# Patient Record
Sex: FEMALE | Race: WHITE | NOT HISPANIC OR LATINO | Employment: OTHER | URBAN - METROPOLITAN AREA
[De-identification: names, ages, dates, MRNs, and addresses within clinical notes are randomized per-mention and may not be internally consistent; named-entity substitution may affect disease eponyms.]

---

## 2017-04-25 ENCOUNTER — APPOINTMENT (OUTPATIENT)
Dept: LAB | Facility: CLINIC | Age: 67
End: 2017-04-25
Payer: MEDICARE

## 2017-04-25 ENCOUNTER — TRANSCRIBE ORDERS (OUTPATIENT)
Dept: LAB | Facility: CLINIC | Age: 67
End: 2017-04-25

## 2017-04-25 DIAGNOSIS — M24.574 CONTRACTURE OF JOINT OF RIGHT FOOT: ICD-10-CM

## 2017-04-25 DIAGNOSIS — Z01.812 PRE-OPERATIVE LABORATORY EXAMINATION: ICD-10-CM

## 2017-04-25 DIAGNOSIS — M20.11 ACQUIRED HALLUX VALGUS OF RIGHT FOOT: Primary | ICD-10-CM

## 2017-04-25 DIAGNOSIS — M20.11 ACQUIRED HALLUX VALGUS OF RIGHT FOOT: ICD-10-CM

## 2017-04-25 LAB
ALBUMIN SERPL BCP-MCNC: 3.6 G/DL (ref 3.5–5)
ALP SERPL-CCNC: 91 U/L (ref 46–116)
ALT SERPL W P-5'-P-CCNC: 20 U/L (ref 12–78)
ANION GAP SERPL CALCULATED.3IONS-SCNC: 6 MMOL/L (ref 4–13)
AST SERPL W P-5'-P-CCNC: 19 U/L (ref 5–45)
BASOPHILS # BLD AUTO: 0 THOUSANDS/ΜL (ref 0–0.1)
BASOPHILS NFR BLD AUTO: 0 % (ref 0–1)
BILIRUB SERPL-MCNC: 0.3 MG/DL (ref 0.2–1)
BUN SERPL-MCNC: 19 MG/DL (ref 5–25)
CALCIUM SERPL-MCNC: 9.3 MG/DL (ref 8.3–10.1)
CHLORIDE SERPL-SCNC: 104 MMOL/L (ref 100–108)
CO2 SERPL-SCNC: 31 MMOL/L (ref 21–32)
CREAT SERPL-MCNC: 1.08 MG/DL (ref 0.6–1.3)
EOSINOPHIL # BLD AUTO: 0.02 THOUSAND/ΜL (ref 0–0.61)
EOSINOPHIL NFR BLD AUTO: 1 % (ref 0–6)
ERYTHROCYTE [DISTWIDTH] IN BLOOD BY AUTOMATED COUNT: 12.4 % (ref 11.6–15.1)
GFR SERPL CREATININE-BSD FRML MDRD: 51.1 ML/MIN/1.73SQ M
GLUCOSE P FAST SERPL-MCNC: 95 MG/DL (ref 65–99)
HCT VFR BLD AUTO: 39.3 % (ref 34.8–46.1)
HGB BLD-MCNC: 13.1 G/DL (ref 11.5–15.4)
LYMPHOCYTES # BLD AUTO: 1.39 THOUSANDS/ΜL (ref 0.6–4.47)
LYMPHOCYTES NFR BLD AUTO: 44 % (ref 14–44)
MCH RBC QN AUTO: 31.3 PG (ref 26.8–34.3)
MCHC RBC AUTO-ENTMCNC: 33.3 G/DL (ref 31.4–37.4)
MCV RBC AUTO: 94 FL (ref 82–98)
MONOCYTES # BLD AUTO: 0.3 THOUSAND/ΜL (ref 0.17–1.22)
MONOCYTES NFR BLD AUTO: 10 % (ref 4–12)
NEUTROPHILS # BLD AUTO: 1.45 THOUSANDS/ΜL (ref 1.85–7.62)
NEUTS SEG NFR BLD AUTO: 45 % (ref 43–75)
PLATELET # BLD AUTO: 240 THOUSANDS/UL (ref 149–390)
PMV BLD AUTO: 9 FL (ref 8.9–12.7)
POTASSIUM SERPL-SCNC: 3.8 MMOL/L (ref 3.5–5.3)
PROT SERPL-MCNC: 7 G/DL (ref 6.4–8.2)
RBC # BLD AUTO: 4.18 MILLION/UL (ref 3.81–5.12)
SODIUM SERPL-SCNC: 141 MMOL/L (ref 136–145)
WBC # BLD AUTO: 3.16 THOUSAND/UL (ref 4.31–10.16)

## 2017-04-25 PROCEDURE — 80053 COMPREHEN METABOLIC PANEL: CPT

## 2017-04-25 PROCEDURE — 85025 COMPLETE CBC W/AUTO DIFF WBC: CPT

## 2017-04-25 PROCEDURE — 36415 COLL VENOUS BLD VENIPUNCTURE: CPT

## 2017-05-02 ENCOUNTER — GENERIC CONVERSION - ENCOUNTER (OUTPATIENT)
Dept: OTHER | Facility: OTHER | Age: 67
End: 2017-05-02

## 2017-05-02 ENCOUNTER — ALLSCRIPTS OFFICE VISIT (OUTPATIENT)
Dept: OTHER | Facility: OTHER | Age: 67
End: 2017-05-02

## 2017-06-30 ENCOUNTER — TRANSCRIBE ORDERS (OUTPATIENT)
Dept: LAB | Facility: CLINIC | Age: 67
End: 2017-06-30

## 2017-06-30 ENCOUNTER — APPOINTMENT (OUTPATIENT)
Dept: LAB | Facility: CLINIC | Age: 67
End: 2017-06-30
Payer: MEDICARE

## 2017-06-30 PROCEDURE — 36415 COLL VENOUS BLD VENIPUNCTURE: CPT | Performed by: INTERNAL MEDICINE

## 2017-06-30 PROCEDURE — 82306 VITAMIN D 25 HYDROXY: CPT | Performed by: INTERNAL MEDICINE

## 2017-07-01 ENCOUNTER — LAB CONVERSION - ENCOUNTER (OUTPATIENT)
Dept: OTHER | Facility: OTHER | Age: 67
End: 2017-07-01

## 2017-07-24 ENCOUNTER — APPOINTMENT (OUTPATIENT)
Dept: LAB | Facility: CLINIC | Age: 67
End: 2017-07-24
Payer: MEDICARE

## 2017-07-24 DIAGNOSIS — D72.819 DECREASED WHITE BLOOD CELL COUNT: ICD-10-CM

## 2017-07-24 LAB
ALBUMIN SERPL BCP-MCNC: 3.7 G/DL (ref 3.5–5)
ALP SERPL-CCNC: 116 U/L (ref 46–116)
ALT SERPL W P-5'-P-CCNC: 24 U/L (ref 12–78)
ANION GAP SERPL CALCULATED.3IONS-SCNC: 10 MMOL/L (ref 4–13)
AST SERPL W P-5'-P-CCNC: 15 U/L (ref 5–45)
BASOPHILS # BLD AUTO: 0 THOUSANDS/ΜL (ref 0–0.1)
BASOPHILS NFR BLD AUTO: 0 % (ref 0–1)
BILIRUB SERPL-MCNC: 0.5 MG/DL (ref 0.2–1)
BUN SERPL-MCNC: 12 MG/DL (ref 5–25)
CALCIUM SERPL-MCNC: 9.3 MG/DL (ref 8.3–10.1)
CHLORIDE SERPL-SCNC: 103 MMOL/L (ref 100–108)
CO2 SERPL-SCNC: 28 MMOL/L (ref 21–32)
CREAT SERPL-MCNC: 0.86 MG/DL (ref 0.6–1.3)
EOSINOPHIL # BLD AUTO: 0.01 THOUSAND/ΜL (ref 0–0.61)
EOSINOPHIL NFR BLD AUTO: 0 % (ref 0–6)
ERYTHROCYTE [DISTWIDTH] IN BLOOD BY AUTOMATED COUNT: 12.8 % (ref 11.6–15.1)
GFR SERPL CREATININE-BSD FRML MDRD: 72 ML/MIN/1.73SQ M
GLUCOSE SERPL-MCNC: 89 MG/DL (ref 65–140)
HCT VFR BLD AUTO: 40.3 % (ref 34.8–46.1)
HGB BLD-MCNC: 13.4 G/DL (ref 11.5–15.4)
LDH SERPL-CCNC: 207 U/L (ref 81–234)
LYMPHOCYTES # BLD AUTO: 1.29 THOUSANDS/ΜL (ref 0.6–4.47)
LYMPHOCYTES NFR BLD AUTO: 33 % (ref 14–44)
MCH RBC QN AUTO: 31 PG (ref 26.8–34.3)
MCHC RBC AUTO-ENTMCNC: 33.3 G/DL (ref 31.4–37.4)
MCV RBC AUTO: 93 FL (ref 82–98)
MONOCYTES # BLD AUTO: 0.25 THOUSAND/ΜL (ref 0.17–1.22)
MONOCYTES NFR BLD AUTO: 6 % (ref 4–12)
NEUTROPHILS # BLD AUTO: 2.33 THOUSANDS/ΜL (ref 1.85–7.62)
NEUTS SEG NFR BLD AUTO: 61 % (ref 43–75)
PLATELET # BLD AUTO: 243 THOUSANDS/UL (ref 149–390)
PMV BLD AUTO: 9.1 FL (ref 8.9–12.7)
POTASSIUM SERPL-SCNC: 3.8 MMOL/L (ref 3.5–5.3)
PROT SERPL-MCNC: 7.4 G/DL (ref 6.4–8.2)
RBC # BLD AUTO: 4.32 MILLION/UL (ref 3.81–5.12)
SODIUM SERPL-SCNC: 141 MMOL/L (ref 136–145)
WBC # BLD AUTO: 3.88 THOUSAND/UL (ref 4.31–10.16)

## 2017-07-24 PROCEDURE — 85025 COMPLETE CBC W/AUTO DIFF WBC: CPT

## 2017-07-24 PROCEDURE — 36415 COLL VENOUS BLD VENIPUNCTURE: CPT

## 2017-07-24 PROCEDURE — 80053 COMPREHEN METABOLIC PANEL: CPT

## 2017-07-24 PROCEDURE — 83615 LACTATE (LD) (LDH) ENZYME: CPT

## 2017-07-28 ENCOUNTER — ALLSCRIPTS OFFICE VISIT (OUTPATIENT)
Dept: OTHER | Facility: OTHER | Age: 67
End: 2017-07-28

## 2017-10-16 ENCOUNTER — TRANSCRIBE ORDERS (OUTPATIENT)
Dept: LAB | Facility: CLINIC | Age: 67
End: 2017-10-16

## 2017-10-16 ENCOUNTER — APPOINTMENT (OUTPATIENT)
Dept: LAB | Facility: CLINIC | Age: 67
End: 2017-10-16
Payer: MEDICARE

## 2017-10-16 DIAGNOSIS — Z01.812 PRE-OPERATIVE LABORATORY EXAMINATION: ICD-10-CM

## 2017-10-16 DIAGNOSIS — M20.22 HALLUX RIGIDUS OF LEFT FOOT: ICD-10-CM

## 2017-10-16 DIAGNOSIS — M20.22 HALLUX RIGIDUS OF LEFT FOOT: Primary | ICD-10-CM

## 2017-10-16 LAB
ALBUMIN SERPL BCP-MCNC: 3.6 G/DL (ref 3.5–5)
ALP SERPL-CCNC: 99 U/L (ref 46–116)
ALT SERPL W P-5'-P-CCNC: 30 U/L (ref 12–78)
ANION GAP SERPL CALCULATED.3IONS-SCNC: 6 MMOL/L (ref 4–13)
AST SERPL W P-5'-P-CCNC: 27 U/L (ref 5–45)
BASOPHILS # BLD AUTO: 0 THOUSANDS/ΜL (ref 0–0.1)
BASOPHILS NFR BLD AUTO: 0 % (ref 0–1)
BILIRUB SERPL-MCNC: 0.4 MG/DL (ref 0.2–1)
BUN SERPL-MCNC: 20 MG/DL (ref 5–25)
CALCIUM SERPL-MCNC: 9.2 MG/DL (ref 8.3–10.1)
CHLORIDE SERPL-SCNC: 103 MMOL/L (ref 100–108)
CO2 SERPL-SCNC: 30 MMOL/L (ref 21–32)
CREAT SERPL-MCNC: 0.77 MG/DL (ref 0.6–1.3)
EOSINOPHIL # BLD AUTO: 0.09 THOUSAND/ΜL (ref 0–0.61)
EOSINOPHIL NFR BLD AUTO: 2 % (ref 0–6)
ERYTHROCYTE [DISTWIDTH] IN BLOOD BY AUTOMATED COUNT: 12.7 % (ref 11.6–15.1)
GFR SERPL CREATININE-BSD FRML MDRD: 82 ML/MIN/1.73SQ M
GLUCOSE SERPL-MCNC: 93 MG/DL (ref 65–140)
HCT VFR BLD AUTO: 40.8 % (ref 34.8–46.1)
HGB BLD-MCNC: 14 G/DL (ref 11.5–15.4)
LYMPHOCYTES # BLD AUTO: 1.18 THOUSANDS/ΜL (ref 0.6–4.47)
LYMPHOCYTES NFR BLD AUTO: 29 % (ref 14–44)
MCH RBC QN AUTO: 31.9 PG (ref 26.8–34.3)
MCHC RBC AUTO-ENTMCNC: 34.3 G/DL (ref 31.4–37.4)
MCV RBC AUTO: 93 FL (ref 82–98)
MONOCYTES # BLD AUTO: 0.31 THOUSAND/ΜL (ref 0.17–1.22)
MONOCYTES NFR BLD AUTO: 8 % (ref 4–12)
NEUTROPHILS # BLD AUTO: 2.56 THOUSANDS/ΜL (ref 1.85–7.62)
NEUTS SEG NFR BLD AUTO: 61 % (ref 43–75)
PLATELET # BLD AUTO: 277 THOUSANDS/UL (ref 149–390)
PMV BLD AUTO: 9 FL (ref 8.9–12.7)
POTASSIUM SERPL-SCNC: 4.3 MMOL/L (ref 3.5–5.3)
PROT SERPL-MCNC: 7.3 G/DL (ref 6.4–8.2)
RBC # BLD AUTO: 4.39 MILLION/UL (ref 3.81–5.12)
SODIUM SERPL-SCNC: 139 MMOL/L (ref 136–145)
WBC # BLD AUTO: 4.14 THOUSAND/UL (ref 4.31–10.16)

## 2017-10-16 PROCEDURE — 36415 COLL VENOUS BLD VENIPUNCTURE: CPT

## 2017-10-16 PROCEDURE — 80053 COMPREHEN METABOLIC PANEL: CPT

## 2017-10-16 PROCEDURE — 85025 COMPLETE CBC W/AUTO DIFF WBC: CPT

## 2017-10-23 ENCOUNTER — ALLSCRIPTS OFFICE VISIT (OUTPATIENT)
Dept: OTHER | Facility: OTHER | Age: 67
End: 2017-10-23

## 2018-01-09 NOTE — RESULT NOTES
Message   Recorded as Task   Date: 03/25/2016 11:17 AM, Created By: Maverick Fields   Task Name: Follow Up   Assigned To: [facility],Team   Regarding Patient: Samir Lizarraga, Status: Active   Comment:    Haily Delatorre - 25 Mar 2016 11:17 AM     TASK CREATED  S/P Thoracic PNS trial 3-24-16    Lead pull 3-30-16    Spoke with pt who states she is doing well and she "thinks it's going to work "  States she has been making adjustments to the programs and figuring out what works best for her  States she slept well last night  States her pain level went from 8-9/10 to 3-4/10  States she is getting good coverage to upper back but is asking if when permanent placed,can they place it an inch higher? ?  Pt started po antibiotics,denies s/s of infection/fever  States dressing D/I  Confirmed lead pull date with pt and advised to cb with any concerns     Elba Ellis - 25 Mar 2016 12:17 PM     TASK REPLIED TO: Previously Assigned To CATY Méndez md aware        Signatures   Electronically signed by : Vicente Franco, ; Mar 25 2016 12:18PM EST                       (Author)

## 2018-01-11 NOTE — MISCELLANEOUS
Message  Message Free Text Note Form: I spoke with patient this evening  She was seen in the ER on 11/8 with right-sided chest pain  no shortness of breath  no cough  no fevers  she had multiple diagnostic studies  ER report reviewed  She has had symptoms off and on for the last 4-5 weeks  she is currently taking no pain medicines  She tried an over-the-counter pain patch  She received a small prescription for Vicodin from the ER which she did not fill  I suggested Advil or Aleve as well as needed Vicodin  Follow-up with office next week  Advised to go to ER for any worsening of symptoms        Signatures   Electronically signed by : RALEIGH Damon ; Nov 12 2016  5:18PM EST                       (Author)

## 2018-01-12 NOTE — CONSULTS
Chief Complaint  Pt here for pre-op  R foot  11/3/2017       History of Present Illness  Pre-Op Visit (Brief): The patient is being seen for a preoperative visit  Surgical Risk Assessment:   Prior Anesthesia: She had prior anesthesia, no prior adverse reaction to edidural anesthesia, no prior adverse reaction to spinal anesthesia and no prior adverse reaction to general anesthesia  Pertinent Past Medical History: no CAD, no chronic liver disease, no acute hepatitis, no coagulation delay, no primary hypercoagulable state, no secondary hypercoagulable state, no diabetes, no CVA, no COPD and no renal disease  Exercise Capacity: able to walk four blocks without symptoms and able to walk two flights of stairs without symptoms  Lifestyle Factors: denies tobacco use and denies illegal drug use  Symptoms: no symptoms  Pertinent Family History: no pertinent family history  Living Situation: home is secure and supportive  HPI:   Left foot bunionectomy + repair of toe deformity scheduled 11/3 with Dr Doc Bradshaw  Monitored anesthesia  Had right foot surgery back in May  Went well, no complications  Looking forward to getting other foot done  No pain at present  No analgesics  No acute complaints  PMH, PSH, FH reviewed  No perioperative issues  PE years ago (1980's)  Subsequent negative w/u  No recurrent clotting issues since  PAT results from 10/16 reviewed (CBC, CMP)-->WNL  Review of Systems    Constitutional: no fever  ENT: no sore throat  Cardiovascular: no chest pain and no palpitations  Respiratory: no shortness of breath and no cough  Gastrointestinal: no abdominal pain, no nausea, no vomiting, no constipation, no diarrhea and no blood in stools  Genitourinary: no dysuria  Neurological: no headache and no dizziness  Hematologic/Lymphatic: no swollen glands, no tendency for easy bleeding and no tendency for easy bruising  Active Problems    1   History of Asthma, mild intermittent (493 90) (J45 20)   2  Back pain, chronic (724 5,338 29) (M54 9,G89 29)   3  Bunion, right foot (727 1) (M21 611)   4  Cervical post-laminectomy syndrome (722 81) (M96 1)   5  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) (M47 814)   6  Encounter for postoperative care (V58 49) (Z48 89)   7  Encounter for screening mammogram for breast cancer (V76 12) (Z12 31)   8  Foot pain, bilateral (729 5) (M79 671,M79 672)   9  History of anemia (V12 3) (Z86 2)   10  History of gastroesophageal reflux (GERD) (V12 79) (Z87 19)   11  History of vitamin D deficiency (V12 1) (Z86 39)   12  Laboratory exam ordered as part of routine general medical examination (V72 62)    (Z00 00)   13  History of Left hip pain (719 45) (M25 552)   14  Leukopenia (288 50) (D72 819)   15  Myofascial pain syndrome (729 1) (M79 1)   16  Osteopenia (733 90) (M85 80)   17  Pain syndrome, chronic (338 4) (G89 4)   18  Postprocedural state (V45 89) (Z98 890)   19  Preoperative examination (V72 84) (Z01 818)   20  Preoperative examination (V72 84) (Z01 818)   21  Thoracic neuralgia (729 2) (M79 2)   22  Thoracic spondylosis without myelopathy (721 2) (M47 814)   23  History of Toe deformity (735 9) (M20 60)   24   Vitiligo (709 01) (L80)    Past Medical History    · History of Anxiety disorder (300 00) (F41 9)   · History of Asthma (493 90) (J45 909)   · History of Asthma, mild intermittent (493 90) (J45 20)   · Bunion, right foot (727 1) (M21 611)   · History of Cervical cancer screening (V76 2) (Z12 4)   · Deformity of toe of left foot (735 9) (M20 62)   · History of Discitis of thoracolumbar region (722 92) (M46 45)   · Foot pain, bilateral (729 5) (M79 671,M79 672)   · History of Fracture Of Wrist And Hand (818)   · History of abdominal pain (V13 89) (T65 850)   · History of anemia (V12 3) (Z86 2)   · History of arthritis (V13 4) (Z87 39)   · History of backache (V13 59) (Z87 39)   · History of fall (V15 88) (Z91 81)   · History of fibrocystic disease of breast (V13 89) (Z87 898)   · History of gastroesophageal reflux (GERD) (V12 79) (Z87 19)   · History of heartburn (V12 79) (T99 941)   · History of hypercholesterolemia (V12 29) (Z86 39)   · History of low back pain (V13 59) (Z87 39)   · History of nausea (V12 79) (G82 982)   · History of polyarthritis (V13 4) (Z87 39)   · History of seasonal allergies (V15 09) (Z88 9)   · History of shortness of breath (V13 89) (B00 083)   · History of sleep disturbance (V13 89) (Z87 898)   · History of vitamin D deficiency (V12 1) (Z86 39)   · History of Indigestion (536 8) (K30)   · Laboratory exam ordered as part of routine general medical examination (V72 62)  (Z00 00)   · History of Left hip pain (719 45) (M25 552)   · Leukopenia (288 50) (D72 819)   · Osteopenia (733 90) (M85 80)   · Preoperative examination (V72 84) (Z01 818)   · Preoperative examination (V72 84) (Z01 818)   · History of Sore throat (462) (J02 9)   · History of Toe deformity (735 9) (M20 60)   · History of Trochanteric bursitis of left hip (726 5) (M70 62)   · Vitiligo (709 01) (L80)   · History of Wears eyeglasses (V49 89) (Z97 3)    The active problems and past medical history were reviewed and updated today  Surgical History    · History of Back Surgery   · History of  Section   · History of Cholecystectomy   · History of Hand Surgery   · History of Install Peripheral Nerve Neurostimulator By Incision   · History of Neck Surgery   · History of Neuroplasty Decompression Median Nerve At Carpal Tunnel   · History of Neuroplasty Ulnar Nerve    The surgical history was reviewed and updated today         Family History    · Family history of dementia (V17 2) (Z81 8)    · Family history of Emphysema lung   · Family history of lung cancer (V16 1) (Z80 1)    · Family history of gangrene (V19 4) (Z84 0)    · Family history of Hypertension (V17 49)   · Family history of Reported Prior Back Trouble    The family history was reviewed and updated today  Social History    · Denied: History of Alcohol Use (History)   · Denied: History of Currently sexually active   · High school or GED   · Marital History - Currently    · Never smoked tobacco (V49 89) (Z78 9)   · No drug use   · No known risk factors   · No known STD risk factors   · Occasional alcohol use   · Physical Disability:   · Denied: History of Tobacco Use   · Two children  The social history was reviewed and updated today  The social history was reviewed and is unchanged  Current Meds   1  Melatonin 3 MG Oral Capsule; TAKE 1 CAPSULE AT BEDTIME AS NEEDED; Therapy: (Recorded:07Hqz3239) to Recorded   2  Omeprazole 20 MG Oral Capsule Delayed Release; TAKE 1 CAPSULE ONCE DAILY AS   NEEDED FOR REFLUX; Therapy: 54AHD0687 to (Mathew Hatfield)  Requested for: 52JPL5752; Last   Rx:39Aoz9823 Ordered   3  Vitamin B-12 1000 MCG Oral Tablet; TAKE 1 TABLET DAILY AS DIRECTED; Therapy: (Recorded:32Uhm3503) to Recorded   4  Vitamin D CAPS; take 1 capsule daily; Therapy: (Recorded:58Qzn4693) to Recorded    The medication list was reviewed and updated today  Allergies    1  Lyrica CAPS   2  Percocet TABS   3  Influenza (Split PF)    Vitals  Signs    Temperature: 98 F, Tympanic  Heart Rate: 74  Systolic: 712  Diastolic: 80  Height: 5 ft 1 in  Weight: 146 lb 6 oz  BMI Calculated: 27 66  BSA Calculated: 1 65  O2 Saturation: 98    Physical Exam    Constitutional   General appearance: No acute distress, well appearing and well nourished  Head and Face   Head and face: Normal     Eyes   Conjunctiva and lids: No swelling, erythema or discharge  Pupils and irises: Equal, round, reactive to light  Ears, Nose, Mouth, and Throat   External inspection of ears and nose: Normal     Otoscopic examination: Tympanic membranes translucent with normal light reflex  Canals patent without erythema  Nasal mucosa, septum, and turbinates: Normal without edema or erythema  Oropharynx: Normal with no erythema, edema, exudate or lesions  Neck   Neck: Supple, symmetric, trachea midline, no masses  Pulmonary   Respiratory effort: No increased work of breathing or signs of respiratory distress  Auscultation of lungs: Clear to auscultation  Cardiovascular   Auscultation of heart: Normal rate and rhythm, normal S1 and S2, no murmurs  Carotid pulses: 2+ bilaterally  Examination of extremities for edema and/or varicosities: Normal     Abdomen   Abdomen: Non-tender, no masses  Liver and spleen: No hepatomegaly or splenomegaly  Lymphatic   Palpation of lymph nodes in neck: No lymphadenopathy  Musculoskeletal   Gait and station: Normal     Joints, bones, and muscles: Abnormal   Left foot toe deformity  Psychiatric   Orientation to person, place, and time: Normal     Mood and affect: Normal        Results/Data  EKG/ECG- POC 23Oct2017 08:15AM Lor Hernandez     Test Name Result Flag Reference   EKG/ECG 10/23/2017       A 12 lead ECG was performed and was normal    Rhythm and rate: normal sinus rhythm  P-waves: the P wave is normal    QRS: the QRS is normal    ST segment: the ST segments are normal    Comparison to prior ECGs:  no interval change  Assessment    1  Preoperative examination (V72 84) (Z01 818)   2  Deformity of toe of left foot (735 9) (M20 62)    Plan  Preoperative examination    · EKG/ECG- POC; Status:Complete - Retrospective By Protocol Authorization;   Done:  32PMV2697 08:15AM    Discussion/Summary  Surgical Clearance: She is at a LOW risk from a cardiovascular standpoint at this time without any additional cardiac testing  Reevaluation needed, if she should present with symptoms prior to surgery/procedure  Surgical clearance faxed to Dr Ross Castrejon   End of Encounter Meds    1  Vitamin B-12 1000 MCG Oral Tablet; TAKE 1 TABLET DAILY AS DIRECTED; Therapy: (Recorded:04Whe9970) to Recorded    2   Omeprazole 20 MG Oral Capsule Delayed Release; TAKE 1 CAPSULE ONCE DAILY AS   NEEDED FOR REFLUX; Therapy: 81QKW4498 to (78 169 016)  Requested for: 28FNP5249; Last   Rx:39Lmn5652 Ordered    3  Melatonin 3 MG Oral Capsule; TAKE 1 CAPSULE AT BEDTIME AS NEEDED; Therapy: (Recorded:28Miq8189) to Recorded   4  Vitamin D CAPS; take 1 capsule daily;    Therapy: (Recorded:65Sqg4506) to Recorded    Signatures   Electronically signed by : KOBI Ibrahim; Oct 23 2017  8:46AM EST                       (Author)    Electronically signed by : Tommy Ramachandran DO; Oct 23 2017  8:48AM EST                       (Author)

## 2018-01-13 VITALS
SYSTOLIC BLOOD PRESSURE: 132 MMHG | OXYGEN SATURATION: 98 % | TEMPERATURE: 98 F | DIASTOLIC BLOOD PRESSURE: 80 MMHG | HEART RATE: 74 BPM | BODY MASS INDEX: 27.64 KG/M2 | WEIGHT: 146.38 LBS | HEIGHT: 61 IN

## 2018-01-13 NOTE — MISCELLANEOUS
Message   Recorded as Task   Date: 01/15/2016 11:09 AM, Created By: Jeff Pierre   Task Name: Care Coordination   Assigned To: SPA stim,Team   Regarding Patient: PARTH ARAIZA, Status: In Progress   Comment:    Agnieszka Jefferson - 15 Sesar 2016 11:09 AM     TASK CREATED  Pt to be a [facility] Sal PNS trial with Dr Isma Crawford  Pt signed release of info  Pt received [facility] DVD  pt given script for psych eval and list of providers  Pt scheduled for education on 1/20/16 in the [facility] office, email sent to Yolande from [facility]  Pt denies any blood thinning meds or ASA  Agnieszka Jefferson - 15 Sesar 2016 11:09 AM     TASK IN PROGRESS   Agnieszka Jefferson - 10 Feb 2016 12:36 PM     TASK EDITED  Spoke to pt who reports she had her psych eval with Dr Bridgette Lynn last week  Advised pt that once we received the eval I will call her as to next step  Agnieszka Jefferson - 15 Feb 2016 11:29 AM     TASK EDITED  Received psych eval and it was given to Dr Isma Crawford for review  Agnieszka Jefferson - 24 Feb 2016 2:11 PM     TASK EDITED  Verified benefits through medicare, no PA can be done  Pt can be scheduled for PNFS  Spoke to pt and she is scheduled as follows:  3/8/16 @ 1330 to sign consents  3/24/16 arriving @ 1000 for 1045 trial  3/30/16 @ 1100 for lead removal  pt denied any blood thinning meds  NKDA  Email sent to [facility] to imform them of trial dates  Pre procedure paperwork complete        Active Problems    1  Anemia (285 9) (D64 9)   2  Asthma, mild intermittent (493 90) (J45 20)   3  Back pain, chronic (724 5,338 29) (M54 9,G89 29)   4  History of Calf pain (729 5) (M79 669)   5  Cervical post-laminectomy syndrome (722 81) (M96 1)   6  Degeneration of thoracic or thoracolumbar intervertebral disc (722 51)   7  Degenerative joint disease of thoracic spine (721 2) (M47 814)   8  Discitis of thoracolumbar region (722 92) (M46 45)   9  Encounter for postoperative care (V58 49) (Z48 89)   10   Encounter for screening mammogram for breast cancer (V76 12) (Z12 31)   11  History of gastroesophageal reflux (GERD) (V12 79) (Z87 19)   12  History of oral aphthous ulcers (V12 79) (Z87 19)   13  Inflammation, skin (686 9) (L08 9)   14  Laboratory exam ordered as part of routine general medical examination (V72 62)    (Z00 00)   15  Left hip pain (719 45) (M25 552)   16  Leukopenia (288 50) (D72 819)   17  Lower back pain (724 2) (M54 5)   18  Myofascial pain syndrome (729 1) (M79 1)   19  Osteopenia (733 90) (M85 80)   20  Osteoporosis screening (V82 81) (Z13 820)   21  Pain syndrome, chronic (338 4) (G89 4)   22  Preoperative examination (V72 84) (Z01 818)   23  Pre-procedural laboratory examination (V72 63) (Z01 812)   24  Screening for colon cancer (V76 51) (Z12 11)   25  Status post surgery (V45 89) (Z98 89)   26  Thoracic disc disorder (722 92) (M51 9)   27  Thoracic neuralgia (729 2) (M79 2)   28  Thoracic spondylosis without myelopathy (721 2) (M47 814)   29  Toe deformity (735 9) (M20 60)   30  Trochanteric bursitis of left hip (726 5) (M70 62)   31  Vitamin D insufficiency (268 9) (E55 9)   32  Vitiligo (709 01) (L80)    Current Meds   1  Cephalexin 500 MG Oral Capsule (Keflex); TAKE 1 CAPSULE 4 TIMES DAILY UNTIL   GONE;   Therapy: 22VMP6321 to (Evaluate:02Mar2016); Last Rx:67Oyx2447 Ordered   2  Fluocinonide 0 05 % External Ointment; APPLY SPARINGLY TO AFFECTED AREAS   ONCE  A DAY  USE NO LONGER THAN 2 WEEKS; Therapy: 75Dob2136 to (Last Rx:95Jfx9110)  Requested for: 22Dec2015 Ordered   3  Hydrocodone-Acetaminophen 5-325 MG Oral Tablet; Take 1 tablet every 6 hours as   needed for severe pain; Therapy: 78AMG9973 to (Last Rx:11Jan2016) Ordered   4  Melatonin 3 MG Oral Capsule; TAKE 1 CAPSULE AT BEDTIME AS NEEDED; Therapy: (Recorded:70Qbw8590) to Recorded   5  Methocarbamol 750 MG Oral Tablet; TAKE 1 TABLET 4 TIMES DAILY AS NEEDED; Therapy: 87ZSQ2766 to (Evaluate:14Jan2016)  Requested for: 13Suv9182; Last   Rx:59Ppo8549 Ordered   6   Omeprazole 20 MG Oral Capsule Delayed Release; TAKE 1 CAPSULE ONCE DAILY AS   NEEDED FOR REFLUX; Therapy: 47KAA6342 to (Irlanda Credit)  Requested for: 29Anz4260; Last   Rx:98Yvh2669 Ordered   7  Vitamin B-12 1000 MCG Oral Tablet; TAKE 1 TABLET DAILY AS DIRECTED; Therapy: (Recorded:23Zlf7171) to Recorded   8  Vitamin D CAPS; take 1 capsule daily; Therapy: (Recorded:51Yuz7435) to Recorded    Allergies    1  Lyrica CAPS   2  Percocet TABS   3   Influenza (Split PF)    Signatures   Electronically signed by : Donald Gold, ; Feb 29 2016  7:38AM EST                       (Author)

## 2018-01-14 VITALS
BODY MASS INDEX: 27.38 KG/M2 | TEMPERATURE: 98.1 F | WEIGHT: 145 LBS | HEART RATE: 80 BPM | SYSTOLIC BLOOD PRESSURE: 128 MMHG | DIASTOLIC BLOOD PRESSURE: 86 MMHG | HEIGHT: 61 IN | OXYGEN SATURATION: 99 % | RESPIRATION RATE: 18 BRPM

## 2018-01-14 VITALS
BODY MASS INDEX: 27.56 KG/M2 | DIASTOLIC BLOOD PRESSURE: 80 MMHG | OXYGEN SATURATION: 97 % | WEIGHT: 146 LBS | TEMPERATURE: 97.4 F | SYSTOLIC BLOOD PRESSURE: 122 MMHG | HEIGHT: 61 IN | HEART RATE: 69 BPM | RESPIRATION RATE: 16 BRPM

## 2018-01-15 NOTE — MISCELLANEOUS
Message  Contacted patient and preop instruction were given  Patient verbalized agreement and will call the office with any questions or problems  Active Problems    1  History of Asthma, mild intermittent (493 90) (J45 20)   2  Back pain, chronic (724 5,338 29) (M54 9,G89 29)   3  Cervical post-laminectomy syndrome (722 81) (M96 1)   4  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) (M47 814)   5  Encounter for screening mammogram for breast cancer (V76 12) (Z12 31)   6  History of anemia (V12 3) (Z86 2)   7  History of gastroesophageal reflux (GERD) (V12 79) (Z87 19)   8  Laboratory exam ordered as part of routine general medical examination (V72 62)   (Z00 00)   9  History of Left hip pain (719 45) (M25 552)   10  Leukopenia (288 50) (D72 819)   11  Myofascial pain syndrome (729 1) (M79 1)   12  Osteopenia (733 90) (M85 80)   13  Pain syndrome, chronic (338 4) (G89 4)   14  Postprocedural state (V45 89) (Z98 89)   15  Preoperative examination (V72 84) (Z01 818)   16  Thoracic neuralgia (729 2) (M79 2)   17  Thoracic spondylosis without myelopathy (721 2) (M47 814)   18  History of Toe deformity (735 9) (M20 60)   19  Vitamin D deficiency (268 9) (E55 9)   20  Vitiligo (709 01) (L80)    Current Meds   1  Cephalexin 500 MG Oral Capsule; TAKE 1 CAPSULE 4 TIMES DAILY; Therapy: 49CWY1343 to (Evaluate:73Kzl1645)  Requested for: 19BAO4522; Last   Rx:96Bje3231; Status: ACTIVE - Retrospective By Protocol Authorization Ordered   2  Melatonin 3 MG Oral Capsule; TAKE 1 CAPSULE AT BEDTIME AS NEEDED; Therapy: (Recorded:33Vcs0635) to Recorded   3  Methocarbamol 750 MG Oral Tablet; TAKE 1 TABLET 4 TIMES DAILY AS NEEDED; Therapy: 84SMC6100 to (Evaluate:70Ojs4931)  Requested for: 21Apr2016; Last   Rx:21Apr2016 Ordered   4  Morphine Sulfate 15 MG Oral Tablet; take 1 tablet every 4 hours as needed for pain; Therapy: 96KAT3424 to (Evaluate:12Czu3658);  Last Rx:22Bwd4969; Status: ACTIVE -   Retrospective By Protocol Authorization Ordered   5  Naproxen Sodium 220 MG Oral Tablet; two  by mouth twice per day  Requested for:   15Apr2016; Last Rx:15Apr2016 Ordered   6  Omeprazole 20 MG Oral Capsule Delayed Release; TAKE 1 CAPSULE ONCE DAILY AS   NEEDED FOR REFLUX; Therapy: 68JVT9265 to (Evaluate:20Apr2017)  Requested for: 25Apr2016; Last   Rx:25Apr2016 Ordered   7  Vitamin B-12 1000 MCG Oral Tablet; TAKE 1 TABLET DAILY AS DIRECTED; Therapy: (Recorded:66Tlw0866) to Recorded   8  Vitamin D CAPS; take 1 capsule daily; Therapy: (Recorded:68Tqp1707) to Recorded    Allergies    1  Lyrica CAPS   2  Percocet TABS   3   Influenza (Split PF)    Signatures   Electronically signed by : Aura Ornelas RN; Jul 11 2016  9:07AM EST                       (Author)

## 2018-01-18 NOTE — RESULT NOTES
Discussion/Summary   Vitamin D level remains at lower normal range  Would benefit from daily vitamin D supplement  Will discuss at upcoming office visit next week  Verified Results  (1) VITAMIN D 25-HYDROXY 30Jun2017 06:15AM Ze Gomes    Order Number: [id number]     Test Name Result Flag Reference   VIT D 25-HYDROX 32 7 ng/mL  30 0-100 0   This assay is a certified procedure of the CDC Vitamin D Standardization Certification Program (VDSCP)     Deficiency <20ng/ml   Insufficiency 20-30ng/ml   Sufficient  ng/ml     *Patients undergoing fluorescein dye angiography may retain small amounts of fluorescein in the body for 48-72 hours post procedure  Samples containing fluorescein can produce falsely elevated Vitamin D values  If the patient had this procedure, a specimen should be resubmitted post fluorescein clearance

## 2018-02-10 ENCOUNTER — APPOINTMENT (EMERGENCY)
Dept: CT IMAGING | Facility: HOSPITAL | Age: 68
End: 2018-02-10
Payer: MEDICARE

## 2018-02-10 ENCOUNTER — HOSPITAL ENCOUNTER (EMERGENCY)
Facility: HOSPITAL | Age: 68
Discharge: HOME | End: 2018-02-10
Attending: EMERGENCY MEDICINE | Admitting: EMERGENCY MEDICINE
Payer: MEDICARE

## 2018-02-10 VITALS
SYSTOLIC BLOOD PRESSURE: 148 MMHG | OXYGEN SATURATION: 98 % | RESPIRATION RATE: 18 BRPM | DIASTOLIC BLOOD PRESSURE: 77 MMHG | BODY MASS INDEX: 28.16 KG/M2 | WEIGHT: 153 LBS | HEART RATE: 76 BPM | TEMPERATURE: 98 F | HEIGHT: 62 IN

## 2018-02-10 DIAGNOSIS — E86.0 MILD DEHYDRATION: ICD-10-CM

## 2018-02-10 DIAGNOSIS — S06.0X9A CONCUSSION: ICD-10-CM

## 2018-02-10 DIAGNOSIS — S09.90XA INJURY OF HEAD, INITIAL ENCOUNTER: Primary | ICD-10-CM

## 2018-02-10 DIAGNOSIS — S16.1XXA STRAIN OF NECK MUSCLE, INITIAL ENCOUNTER: ICD-10-CM

## 2018-02-10 LAB
ANION GAP BLD CALC-SCNC: 13 MMOL/L (ref 4–13)
BUN BLD-MCNC: 22 MG/DL (ref 5–25)
CA-I BLD-SCNC: 1.15 MMOL/L (ref 1.12–1.32)
CHLORIDE BLD-SCNC: 103 MMOL/L (ref 100–108)
CREAT BLD-MCNC: 1 MG/DL (ref 0.6–1.3)
GFR SERPL CREATININE-BSD FRML MDRD: 59 ML/MIN/1.73SQ M
GLUCOSE SERPL-MCNC: 101 MG/DL (ref 65–140)
HCT VFR BLD CALC: 42 % (ref 34.8–46.1)
HGB BLDA-MCNC: 14.3 G/DL (ref 11.5–15.4)
PCO2 BLD: 28 MMOL/L (ref 21–32)
POTASSIUM BLD-SCNC: 3.7 MMOL/L (ref 3.5–5.3)
SODIUM BLD-SCNC: 140 MMOL/L (ref 136–145)
SPECIMEN SOURCE: NORMAL

## 2018-02-10 PROCEDURE — 80047 BASIC METABLC PNL IONIZED CA: CPT

## 2018-02-10 PROCEDURE — 99284 EMERGENCY DEPT VISIT MOD MDM: CPT

## 2018-02-10 PROCEDURE — 72125 CT NECK SPINE W/O DYE: CPT

## 2018-02-10 PROCEDURE — 85014 HEMATOCRIT: CPT

## 2018-02-10 PROCEDURE — 70450 CT HEAD/BRAIN W/O DYE: CPT

## 2018-02-10 RX ORDER — IBUPROFEN 400 MG/1
400 TABLET ORAL ONCE
Status: COMPLETED | OUTPATIENT
Start: 2018-02-10 | End: 2018-02-10

## 2018-02-10 RX ADMIN — IBUPROFEN 400 MG: 400 TABLET, FILM COATED ORAL at 03:03

## 2018-02-10 NOTE — ED PROVIDER NOTES
History  Chief Complaint   Patient presents with    Fall     patient reports falling and slipping on ice today and can't remember the episode and driving home  family reported seeing her fall with +LOC  no blood thinners  patient tearful due to "i can't remember"  patient now c/o back of head pain and right shoulder  patient states she is not nauseas, no photophobia and no vomiting at this time  Patient is a 74year old female who slipped on ice today and fell and struck her head  Patient does not remember incident or driving home afterwards  (+) headache and neck pain  ?LOC  Grandson witnessed fall  Was last seen in this ED on 11/8/16 for right chest pain  Declines pain medication  [facility] this patient and last Rx filled was on 11/13/17 for tramadol for 8 day supply  No N/V  History provided by:  Patient and spouse   used: No    Fall   Associated symptoms: headaches and neck pain    Associated symptoms: no nausea and no vomiting        Prior to Admission Medications   Prescriptions Last Dose Informant Patient Reported? Taking? HYDROcodone-acetaminophen (NORCO) 5-325 mg per tablet   No No   Sig: Take 1 tablet by mouth every 6 (six) hours as needed for pain for up to 10 doses Max Daily Amount: 4 tablets   lidocaine (LIDODERM) 5 %   No No   Sig: Place 1 patch on the skin every 24 hours Remove & Discard patch within 12 hours or as directed by MD   omeprazole (PriLOSEC) 20 mg delayed release capsule   Yes No   Sig: Take 20 mg by mouth daily        Facility-Administered Medications: None       Past Medical History:   Diagnosis Date    Back pain        Past Surgical History:   Procedure Laterality Date    BACK SURGERY      4 back surgeries, fusion, bone replacement    CARPAL TUNNEL RELEASE Bilateral     CHOLECYSTECTOMY      HERNIA REPAIR      SPINAL CORD STIMULATOR IMPLANT N/A 7/12/2016    Procedure: PLACEMENT OF THORACIC PARASPINAL PERIPHERAL NERVE STIMULATING ELECTODES WITH LEFT BUTTOCK GENERATOR;  Surgeon: Brenden Marrero MD;  Location: [facility];  Service:    [facility] [place] NERVE REPAIR Bilateral        History reviewed  No pertinent family history  I have reviewed and agree with the history as documented  Social History   Substance Use Topics    Smoking status: Never Smoker    Smokeless tobacco: Never Used    Alcohol use No        Review of Systems   Gastrointestinal: Negative for nausea and vomiting  Musculoskeletal: Positive for neck pain  Neurological: Positive for headaches  All other systems reviewed and are negative  Physical Exam  ED Triage Vitals [02/09/18 2233]   Temperature Pulse Respirations Blood Pressure SpO2   98 °F (36 7 °C) 81 16 164/85 98 %      Temp Source Heart Rate Source Patient Position - Orthostatic VS BP Location FiO2 (%)   Oral Monitor Sitting Left arm --      Pain Score       5           Orthostatic Vital Signs  Vitals:    02/09/18 2233 02/10/18 0252   BP: 164/85 148/77   Pulse: 81 76   Patient Position - Orthostatic VS: Sitting Lying       Physical Exam   Constitutional: She is oriented to person, place, and time  She appears well-developed and well-nourished  She appears distressed (mild)  HENT:   Head: Normocephalic  Mouth/Throat: Oropharynx is clear and moist    Eyes: EOM are normal  Pupils are equal, round, and reactive to light  Neck: Normal range of motion  Neck supple  Paravertebral cervical tenderness  Cardiovascular: Normal rate, regular rhythm and normal heart sounds  No murmur heard  Pulmonary/Chest: Effort normal and breath sounds normal  No stridor  No respiratory distress  Abdominal: Soft  Bowel sounds are normal  There is no tenderness  Musculoskeletal: She exhibits no edema, tenderness or deformity  Neurological: She is alert and oriented to person, place, and time  Skin: Skin is warm and dry  No rash noted  Psychiatric: She has a normal mood and affect     Nursing note and vitals reviewed  ED Medications  Medications   ibuprofen (MOTRIN) tablet 400 mg (not administered)       Diagnostic Studies  Results Reviewed     Procedure Component Value Units Date/Time    POCT Chem 8+ [[id number]]  (Normal) Collected:  02/10/18 0056    Lab Status:  Final result Updated:  02/10/18 0101     SODIUM, I-STAT 140 mmol/l      Potassium, i-STAT 3 7 mmol/L      Chloride, istat 103 mmol/L      CO2, i-STAT 28 mmol/L      Anion Gap, Istat 13 mmol/L      Calcium, Ionized i-STAT 1 15 mmol/L      BUN, I-STAT 22 mg/dl      Creatinine, i-STAT 1 0 mg/dl      eGFR 59 ml/min/1 73sq m      Glucose, i-STAT 101 mg/dl      Hct, i-STAT 42 %      Hgb, i-STAT 14 3 g/dl      Specimen Type VENOUS                 CT head without contrast   ED Interpretation by Elijah Daniel MD (02/10 9670)   COMPARISON:   No relevant prior studies available  FINDINGS:   Brain: No acute intracranial hemorrhage  No acute infarct  No significant white matter disease  Midline shift: No midline shift  Ventricles: Unremarkable  No ventriculomegaly  Bones/joints: Unremarkable  No acute fracture  Soft tissues: Unremarkable  Sinuses: Sinus mucosal disease  Mastoid air cells: Unremarkable as visualized  No mastoid effusion  IMPRESSION:   No acute intracranial hemorrhage  Thank you for allowing us to participate in the care of your patient  Dictated and Authenticated by: Faiza Robles MD   02/10/2018 2:35 AM [place] Time ([address])      CT cervical spine without contrast   ED Interpretation by Elijah Daniel MD (02/10 2651)   COMPARISON:   No relevant prior studies available  FINDINGS:   Vertebrae: No evidence of an acute fracture or dislocation  Anterior spinal fixation  Discs/spinal canal/neural foramina: Moderate degenerative changes  Soft tissues: Unremarkable  Lung apices: Unremarkable as visualized  IMPRESSION:   No evidence of an acute fracture or dislocation  Procedures  Procedures       Phone Contacts  ED Phone Contact    ED Course  ED Course as of Feb 10 0259   Sat Feb 10, 2018   0254 Patient requesting motrin for pain so this was ordered  MDM  Number of Diagnoses or Management Options  Diagnosis management comments: DDx including but not limited to: intracranial injury, cervical injury, transient global amnesia, metabolic abnormality; doubt intrathoracic injury, intraabdominal injury, extremity injury or cardiac etiology   Amount and/or Complexity of Data Reviewed  Clinical lab tests: ordered and reviewed  Tests in the radiology section of CPT®: ordered and reviewed  Decide to obtain previous medical records or to obtain history from someone other than the patient: yes  Review and summarize past medical records: yes      CritCare Time    Disposition  Final diagnoses:   Injury of head, initial encounter   Concussion   Strain of neck muscle, initial encounter   Mild dehydration     Time reflects when diagnosis was documented in both MDM as applicable and the Disposition within this note     Time User Action Codes Description Comment    2/10/2018  2:58 AM Gabby Harness Add [S09 90XA] Injury of head, initial encounter     2/10/2018  2:58 AM Gabby Harness Add [S06 0X9A] Concussion     2/10/2018  2:58 AM Gabby Harness Add Willy Hirsch  1XXA] Strain of neck muscle, initial encounter     2/10/2018  2:58 AM Gabby Harness Add [E86 0] Mild dehydration       ED Disposition     ED Disposition Condition Comment    Discharge  [address] discharge to home/self care  Condition at discharge: Stable        Follow-up Information     Follow up With Specialties Details Why [facility], [address] [place] [address] Call in 3 days motrin/tylenol for pain  Drink fluids  return sooner if increased pain, weakness, lethargy, difficulty breathing or walking, vomiting, diarrhea    [id number]  [phone]          Patient's Medications   Discharge Prescriptions    No medications on file     No discharge procedures on file      ED Provider  Electronically Signed by           Arvind Eldridge MD  02/10/18 3007

## 2018-02-10 NOTE — DISCHARGE INSTRUCTIONS
Cervical Strain   WHAT YOU NEED TO KNOW:   A cervical strain is a stretched or torn muscle or tendon in your neck  Tendons are strong tissues that connect muscles to bones  Common causes of cervical strains include a car accident, a fall, or a sports injury  DISCHARGE INSTRUCTIONS:   Return to the emergency department if:   · You have pain or numbness from your shoulder down to your hand  · You have problems with your vision, hearing, or balance  · You feel confused or cannot concentrate  · You have problems with movement and strength  Contact your healthcare provider if:   · You have increased swelling or pain in your neck  · You have questions or concerns about your condition or care  Medicines: You may need any of the following:  · Acetaminophen  decreases pain and fever  It is available without a doctor's order  Ask how much to take and how often to take it  Follow directions  Read the labels of all other medicines you are using to see if they also contain acetaminophen, or ask your doctor or pharmacist  Acetaminophen can cause liver damage if not taken correctly  Do not use more than 4 grams (4,000 milligrams) total of acetaminophen in one day  · NSAIDs , such as ibuprofen, help decrease swelling, pain, and fever  This medicine is available with or without a doctor's order  NSAIDs can cause stomach bleeding or kidney problems in certain people  If you take blood thinner medicine, always ask your healthcare provider if NSAIDs are safe for you  Always read the medicine label and follow directions  · Muscle relaxers  help decrease pain and muscle spasms  · Prescription pain medicine  may be given  Ask your healthcare provider how to take this medicine safely  Some prescription pain medicines contain acetaminophen  Do not take other medicines that contain acetaminophen without talking to your healthcare provider  Too much acetaminophen may cause liver damage   Prescription pain medicine may cause constipation  Ask your healthcare provider how to prevent or treat constipation  · Take your medicine as directed  Contact your healthcare provider if you think your medicine is not helping or if you have side effects  Tell him or her if you are allergic to any medicine  Keep a list of the medicines, vitamins, and herbs you take  Include the amounts, and when and why you take them  Bring the list or the pill bottles to follow-up visits  Carry your medicine list with you in case of an emergency  Manage your symptoms:   · Apply heat  on your neck for 15 to 20 minutes, 4 to 6 times a day or as directed  Heat helps decrease pain, stiffness, and muscle spasms  · Begin gentle neck exercises  as soon as you can move your neck without pain  Exercises will help decrease stiffness and improve the strength and movement of your neck  Ask your healthcare provider what kind of exercises you should do  · Gradually return to your usual activities as directed  Stop if you have pain  Avoid activities that can cause more damage to your neck, such as heavy lifting or strenuous exercise  · Sleep without a pillow  to help decrease pain  Instead, roll a small towel tightly and place it under your neck  · Go to physical therapy as directed  A physical therapist teaches you exercises to help improve movement and strength, and to decrease pain  Prevent neck injury:   · Drive safely  Make sure everyone in your car wears a seatbelt  A seatbelt can save your life if you are in an accident  Do not use your cell phone when you are driving  This could distract you and cause an accident  Pull over if you need to make a call or send a text message  · Wear helmets, lifejackets, and protective gear  Always wear a helmet when you ride a bike or motorcycle, go skiing, or play sports that could cause a head injury  Wear protective equipment when you play sports   Wear a lifejacket when you are on a boat or doing water sports  Follow up with your healthcare provider as directed: You may be referred to an orthopedist or physical therapies  Write down your questions so you remember to ask them during your visits  © 2017 [address] Information is for End User's use only and may not be sold, redistributed or otherwise used for commercial purposes  All illustrations and images included in CareNotes® are the copyrighted property of [employer]  or [employer]  The above information is an  only  It is not intended as medical advice for individual conditions or treatments  Talk to your doctor, nurse or pharmacist before following any medical regimen to see if it is safe and effective for you  Concussion   WHAT YOU NEED TO KNOW:   A concussion is a mild brain injury  It is usually caused by a bump or blow to the head from a fall, a motor vehicle crash, or a sports injury  Sometimes being shaken forcefully may cause a concussion  DISCHARGE INSTRUCTIONS:   Have someone else call 911 for the following:   · Someone tries to wake you and cannot do so  · You have a seizure, increasing confusion, or a change in personality  · Your speech becomes slurred, or you have new vision problems  Return to the emergency department if:   · You have a severe headache that does not go away  · You have arm or leg weakness, numbness, or new problems with coordination  · You have blood or clear fluid coming out of the ears or nose  Contact your healthcare provider if:   · You have nausea or are vomiting  · You feel more sleepy than usual     · Your symptoms get worse  · Your symptoms last longer than 6 weeks after the injury  · You have questions or concerns about your condition or care  Medicines:   · Acetaminophen  helps to decrease pain  It is available without a doctor's order  Ask how much to take and how often to take it  Follow directions   Acetaminophen can cause liver damage if not taken correctly  · NSAIDs , such as ibuprofen, help decrease swelling and pain  NSAIDs can cause stomach bleeding or kidney problems in certain people  If you take blood thinner medicine, always ask your healthcare provider if NSAIDs are safe for you  Always read the medicine label and follow directions  · Take your medicine as directed  Contact your healthcare provider if you think your medicine is not helping or if you have side effects  Tell him or her if you are allergic to any medicine  Keep a list of the medicines, vitamins, and herbs you take  Include the amounts, and when and why you take them  Bring the list or the pill bottles to follow-up visits  Carry your medicine list with you in case of an emergency  Follow up with your healthcare provider as directed:  Write down your questions so you remember to ask them during your visits  Self-care:   · Rest  from physical and mental activities as directed  Mental activities are those that require thinking, concentration, and attention  You will need to rest until your symptoms are gone  Rest will allow you to recover from your concussion  Ask your healthcare provider when you can return to work and other daily activities  · Have someone stay with you for the first 24 hours after your injury  Your healthcare provider should be contacted if your symptoms get worse, or you develop new symptoms  · Do not participate in sports and physical activities until your healthcare provider says it is okay  They could make your symptoms worse or lead to another concussion  Your healthcare provider will tell you when it is okay for you to return to sports or physical activities  Prevent another concussion:   · Wear protective sports equipment that fit properly  Helmets help decrease your risk of a serious brain injury  Talk to your healthcare provider about ways you can decrease your risk for a concussion if you play sports      · Wear your seat belt every time you travel  This helps to decrease your risk of a head injury if you are in a car accident  © 2017 [address] Information is for End User's use only and may not be sold, redistributed or otherwise used for commercial purposes  All illustrations and images included in CareNotes® are the copyrighted property of A D [employer] M , Inc  or [employer] Souza  The above information is an  only  It is not intended as medical advice for individual conditions or treatments  Talk to your doctor, nurse or pharmacist before following any medical regimen to see if it is safe and effective for you  Dehydration   WHAT YOU NEED TO KNOW:   Dehydration is a condition that develops when your body does not have enough fluid  You may become dehydrated if you do not drink enough water or lose too much fluid  Fluid loss may also cause loss of electrolytes (minerals), such as sodium  DISCHARGE INSTRUCTIONS:   Return to the emergency department if:   · You have a seizure  · You are confused or cannot think clearly  · You are extremely sleepy, or another person cannot wake you  · You become dizzy or faint when you stand  · You are not able to urinate  · You have trouble breathing  · You have a fast or irregular heartbeat  · Your hands or feet are cold, or your face is pale  Contact your healthcare provider if:   · You have trouble drinking liquids because you are vomiting  · Your symptoms get worse  · You have a fever  · You feel very weak or tired  · You have questions or concerns about your condition or care  Follow up with your healthcare provider as directed:  Write down your questions so you remember to ask them during your visits  Prevent or manage dehydration:   · Drink liquids as directed  Liquids that contain water, sugar, and minerals can help your body hold in fluid and help prevent dehydration   Drink liquids throughout the day, not just when you feel thirsty  Men should drink about 3 liters (13 eight-ounce cups) of liquid each day  Women should drink about 2 liters (9 eight-ounce cups) of liquid each day  Drink even more liquid if you will be outdoors, in the sun for a long time, or exercising  · Stay cool  Limit the time you spend outdoors during the hottest part of the day  Dress in lightweight clothes  · Keep track of how often you urinate  If you urinate less than usual or your urine is darker, drink more liquids  © 2017 [address] Information is for End User's use only and may not be sold, redistributed or otherwise used for commercial purposes  All illustrations and images included in CareNotes® are the copyrighted property of [employer] , [employer]  or Levi Souza  The above information is an  only  It is not intended as medical advice for individual conditions or treatments  Talk to your doctor, nurse or pharmacist before following any medical regimen to see if it is safe and effective for you  Head Injury   WHAT YOU NEED TO KNOW:   A head injury is most often caused by a blow to the head  This may occur from a fall, bicycle injury, sports injury, being struck in the head, or a motor vehicle accident  DISCHARGE INSTRUCTIONS:   Call 911 or have someone else call for any of the following:   · You cannot be woken  · You have a seizure  · You stop responding to others or you faint  · You have blurry or double vision  · Your speech becomes slurred or confused  · You have arm or leg weakness, loss of feeling, or new problems with coordination  · Your pupils are larger than usual or one pupil is a different size than the other  · You have blood or clear fluid coming out of your ears or nose  Return to the emergency department if:   · You have repeated or forceful vomiting  · You feel confused  · Your headache gets worse or becomes severe      · You or someone caring for you notices that you are harder to wake than usual   Contact your healthcare provider if:   · Your symptoms last longer than 6 weeks after the injury  · You have questions or concerns about your condition or care  Medicines:   · Acetaminophen  decreases pain  Acetaminophen is available without a doctor's order  Ask how much to take and how often to take it  Follow directions  Acetaminophen can cause liver damage if not taken correctly  · Take your medicine as directed  Contact your healthcare provider if you think your medicine is not helping or if you have side effects  Tell him or her if you are allergic to any medicine  Keep a list of the medicines, vitamins, and herbs you take  Include the amounts, and when and why you take them  Bring the list or the pill bottles to follow-up visits  Carry your medicine list with you in case of an emergency  Self-care:   · Rest  or do quiet activities for 24 to 48 hours  Limit your time watching TV, using the computer, or doing tasks that require a lot of thinking  Slowly return to your normal activities as directed  Do not play sports or do activities that may cause you to get hit in the head  Ask your healthcare provider when you can return to sports  · Apply ice  on your head for 15 to 20 minutes every hour or as directed  Use an ice pack, or put crushed ice in a plastic bag  Cover it with a towel before you apply it to your skin  Ice helps prevent tissue damage and decreases swelling and pain  · Have someone stay with you for 24 hours  or as directed  This person can monitor you for complications and call [phone]  When you are awake the person should ask you a few questions to see if you are thinking clearly  An example would be to ask your name or your address  Prevent another head injury:   · Wear a helmet that fits properly  Do this when you play sports, or ride a bike, scooter, or skateboard  Helmets help decrease your risk of a serious head injury   Talk to your healthcare provider about other ways you can protect yourself if you play sports  · Wear your seat belt every time you are in a car  This helps to decrease your risk for a head injury if you are in a car accident  Follow up with your healthcare provider as directed:  Write down your questions so you remember to ask them during your visits  © 2017 [address]  Information is for End User's use only and may not be sold, redistributed or otherwise used for commercial purposes  All illustrations and images included in CareNotes® are the copyrighted property of [employer]  or [address]  The above information is an  only  It is not intended as medical advice for individual conditions or treatments  Talk to your doctor, nurse or pharmacist before following any medical regimen to see if it is safe and effective for you

## 2018-02-12 ENCOUNTER — OFFICE VISIT (OUTPATIENT)
Dept: FAMILY MEDICINE CLINIC | Facility: OTHER | Age: 68
End: 2018-02-12
Payer: MEDICARE

## 2018-02-12 VITALS
WEIGHT: 151.38 LBS | DIASTOLIC BLOOD PRESSURE: 82 MMHG | BODY MASS INDEX: 28.58 KG/M2 | HEIGHT: 61 IN | HEART RATE: 77 BPM | OXYGEN SATURATION: 99 % | TEMPERATURE: 99.6 F | SYSTOLIC BLOOD PRESSURE: 140 MMHG
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
## 2018-02-12 DIAGNOSIS — M54.2 NECK PAIN: Primary | ICD-10-CM

## 2018-02-12 DIAGNOSIS — S06.0X1D CONCUSSION WITH LOSS OF CONSCIOUSNESS OF 30 MINUTES OR LESS, SUBSEQUENT ENCOUNTER: ICD-10-CM

## 2018-02-12 PROBLEM — M20.62 DEFORMITY OF TOE OF LEFT FOOT: Status: ACTIVE | Noted: 2017-10-23

## 2018-02-12 PROBLEM — M21.611 BUNION, RIGHT FOOT: Status: ACTIVE | Noted: 2017-05-02

## 2018-02-12 PROBLEM — M79.671 FOOT PAIN, BILATERAL: Status: ACTIVE | Noted: 2017-05-02

## 2018-02-12 PROBLEM — M79.672 FOOT PAIN, BILATERAL: Status: ACTIVE | Noted: 2017-05-02

## 2018-02-12 PROCEDURE — 99213 OFFICE O/P EST LOW 20 MIN: CPT | Performed by: FAMILY MEDICINE

## 2018-02-12 RX ORDER — MULTIVIT-MIN/IRON/FOLIC ACID/K 18-600-40
2 CAPSULE ORAL DAILY
COMMUNITY
End: 2019-05-30 | Stop reason: HOSPADM

## 2018-02-12 RX ORDER — LANOLIN ALCOHOL/MO/W.PET/CERES
1 CREAM (GRAM) TOPICAL DAILY
Status: ON HOLD | COMMUNITY
End: 2019-05-30 | Stop reason: SDUPTHER

## 2018-02-12 NOTE — PATIENT INSTRUCTIONS
Concussion   AMBULATORY CARE:   A concussion  is a mild brain injury  It is usually caused by a bump or blow to the head from a fall, a motor vehicle crash, or a sports injury  Sometimes being forcefully shaken may cause a concussion  Common symptoms include the following:  Symptoms may occur right away, or they may appear days after the concussion  After the injury, you may have any of these symptoms:  · A mild to moderate headache    · Dizziness, loss of balance, or blurry vision    · Nausea or vomiting     · A change in mood, such as restlessness or irritability    · Trouble thinking, remembering things, or concentrating    · Ringing in the ears    · Drowsiness or decreased energy    · Changes in your normal sleeping pattern  Have someone else call 911 for the following:   · Someone tries to wake you and cannot do so  · You have a seizure, increasing confusion, or a change in personality  · Your speech becomes slurred, or you have new vision problems  Seek care immediately if:   · You have a severe headache that does not go away  · Someone tries to wake you and cannot do so  · You have a seizure, increasing confusion, or a change in personality  · Your speech becomes slurred, or you have new vision problems  · You have arm or leg weakness, numbness, or new problems with coordination  · You have blood or clear fluid coming out of the ears or nose  Contact your healthcare provider if:   · You have nausea or are vomiting  · You feel more sleepy than usual     · Your symptoms get worse  · Your symptoms last longer than 6 weeks after the injury  · You have questions or concerns about your condition or care  Manage a concussion:  Usually no treatment is needed for a mild concussion  Concussion symptoms usually go away within about 10 days  The following may be recommended to manage your symptoms:  · Rest  from physical and mental activities as directed   Mental activities are those that require thinking, concentration, and attention  You will need to rest until your symptoms are gone  Rest will allow you to recover from your concussion  Ask your healthcare provider when you can return to work and other daily activities  · Have someone stay with you for the first 24 hours after your injury  Your healthcare provider should be contacted if your symptoms get worse, or you develop new symptoms  · Do not participate in sports and physical activities until your healthcare provider says it is okay  They could make your symptoms worse or lead to another concussion  Your healthcare provider will tell you when it is okay for you to return to sports or physical activities  · Acetaminophen  helps to decrease pain  It is available without a doctor's order  Ask how much to take and how often to take it  Follow directions  Acetaminophen can cause liver damage if not taken correctly  · NSAIDs , such as ibuprofen, help decrease swelling and pain  NSAIDs can cause stomach bleeding or kidney problems in certain people  If you take blood thinner medicine, always ask your healthcare provider if NSAIDs are safe for you  Always read the medicine label and follow directions  Prevent another concussion:   · Wear protective sports equipment that fit properly  Helmets help decrease your risk of a serious brain injury  Talk to your healthcare provider about ways you can decrease your risk for a concussion if you play sports  · Wear your seat belt  every time you travel  This helps to decrease your risk of a head injury if you are in a car accident  Follow up with your healthcare provider as directed:  Write down your questions so you remember to ask them during your visits  © 2017 [employer] Information is for End User's use only and may not be sold, redistributed or otherwise used for commercial purposes   All illustrations and images included in CareNotes® are the copyrighted property of [employer]  or Levi Souza  The above information is an  only  It is not intended as medical advice for individual conditions or treatments  Talk to your doctor, nurse or pharmacist before following any medical regimen to see if it is safe and effective for you

## 2018-02-12 NOTE — PROGRESS NOTES
Assessment/Plan:           Problem List Items Addressed This Visit     None      Visit Diagnoses     Neck pain    -  Primary  Take the Ibuprofen regularly TID with food for the next week  RICE therapy as well      Concussion with loss of consciousness of 30 minutes or less, subsequent encounter        Relevant Orders    Ambulatory referral to Sports Medicine  For now she is to rest and avoid falls and injury as possible      Fall, subsequent encounter     Fall precautions advised  Subjective:      Patient ID: Lanre Sherman is a 74 y o  female  Yoan Diop is a 74 y o  female who presents for evaluation of a possible concussion  Initial evaluation was performed in the Emergency Department  Injury occurred 2 days ago while walking she slipped over ice and hit the back of her head  Mechanism of injury was head to ground contact  The point of impact was the occiput  Patient did experience an altered level of consciousness but she states she doesn't recall how she fell and how she hurt her head and how she got up and drove to home, the whole episode was witnessed by her grandson who is 8 and was with her  Patient did have retrograde amnesia and LOC  Since the injury, her symptoms include headache  She has had no previous head injuries  The following portions of the patient's history were reviewed and updated as appropriate: allergies, current medications, past family history, past medical history, past social history, past surgical history and problem list     Review of Systems   Constitutional: Negative for chills, fatigue and fever  HENT: Negative for congestion, rhinorrhea, sinus pressure and tinnitus  Eyes: Negative for visual disturbance  Respiratory: Negative for shortness of breath  Cardiovascular: Negative for chest pain  Gastrointestinal: Negative for abdominal pain, nausea and vomiting  Genitourinary: Negative for dysuria     Musculoskeletal: Positive for myalgias and neck pain  Neurological: Positive for headaches  Negative for dizziness, tremors, weakness and light-headedness  Objective:    Vitals:    02/12/18 1533   BP: 140/82   Pulse: 77   Temp: 99 6 °F (37 6 °C)   SpO2: 99%     /82 (BP Location: Right arm, Patient Position: Sitting, Cuff Size: Adult)   Pulse 77   Temp 99 6 °F (37 6 °C) (Tympanic)   Ht 5' 1" (1 549 m)   Wt 68 7 kg (151 lb 6 oz)   SpO2 99%   BMI 28 60 kg/m²      Physical Exam   Constitutional: She is oriented to person, place, and time  She appears well-developed and well-nourished  No distress  HENT:   Head: Normocephalic  Nose: Nose normal    Mouth/Throat: Oropharynx is clear and moist    Eyes: Conjunctivae are normal  Pupils are equal, round, and reactive to light  Right eye exhibits no discharge  Left eye exhibits no discharge  Neck: Normal range of motion  Neck supple  No thyromegaly present  Cardiovascular: Normal rate, regular rhythm and normal heart sounds  No murmur heard  Pulmonary/Chest: Effort normal and breath sounds normal  No respiratory distress  She has no wheezes  Abdominal: Soft  Bowel sounds are normal  She exhibits no distension  There is no tenderness  Musculoskeletal: Normal range of motion  She exhibits no edema or tenderness  There is some tenderness of the occipital area bilaterally  No bruise or open wounds noted  Lymphadenopathy:     She has no cervical adenopathy  Neurological: She is alert and oriented to person, place, and time  She has normal reflexes  No cranial nerve deficit  Still doesn't remember the episode of fall and driving afterwards  Skin: Skin is warm and dry  No rash noted  She is not diaphoretic  No pallor  Nursing note and vitals reviewed        Lab Results   Component Value Date    WBC 4 14 (L) 10/16/2017    HGB 14 3 02/10/2018    HCT 40 8 10/16/2017     10/16/2017    CHOL 206 (H) 06/27/2016    TRIG 58 06/27/2016    HDL 71 (H) 06/27/2016    ALT 30 10/16/2017    AST 27 10/16/2017     10/16/2017    K 4 3 10/16/2017     10/16/2017    CREATININE 0 77 10/16/2017    BUN 20 10/16/2017    CO2 30 10/16/2017    INR 0 95 06/27/2016    GLUF 95 04/25/2017    HGBA1C 5 6 04/29/2015

## 2018-02-14 ENCOUNTER — TELEPHONE (OUTPATIENT)
Dept: FAMILY MEDICINE CLINIC | Facility: OTHER | Age: 68
End: 2018-02-14

## 2018-02-14 NOTE — TELEPHONE ENCOUNTER
Please have her take some aleve 2 tabs OTC bid with food for the next week to 2 weeks    If not better in 2-3 days then will need to see her in office and eval   Thanks,

## 2018-06-18 ENCOUNTER — TELEPHONE (OUTPATIENT)
Dept: HEMATOLOGY ONCOLOGY | Facility: CLINIC | Age: 68
End: 2018-06-18

## 2018-07-20 DIAGNOSIS — D72.819 DECREASED WHITE BLOOD CELL COUNT: ICD-10-CM

## 2018-07-26 ENCOUNTER — APPOINTMENT (OUTPATIENT)
Dept: LAB | Facility: CLINIC | Age: 68
End: 2018-07-26
Payer: MEDICARE

## 2018-07-26 ENCOUNTER — TRANSCRIBE ORDERS (OUTPATIENT)
Dept: LAB | Facility: CLINIC | Age: 68
End: 2018-07-26

## 2018-07-26 DIAGNOSIS — D72.819 DECREASED WHITE BLOOD CELL COUNT: ICD-10-CM

## 2018-07-26 LAB
ALBUMIN SERPL BCP-MCNC: 3.7 G/DL (ref 3.5–5)
ALP SERPL-CCNC: 96 U/L (ref 46–116)
ALT SERPL W P-5'-P-CCNC: 45 U/L (ref 12–78)
ANION GAP SERPL CALCULATED.3IONS-SCNC: 9 MMOL/L (ref 4–13)
AST SERPL W P-5'-P-CCNC: 28 U/L (ref 5–45)
BASOPHILS # BLD AUTO: 0.02 THOUSANDS/ΜL (ref 0–0.1)
BASOPHILS NFR BLD AUTO: 1 % (ref 0–1)
BILIRUB SERPL-MCNC: 0.3 MG/DL (ref 0.2–1)
BUN SERPL-MCNC: 17 MG/DL (ref 5–25)
CALCIUM SERPL-MCNC: 9 MG/DL (ref 8.3–10.1)
CHLORIDE SERPL-SCNC: 103 MMOL/L (ref 100–108)
CO2 SERPL-SCNC: 28 MMOL/L (ref 21–32)
CREAT SERPL-MCNC: 1.06 MG/DL (ref 0.6–1.3)
EOSINOPHIL # BLD AUTO: 0.1 THOUSAND/ΜL (ref 0–0.61)
EOSINOPHIL NFR BLD AUTO: 3 % (ref 0–6)
ERYTHROCYTE [DISTWIDTH] IN BLOOD BY AUTOMATED COUNT: 12.3 % (ref 11.6–15.1)
GFR SERPL CREATININE-BSD FRML MDRD: 55 ML/MIN/1.73SQ M
GLUCOSE SERPL-MCNC: 107 MG/DL (ref 65–140)
HCT VFR BLD AUTO: 38.9 % (ref 34.8–46.1)
HGB BLD-MCNC: 13.5 G/DL (ref 11.5–15.4)
LDH SERPL-CCNC: 208 U/L (ref 81–234)
LYMPHOCYTES # BLD AUTO: 1.29 THOUSANDS/ΜL (ref 0.6–4.47)
LYMPHOCYTES NFR BLD AUTO: 38 % (ref 14–44)
MCH RBC QN AUTO: 32.7 PG (ref 26.8–34.3)
MCHC RBC AUTO-ENTMCNC: 34.7 G/DL (ref 31.4–37.4)
MCV RBC AUTO: 94 FL (ref 82–98)
MONOCYTES # BLD AUTO: 0.28 THOUSAND/ΜL (ref 0.17–1.22)
MONOCYTES NFR BLD AUTO: 8 % (ref 4–12)
NEUTROPHILS # BLD AUTO: 1.72 THOUSANDS/ΜL (ref 1.85–7.62)
NEUTS SEG NFR BLD AUTO: 51 % (ref 43–75)
PLATELET # BLD AUTO: 252 THOUSANDS/UL (ref 149–390)
PMV BLD AUTO: 8.8 FL (ref 8.9–12.7)
POTASSIUM SERPL-SCNC: 4.5 MMOL/L (ref 3.5–5.3)
PROT SERPL-MCNC: 7.2 G/DL (ref 6.4–8.2)
RBC # BLD AUTO: 4.13 MILLION/UL (ref 3.81–5.12)
SODIUM SERPL-SCNC: 140 MMOL/L (ref 136–145)
WBC # BLD AUTO: 3.41 THOUSAND/UL (ref 4.31–10.16)

## 2018-07-26 PROCEDURE — 80053 COMPREHEN METABOLIC PANEL: CPT

## 2018-07-26 PROCEDURE — 36415 COLL VENOUS BLD VENIPUNCTURE: CPT

## 2018-07-26 PROCEDURE — 83615 LACTATE (LD) (LDH) ENZYME: CPT

## 2018-07-26 PROCEDURE — 85025 COMPLETE CBC W/AUTO DIFF WBC: CPT

## 2018-08-03 ENCOUNTER — OFFICE VISIT (OUTPATIENT)
Dept: HEMATOLOGY ONCOLOGY | Facility: CLINIC | Age: 68
End: 2018-08-03
Payer: MEDICARE

## 2018-08-03 VITALS
TEMPERATURE: 96.5 F | BODY MASS INDEX: 29.45 KG/M2 | RESPIRATION RATE: 16 BRPM | OXYGEN SATURATION: 96 % | WEIGHT: 156 LBS | DIASTOLIC BLOOD PRESSURE: 80 MMHG | HEART RATE: 74 BPM | SYSTOLIC BLOOD PRESSURE: 124 MMHG | HEIGHT: 61 IN

## 2018-08-03 DIAGNOSIS — D70.9 NEUTROPENIA, UNSPECIFIED TYPE (HCC): Primary | ICD-10-CM

## 2018-08-03 PROCEDURE — 99214 OFFICE O/P EST MOD 30 MIN: CPT | Performed by: INTERNAL MEDICINE

## 2018-08-03 NOTE — PROGRESS NOTES
Hematology/Oncology Outpatient Follow- up Note  Heriberto Marinelli 74 y o  female MRN: @ Encounter: [id number]        Date:  8/3/2018    Presenting Complaint/Diagnos:     Low white count along with mild anemia  Previous Hematologic/ Oncologic History:      Workup     Current Hematologic/ Oncologic Treatment:      Workup     Interval History:      The patient returns for followup visit  She was initially referred for low white count and anemia  Anemia has resolved  White count is still low But in a stable range  ANC still above the thousand  She is asymptomatic  Denies any nausea denies any vomiting any diarrhea denies any constipation denies abdominal pain  In the past Flow cytometry was negative for any abnormal cells in the blood  She has no evidence of leukemia or lymphoma  The rest of her 14 point review of systems today was negative  Test Results:    Imaging: No results found  Labs:   Lab Results   Component Value Date    WBC 3 41 (L) 07/26/2018    HGB 13 5 07/26/2018    HCT 38 9 07/26/2018    MCV 94 07/26/2018     07/26/2018     Lab Results   Component Value Date     07/26/2018    K 4 5 07/26/2018     07/26/2018    CO2 28 07/26/2018    ANIONGAP 9 07/26/2018    BUN 17 07/26/2018    CREATININE 1 06 07/26/2018    GLUCOSE 107 07/26/2018    GLUF 95 04/25/2017    CALCIUM 9 0 07/26/2018    AST 28 07/26/2018    ALT 45 07/26/2018    ALKPHOS 96 07/26/2018    PROT 7 2 07/26/2018    BILITOT 0 30 07/26/2018    EGFR 55 07/26/2018           ROS: As stated in the history of present illness otherwise his 14 point review of systems today was negative        Active Problems:   Patient Active Problem List   Diagnosis    Back pain, chronic    Bunion, right foot    Cervical post-laminectomy syndrome    Deformity of toe of left foot    DJD (degenerative joint disease) of thoracic spine    Foot pain, bilateral    Leukopenia    Myofascial pain syndrome    Osteopenia    Pain syndrome, chronic  Thoracic neuralgia    Thoracic spondylosis without myelopathy    Vitiligo       Past Medical History:   Past Medical History:   Diagnosis Date    Anemia     last assessed - [id number]    Anxiety disorder     Arthritis     Asthma     Asthma, mild intermittent     last assessed - [id number]    Back pain     Bunion, right foot     last assessed - [id number]    Deformity of toe of left foot     last assessed - [id number]    Discitis of thoracolumbar region     last assessed - [id number]    Fall     Fibrocystic disease of breast     Fracture of wrist     and hand, left    Heartburn     History of gastroesophageal reflux (GERD)     last assessed - 2015    Hypercholesterolemia     Indigestion     Leukopenia     last assessed - [id number]    Osteopenia     last assessed - [id number]    Polyarthritis     Seasonal allergies     Shortness of breath     Sleep disturbance     Toe deformity     last assessed - 2015    Trochanteric bursitis of left hip     last assessed - [id number]    Vitamin D deficiency     last assessed - [id number]    Vitiligo     last assessed - [id number]    Wears eyeglasses        Surgical History:   Past Surgical History:   Procedure Laterality Date    BACK SURGERY      4 back surgeries, fusion, bone replacement    BACK SURGERY  2012    Lumbar PLIF surgery    CARPAL TUNNEL RELEASE Bilateral      SECTION  1980    CHOLECYSTECTOMY      HAND TENDON SURGERY Bilateral     HERNIA REPAIR      NECK SURGERY  2012    ACDF Surgery    SPINAL CORD STIMULATOR IMPLANT N/A 2016    Procedure: PLACEMENT OF THORACIC PARASPINAL PERIPHERAL NERVE STIMULATING ELECTODES WITH LEFT BUTTOCK GENERATOR;  Surgeon: Karsten Nascimento MD;  Location:  MAIN OR;  Service:    To[place] NERVE REPAIR Bilateral        Family History:    Family History   Problem Relation Age of Onset    Dementia Mother     Emphysema Father         lung    Lung cancer Father  Other Paternal Grandfather         gangrene    Hypertension Family     Other Family         back trouble       Cancer-related family history includes Lung cancer in her father  Social History:   Social History     Social History    Marital status: /Civil Union     Spouse name: N/A    Number of children: 2    Years of education: HS or GED     Occupational History    Not on file  Social History Main Topics    Smoking status: Never Smoker    Smokeless tobacco: Never Used    Alcohol use No      Comment: Occasional alcohol use & Denied history of alcohol use both documented in [employer]    Drug use: No    Sexual activity: Not Currently      Comment: No known STD risk factors; Denied currently sexually active     Other Topics Concern    Not on file     Social History Narrative    No known risk factors    Physical Disability           Current Medications:   Current Outpatient Prescriptions   Medication Sig Dispense Refill    Cholecalciferol (VITAMIN D) 2000 units CAPS Take 2 capsules by mouth daily      cyanocobalamin (VITAMIN B-12) 1,000 mcg tablet Take 1 tablet by mouth daily      HYDROcodone-acetaminophen (NORCO) 5-325 mg per tablet Take 1 tablet by mouth every 6 (six) hours as needed for pain for up to 10 doses Max Daily Amount: 4 tablets 10 tablet 0    lidocaine (LIDODERM) 5 % Place 1 patch on the skin every 24 hours Remove & Discard patch within 12 hours or as directed by MD 30 patch 0    Melatonin 3 MG CAPS Take 1 capsule by mouth      omeprazole (PriLOSEC) 20 mg delayed release capsule Take 20 mg by mouth daily  No current facility-administered medications for this visit  Allergies:    Allergies   Allergen Reactions    Influenza A (H1n1) Monoval Vac Anaphylaxis    Lyrica [Pregabalin] Swelling    Acetaminophen Other (See Comments)     GI Upset, "it upps my triglycerides"    Percocet [Oxycodone-Acetaminophen] GI Intolerance       Physical Exam:    Body surface area is 1 7 meters squared  Wt Readings from Last 3 Encounters:   08/03/18 70 8 kg (156 lb)   02/12/18 68 7 kg (151 lb 6 oz)   02/09/18 69 4 kg (153 lb)        Temp Readings from Last 3 Encounters:   08/03/18 (!) 96 5 °F (35 8 °C) (Tympanic)   02/12/18 99 6 °F (37 6 °C) (Tympanic)   02/09/18 98 °F (36 7 °C) (Oral)        BP Readings from Last 3 Encounters:   08/03/18 124/80   02/12/18 140/82   02/10/18 148/77         Pulse Readings from Last 3 Encounters:   08/03/18 74   02/12/18 77   02/10/18 76     @LASTSAO2(3)@      Physical Exam     Constitutional   General appearance: No acute distress, well appearing and well nourished  Eyes   Conjunctiva and lids: No swelling, erythema or discharge  Pupils and irises: Equal, round and reactive to light  Ears, Nose, Mouth, and Throat   External inspection of ears and nose: Normal     Nasal mucosa, septum, and turbinates: Normal without edema or erythema  Oropharynx: Normal with no erythema, edema, exudate or lesions  Pulmonary   Respiratory effort: No increased work of breathing or signs of respiratory distress  Auscultation of lungs: Clear to auscultation  Cardiovascular   Palpation of heart: Normal PMI, no thrills  Auscultation of heart: Normal rate and rhythm, normal S1 and S2, without murmurs  Examination of extremities for edema and/or varicosities: Normal     Carotid pulses: Normal     Abdomen   Abdomen: Non-tender, no masses  Liver and spleen: No hepatomegaly or splenomegaly  Lymphatic   Palpation of lymph nodes in neck: No lymphadenopathy  Musculoskeletal   Gait and station: Normal     Digits and nails: Normal without clubbing or cyanosis  Inspection/palpation of joints, bones, and muscles: Normal     Skin   Skin and subcutaneous tissue: Normal without rashes or lesions  Neurologic   Cranial nerves: Cranial nerves 2-12 intact  Sensation: No sensory loss      Psychiatric   Orientation to person, place, and time: Normal  Mood and affect: Normal         Assessment / Plan:    Patient is a pleasant 42-year-old female with a past medical history of multiple back surgeries was referred was for anemia along with a low white count  In the past I did a workup including flow cytometry, SPEP with immunofixation which was all negative  Her white count is still low and may be related to her autoimmune disorder  Her anemia has resolved  I advised her at this point she can just see me as needed  She can follow-up with her primary care physician  If her white count deteriorates she can come back to see me sooner  Goals and Barriers:  Current Goal:  Prolong Survival from low white count   Barriers: None  Patient's Capacity to Self Care:  Patient able to self care  Portions of the record may have been created with voice recognition software   Occasional wrong word or "sound a like" substitutions may have occurred due to the inherent limitations of voice recognition software   Read the chart carefully and recognize, using context, where substitutions have occurred

## 2018-10-15 ENCOUNTER — TELEPHONE (OUTPATIENT)
Dept: FAMILY MEDICINE CLINIC | Facility: OTHER | Age: 68
End: 2018-10-15

## 2018-10-15 DIAGNOSIS — K21.9 GASTROESOPHAGEAL REFLUX DISEASE, ESOPHAGITIS PRESENCE NOT SPECIFIED: Primary | ICD-10-CM

## 2018-10-15 RX ORDER — OMEPRAZOLE 20 MG/1
20 CAPSULE, DELAYED RELEASE ORAL DAILY
Qty: 30 CAPSULE | Refills: 5 | Status: ON HOLD | OUTPATIENT
Start: 2018-10-15 | End: 2019-05-30 | Stop reason: SDUPTHER

## 2018-10-15 NOTE — TELEPHONE ENCOUNTER
Pt called she thinks she needs to go back on Omeprazole 20mg and she would like it called into [facility] on [address]   She can be called on her cell phone  Thank you

## 2018-10-15 NOTE — TELEPHONE ENCOUNTER
Pt called back she would like to go to [facility] on [facility] instead of [facility] Re: the omperazole 20mg  Thank you

## 2018-10-24 ENCOUNTER — OFFICE VISIT (OUTPATIENT)
Dept: FAMILY MEDICINE CLINIC | Facility: OTHER | Age: 68
End: 2018-10-24
Payer: MEDICARE

## 2018-10-24 VITALS
BODY MASS INDEX: 29.72 KG/M2 | WEIGHT: 157.44 LBS | HEART RATE: 75 BPM | HEIGHT: 61 IN | TEMPERATURE: 99.3 F | SYSTOLIC BLOOD PRESSURE: 154 MMHG | OXYGEN SATURATION: 98 % | DIASTOLIC BLOOD PRESSURE: 90 MMHG

## 2018-10-24 DIAGNOSIS — J30.9 ALLERGIC RHINITIS, UNSPECIFIED SEASONALITY, UNSPECIFIED TRIGGER: ICD-10-CM

## 2018-10-24 DIAGNOSIS — J45.31 MILD PERSISTENT ASTHMA WITH ACUTE EXACERBATION: Primary | ICD-10-CM

## 2018-10-24 PROCEDURE — 99215 OFFICE O/P EST HI 40 MIN: CPT | Performed by: FAMILY MEDICINE

## 2018-10-24 RX ORDER — FLUTICASONE PROPIONATE 50 MCG
2 SPRAY, SUSPENSION (ML) NASAL DAILY
Qty: 16 G | Refills: 3 | Status: SHIPPED | OUTPATIENT
Start: 2018-10-24 | End: 2018-11-08

## 2018-10-24 RX ORDER — FLUTICASONE PROPIONATE 220 UG/1
2 AEROSOL, METERED RESPIRATORY (INHALATION) 2 TIMES DAILY
Qty: 1 INHALER | Refills: 3 | Status: SHIPPED | OUTPATIENT
Start: 2018-10-24 | End: 2018-11-08

## 2018-10-24 RX ORDER — ALBUTEROL SULFATE 90 UG/1
2 AEROSOL, METERED RESPIRATORY (INHALATION) EVERY 6 HOURS PRN
Qty: 8.5 G | Refills: 5 | Status: SHIPPED | OUTPATIENT
Start: 2018-10-24 | End: 2018-11-08

## 2018-10-24 RX ORDER — FEXOFENADINE HCL 180 MG/1
180 TABLET ORAL DAILY
Qty: 90 TABLET | Refills: 1 | Status: SHIPPED | OUTPATIENT
Start: 2018-10-24 | End: 2018-11-08

## 2018-10-24 NOTE — PATIENT INSTRUCTIONS
Asthma   WHAT YOU NEED TO KNOW:   Asthma is a lung disease that makes breathing difficult  Chronic inflammation and reactions to triggers narrow the airways in the lungs  Asthma can become life-threatening if it is not managed  DISCHARGE INSTRUCTIONS:   Return to the emergency department if:   · You have severe shortness of breath  · Your lips or nails turn blue or gray  · The skin around your neck and ribs pulls in with each breath  · You have shortness of breath, even after you take your short-term medicine as directed  · Your peak flow numbers are in the red zone of your AAP  Contact your healthcare provider if:   · You run out of medicine before your next refill is due  · Your symptoms get worse  · You need to take more medicine than usual to control your symptoms  · You have questions or concerns about your condition or care  Medicines:   · Medicines  decrease inflammation, open airways, and make it easier to breathe  Medicines may be inhaled, taken as a pill, or injected  Short-term medicines relieve your symptoms quickly  Long-term medicines are used to prevent future attacks  You may also need medicine to help control your allergies  Ask your healthcare provider for more information about the medicine you are given and how to take it safely  · Take your medicine as directed  Contact your healthcare provider if you think your medicine is not helping or if you have side effects  Tell him of her if you are allergic to any medicine  Keep a list of the medicines, vitamins, and herbs you take  Include the amounts, and when and why you take them  Bring the list or the pill bottles to follow-up visits  Carry your medicine list with you in case of an emergency  Follow up with your healthcare provider as directed: You will need to return to make sure your medicine is working and your symptoms are controlled   You may be referred to an asthma specialist  Melissa Rajan may be asked to keep a record of your peak flow values and bring it with you to your appointments  Write down your questions so you remember to ask them during your visits  Manage your symptoms and prevent future attacks:   · Follow your Asthma Action Plan (AAP)  This is a written plan that you and your healthcare provider create  It explains which medicine you need and when to change doses if necessary  It also explains how you can monitor symptoms and use a peak flow meter  The meter measures how well your lungs are working  · Manage other health conditions , such as allergies, acid reflux, and sleep apnea  · Identify and avoid triggers  These may include pets, dust mites, mold, and cockroaches  · Do not smoke or be around others who smoke  Nicotine and other chemicals in cigarettes and cigars can cause lung damage  Ask your healthcare provider for information if you currently smoke and need help to quit  E-cigarettes or smokeless tobacco still contain nicotine  Talk to your healthcare provider before you use these products  · Ask about the flu vaccine  The flu can make your asthma worse  You may need a yearly flu shot  © 2017 [facility] Information is for End User's use only and may not be sold, redistributed or otherwise used for commercial purposes  All illustrations and images included in CareNotes® are the copyrighted property of [employer]  or Levi Souza  The above information is an  only  It is not intended as medical advice for individual conditions or treatments  Talk to your doctor, nurse or pharmacist before following any medical regimen to see if it is safe and effective for you

## 2018-10-24 NOTE — PROGRESS NOTES
Subjective:      Patient ID: Evonne Ceballos is a 74 y o  female  Chief Complaint   Patient presents with    Cough     persistent cough since August         PMH of asthma -- last saw Pulmonology 3+ yrs ago  No inhaler use   No allergy medication use       Cough   This is a chronic problem  Episode onset: August 2018  The problem has been gradually worsening  The problem occurs constantly  The cough is non-productive  Associated symptoms include headaches, nasal congestion, postnasal drip, shortness of breath and wheezing  Pertinent negatives include no chest pain, chills, ear congestion, ear pain, fever, heartburn, hemoptysis, myalgias, rash, rhinorrhea, sore throat, sweats or weight loss  The symptoms are aggravated by pollens, lying down, dust and cold air  Risk factors for lung disease include smoking/tobacco exposure  Treatments tried: Augmentin + Flonase ([facility] in 9/2018 for sinusitis) The treatment provided no relief  Her past medical history is significant for asthma, COPD, environmental allergies and pneumonia  There is no history of emphysema         The following portions of the patient's history were reviewed and updated as appropriate: allergies, current medications, past family history, past medical history, past social history, past surgical history and problem list       Current Outpatient Prescriptions:     Cholecalciferol (VITAMIN D) 2000 units CAPS, Take 2 capsules by mouth daily, Disp: , Rfl:     cyanocobalamin (VITAMIN B-12) 1,000 mcg tablet, Take 1 tablet by mouth daily, Disp: , Rfl:     Melatonin 3 MG CAPS, Take 1 capsule by mouth, Disp: , Rfl:     omeprazole (PriLOSEC) 20 mg delayed release capsule, Take 1 capsule (20 mg total) by mouth daily, Disp: 30 capsule, Rfl: 5    albuterol (PROAIR HFA) 90 mcg/act inhaler, Inhale 2 puffs every 6 (six) hours as needed for wheezing, Disp: 8 5 g, Rfl: 5    fexofenadine (ALLEGRA) 180 MG tablet, Take 1 tablet (180 mg total) by mouth daily, Disp: 90 tablet, Rfl: 1    fluticasone (FLONASE) 50 mcg/act nasal spray, 2 sprays into each nostril daily, Disp: 16 g, Rfl: 3    fluticasone (FLOVENT HFA) 220 mcg/act inhaler, Inhale 2 puffs 2 (two) times a day Rinse mouth after use , Disp: 1 Inhaler, Rfl: 3      Review of Systems   Constitutional: Negative for activity change, chills, fatigue, fever and weight loss  HENT: Positive for postnasal drip  Negative for congestion, ear pain, rhinorrhea, sinus pain and sore throat  Eyes: Negative for pain and itching  Respiratory: Positive for cough, chest tightness, shortness of breath and wheezing  Negative for apnea, hemoptysis, choking and stridor  Cardiovascular: Negative for chest pain and palpitations  Gastrointestinal: Negative for abdominal pain, constipation, diarrhea, heartburn, nausea and vomiting  Endocrine: Negative for cold intolerance and heat intolerance  Genitourinary: Negative for difficulty urinating, dysuria, frequency and urgency  Musculoskeletal: Negative for arthralgias, back pain, gait problem and myalgias  Skin: Negative for color change and rash  Allergic/Immunologic: Positive for environmental allergies  Neurological: Positive for headaches  Negative for dizziness and syncope  Hematological: Negative for adenopathy  Psychiatric/Behavioral: Negative for behavioral problems, dysphoric mood and sleep disturbance  The patient is not nervous/anxious  Objective:      /90 (BP Location: Left arm, Patient Position: Sitting, Cuff Size: Large)   Pulse 75   Temp 99 3 °F (37 4 °C) (Tympanic)   Ht 5' 1" (1 549 m)   Wt 71 4 kg (157 lb 7 oz)   SpO2 98%   BMI 29 75 kg/m²          Physical Exam   Constitutional: She is oriented to person, place, and time  She appears well-developed and well-nourished  No distress  HENT:   Head: Normocephalic and atraumatic  Right Ear: External ear normal  Tympanic membrane is retracted     Left Ear: External ear normal  Tympanic membrane is retracted  Nose: Mucosal edema present  Right sinus exhibits no maxillary sinus tenderness and no frontal sinus tenderness  Left sinus exhibits no maxillary sinus tenderness and no frontal sinus tenderness  Mouth/Throat: Oropharyngeal exudate (PND/cobblestoning) present  No posterior oropharyngeal erythema  Eyes: Pupils are equal, round, and reactive to light  Conjunctivae and EOM are normal  No scleral icterus  Neck: Normal range of motion  Neck supple  No thyromegaly present  Cardiovascular: Normal rate, regular rhythm and normal heart sounds  No murmur heard  Pulmonary/Chest: Effort normal  No respiratory distress  She has decreased breath sounds  She has no wheezes  She has no rales  Coarse breath sounds bilaterally    Abdominal: Soft  Bowel sounds are normal  She exhibits no distension  There is no tenderness  Musculoskeletal: Normal range of motion  She exhibits no edema or tenderness  Lymphadenopathy:     She has no cervical adenopathy  Neurological: She is alert and oriented to person, place, and time  No cranial nerve deficit  Coordination normal    Skin: Skin is warm and dry  No rash noted  She is not diaphoretic  No erythema  Psychiatric: She has a normal mood and affect  Her behavior is normal            Assessment/Plan:   Diagnoses and all orders for this visit:    Mild persistent asthma with acute exacerbation  Allergic rhinitis, unspecified seasonality, unspecified trigger  -     XR chest pa & lateral; Future  -     albuterol (PROAIR HFA) 90 mcg/act inhaler; Inhale 2 puffs every 6 (six) hours as needed for wheezing  -     fluticasone (FLOVENT HFA) 220 mcg/act inhaler; Inhale 2 puffs 2 (two) times a day Rinse mouth after use  -     fexofenadine (ALLEGRA) 180 MG tablet;  Take 1 tablet (180 mg total) by mouth daily  -     fluticasone (FLONASE) 50 mcg/act nasal spray; 2 sprays into each nostril daily  -     Complete pulmonary function test; Future 43-year-old female presents with acute asthma exacerbation likely due to allergic trigger  Recommend chest x-ray to rule out underlying pulmonary pathology  Recommend allergy control in the form of oral antihistamine and nasal steroid spray--Allegra and Flonase sent to pharmacy  Recommend daily inhaled steroid in the form of fluticasone and as needed rescue inhaler in the form of albuterol to control asthma/persistent cough  Would anticipate marked symptom improvement in 2 weeks  After complete resolution of symptoms, patient should scheduled complete pulmonary function test     Greater than 50% of this 40 minute visit spent on education/counseling and coordination of care  RTO November 8 as scheduled for annual wellness visit  The patient indicates understanding of these issues and agrees with the plan          Abimael Few, DO

## 2018-10-26 ENCOUNTER — HOSPITAL ENCOUNTER (OUTPATIENT)
Dept: RADIOLOGY | Facility: HOSPITAL | Age: 68
Discharge: HOME | End: 2018-10-26
Attending: FAMILY MEDICINE
Payer: MEDICARE

## 2018-10-26 DIAGNOSIS — J30.9 ALLERGIC RHINITIS, UNSPECIFIED SEASONALITY, UNSPECIFIED TRIGGER: ICD-10-CM

## 2018-10-26 DIAGNOSIS — J45.31 MILD PERSISTENT ASTHMA WITH ACUTE EXACERBATION: ICD-10-CM

## 2018-10-26 PROCEDURE — 71046 X-RAY EXAM CHEST 2 VIEWS: CPT

## 2018-10-29 ENCOUNTER — TELEPHONE (OUTPATIENT)
Dept: FAMILY MEDICINE CLINIC | Facility: OTHER | Age: 68
End: 2018-10-29

## 2018-10-29 NOTE — TELEPHONE ENCOUNTER
Left message for patient    ----- Message from Jeana Hathaway DO sent at 10/29/2018  8:04 AM EDT -----  Please inform pt that CXR is unremarkable -- no evidence of pneumonia    Thanks!   Jeana Hathaway DO

## 2018-11-08 ENCOUNTER — OFFICE VISIT (OUTPATIENT)
Dept: FAMILY MEDICINE CLINIC | Facility: OTHER | Age: 68
End: 2018-11-08
Payer: MEDICARE

## 2018-11-08 VITALS
OXYGEN SATURATION: 99 % | HEART RATE: 99 BPM | DIASTOLIC BLOOD PRESSURE: 78 MMHG | TEMPERATURE: 97.9 F | WEIGHT: 157.19 LBS | SYSTOLIC BLOOD PRESSURE: 142 MMHG | BODY MASS INDEX: 29.68 KG/M2 | HEIGHT: 61 IN

## 2018-11-08 DIAGNOSIS — L80 VITILIGO: ICD-10-CM

## 2018-11-08 DIAGNOSIS — E78.00 HYPERCHOLESTEROLEMIA: ICD-10-CM

## 2018-11-08 DIAGNOSIS — R03.0 BORDERLINE HYPERTENSION: ICD-10-CM

## 2018-11-08 DIAGNOSIS — E55.9 VITAMIN D DEFICIENCY: ICD-10-CM

## 2018-11-08 DIAGNOSIS — D70.9 NEUTROPENIA, UNSPECIFIED TYPE (HCC): ICD-10-CM

## 2018-11-08 DIAGNOSIS — Z87.19 HISTORY OF GASTROESOPHAGEAL REFLUX (GERD): ICD-10-CM

## 2018-11-08 DIAGNOSIS — R13.19 OTHER DYSPHAGIA: ICD-10-CM

## 2018-11-08 DIAGNOSIS — Z28.20 VACCINE REFUSED BY PATIENT: ICD-10-CM

## 2018-11-08 DIAGNOSIS — Z28.21 REFUSED INFLUENZA VACCINE: ICD-10-CM

## 2018-11-08 DIAGNOSIS — J45.31 MILD PERSISTENT ASTHMA WITH ACUTE EXACERBATION: ICD-10-CM

## 2018-11-08 DIAGNOSIS — Z12.39 SCREENING FOR BREAST CANCER: ICD-10-CM

## 2018-11-08 DIAGNOSIS — Z00.00 ENCOUNTER FOR MEDICARE ANNUAL WELLNESS EXAM: Primary | ICD-10-CM

## 2018-11-08 DIAGNOSIS — Z13.820 OSTEOPOROSIS SCREENING: ICD-10-CM

## 2018-11-08 DIAGNOSIS — M85.80 OSTEOPENIA, UNSPECIFIED LOCATION: ICD-10-CM

## 2018-11-08 DIAGNOSIS — Z12.11 SCREENING FOR COLON CANCER: ICD-10-CM

## 2018-11-08 DIAGNOSIS — M47.814 OSTEOARTHRITIS OF THORACIC SPINE, UNSPECIFIED SPINAL OSTEOARTHRITIS COMPLICATION STATUS: ICD-10-CM

## 2018-11-08 DIAGNOSIS — Z13.1 SCREENING FOR DIABETES MELLITUS: ICD-10-CM

## 2018-11-08 DIAGNOSIS — Z11.59 NEED FOR HEPATITIS C SCREENING TEST: ICD-10-CM

## 2018-11-08 PROBLEM — J30.2 SEASONAL ALLERGIES: Status: ACTIVE | Noted: 2018-11-08

## 2018-11-08 PROBLEM — R12 HEARTBURN: Status: ACTIVE | Noted: 2018-11-08

## 2018-11-08 PROCEDURE — 99214 OFFICE O/P EST MOD 30 MIN: CPT | Performed by: NURSE PRACTITIONER

## 2018-11-08 PROCEDURE — G0439 PPPS, SUBSEQ VISIT: HCPCS | Performed by: NURSE PRACTITIONER

## 2018-11-08 NOTE — PROGRESS NOTES
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  --Controlled with SCS  Previously seen by pain management and neurosurgery  --Denies need for additional interventions at this time  Leukopenia  --Mild, stable  Followed by heme-onc  Osteopenia + osteoporosis screening  --Continue regular weight bearing exercise, calcium + D  --Due for repeat DEXA     Relevant Orders    DXA bone density spine hip and pelvis    Borderline hypertension + overweight  --Monitor for now, while working on weight + diet  Home readings advised  Mild persistent asthma with acute exacerbation  --Improved with inhalers, Allegran, Flonase  --Recent normal CXR  She does not wish to get PFT done at this time  --Call for no resolution/worsening over the next 1-2 weeks  Gastroesophageal reflux (GERD) + Dysphagia  --Controlled with diet + PRN Prilosec  Aware of potential long-term AE's of PPI's and tries to minimize use  Suggest OTC H2-blocker (Pepcid, etc) as alternative  --Increased dysphagia noted-->referred to GI as she is due for repeat EGD  --May be contributing to cough/asthma  Relevant Orders   Ambulatory referral to Gastroenterology       Hypercholesterolemia  --Diet/lifestyle control  Slip given for repeat labs  Relevant Orders    CBC and differential    Comprehensive metabolic panel    Lipid Panel with Direct LDL reflex    TSH, 3rd generation with Free T4 reflex    Urinalysis with reflex to microscopic    Vitamin D deficiency  --On daily supplement    Relevant Orders    Vitamin D 25 hydroxy    Vitiligo  --Stable        Other Visit Diagnoses     Encounter for Medicare annual wellness exam    -  Primary  --Overdue for repeat mammogram + DEXA-->ordered, encouraged to schedule  --She is once again refusing all vaccines including flu shot, Prevnar, Pneumovax, Shingrix     --Slip given for yearly labs including hepatitis C screen  --UTD with colonoscopy (2014)  --UTD with eye exam  --Advanced directive packet given  Encouraged to complete, getting us copy for records      Vaccine refused by patient        Relevant Orders    PNEUMOCOCCAL CONJUGATE VACCINE 13-VALENT GREATER THAN 6 MONTHS    Refused influenza vaccine        Relevant Orders    influenza vaccine, [id number], high-dose, PF 0 5 mL, for patients 65 yr+ (FLUZONE HIGH-DOSE)    Screening for colon cancer        Relevant Orders    Ambulatory referral to Gastroenterology    Screening for breast cancer        Relevant Orders    Mammo screening bilateral w 3d & cad    Screening for diabetes mellitus        Relevant Orders    Hemoglobin A1C          RTO 6 months    Subjective:      Patient ID: Giovani Schofield is a 74 y o  female  Here with  for AWV  Mildly productive cough, chest tightness, wheezing, nasal congestion, yellow rhinorrhea, postnasal drip x 3 months  No fever  Saw Dr Giovani Schofield recently  Inhalers prescribed, which have been helping, along with Flonase, Allegra  CXR negative  Symptoms somewhat improved  PFT's ordered also, which she hasn't gotten done yet  Non-smoker  Takes Prilosec on occasional basis only  Heartburn not too bad as long as she watches what she eats, but notes frequent dysphagia, with sensation of food getting stuck, occasional choking  No odynophagia, unintentional weight loss, N/V, abdominal pain, melena, hematochezia  Believes she had EGD at time of last colonoscopy (2014)  Fairly healthy diet  Adequate fiber  Walks regularly  Slipped and fell on ice last Feb   No falls otherwise  No balance issues  Home safety considerations reviewed  No problems with ADL's  Foot pain more or less resolved since bunion surgery last year  Occasional mid back pain  SCS remains quite effective, however  Does not feel need to get back in with pain management  Occasional Tylenol, no other analgesics  Mammogram 2016  DEXA 2015  Refuses flu shot, Pneumovax, Prevnar, Shingrix  Tdap 2014  No advanced directive  The following portions of the patient's history were reviewed and updated as appropriate: current medications, past family history, past medical history, past social history, past surgical history and problem list     Review of Systems   Constitutional: Negative for fever  HENT:        Per HPI   Respiratory: Positive for cough and wheezing  Cardiovascular: Negative for chest pain  Gastrointestinal: Negative for abdominal pain, blood in stool, constipation, diarrhea, nausea and vomiting  Genitourinary: Negative for dysuria  Musculoskeletal:        Per HPI   Skin: Negative for rash  Neurological: Negative for dizziness and headaches  Psychiatric/Behavioral: Negative for dysphoric mood  Objective:      /78   Pulse 99   Temp 97 9 °F (36 6 °C) (Tympanic)   Ht 5' 1" (1 549 m)   Wt 71 3 kg (157 lb 3 oz)   SpO2 99%   BMI 29 70 kg/m²          Physical Exam   Constitutional: She is oriented to person, place, and time  She appears well-developed and well-nourished  HENT:   Head: Normocephalic  Right Ear: External ear normal    Left Ear: External ear normal    Nose: Nose normal    Mouth/Throat: Oropharynx is clear and moist    Eyes: Pupils are equal, round, and reactive to light  Conjunctivae are normal    Neck: Normal range of motion  Neck supple  Cardiovascular: Normal rate, regular rhythm and normal heart sounds  Pulmonary/Chest: Effort normal and breath sounds normal    Breathing non-labored  RR=18  No cough noted  Abdominal: Soft  Bowel sounds are normal  There is no tenderness  Lymphadenopathy:     She has no cervical adenopathy  Neurological: She is alert and oriented to person, place, and time  She has normal reflexes  Skin: Skin is warm and dry  Psychiatric: She has a normal mood and affect

## 2018-11-08 NOTE — PROGRESS NOTES
Assessment and Plan:    Problem List Items Addressed This Visit     None        Health Maintenance Due   Topic Date Due    Depression Screening PHQ  1950    Medicare Annual Wellness Visit (AWV)  1950    CRC Screening: Colonoscopy  04/18/2016    Fall Risk  11/18/2017    Urinary Incontinence Screening  11/18/2017    Pneumococcal PPSV23/PCV13 65+ Years / Low and Medium Risk (1 of 2 - PCV13) 11/18/2017    INFLUENZA VACCINE  07/01/2018         HPI:  Lanre Sherman is a 74 y o  female here for her Subsequent Wellness Visit             Patient Active Problem List   Diagnosis    Back pain, chronic    Bunion, right foot    Cervical post-laminectomy syndrome    Deformity of toe of left foot    DJD (degenerative joint disease) of thoracic spine    Foot pain, bilateral    Leukopenia    Myofascial pain syndrome    Osteopenia    Pain syndrome, chronic    Thoracic neuralgia    Thoracic spondylosis without myelopathy    Vitiligo    Mild persistent asthma with acute exacerbation     Past Medical History:   Diagnosis Date    Anemia     last assessed - [id number]    Anxiety disorder     Arthritis     Asthma     Asthma, mild intermittent     last assessed - [id number]    Back pain     Bunion, right foot     last assessed - [id number]    Deformity of toe of left foot     last assessed - [id number]    Discitis of thoracolumbar region     last assessed - [id number]    Fall     Fibrocystic disease of breast     Fracture of wrist     and hand, left    Heartburn     History of gastroesophageal reflux (GERD)     last assessed - [id number]    Hypercholesterolemia     Indigestion     Leukopenia     last assessed - [id number]    Osteopenia     last assessed - [id number]    Polyarthritis     Seasonal allergies     Shortness of breath     Sleep disturbance     Toe deformity     last assessed - [id number]    Trochanteric bursitis of left hip     last assessed - [id number]    Vitamin D deficiency last assessed - [id number]    Vitiligo     last assessed - [id number]    Wears eyeglasses      Past Surgical History:   Procedure Laterality Date    BACK SURGERY      4 back surgeries, fusion, bone replacement    BACK SURGERY  2012    Lumbar PLIF surgery    CARPAL TUNNEL RELEASE Bilateral      SECTION  1980    CHOLECYSTECTOMY  1997    HAND TENDON SURGERY Bilateral     HERNIA REPAIR      NECK SURGERY  2012    ACDF Surgery    SPINAL CORD STIMULATOR IMPLANT N/A 2016    Procedure: PLACEMENT OF THORACIC PARASPINAL PERIPHERAL NERVE STIMULATING ELECTODES WITH LEFT BUTTOCK GENERATOR;  Surgeon: Gaurav Shearer MD;  Location:  MAIN OR;  [facility] NERVE REPAIR Bilateral      Family History   Problem Relation Age of Onset    Dementia Mother     Emphysema Father         lung    Lung cancer Father     Other Paternal Grandfather         gangrene    Hypertension Family     Other Family         back trouble     History   Smoking Status    Never Smoker   Smokeless Tobacco    Never Used     History   Alcohol Use No     Comment: Occasional alcohol use & Denied history of alcohol use both documented in [employer]      History   Drug Use No       Current Outpatient Prescriptions   Medication Sig Dispense Refill    albuterol (PROAIR HFA) 90 mcg/act inhaler Inhale 2 puffs every 6 (six) hours as needed for wheezing 8 5 g 5    Cholecalciferol (VITAMIN D) 2000 units CAPS Take 2 capsules by mouth daily      cyanocobalamin (VITAMIN B-12) 1,000 mcg tablet Take 1 tablet by mouth daily      fexofenadine (ALLEGRA) 180 MG tablet Take 1 tablet (180 mg total) by mouth daily 90 tablet 1    fluticasone (FLONASE) 50 mcg/act nasal spray 2 sprays into each nostril daily 16 g 3    fluticasone (FLOVENT HFA) 220 mcg/act inhaler Inhale 2 puffs 2 (two) times a day Rinse mouth after use   1 Inhaler 3    Melatonin 3 MG CAPS Take 1 capsule by mouth      omeprazole (PriLOSEC) 20 mg delayed release capsule Take 1 capsule (20 mg total) by mouth daily 30 capsule 5     No current facility-administered medications for this visit  Allergies   Allergen Reactions    Influenza A (H1n1) Monoval Vac Anaphylaxis    Lyrica [Pregabalin] Swelling    Other Anaphylaxis     FLU SHOT    Acetaminophen Other (See Comments)     GI Upset, "it upps my triglycerides"    Percocet [Oxycodone-Acetaminophen] GI Intolerance     Immunization History   Administered Date(s) Administered    Tdap 06/04/2014       Patient Care Team:  Beatriz Blanco DO as PCP - General (Family Medicine)  SIOBHAN Talavera MD Lin Breech, MD Daryl Delmas Later, MD Susanna Schneider, MD    Medicare Screening Tests and Risk Assessments:  Deisy Carrero is here for her Subsequent Wellness visit  Last Medicare Wellness visit information reviewed, patient interviewed and updates made to the record today  Health Risk Assessment:  Patient rates overall health as very good  Patient feels that their physical health rating is Same  Eyesight was rated as Same  Hearing was rated as Same  Patient feels that their emotional and mental health rating is Same  Pain experienced by patient in the last 7 days has been None  Emotional/Mental Health:  Patient has been feeling nervous/anxious  PHQ-9 Depression Screening:    Frequency of the following problems over the past two weeks:      1  Little interest or pleasure in doing things: 0 - not at all      2  Feeling down, depressed, or hopeless: 0 - not at all  PHQ-2 Score: 0          Broken Bones/Falls: Fall Risk Assessment:    In the past year, patient has experienced: History of falling in past year     Number of falls: 1          Bladder/Bowel:  Patient has not leaked urine accidently in the last six months  Patient reports no loss of bowel control      Immunizations:  Patient has not had a flu vaccination within the last year  Patient has not received a pneumonia shot  Patient has not received a shingles shot  Home Safety:  Patient does not have trouble with stairs inside or outside of their home  Patient currently reports that there are safety hazards present in home , working smoke alarms, working carbon monoxide detectors  Preventative Screenings:   Breast cancer screening performed, colon cancer screen completed, cholesterol screen completed, glaucoma eye exam completed,     Nutrition:  Current diet: Regular, No Added Salt, Low Carb and Frequent junk food with servings of the following:    Medications:  Patient is currently taking over-the-counter supplements  Patient is able to manage medications  Lifestyle Choices:  Patient reports no tobacco use  Patient has not smoked or used tobacco in the past   Patient reports no alcohol use  Patient drives a vehicle  Patient wears seat belt  Current level of exercise of physical activity described by patient as: Bike ride, swim and walks  Activities of Daily Living:  Can get out of bed by his or her self, able to dress self, able to make own meals, able to do own shopping, able to bathe self, can do own laundry/housekeeping, can manage own money, pay bills and track expenses    Previous Hospitalizations:  No hospitalization or ED visit in past 12 months        Advanced Directives:  Patient has decided on a power of   Patient has spoken to designated power of   Patient has not completed advanced directive          Preventative Screening/Counseling:      Cardiovascular:      General: Screening Current      Counseling: Healthy Weight and Healthy Diet     Due for Labs/Analytes/Optional EKG: Lipid Panel          Diabetes:      General: Screening Current      Counseling: Healthy Diet      Due for labs: Blood Glucose          Colorectal Cancer:      General: Screening Current      Counseling: high fiber diet      Due for studies: Fecal Occult Blood          Breast Cancer:      General: Risks and Benefits Discussed          Cervical Cancer:      General: Screening Not Indicated          Osteoporosis:      General: Risks and Benefits Discussed      Counseling: Calcium and Vitamin D Intake and Regular Weightbearing Exercise      Due for studies: DXA Axial          AAA:      General: Screening Not Indicated          Glaucoma:      General: Screening Current          HIV:      General: Screening Not Indicated          Hepatitis C:      Counseling: has received general HCV counseling        Advanced Directives:   Patient has no living will for healthcare, patient does not have an advanced directive  Information on ACP and/or AD provided  5 wishes given       Immunizations:      Influenza: Patient Declines      Pneumococcal: Patient Declines      Shingrix: Patient Declines      Hepatitis B (Medium to high risk patients): Patient Declines      Zostavax: Patient Declines      TD: Patient Declines      TDAP: Patient Declines

## 2018-11-08 NOTE — PATIENT INSTRUCTIONS
Wellness Visit for Adults   WHAT YOU NEED TO KNOW:   What is a wellness visit? A wellness visit is when you see your healthcare provider to get screened for health problems  You can also get advice on how to stay healthy  Write down your questions so you remember to ask them  Ask your healthcare provider how often you should have a wellness visit  What happens at a wellness visit? Your healthcare provider will ask about your health, and your family history of health problems  This includes high blood pressure, heart disease, and cancer  He or she will ask if you have symptoms that concern you, if you smoke, and about your mood  You may also be asked about your intake of medicines, supplements, food, and alcohol  Any of the following may be done:  · Your weight  will be checked  Your height may also be checked so your body mass index (BMI) can be calculated  Your BMI shows if you are at a healthy weight  · Your blood pressure  and heart rate will be checked  Your temperature may also be checked  · Blood and urine tests  may be done  Blood tests may be done to check your cholesterol levels  Abnormal cholesterol levels increase your risk for heart disease and stroke  You may also need a blood or urine test to check for diabetes if you are at increased risk  Urine tests may be done to look for signs of an infection or kidney disease  · A physical exam  includes checking your heartbeat and lungs with a stethoscope  Your healthcare provider may also check your skin to look for sun damage  · Screening tests  may be recommended  A screening test is done to check for diseases that may not cause symptoms  The screening tests you may need depend on your age, gender, family history, and lifestyle habits  For example, colorectal screening may be recommended if you are 48years old or older  What screening tests do I need if I am a woman? · A Pap smear  is used to screen for cervical cancer   Pap smears are usually done every 3 to 5 years depending on your age  You may need them more often if you have had abnormal Pap smear test results in the past  Ask your healthcare provider how often you should have a Pap smear  · A mammogram  is an x-ray of your breasts to screen for breast cancer  Experts recommend mammograms every 2 years starting at age 48 years  You may need a mammogram at age 52 years or younger if you have an increased risk for breast cancer  Talk to your healthcare provider about when you should start having mammograms and how often you need them  What vaccines might I need? · Get an influenza vaccine  every year  The influenza vaccine protects you from the flu  Several types of viruses cause the flu  The viruses change over time, so new vaccines are made each year  · Get a tetanus-diphtheria (Td) booster vaccine  every 10 years  This vaccine protects you against tetanus and diphtheria  Tetanus is a severe infection that may cause painful muscle spasms and lockjaw  Diphtheria is a severe bacterial infection that causes a thick covering in the back of your mouth and throat  · Get a human papillomavirus (HPV) vaccine  if you are female and aged 23 to 32 or male 23 to 24 and never received it  This vaccine protects you from HPV infection  HPV is the most common infection spread by sexual contact  HPV may also cause vaginal, penile, and anal cancers  · Get a pneumococcal vaccine  if you are aged 72 years or older  The pneumococcal vaccine is an injection given to protect you from pneumococcal disease  Pneumococcal disease is an infection caused by pneumococcal bacteria  The infection may cause pneumonia, meningitis, or an ear infection  · Get a shingles vaccine  if you are aged 61 or older, even if you have had shingles before  The shingles vaccine is an injection to protect you from the varicella-zoster virus  This is the same virus that causes chickenpox   Shingles is a painful rash that develops in people who had chickenpox or have been exposed to the virus  How can I eat healthy? My Plate is a model for planning healthy meals  It shows the types and amounts of foods that should go on your plate  Fruits and vegetables make up about half of your plate, and grains and protein make up the other half  A serving of dairy is included on the side of your plate  The amount of calories and serving sizes you need depends on your age, gender, weight, and height  Examples of healthy foods are listed below:  · Eat a variety of vegetables  such as dark green, red, and orange vegetables  You can also include canned vegetables low in sodium (salt) and frozen vegetables without added butter or sauces  · Eat a variety of fresh fruits , canned fruit in 100% juice, frozen fruit, and dried fruit  · Include whole grains  At least half of the grains you eat should be whole grains  Examples include whole-wheat bread, wheat pasta, brown rice, and whole-grain cereals such as oatmeal     · Eat a variety of protein foods such as seafood (fish and shellfish), lean meat, and poultry without skin (turkey and chicken)  Examples of lean meats include pork leg, shoulder, or tenderloin, and beef round, sirloin, tenderloin, and extra lean ground beef  Other protein foods include eggs and egg substitutes, beans, peas, soy products, nuts, and seeds  · Choose low-fat dairy products such as skim or 1% milk or low-fat yogurt, cheese, and cottage cheese  · Limit unhealthy fats  such as butter, hard margarine, and shortening  How much exercise do I need? Exercise at least 30 minutes per day on most days of the week  Some examples of exercise include walking, biking, dancing, and swimming  You can also fit in more physical activity by taking the stairs instead of the elevator or parking farther away from stores  Include muscle strengthening activities 2 days each week  Regular exercise provides many health benefits  It helps you manage your weight, and decreases your risk for type 2 diabetes, heart disease, stroke, and high blood pressure  Exercise can also help improve your mood  Ask your healthcare provider about the best exercise plan for you  What are some general health and safety guidelines I should follow? · Do not smoke  Nicotine and other chemicals in cigarettes and cigars can cause lung damage  Ask your healthcare provider for information if you currently smoke and need help to quit  E-cigarettes or smokeless tobacco still contain nicotine  Talk to your healthcare provider before you use these products  · Limit alcohol  A drink of alcohol is 12 ounces of beer, 5 ounces of wine, or 1½ ounces of liquor  · Lose weight, if needed  Being overweight increases your risk of certain health conditions  These include heart disease, high blood pressure, type 2 diabetes, and certain types of cancer  · Protect your skin  Do not sunbathe or use tanning beds  Use sunscreen with a SPF 15 or higher  Apply sunscreen at least 15 minutes before you go outside  Reapply sunscreen every 2 hours  Wear protective clothing, hats, and sunglasses when you are outside  · Drive safely  Always wear your seatbelt  Make sure everyone in your car wears a seatbelt  A seatbelt can save your life if you are in an accident  Do not use your cell phone when you are driving  This could distract you and cause an accident  Pull over if you need to make a call or send a text message  · Practice safe sex  Use latex condoms if are sexually active and have more than one partner  Your healthcare provider may recommend screening tests for sexually transmitted infections (STIs)  · Wear helmets, lifejackets, and protective gear  Always wear a helmet when you ride a bike or motorcycle, go skiing, or play sports that could cause a head injury  Wear protective equipment when you play sports   Wear a lifejacket when you are on a boat or doing water sports  CARE AGREEMENT:   You have the right to help plan your care  Learn about your health condition and how it may be treated  Discuss treatment options with your caregivers to decide what care you want to receive  You always have the right to refuse treatment  The above information is an  only  It is not intended as medical advice for individual conditions or treatments  Talk to your doctor, nurse or pharmacist before following any medical regimen to see if it is safe and effective for you  © 2017 [address] [employer] Information is for End User's use only and may not be sold, redistributed or otherwise used for commercial purposes  All illustrations and images included in CareNotes® are the copyrighted property of A D A M [employer]  or Levi Souza

## 2018-11-26 ENCOUNTER — TELEPHONE (OUTPATIENT)
Dept: FAMILY MEDICINE CLINIC | Facility: OTHER | Age: 68
End: 2018-11-26

## 2018-11-26 NOTE — TELEPHONE ENCOUNTER
Patient called and said she is refusing the mammogram screening bilateral w 3d and CAD she doesn't feel she needs it   Thank you

## 2018-12-04 ENCOUNTER — APPOINTMENT (OUTPATIENT)
Dept: LAB | Facility: CLINIC | Age: 68
End: 2018-12-04
Payer: MEDICARE

## 2018-12-04 DIAGNOSIS — E55.9 VITAMIN D DEFICIENCY: ICD-10-CM

## 2018-12-04 DIAGNOSIS — E78.00 HYPERCHOLESTEROLEMIA: ICD-10-CM

## 2018-12-04 DIAGNOSIS — Z11.59 NEED FOR HEPATITIS C SCREENING TEST: ICD-10-CM

## 2018-12-04 DIAGNOSIS — Z13.1 SCREENING FOR DIABETES MELLITUS: ICD-10-CM

## 2018-12-04 LAB
25(OH)D3 SERPL-MCNC: 35.9 NG/ML (ref 30–100)
ALBUMIN SERPL BCP-MCNC: 3.7 G/DL (ref 3.5–5)
ALP SERPL-CCNC: 92 U/L (ref 46–116)
ALT SERPL W P-5'-P-CCNC: 29 U/L (ref 12–78)
ANION GAP SERPL CALCULATED.3IONS-SCNC: 9 MMOL/L (ref 4–13)
AST SERPL W P-5'-P-CCNC: 19 U/L (ref 5–45)
BACTERIA UR QL AUTO: ABNORMAL /HPF
BASOPHILS # BLD AUTO: 0 THOUSANDS/ΜL (ref 0–0.1)
BASOPHILS NFR BLD AUTO: 0 % (ref 0–1)
BILIRUB SERPL-MCNC: 0.4 MG/DL (ref 0.2–1)
BILIRUB UR QL STRIP: NEGATIVE
BUN SERPL-MCNC: 17 MG/DL (ref 5–25)
CALCIUM SERPL-MCNC: 9.3 MG/DL (ref 8.3–10.1)
CHLORIDE SERPL-SCNC: 104 MMOL/L (ref 100–108)
CHOLEST SERPL-MCNC: 239 MG/DL (ref 50–200)
CLARITY UR: CLEAR
CO2 SERPL-SCNC: 27 MMOL/L (ref 21–32)
COLOR UR: YELLOW
CREAT SERPL-MCNC: 1.01 MG/DL (ref 0.6–1.3)
EOSINOPHIL # BLD AUTO: 0.01 THOUSAND/ΜL (ref 0–0.61)
EOSINOPHIL NFR BLD AUTO: 0 % (ref 0–6)
ERYTHROCYTE [DISTWIDTH] IN BLOOD BY AUTOMATED COUNT: 11.8 % (ref 11.6–15.1)
GFR SERPL CREATININE-BSD FRML MDRD: 58 ML/MIN/1.73SQ M
GLUCOSE P FAST SERPL-MCNC: 91 MG/DL (ref 65–99)
GLUCOSE UR STRIP-MCNC: NEGATIVE MG/DL
HCT VFR BLD AUTO: 41.1 % (ref 34.8–46.1)
HCV AB SER QL: NORMAL
HDLC SERPL-MCNC: 57 MG/DL (ref 40–60)
HGB BLD-MCNC: 13.8 G/DL (ref 11.5–15.4)
HGB UR QL STRIP.AUTO: NEGATIVE
IMM GRANULOCYTES # BLD AUTO: 0.01 THOUSAND/UL (ref 0–0.2)
IMM GRANULOCYTES NFR BLD AUTO: 0 % (ref 0–2)
KETONES UR STRIP-MCNC: NEGATIVE MG/DL
LDLC SERPL CALC-MCNC: 161 MG/DL (ref 0–100)
LEUKOCYTE ESTERASE UR QL STRIP: ABNORMAL
LYMPHOCYTES # BLD AUTO: 1.14 THOUSANDS/ΜL (ref 0.6–4.47)
LYMPHOCYTES NFR BLD AUTO: 38 % (ref 14–44)
MCH RBC QN AUTO: 31.9 PG (ref 26.8–34.3)
MCHC RBC AUTO-ENTMCNC: 33.6 G/DL (ref 31.4–37.4)
MCV RBC AUTO: 95 FL (ref 82–98)
MONOCYTES # BLD AUTO: 0.24 THOUSAND/ΜL (ref 0.17–1.22)
MONOCYTES NFR BLD AUTO: 8 % (ref 4–12)
NEUTROPHILS # BLD AUTO: 1.63 THOUSANDS/ΜL (ref 1.85–7.62)
NEUTS SEG NFR BLD AUTO: 54 % (ref 43–75)
NITRITE UR QL STRIP: NEGATIVE
NON-SQ EPI CELLS URNS QL MICRO: ABNORMAL /HPF
NRBC BLD AUTO-RTO: 0 /100 WBCS
PH UR STRIP.AUTO: 6 [PH] (ref 4.5–8)
PLATELET # BLD AUTO: 236 THOUSANDS/UL (ref 149–390)
PMV BLD AUTO: 8.7 FL (ref 8.9–12.7)
POTASSIUM SERPL-SCNC: 3.9 MMOL/L (ref 3.5–5.3)
PROT SERPL-MCNC: 7.2 G/DL (ref 6.4–8.2)
PROT UR STRIP-MCNC: NEGATIVE MG/DL
RBC # BLD AUTO: 4.32 MILLION/UL (ref 3.81–5.12)
RBC #/AREA URNS AUTO: ABNORMAL /HPF
SODIUM SERPL-SCNC: 140 MMOL/L (ref 136–145)
SP GR UR STRIP.AUTO: 1.01 (ref 1–1.03)
T4 FREE SERPL-MCNC: 0.93 NG/DL (ref 0.76–1.46)
TRIGL SERPL-MCNC: 104 MG/DL
TSH SERPL DL<=0.05 MIU/L-ACNC: 4.23 UIU/ML (ref 0.36–3.74)
UROBILINOGEN UR QL STRIP.AUTO: 0.2 E.U./DL
WBC # BLD AUTO: 3.03 THOUSAND/UL (ref 4.31–10.16)
WBC #/AREA URNS AUTO: ABNORMAL /HPF

## 2018-12-04 PROCEDURE — 84439 ASSAY OF FREE THYROXINE: CPT

## 2018-12-04 PROCEDURE — 80053 COMPREHEN METABOLIC PANEL: CPT

## 2018-12-04 PROCEDURE — 85025 COMPLETE CBC W/AUTO DIFF WBC: CPT

## 2018-12-04 PROCEDURE — 83036 HEMOGLOBIN GLYCOSYLATED A1C: CPT

## 2018-12-04 PROCEDURE — 80061 LIPID PANEL: CPT

## 2018-12-04 PROCEDURE — 36415 COLL VENOUS BLD VENIPUNCTURE: CPT

## 2018-12-04 PROCEDURE — 81001 URINALYSIS AUTO W/SCOPE: CPT

## 2018-12-04 PROCEDURE — 82306 VITAMIN D 25 HYDROXY: CPT

## 2018-12-04 PROCEDURE — 86803 HEPATITIS C AB TEST: CPT

## 2018-12-04 PROCEDURE — 84443 ASSAY THYROID STIM HORMONE: CPT

## 2018-12-05 LAB
EST. AVERAGE GLUCOSE BLD GHB EST-MCNC: 137 MG/DL
HBA1C MFR BLD: 6.4 % (ref 4.2–6.3)

## 2018-12-10 ENCOUNTER — HOSPITAL ENCOUNTER (OUTPATIENT)
Dept: RADIOLOGY | Facility: HOSPITAL | Age: 68
Discharge: HOME | End: 2018-12-10
Payer: MEDICARE

## 2018-12-10 DIAGNOSIS — M85.80 OSTEOPENIA, UNSPECIFIED LOCATION: ICD-10-CM

## 2018-12-10 DIAGNOSIS — Z13.820 OSTEOPOROSIS SCREENING: ICD-10-CM

## 2018-12-10 PROCEDURE — 77080 DXA BONE DENSITY AXIAL: CPT

## 2018-12-14 ENCOUNTER — TELEPHONE (OUTPATIENT)
Dept: FAMILY MEDICINE CLINIC | Facility: OTHER | Age: 68
End: 2018-12-14

## 2018-12-14 DIAGNOSIS — E78.00 HYPERCHOLESTEROLEMIA: ICD-10-CM

## 2018-12-14 DIAGNOSIS — D70.9 NEUTROPENIA, UNSPECIFIED TYPE (HCC): ICD-10-CM

## 2018-12-14 DIAGNOSIS — R73.03 PREDIABETES: Primary | ICD-10-CM

## 2018-12-14 DIAGNOSIS — E03.8 SUBCLINICAL HYPOTHYROIDISM: ICD-10-CM

## 2018-12-14 PROBLEM — E55.9 VITAMIN D DEFICIENCY: Status: RESOLVED | Noted: 2018-11-08 | Resolved: 2018-12-14

## 2018-12-14 NOTE — TELEPHONE ENCOUNTER
Left message    ----- Message from [id number] sent at 12/14/2018  9:33 AM EST -----  Can we let Mar Yu know that her DEXA scan came back showing continued osteopenia (thinning of the bones), but a bit improved compared with previous  Should continue with regular weight bearing exercise, daily calcium + D supplement   Thanks

## 2018-12-14 NOTE — TELEPHONE ENCOUNTER
Left message for patient to return call    ----- Message from [id number] sent at 12/14/2018  9:35 AM EST -----  Can we inform Brook Klein of blood work results:  --Cholesterol numbers up a bit from previous  Average blood sugar level (A1C) in "prediabetes" range, close to meeting criteria for diabetes  Weight loss, increased fiber, decreased sugar and carbs in diet will help with this  Lab slip printed to recheck in 6 months, a week prior to next visit  --Normal vitamin D level  Negative hepatitis C screening test    --Thyroid level trending just a bit towards underactive  Will recheck this also prior to next visit--lab slip printed       Thanks

## 2019-01-24 ENCOUNTER — OFFICE VISIT (OUTPATIENT)
Dept: GASTROENTEROLOGY | Facility: AMBULARY SURGERY CENTER | Age: 69
End: 2019-01-24
Payer: MEDICARE

## 2019-01-24 VITALS
WEIGHT: 164 LBS | BODY MASS INDEX: 30.96 KG/M2 | RESPIRATION RATE: 14 BRPM | HEIGHT: 61 IN | TEMPERATURE: 98 F | SYSTOLIC BLOOD PRESSURE: 154 MMHG | DIASTOLIC BLOOD PRESSURE: 82 MMHG | HEART RATE: 68 BPM

## 2019-01-24 DIAGNOSIS — R13.19 OTHER DYSPHAGIA: ICD-10-CM

## 2019-01-24 DIAGNOSIS — Z12.11 SCREENING FOR COLON CANCER: ICD-10-CM

## 2019-01-24 PROCEDURE — 99204 OFFICE O/P NEW MOD 45 MIN: CPT | Performed by: INTERNAL MEDICINE

## 2019-01-24 NOTE — PROGRESS NOTES
Consultation -  [facility]  Lanre Sherman 79 y o  female MRN: [id number]          Assessment & Plan:    Iesha 62-year-old female with a longstanding history of acid reflux, intermittent dysphagia to solid foods, last colonoscopy was 10 years ago  1   Intermittent dysphagia and longstanding history of acid reflux:  -I have encouraged the patient at this time to resume taking PPI daily  -we will proceed with an upper endoscopy to evaluate for Kathleen's esophagus, stricture stenosis in Schatzki's ring and dilated of indicated    2  We colon cancer screening:  Patient's last colonoscopy was 10 years ago  -she gives a very vague history of a possible polyp that was removed though we do not have the pathology available    We will schedule patient for an upper endoscopy and colonoscopy  -discussed with her the risks of the procedures including bleeding, surgery, perforation, missed polyp detection      _____________________________________________________________        CC:  A dysphagia    HPI:  Lanre Sherman is a 79 y  o female who was referred for evaluation of dysphagia  As you know this is a pleasant 62-year-old female who is otherwise healthy who reports she has had a longstanding history of acid reflux, had been on PPI therapy for many years  She has intermittent dysphagia  Though admittedly patient has very poor dentition, she is not compliant with her dentures as they do not fit properly when she chews  She tries to cut her food any carefully and slowly and takes sips of liquids in between  If she does not she has noticed that multiple different food will get stuck, pointing to her central esophageal area  She denies any nausea vomiting but does have regurgitation  She has been trying to wean down on PPI, not taking about 3 or 4 days per week  She reports regular bowel movements, denies any diarrhea, constipation, melena, rectal bleeding      Recently has noted some right subscapular pinpoint tenderness reproducible to touch  She also has some intermittent epigastric pain  Patient is status post cholecystectomy  She has gained about 30 lb in the last 1 year  She has had multiple orthopedic surgeries including cervical hardware and spinal stimulator  ROS:  The remainder of the ROS was negative except for the pertinent positives mentioned in HPI  Allergies: Influenza a (h1n1) monoval vac; Lyrica [pregabalin];  Other; Acetaminophen; and Percocet [oxycodone-acetaminophen]    Medications:   Current Outpatient Prescriptions:     Cholecalciferol (VITAMIN D) 2000 units CAPS, Take 2 capsules by mouth daily, Disp: , Rfl:     cyanocobalamin (VITAMIN B-12) 1,000 mcg tablet, Take 1 tablet by mouth daily, Disp: , Rfl:     Melatonin 3 MG CAPS, Take 1 capsule by mouth, Disp: , Rfl:     omeprazole (PriLOSEC) 20 mg delayed release capsule, Take 1 capsule (20 mg total) by mouth daily, Disp: 30 capsule, Rfl: 5    Na Sulfate-K Sulfate-Mg Sulf (SUPREP BOWEL PREP KIT) 17 5-3 13-1 6 GM/177ML SOLN, Take 1 Bottle by mouth once for 1 dose, Disp: 1 Bottle, Rfl: 0'    Past Medical History:   Diagnosis Date    Anemia     last assessed - [id number]    Anxiety disorder     Arthritis     Asthma     Asthma, mild intermittent     last assessed - [id number]    Back pain     Bunion, right foot     last assessed - [id number]    Deformity of toe of left foot     last assessed - 40Qtr6712    Discitis of thoracolumbar region     last assessed - [id number]    Fall     Fibrocystic disease of breast     Fracture of wrist     and hand, left    Heartburn     History of gastroesophageal reflux (GERD)     last assessed - 83Bzt1245    Hypercholesterolemia     Indigestion     Leukopenia     last assessed - 83Xkz5623    Osteopenia     last assessed - [id number]    Polyarthritis     Seasonal allergies     Shortness of breath     Sleep disturbance     Toe deformity     last assessed - 85Pqg0106    Trochanteric bursitis of left hip     last assessed - [id number]    Vitamin D deficiency     last assessed - 99Ptr9520    Vitiligo     last assessed - [id number]    Wears eyeglasses        Past Surgical History:   Procedure Laterality Date    BACK SURGERY      4 back surgeries, fusion, bone replacement    BACK SURGERY  2012    Lumbar PLIF surgery    CARPAL TUNNEL RELEASE Bilateral      SECTION  1980    CHOLECYSTECTOMY  1997    HAND TENDON SURGERY Bilateral     HERNIA REPAIR      NECK SURGERY  2012    ACDF Surgery    SPINAL CORD STIMULATOR IMPLANT N/A 2016    Procedure: PLACEMENT OF THORACIC PARASPINAL PERIPHERAL NERVE STIMULATING ELECTODES WITH LEFT BUTTOCK GENERATOR;  Surgeon: David Conway MD;  Location:  MAIN OR;  Service:    Saad Almonte NERVE REPAIR Bilateral        Family History   Problem Relation Age of Onset    Dementia Mother     Emphysema Father         lung    Lung cancer Father     Other Paternal Grandfather         gangrene    Hypertension Family     Other Family         back trouble        reports that she has never smoked  She has never used smokeless tobacco  She reports that she does not drink alcohol or use drugs            Physical Exam:     /82 (BP Location: Left arm, Patient Position: Sitting, Cuff Size: Standard)   Pulse 68   Temp 98 °F (36 7 °C) (Tympanic)   Resp 14   Ht 5' 1" (1 549 m)   Wt 74 4 kg (164 lb)   BMI 30 99 kg/m²     Gen: wn/wd, NAD from overweight  HEENT: anicteric, MMM, no cervical LAD  CVS: RRR, no m/r/g  CHEST: CTA b/l, posterior thorax is a pinpoint area of pain in subscapular area  Multiple well-healed scars from prior spinal surgery  ABD: +BS, soft, NT,ND, no hepatosplenomegaly  EXT: no c/c/e  NEURO: aaox3  SKIN: NO rashes

## 2019-01-24 NOTE — LETTER
January 24, 2019     Emiliano Form, [address] [place], [place] [address]    Patient: Bong Curiel   YOB: 1950   Date of Visit: 1/24/2019       Dear Dr Miriam Burleson: Thank you for referring Chelsea Calvo to me for evaluation  Below are my notes for this consultation  If you have questions, please do not hesitate to call me  I look forward to following your patient along with you  Sincerely,        Taz Dooley MD        CC: Rebecca Hassan MD  1/24/2019  3:12 PM  Sign at close encounter  Consultation - [facility]) [facility]  Bong Curiel 79 y o  female MRN: [id number]          Assessment & Plan:    Pleasant 53-year-old female with a longstanding history of acid reflux, intermittent dysphagia to solid foods, last colonoscopy was 10 years ago  1   Intermittent dysphagia and longstanding history of acid reflux:  -I have encouraged the patient at this time to resume taking PPI daily  -we will proceed with an upper endoscopy to evaluate for Kathleen's esophagus, stricture stenosis in Schatzki's ring and dilated of indicated    2  We colon cancer screening:  Patient's last colonoscopy was 10 years ago  -she gives a very vague history of a possible polyp that was removed though we do not have the pathology available    We will schedule patient for an upper endoscopy and colonoscopy  -discussed with her the risks of the procedures including bleeding, surgery, perforation, missed polyp detection      _____________________________________________________________        CC:  A dysphagia    HPI:  Bong Curiel is a 79 y  o female who was referred for evaluation of dysphagia  As you know this is a pleasant 53-year-old female who is otherwise healthy who reports she has had a longstanding history of acid reflux, had been on PPI therapy for many years  She has intermittent dysphagia    Though admittedly patient has very poor dentition, she is not compliant with her dentures as they do not fit properly when she chews  She tries to cut her food any carefully and slowly and takes sips of liquids in between  If she does not she has noticed that multiple different food will get stuck, pointing to her central esophageal area  She denies any nausea vomiting but does have regurgitation  She has been trying to wean down on PPI, not taking about 3 or 4 days per week  She reports regular bowel movements, denies any diarrhea, constipation, melena, rectal bleeding  Recently has noted some right subscapular pinpoint tenderness reproducible to touch  She also has some intermittent epigastric pain  Patient is status post cholecystectomy  She has gained about 30 lb in the last 1 year  She has had multiple orthopedic surgeries including cervical hardware and spinal stimulator  ROS:  The remainder of the ROS was negative except for the pertinent positives mentioned in HPI  Allergies: Influenza a (h1n1) monoval vac; Lyrica [pregabalin];  Other; Acetaminophen; and Percocet [oxycodone-acetaminophen]    Medications:   Current Outpatient Prescriptions:     Cholecalciferol (VITAMIN D) 2000 units CAPS, Take 2 capsules by mouth daily, Disp: , Rfl:     cyanocobalamin (VITAMIN B-12) 1,000 mcg tablet, Take 1 tablet by mouth daily, Disp: , Rfl:     Melatonin 3 MG CAPS, Take 1 capsule by mouth, Disp: , Rfl:     omeprazole (PriLOSEC) 20 mg delayed release capsule, Take 1 capsule (20 mg total) by mouth daily, Disp: 30 capsule, Rfl: 5    Na Sulfate-K Sulfate-Mg Sulf (SUPREP BOWEL PREP KIT) 17 5-3 13-1 6 GM/177ML SOLN, Take 1 Bottle by mouth once for 1 dose, Disp: 1 Bottle, Rfl: 0'    Past Medical History:   Diagnosis Date    Anemia     last assessed - [id number]    Anxiety disorder     Arthritis     Asthma     Asthma, mild intermittent     last assessed - [id number]    Back pain     Bunion, right foot     last assessed - [id number]    Deformity of toe of left foot     last assessed - 09Cud4825    Discitis of thoracolumbar region     last assessed - [id number]    Fall     Fibrocystic disease of breast     Fracture of wrist     and hand, left    Heartburn     History of gastroesophageal reflux (GERD)     last assessed - 2015    Hypercholesterolemia     Indigestion     Leukopenia     last assessed - 23Tcc7388    Osteopenia     last assessed - 12DCO5056    Polyarthritis     Seasonal allergies     Shortness of breath     Sleep disturbance     Toe deformity     last assessed - 2015    Trochanteric bursitis of left hip     last assessed - [id number]    Vitamin D deficiency     last assessed - 07Mzy1269    Vitiligo     last assessed - 97DGW5543    Wears eyeglasses        Past Surgical History:   Procedure Laterality Date    BACK SURGERY      4 back surgeries, fusion, bone replacement    BACK SURGERY  2012    Lumbar PLIF surgery    CARPAL TUNNEL RELEASE Bilateral      SECTION  1980    CHOLECYSTECTOMY  1997    HAND TENDON SURGERY Bilateral     HERNIA REPAIR      NECK SURGERY  2012    ACDF Surgery    SPINAL CORD STIMULATOR IMPLANT N/A 2016    Procedure: PLACEMENT OF THORACIC PARASPINAL PERIPHERAL NERVE STIMULATING ELECTODES WITH LEFT BUTTOCK GENERATOR;  Surgeon: Stefani Mark MD;  [facility];  Service:    Chryl Dom NERVE REPAIR Bilateral        Family History   Problem Relation Age of Onset    Dementia Mother     Emphysema Father         lung    Lung cancer Father     Other Paternal Grandfather         gangrene    Hypertension Family     Other Family         back trouble        reports that she has never smoked  She has never used smokeless tobacco  She reports that she does not drink alcohol or use drugs            Physical Exam:     /82 (BP Location: Left arm, Patient Position: Sitting, Cuff Size: Standard)   Pulse 68   Temp 98 °F (36 7 °C) (Tympanic)   Resp 14   Ht 5' 1" (1 549 m)   Wt 74 4 kg (164 lb)   BMI 30 99 kg/m²      Gen: wn/wd, NAD from overweight  HEENT: anicteric, MMM, no cervical LAD  CVS: RRR, no m/r/g  CHEST: CTA b/l, posterior thorax is a pinpoint area of pain in subscapular area  Multiple well-healed scars from prior spinal surgery  ABD: +BS, soft, NT,ND, no hepatosplenomegaly  EXT: no c/c/e  NEURO: aaox3  SKIN: NO rashes

## 2019-01-29 ENCOUNTER — ANESTHESIA EVENT (OUTPATIENT)
Dept: PERIOP | Facility: AMBULARY SURGERY CENTER | Age: 69
End: 2019-01-29
Payer: MEDICARE

## 2019-01-31 ENCOUNTER — HOSPITAL ENCOUNTER (OUTPATIENT)
Facility: AMBULARY SURGERY CENTER | Age: 69
Setting detail: OUTPATIENT SURGERY
Discharge: HOME | End: 2019-01-31
Attending: INTERNAL MEDICINE | Admitting: INTERNAL MEDICINE
Payer: MEDICARE

## 2019-01-31 ENCOUNTER — ANESTHESIA (OUTPATIENT)
Dept: PERIOP | Facility: AMBULARY SURGERY CENTER | Age: 69
End: 2019-01-31
Payer: MEDICARE

## 2019-01-31 VITALS
HEART RATE: 72 BPM | DIASTOLIC BLOOD PRESSURE: 84 MMHG | OXYGEN SATURATION: 97 % | SYSTOLIC BLOOD PRESSURE: 131 MMHG | BODY MASS INDEX: 29.27 KG/M2 | RESPIRATION RATE: 18 BRPM | TEMPERATURE: 97.6 F | HEIGHT: 61 IN | WEIGHT: 155 LBS

## 2019-01-31 DIAGNOSIS — R13.19 OTHER DYSPHAGIA: ICD-10-CM

## 2019-01-31 DIAGNOSIS — Z12.11 SCREENING FOR COLON CANCER: ICD-10-CM

## 2019-01-31 PROCEDURE — 45381 COLONOSCOPY SUBMUCOUS NJX: CPT | Performed by: INTERNAL MEDICINE

## 2019-01-31 PROCEDURE — 88342 IMHCHEM/IMCYTCHM 1ST ANTB: CPT | Performed by: PATHOLOGY

## 2019-01-31 PROCEDURE — 88305 TISSUE EXAM BY PATHOLOGIST: CPT | Performed by: PATHOLOGY

## 2019-01-31 PROCEDURE — 45385 COLONOSCOPY W/LESION REMOVAL: CPT | Performed by: INTERNAL MEDICINE

## 2019-01-31 PROCEDURE — 43239 EGD BIOPSY SINGLE/MULTIPLE: CPT | Performed by: INTERNAL MEDICINE

## 2019-01-31 RX ORDER — LIDOCAINE HYDROCHLORIDE 10 MG/ML
INJECTION, SOLUTION INFILTRATION; PERINEURAL AS NEEDED
Status: DISCONTINUED | OUTPATIENT
Start: 2019-01-31 | End: 2019-01-31 | Stop reason: SURG

## 2019-01-31 RX ORDER — PROPOFOL 10 MG/ML
INJECTION, EMULSION INTRAVENOUS AS NEEDED
Status: DISCONTINUED | OUTPATIENT
Start: 2019-01-31 | End: 2019-01-31 | Stop reason: SURG

## 2019-01-31 RX ORDER — SODIUM CHLORIDE 9 MG/ML
125 INJECTION, SOLUTION INTRAVENOUS CONTINUOUS
Status: DISCONTINUED | OUTPATIENT
Start: 2019-01-31 | End: 2019-01-31 | Stop reason: HOSPADM

## 2019-01-31 RX ADMIN — PROPOFOL 50 MG: 10 INJECTION, EMULSION INTRAVENOUS at 11:20

## 2019-01-31 RX ADMIN — SODIUM CHLORIDE: 0.9 INJECTION, SOLUTION INTRAVENOUS at 10:45

## 2019-01-31 RX ADMIN — LIDOCAINE HYDROCHLORIDE ANHYDROUS 50 MG: 10 INJECTION, SOLUTION INFILTRATION at 11:00

## 2019-01-31 RX ADMIN — PROPOFOL 50 MG: 10 INJECTION, EMULSION INTRAVENOUS at 11:30

## 2019-01-31 RX ADMIN — PROPOFOL 100 MG: 10 INJECTION, EMULSION INTRAVENOUS at 11:00

## 2019-01-31 RX ADMIN — PROPOFOL 50 MG: 10 INJECTION, EMULSION INTRAVENOUS at 11:10

## 2019-01-31 NOTE — OP NOTE
ESOPHAGOGASTRODUODENOSCOPY    PROCEDURE: EGD    SEDATION: Monitored anesthesia care, check anesthesia records    ASA Class: 3    INDICATIONS:  Chronic reflux and dysphagia    CONSENT:  Informed consent was obtained for the procedure, including sedation after explaining the risks and benefits of the procedure  Risks including but not limited to bleeding, perforation, infection, and missed lesion  PREPARATION:   Telemetry, pulse oximetry, blood pressure were monitored throughout the procedure  Patient was identified by myself both verbally and by visual inspection of ID band  DESCRIPTION:   Patient was placed in the left lateral decubitus position and was sedated with the above medication  The gastroscope was introduced in to the oropharynx and the esophagus was intubated under direct visualization  Scope was passed down the esophagus up to 2nd part of the duodenum  A careful inspection was made as the gastroscope was withdrawn, including a retroflexed view of the stomach; findings and interventions are described below  FINDINGS:    #1  Esophagus- normal esophagus and GE junction, no evidence of stenosis, stricture, esophagitis  #2  Stomach- gastritis biopsies taken for H pylori  Normal retroflexion    #3  Duodenum- normal duodenum         IMPRESSIONS:      Normal duodenum  Gastritis, biopsies taken for H pylori  Normal retroflexion  Normal esophagus, no stenosis or stricture, no significant esophagitis    RECOMMENDATIONS:     Discharge home  Resume regular diet  Resume home medications  Follow up biopsy results  Consider switching to H2 blocker  Consider esophageal manometry if symptoms persist  Recommend well fitting dentures  Call with any abdominal pain, bleeding, fevers    COMPLICATIONS:  None; patient tolerated the procedure well      SPECIMENS:    ID Type Source Tests Collected by Time Destination   1 : gastric Tissue Stomach TISSUE EXAM Salma Estrella MD 1/31/2019 1108        ESTIMATED BLOOD LOSS: Minimal

## 2019-01-31 NOTE — ANESTHESIA PREPROCEDURE EVALUATION
Review of Systems/Medical History  Patient summary reviewed  Chart reviewed  No history of anesthetic complications     Cardiovascular  Hyperlipidemia,    Pulmonary  Asthma ,        GI/Hepatic    GERD , Bowel prep       Negative  ROS        Endo/Other  History of thyroid disease , hypothyroidism,      GYN  Negative gynecology ROS          Hematology  Anemia ,     Musculoskeletal  Back pain , cervical pain and thoracic pain,   Arthritis     Neurology      Comment: Thoracic neuralgia   Psychology   Anxiety,   Chronic pain,            Physical Exam    Airway    Mallampati score: II  TM Distance: >3 FB  Neck ROM: full     Dental   No notable dental hx     Cardiovascular  Rhythm: regular, Rate: normal, Cardiovascular exam normal    Pulmonary  Pulmonary exam normal Breath sounds clear to auscultation,     Other Findings        Anesthesia Plan  ASA Score- 3     Anesthesia Type- IV sedation with anesthesia with ASA Monitors  Additional Monitors:   Airway Plan:         Plan Factors-    Induction- intravenous  Postoperative Plan-     Informed Consent- Anesthetic plan and risks discussed with patient  I personally reviewed this patient with the CRNA  Discussed and agreed on the Anesthesia Plan with the CRNA  Nae Gay Recent labs personally reviewed:  Lab Results   Component Value Date    WBC 3 03 (L) 12/04/2018    HGB 13 8 12/04/2018     12/04/2018     Lab Results   Component Value Date     02/22/2016    K 3 9 12/04/2018    BUN 17 12/04/2018    CREATININE 1 01 12/04/2018    GLUCOSE 101 02/10/2018     Lab Results   Component Value Date    PTT 27 06/27/2016      Lab Results   Component Value Date    INR 0 95 06/27/2016       Blood type A    Lab Results   Component Value Date    HGBA1C 6 4 (H) 12/04/2018       I, Yessenia Jackson MD, have personally seen and evaluated the patient prior to anesthetic care    I have reviewed the pre-anesthetic record, and other medical records if appropriate to the anesthetic care  If a CRNA is involved in the case, I have reviewed the CRNA assessment, if present, and agree  Risks/benefits and alternatives discussed with patient including possible PONV, sore throat, and possibility of rare anesthetic and surgical emergencies

## 2019-01-31 NOTE — OP NOTE
Colonoscopy Procedure Note    Procedure: Colonoscopy    Sedation: Monitored anesthesia care, check anesthesia records      ASA Class: 2    INDICATIONS:  Screening colonoscopy    POST-OP DIAGNOSIS: See the impression below    Procedure Details     Prior colonoscopy: No prior colonoscopy  Informed consent was obtained for the procedure, including sedation  Risks of perforation, hemorrhage, adverse drug reaction and aspiration were discussed  The patient was placed in the left lateral decubitus position  Based on the pre-procedure assessment, including review of the patient's medical history, medications, allergies, and review of systems, she had been deemed to be an appropriate candidate for conscious sedation; she was therefore sedated with the medications listed below  The patient was monitored continuously with telemetry, pulse oximetry, blood pressure monitoring, and direct observations  A rectal examination was performed  The colonoscope was inserted into the rectum and advanced under direct vision to the cecum, which was identified by the ileocecal valve and appendiceal orifice  The quality of the colonic preparation was good  A careful inspection was made as the colonoscope was withdrawn, including a retroflexed view of the rectum; findings and interventions are described below  Findings:    One sessile 6 mm polyp in the transverse colon, lifted with saline removed in 2 pieces with cold snare  Otherwise normal colonoscopy with good prep good visualization  Mild internal hemorrhoids on retroflexion           Complications: None; patient tolerated the procedure well      Impression:    Transverse colon polyp removed by cold snare  Otherwise normal colonoscopy    Recommendations:    Discharge home  Resume regular diet  Resume home medications  Follow up biopsy results  Repeat colonoscopy in 5 years, or based on pathology  Call with any abdominal pain, bleeding, fevers    COMPLICATIONS:  None; patient tolerated the procedure well      SPECIMENS:    ID Type Source Tests Collected by Time Destination   1 : gastric Tissue Stomach TISSUE EXAM Jade Almanza MD 1/31/2019 1108    2 : transverse colon Tissue Polyp, Colorectal TISSUE EXAM Jade Almanza MD 1/31/2019 1124        ESTIMATED BLOOD LOSS:  Minimal

## 2019-01-31 NOTE — DISCHARGE INSTRUCTIONS
Discharge home  Resume regular diet  Resume home medications  Follow up biopsy results  Tried to stop taking omeprazole and take ranitidine 150 mg twice daily (over the counter zantac)  Repeat colonoscopy in 5 years, or based on pathology  Call with any abdominal pain, bleeding, fevers

## 2019-01-31 NOTE — DISCHARGE INSTR - AVS FIRST PAGE
ESOPHAGOGASTRODUODENOSCOPY    PROCEDURE: EGD    SEDATION: Monitored anesthesia care, check anesthesia records    ASA Class: 3    INDICATIONS:  Chronic reflux and dysphagia    CONSENT:  Informed consent was obtained for the procedure, including sedation after explaining the risks and benefits of the procedure  Risks including but not limited to bleeding, perforation, infection, and missed lesion  PREPARATION:   Telemetry, pulse oximetry, blood pressure were monitored throughout the procedure  Patient was identified by myself both verbally and by visual inspection of ID band  DESCRIPTION:   Patient was placed in the left lateral decubitus position and was sedated with the above medication  The gastroscope was introduced in to the oropharynx and the esophagus was intubated under direct visualization  Scope was passed down the esophagus up to 2nd part of the duodenum  A careful inspection was made as the gastroscope was withdrawn, including a retroflexed view of the stomach; findings and interventions are described below  FINDINGS:    #1  Esophagus- normal esophagus and GE junction, no evidence of stenosis, stricture, esophagitis  #2  Stomach- gastritis biopsies taken for H pylori  Normal retroflexion    #3  Duodenum- normal duodenum         IMPRESSIONS:      Normal duodenum  Gastritis, biopsies taken for H pylori  Normal retroflexion  Normal esophagus, no stenosis or stricture, no significant esophagitis    RECOMMENDATIONS:     Discharge home  Resume regular diet  Resume home medications  Follow up biopsy results  Consider switching to H2 blocker  Consider esophageal manometry if symptoms persist  Recommend well fitting dentures  Call with any abdominal pain, bleeding, fevers    COMPLICATIONS:  None; patient tolerated the procedure well      SPECIMENS:    ID Type Source Tests Collected by Time Destination   1 : gastric Tissue Stomach TISSUE EXAM Ammon De Los Santos MD 1/31/2019 1108        ESTIMATED BLOOD LOSS: Minimal        Colonoscopy Procedure Note    Procedure: Colonoscopy    Sedation: Monitored anesthesia care, check anesthesia records      ASA Class: 2    INDICATIONS:  Screening colonoscopy    POST-OP DIAGNOSIS: See the impression below    Procedure Details     Prior colonoscopy: No prior colonoscopy  Informed consent was obtained for the procedure, including sedation  Risks of perforation, hemorrhage, adverse drug reaction and aspiration were discussed  The patient was placed in the left lateral decubitus position  Based on the pre-procedure assessment, including review of the patient's medical history, medications, allergies, and review of systems, she had been deemed to be an appropriate candidate for conscious sedation; she was therefore sedated with the medications listed below  The patient was monitored continuously with telemetry, pulse oximetry, blood pressure monitoring, and direct observations  A rectal examination was performed  The colonoscope was inserted into the rectum and advanced under direct vision to the cecum, which was identified by the ileocecal valve and appendiceal orifice  The quality of the colonic preparation was good  A careful inspection was made as the colonoscope was withdrawn, including a retroflexed view of the rectum; findings and interventions are described below  Findings:    One sessile 6 mm polyp in the transverse colon, lifted with saline removed in 2 pieces with cold snare  Otherwise normal colonoscopy with good prep good visualization  Mild internal hemorrhoids on retroflexion           Complications: None; patient tolerated the procedure well      Impression:    Transverse colon polyp removed by cold snare  Otherwise normal colonoscopy    Recommendations:    Discharge home  Resume regular diet  Resume home medications  Follow up biopsy results  Repeat colonoscopy in 5 years, or based on pathology  Call with any abdominal pain, bleeding, fevers    COMPLICATIONS:  None; patient tolerated the procedure well      SPECIMENS:    ID Type Source Tests Collected by Time Destination   1 : gastric Tissue Stomach TISSUE EXAM Noa Colin MD 1/31/2019 1108    2 : transverse colon Tissue Polyp, Colorectal TISSUE EXAM Noa Colin MD 1/31/2019 1124        ESTIMATED BLOOD LOSS:  Minimal

## 2019-01-31 NOTE — H&P
History and Physical -  [facility]  Brenda Kasper 79 y o  female MRN: [id number]    HPI: Brenda Kasper is a 79y o  year old female who presents with GERD, dysphagia, colon cancer screening         Review of Systems    Historical Information   Past Medical History:   Diagnosis Date    Anemia     last assessed - [id number]    Anxiety disorder     Arthritis     Asthma     Asthma, mild intermittent     last assessed - [id number]    Back pain     Bunion, right foot     last assessed - [id number]    Deformity of toe of left foot     last assessed - [id number]    Discitis of thoracolumbar region     last assessed - 57Bzl7461    Fall     Fibrocystic disease of breast     Fracture of wrist     and hand, left    GERD (gastroesophageal reflux disease)     Heartburn     History of gastroesophageal reflux (GERD)     last assessed - [id number]    Hypercholesterolemia     Indigestion     Leukopenia     last assessed - [id number]    Osteopenia     last assessed - 96Glw6676    Polyarthritis     Seasonal allergies     Shortness of breath     Sleep disturbance     Toe deformity     last assessed - 2015    Trochanteric bursitis of left hip     last assessed - [id number]    Vitamin D deficiency     last assessed - 49Ihi2512    Vitiligo     last assessed - [id number]    Wears eyeglasses      Past Surgical History:   Procedure Laterality Date    BACK SURGERY      4 back surgeries, fusion, bone replacement    BACK SURGERY  2012    Lumbar PLIF surgery    CARPAL TUNNEL RELEASE Bilateral      SECTION  1980    CHOLECYSTECTOMY      HAND TENDON SURGERY Bilateral     HERNIA REPAIR      NECK SURGERY  2012    ACDF Surgery    SPINAL CORD STIMULATOR IMPLANT N/A 2016    Procedure: PLACEMENT OF THORACIC PARASPINAL PERIPHERAL NERVE STIMULATING ELECTODES WITH LEFT BUTTOCK GENERATOR;  Surgeon: Liat Colvin MD;  Location: [facility];  Service:    [address] [place] [address] ULNAR NERVE REPAIR Bilateral 1989     Social History   History   Alcohol Use    Yes     Comment: ocassional     History   Drug Use No     History   Smoking Status    Never Smoker   Smokeless Tobacco    Never Used     Family History   Problem Relation Age of Onset    Dementia Mother     Emphysema Father         lung    Lung cancer Father     Other Paternal Grandfather         gangrene    Hypertension Family     Other Family         back trouble       Meds/Allergies     Prescriptions Prior to Admission   Medication    Melatonin 3 MG CAPS    Cholecalciferol (VITAMIN D) 2000 units CAPS    cyanocobalamin (VITAMIN B-12) 1,000 mcg tablet    omeprazole (PriLOSEC) 20 mg delayed release capsule       Allergies   Allergen Reactions    Influenza A (H1n1) Monoval Vac Anaphylaxis    Lyrica [Pregabalin] Swelling    Other Anaphylaxis     FLU SHOT    Acetaminophen Other (See Comments)     GI Upset, "it upps my triglycerides"    Percocet [Oxycodone-Acetaminophen] GI Intolerance       Objective     Blood pressure 140/83, pulse 79, temperature (!) 97 3 °F (36 3 °C), temperature source Temporal, resp  rate 18, height 5' 1" (1 549 m), weight 70 3 kg (155 lb), SpO2 100 %  PHYSICAL EXAM    Gen: NAD  CV: RRR  CHEST: Clear  ABD: soft, NT/ND  EXT: no edema  Neuro: AAO      ASSESSMENT/PLAN:  This is a 79y o  year old female here for GERD, dysphagia, colon cancer screening         PLAN:   Procedure: egd/colonoscopy

## 2019-02-05 ENCOUNTER — TELEPHONE (OUTPATIENT)
Dept: GASTROENTEROLOGY | Facility: AMBULARY SURGERY CENTER | Age: 69
End: 2019-02-05

## 2019-02-05 NOTE — TELEPHONE ENCOUNTER
Spoke to pt  Pt aware of results  hm and recall set  Pt did not want to set up f/u appt at this current time

## 2019-02-05 NOTE — TELEPHONE ENCOUNTER
----- Message from Kevin Raphael MD sent at 2/5/2019 12:34 PM EST -----  Please inform patient that biopsies from stomach were negative for H pylori  Her esophagus was otherwise normal     If she continues to have difficulty swallowing we should schedule her for manometry  Please have her call with any questions or concerns in follow-up in the office in 2 to 3 months with the PA  The polyp removed in her colon was a hyperplastic polyp  This has no cancer potential     I recommend repeat colonoscopy in 10 years, please put her in for recall

## 2019-02-08 ENCOUNTER — TELEPHONE (OUTPATIENT)
Dept: GASTROENTEROLOGY | Facility: AMBULARY SURGERY CENTER | Age: 69
End: 2019-02-08

## 2019-02-08 ENCOUNTER — APPOINTMENT (OUTPATIENT)
Dept: LAB | Facility: CLINIC | Age: 69
End: 2019-02-08
Payer: MEDICARE

## 2019-02-08 DIAGNOSIS — K92.1 BLACK STOOL: ICD-10-CM

## 2019-02-08 DIAGNOSIS — K92.1 BLACK STOOL: Primary | ICD-10-CM

## 2019-02-08 LAB
BASOPHILS # BLD AUTO: 0 THOUSANDS/ΜL (ref 0–0.1)
BASOPHILS NFR BLD AUTO: 0 % (ref 0–1)
EOSINOPHIL # BLD AUTO: 0.01 THOUSAND/ΜL (ref 0–0.61)
EOSINOPHIL NFR BLD AUTO: 0 % (ref 0–6)
ERYTHROCYTE [DISTWIDTH] IN BLOOD BY AUTOMATED COUNT: 12 % (ref 11.6–15.1)
HCT VFR BLD AUTO: 41.3 % (ref 34.8–46.1)
HGB BLD-MCNC: 14 G/DL (ref 11.5–15.4)
IMM GRANULOCYTES # BLD AUTO: 0 THOUSAND/UL (ref 0–0.2)
IMM GRANULOCYTES NFR BLD AUTO: 0 % (ref 0–2)
LYMPHOCYTES # BLD AUTO: 1.54 THOUSANDS/ΜL (ref 0.6–4.47)
LYMPHOCYTES NFR BLD AUTO: 37 % (ref 14–44)
MCH RBC QN AUTO: 32.1 PG (ref 26.8–34.3)
MCHC RBC AUTO-ENTMCNC: 33.9 G/DL (ref 31.4–37.4)
MCV RBC AUTO: 95 FL (ref 82–98)
MONOCYTES # BLD AUTO: 0.3 THOUSAND/ΜL (ref 0.17–1.22)
MONOCYTES NFR BLD AUTO: 7 % (ref 4–12)
NEUTROPHILS # BLD AUTO: 2.32 THOUSANDS/ΜL (ref 1.85–7.62)
NEUTS SEG NFR BLD AUTO: 56 % (ref 43–75)
NRBC BLD AUTO-RTO: 0 /100 WBCS
PLATELET # BLD AUTO: 285 THOUSANDS/UL (ref 149–390)
PMV BLD AUTO: 9 FL (ref 8.9–12.7)
RBC # BLD AUTO: 4.36 MILLION/UL (ref 3.81–5.12)
WBC # BLD AUTO: 4.17 THOUSAND/UL (ref 4.31–10.16)

## 2019-02-08 PROCEDURE — 85025 COMPLETE CBC W/AUTO DIFF WBC: CPT

## 2019-02-08 PROCEDURE — 36415 COLL VENOUS BLD VENIPUNCTURE: CPT

## 2019-02-08 NOTE — TELEPHONE ENCOUNTER
Patient is s/p EGD/Colon showing benign polyp, mild internal hemorrhoids, chronic inactive gastritis, surface erosions  She is taking omeprazole 20 mg daily  She called to report abdominal/tomach  bloating and  "jet black" stool past few days  She had taken pepto bismol for stomach discomfort, last dose was Saturday  Denies lightheadedness, dizziness, fatigue  We discussed black stool most likely due to pepto bismol  I did put in order for CBC as precaution measure  Office visit scheduled for 2/11 with leroy to discuss symptoms  In the meantime if symptoms worsen, she will go to ED

## 2019-02-08 NOTE — TELEPHONE ENCOUNTER
DR Missy Johnson PT    Pt called requesting advise for changes in bowek movement  Pt stated she is experiencing bloating, high order and black colored stool with constipation   Please advise thank you

## 2019-05-17 ENCOUNTER — TELEPHONE (OUTPATIENT)
Dept: FAMILY MEDICINE CLINIC | Facility: OTHER | Age: 69
End: 2019-05-17

## 2019-05-17 ENCOUNTER — APPOINTMENT (OUTPATIENT)
Dept: LAB | Facility: CLINIC | Age: 69
End: 2019-05-17
Payer: MEDICARE

## 2019-05-17 ENCOUNTER — OFFICE VISIT (OUTPATIENT)
Dept: FAMILY MEDICINE CLINIC | Facility: OTHER | Age: 69
End: 2019-05-17
Payer: MEDICARE

## 2019-05-17 ENCOUNTER — TRANSCRIBE ORDERS (OUTPATIENT)
Dept: LAB | Facility: CLINIC | Age: 69
End: 2019-05-17

## 2019-05-17 VITALS
OXYGEN SATURATION: 96 % | HEART RATE: 70 BPM | DIASTOLIC BLOOD PRESSURE: 68 MMHG | HEIGHT: 61 IN | SYSTOLIC BLOOD PRESSURE: 124 MMHG | TEMPERATURE: 98.6 F | WEIGHT: 163.38 LBS | BODY MASS INDEX: 30.85 KG/M2

## 2019-05-17 DIAGNOSIS — D70.9 NEUTROPENIA, UNSPECIFIED TYPE (HCC): ICD-10-CM

## 2019-05-17 DIAGNOSIS — M85.80 OSTEOPENIA, UNSPECIFIED LOCATION: ICD-10-CM

## 2019-05-17 DIAGNOSIS — R73.03 PREDIABETES: ICD-10-CM

## 2019-05-17 DIAGNOSIS — E78.00 HYPERCHOLESTEROLEMIA: ICD-10-CM

## 2019-05-17 DIAGNOSIS — G89.4 PAIN SYNDROME, CHRONIC: ICD-10-CM

## 2019-05-17 DIAGNOSIS — Z11.1 SCREENING-PULMONARY TB: Primary | ICD-10-CM

## 2019-05-17 DIAGNOSIS — E03.8 SUBCLINICAL HYPOTHYROIDISM: Primary | ICD-10-CM

## 2019-05-17 DIAGNOSIS — M79.2 THORACIC NEURALGIA: ICD-10-CM

## 2019-05-17 DIAGNOSIS — Z11.1 SCREENING-PULMONARY TB: ICD-10-CM

## 2019-05-17 DIAGNOSIS — J45.31 MILD PERSISTENT ASTHMA WITH ACUTE EXACERBATION: ICD-10-CM

## 2019-05-17 PROBLEM — R03.0 BORDERLINE HYPERTENSION: Status: RESOLVED | Noted: 2018-11-08 | Resolved: 2019-05-17

## 2019-05-17 PROCEDURE — 99214 OFFICE O/P EST MOD 30 MIN: CPT | Performed by: NURSE PRACTITIONER

## 2019-05-17 PROCEDURE — 36415 COLL VENOUS BLD VENIPUNCTURE: CPT

## 2019-05-17 PROCEDURE — 86480 TB TEST CELL IMMUN MEASURE: CPT

## 2019-05-17 RX ORDER — IBUPROFEN 200 MG
TABLET ORAL
COMMUNITY
Start: 2014-05-08 | End: 2022-03-15

## 2019-05-20 ENCOUNTER — TELEPHONE (OUTPATIENT)
Dept: FAMILY MEDICINE CLINIC | Facility: OTHER | Age: 69
End: 2019-05-20

## 2019-05-20 LAB
GAMMA INTERFERON BACKGROUND BLD IA-ACNC: 0.07 IU/ML
M TB IFN-G BLD-IMP: NEGATIVE
M TB IFN-G CD4+ BCKGRND COR BLD-ACNC: -0.01 IU/ML
M TB IFN-G CD4+ BCKGRND COR BLD-ACNC: 0.1 IU/ML
MITOGEN IGNF BCKGRD COR BLD-ACNC: >10 IU/ML

## 2019-05-23 ENCOUNTER — HOSPITAL ENCOUNTER (OUTPATIENT)
Dept: MAMMOGRAPHY | Facility: HOSPITAL | Age: 69
Discharge: HOME | End: 2019-05-23
Attending: NURSE PRACTITIONER
Payer: MEDICARE

## 2019-05-25 ENCOUNTER — APPOINTMENT (EMERGENCY)
Dept: CT IMAGING | Facility: HOSPITAL | Age: 69
End: 2019-05-25
Payer: MEDICARE

## 2019-05-25 ENCOUNTER — HOSPITAL ENCOUNTER (EMERGENCY)
Facility: HOSPITAL | Age: 69
End: 2019-05-26
Attending: EMERGENCY MEDICINE
Payer: MEDICARE

## 2019-05-25 DIAGNOSIS — M54.41 CHRONIC LOW BACK PAIN WITH BILATERAL SCIATICA, UNSPECIFIED BACK PAIN LATERALITY: ICD-10-CM

## 2019-05-25 DIAGNOSIS — G89.29 CHRONIC LOW BACK PAIN WITH BILATERAL SCIATICA, UNSPECIFIED BACK PAIN LATERALITY: ICD-10-CM

## 2019-05-25 DIAGNOSIS — M54.42 CHRONIC LOW BACK PAIN WITH BILATERAL SCIATICA, UNSPECIFIED BACK PAIN LATERALITY: ICD-10-CM

## 2019-05-25 DIAGNOSIS — F30.9 MANIA (HCC): Primary | ICD-10-CM

## 2019-05-25 LAB
ALBUMIN SERPL BCP-MCNC: 4 G/DL (ref 3.5–5)
ALP SERPL-CCNC: 103 U/L (ref 46–116)
ALT SERPL W P-5'-P-CCNC: 44 U/L (ref 12–78)
AMPHETAMINES SERPL QL SCN: NEGATIVE
ANION GAP SERPL CALCULATED.3IONS-SCNC: 13 MMOL/L (ref 4–13)
ANION GAP SERPL CALCULATED.3IONS-SCNC: 8 MMOL/L (ref 4–13)
AST SERPL W P-5'-P-CCNC: 73 U/L (ref 5–45)
BACTERIA UR QL AUTO: ABNORMAL /HPF
BARBITURATES UR QL: NEGATIVE
BASOPHILS # BLD AUTO: 0.01 THOUSANDS/ΜL (ref 0–0.1)
BASOPHILS NFR BLD AUTO: 0 % (ref 0–1)
BENZODIAZ UR QL: NEGATIVE
BILIRUB SERPL-MCNC: 0.6 MG/DL (ref 0.2–1)
BILIRUB UR QL STRIP: NEGATIVE
BUN SERPL-MCNC: 10 MG/DL (ref 5–25)
BUN SERPL-MCNC: 7 MG/DL (ref 5–25)
CALCIUM SERPL-MCNC: 8.3 MG/DL (ref 8.3–10.1)
CALCIUM SERPL-MCNC: 9.3 MG/DL (ref 8.3–10.1)
CHLORIDE SERPL-SCNC: 106 MMOL/L (ref 100–108)
CHLORIDE SERPL-SCNC: 98 MMOL/L (ref 100–108)
CLARITY UR: CLEAR
CO2 SERPL-SCNC: 22 MMOL/L (ref 21–32)
CO2 SERPL-SCNC: 26 MMOL/L (ref 21–32)
COCAINE UR QL: NEGATIVE
COLOR UR: ABNORMAL
CREAT SERPL-MCNC: 0.78 MG/DL (ref 0.6–1.3)
CREAT SERPL-MCNC: 0.92 MG/DL (ref 0.6–1.3)
EOSINOPHIL # BLD AUTO: 0.06 THOUSAND/ΜL (ref 0–0.61)
EOSINOPHIL NFR BLD AUTO: 2 % (ref 0–6)
ERYTHROCYTE [DISTWIDTH] IN BLOOD BY AUTOMATED COUNT: 11.9 % (ref 11.6–15.1)
ETHANOL EXG-MCNC: 0 MG/DL
GFR SERPL CREATININE-BSD FRML MDRD: 65 ML/MIN/1.73SQ M
GFR SERPL CREATININE-BSD FRML MDRD: 79 ML/MIN/1.73SQ M
GLUCOSE SERPL-MCNC: 143 MG/DL (ref 65–140)
GLUCOSE SERPL-MCNC: 87 MG/DL (ref 65–140)
GLUCOSE UR STRIP-MCNC: NEGATIVE MG/DL
HCT VFR BLD AUTO: 40.1 % (ref 34.8–46.1)
HGB BLD-MCNC: 13.8 G/DL (ref 11.5–15.4)
HGB UR QL STRIP.AUTO: NEGATIVE
IMM GRANULOCYTES # BLD AUTO: 0.01 THOUSAND/UL (ref 0–0.2)
IMM GRANULOCYTES NFR BLD AUTO: 0 % (ref 0–2)
KETONES UR STRIP-MCNC: NEGATIVE MG/DL
LEUKOCYTE ESTERASE UR QL STRIP: ABNORMAL
LYMPHOCYTES # BLD AUTO: 0.88 THOUSANDS/ΜL (ref 0.6–4.47)
LYMPHOCYTES NFR BLD AUTO: 27 % (ref 14–44)
MCH RBC QN AUTO: 32.2 PG (ref 26.8–34.3)
MCHC RBC AUTO-ENTMCNC: 34.4 G/DL (ref 31.4–37.4)
MCV RBC AUTO: 94 FL (ref 82–98)
METHADONE UR QL: NEGATIVE
MONOCYTES # BLD AUTO: 0.3 THOUSAND/ΜL (ref 0.17–1.22)
MONOCYTES NFR BLD AUTO: 9 % (ref 4–12)
NEUTROPHILS # BLD AUTO: 2.04 THOUSANDS/ΜL (ref 1.85–7.62)
NEUTS SEG NFR BLD AUTO: 62 % (ref 43–75)
NITRITE UR QL STRIP: NEGATIVE
NON-SQ EPI CELLS URNS QL MICRO: ABNORMAL /HPF
NRBC BLD AUTO-RTO: 0 /100 WBCS
OPIATES UR QL SCN: NEGATIVE
PCP UR QL: NEGATIVE
PH UR STRIP.AUTO: 6 [PH]
PLATELET # BLD AUTO: 275 THOUSANDS/UL (ref 149–390)
PMV BLD AUTO: 9 FL (ref 8.9–12.7)
POTASSIUM SERPL-SCNC: 3.1 MMOL/L (ref 3.5–5.3)
POTASSIUM SERPL-SCNC: 3.8 MMOL/L (ref 3.5–5.3)
PROT SERPL-MCNC: 7.9 G/DL (ref 6.4–8.2)
PROT UR STRIP-MCNC: NEGATIVE MG/DL
RBC # BLD AUTO: 4.29 MILLION/UL (ref 3.81–5.12)
RBC #/AREA URNS AUTO: ABNORMAL /HPF
SODIUM SERPL-SCNC: 133 MMOL/L (ref 136–145)
SODIUM SERPL-SCNC: 140 MMOL/L (ref 136–145)
SP GR UR STRIP.AUTO: <=1.005 (ref 1–1.03)
THC UR QL: NEGATIVE
TSH SERPL DL<=0.05 MIU/L-ACNC: 3.54 UIU/ML (ref 0.36–3.74)
UROBILINOGEN UR QL STRIP.AUTO: 0.2 E.U./DL
WBC # BLD AUTO: 3.3 THOUSAND/UL (ref 4.31–10.16)
WBC #/AREA URNS AUTO: ABNORMAL /HPF

## 2019-05-25 PROCEDURE — 80053 COMPREHEN METABOLIC PANEL: CPT | Performed by: EMERGENCY MEDICINE

## 2019-05-25 PROCEDURE — 80307 DRUG TEST PRSMV CHEM ANLYZR: CPT | Performed by: EMERGENCY MEDICINE

## 2019-05-25 PROCEDURE — 96365 THER/PROPH/DIAG IV INF INIT: CPT

## 2019-05-25 PROCEDURE — 81001 URINALYSIS AUTO W/SCOPE: CPT | Performed by: EMERGENCY MEDICINE

## 2019-05-25 PROCEDURE — 84443 ASSAY THYROID STIM HORMONE: CPT | Performed by: EMERGENCY MEDICINE

## 2019-05-25 PROCEDURE — 96372 THER/PROPH/DIAG INJ SC/IM: CPT

## 2019-05-25 PROCEDURE — 36415 COLL VENOUS BLD VENIPUNCTURE: CPT | Performed by: EMERGENCY MEDICINE

## 2019-05-25 PROCEDURE — 85025 COMPLETE CBC W/AUTO DIFF WBC: CPT | Performed by: EMERGENCY MEDICINE

## 2019-05-25 PROCEDURE — 99285 EMERGENCY DEPT VISIT HI MDM: CPT

## 2019-05-25 PROCEDURE — 99285 EMERGENCY DEPT VISIT HI MDM: CPT | Performed by: EMERGENCY MEDICINE

## 2019-05-25 PROCEDURE — 96366 THER/PROPH/DIAG IV INF ADDON: CPT

## 2019-05-25 PROCEDURE — 80048 BASIC METABOLIC PNL TOTAL CA: CPT | Performed by: EMERGENCY MEDICINE

## 2019-05-25 PROCEDURE — 82075 ASSAY OF BREATH ETHANOL: CPT | Performed by: EMERGENCY MEDICINE

## 2019-05-25 PROCEDURE — 70450 CT HEAD/BRAIN W/O DYE: CPT

## 2019-05-25 RX ORDER — HALOPERIDOL 5 MG/ML
5 INJECTION INTRAMUSCULAR ONCE
Status: COMPLETED | OUTPATIENT
Start: 2019-05-25 | End: 2019-05-25

## 2019-05-25 RX ORDER — HYDROXYZINE HYDROCHLORIDE 25 MG/1
25 TABLET, FILM COATED ORAL EVERY 6 HOURS PRN
Status: CANCELLED | OUTPATIENT
Start: 2019-05-25

## 2019-05-25 RX ORDER — MAGNESIUM HYDROXIDE/ALUMINUM HYDROXICE/SIMETHICONE 120; 1200; 1200 MG/30ML; MG/30ML; MG/30ML
15 SUSPENSION ORAL EVERY 4 HOURS PRN
Status: CANCELLED | OUTPATIENT
Start: 2019-05-25

## 2019-05-25 RX ORDER — LORAZEPAM 2 MG/ML
2 INJECTION INTRAMUSCULAR ONCE
Status: COMPLETED | OUTPATIENT
Start: 2019-05-25 | End: 2019-05-25

## 2019-05-25 RX ORDER — POTASSIUM CHLORIDE 20 MEQ/1
40 TABLET, EXTENDED RELEASE ORAL 2 TIMES DAILY
Status: DISCONTINUED | OUTPATIENT
Start: 2019-05-25 | End: 2019-05-25

## 2019-05-25 RX ORDER — BENZTROPINE MESYLATE 1 MG/ML
1 INJECTION INTRAMUSCULAR; INTRAVENOUS EVERY 8 HOURS PRN
Status: CANCELLED | OUTPATIENT
Start: 2019-05-25

## 2019-05-25 RX ORDER — RISPERIDONE 1 MG/1
1 TABLET, ORALLY DISINTEGRATING ORAL EVERY 8 HOURS PRN
Status: CANCELLED | OUTPATIENT
Start: 2019-05-25

## 2019-05-25 RX ORDER — POTASSIUM CHLORIDE 20 MEQ/1
40 TABLET, EXTENDED RELEASE ORAL ONCE
Status: DISCONTINUED | OUTPATIENT
Start: 2019-05-25 | End: 2019-05-26 | Stop reason: HOSPADM

## 2019-05-25 RX ORDER — POTASSIUM CHLORIDE 14.9 MG/ML
20 INJECTION INTRAVENOUS
Status: COMPLETED | OUTPATIENT
Start: 2019-05-25 | End: 2019-05-25

## 2019-05-25 RX ORDER — HALOPERIDOL 5 MG/ML
2 INJECTION INTRAMUSCULAR EVERY 6 HOURS PRN
Status: CANCELLED | OUTPATIENT
Start: 2019-05-25

## 2019-05-25 RX ORDER — HALOPERIDOL 1 MG/1
1 TABLET ORAL EVERY 6 HOURS PRN
Status: CANCELLED | OUTPATIENT
Start: 2019-05-25

## 2019-05-25 RX ORDER — BENZTROPINE MESYLATE 0.5 MG/1
1 TABLET ORAL EVERY 8 HOURS PRN
Status: CANCELLED | OUTPATIENT
Start: 2019-05-25

## 2019-05-25 RX ADMIN — POTASSIUM CHLORIDE 20 MEQ: 14.9 INJECTION, SOLUTION INTRAVENOUS at 17:03

## 2019-05-25 RX ADMIN — LORAZEPAM 2 MG: 2 INJECTION INTRAMUSCULAR; INTRAVENOUS at 13:15

## 2019-05-25 RX ADMIN — POTASSIUM CHLORIDE 20 MEQ: 14.9 INJECTION, SOLUTION INTRAVENOUS at 18:59

## 2019-05-25 RX ADMIN — HALOPERIDOL LACTATE 5 MG: 5 INJECTION, SOLUTION INTRAMUSCULAR at 13:15

## 2019-05-26 ENCOUNTER — HOSPITAL ENCOUNTER (INPATIENT)
Facility: HOSPITAL | Age: 69
LOS: 4 days | Discharge: HOME | DRG: 885 | End: 2019-05-30
Attending: PSYCHIATRY & NEUROLOGY | Admitting: PSYCHIATRY & NEUROLOGY
Payer: MEDICARE

## 2019-05-26 VITALS
SYSTOLIC BLOOD PRESSURE: 160 MMHG | WEIGHT: 159.39 LBS | OXYGEN SATURATION: 96 % | HEART RATE: 93 BPM | TEMPERATURE: 98.5 F | RESPIRATION RATE: 17 BRPM | DIASTOLIC BLOOD PRESSURE: 85 MMHG | BODY MASS INDEX: 30.12 KG/M2

## 2019-05-26 DIAGNOSIS — Z78.9 TAKES DIETARY SUPPLEMENTS: ICD-10-CM

## 2019-05-26 DIAGNOSIS — M54.41 CHRONIC LOW BACK PAIN WITH BILATERAL SCIATICA, UNSPECIFIED BACK PAIN LATERALITY: ICD-10-CM

## 2019-05-26 DIAGNOSIS — F23 BRIEF PSYCHOTIC DISORDER (HCC): Primary | ICD-10-CM

## 2019-05-26 DIAGNOSIS — G89.29 CHRONIC LOW BACK PAIN WITH BILATERAL SCIATICA, UNSPECIFIED BACK PAIN LATERALITY: ICD-10-CM

## 2019-05-26 DIAGNOSIS — K21.9 GASTROESOPHAGEAL REFLUX DISEASE, ESOPHAGITIS PRESENCE NOT SPECIFIED: ICD-10-CM

## 2019-05-26 DIAGNOSIS — F41.1 GAD (GENERALIZED ANXIETY DISORDER): Chronic | ICD-10-CM

## 2019-05-26 DIAGNOSIS — M54.42 CHRONIC LOW BACK PAIN WITH BILATERAL SCIATICA, UNSPECIFIED BACK PAIN LATERALITY: ICD-10-CM

## 2019-05-26 PROBLEM — F33.3 MAJOR DEPRESSIVE DISORDER, RECURRENT, SEVERE WITH PSYCHOTIC FEATURES (HCC): Chronic | Status: ACTIVE | Noted: 2019-05-26

## 2019-05-26 PROBLEM — F32.A DEPRESSION: Status: ACTIVE | Noted: 2019-05-26

## 2019-05-26 PROBLEM — R19.7 ACUTE DIARRHEA: Status: ACTIVE | Noted: 2019-05-26

## 2019-05-26 PROCEDURE — 99222 1ST HOSP IP/OBS MODERATE 55: CPT | Performed by: PSYCHIATRY & NEUROLOGY

## 2019-05-26 PROCEDURE — 99222 1ST HOSP IP/OBS MODERATE 55: CPT | Performed by: FAMILY MEDICINE

## 2019-05-26 PROCEDURE — 96366 THER/PROPH/DIAG IV INF ADDON: CPT

## 2019-05-26 PROCEDURE — 1124F ACP DISCUSS-NO DSCNMKR DOCD: CPT | Performed by: PSYCHIATRY & NEUROLOGY

## 2019-05-26 PROCEDURE — 93005 ELECTROCARDIOGRAM TRACING: CPT

## 2019-05-26 RX ORDER — LORAZEPAM 0.5 MG/1
0.5 TABLET ORAL 2 TIMES DAILY
Status: DISCONTINUED | OUTPATIENT
Start: 2019-05-26 | End: 2019-05-30 | Stop reason: HOSPADM

## 2019-05-26 RX ORDER — HALOPERIDOL 1 MG/1
1 TABLET ORAL EVERY 6 HOURS PRN
Status: DISCONTINUED | OUTPATIENT
Start: 2019-05-26 | End: 2019-05-26

## 2019-05-26 RX ORDER — BENZTROPINE MESYLATE 1 MG/1
1 TABLET ORAL EVERY 8 HOURS PRN
Status: DISCONTINUED | OUTPATIENT
Start: 2019-05-26 | End: 2019-05-26

## 2019-05-26 RX ORDER — ARIPIPRAZOLE 2 MG/1
2 TABLET ORAL DAILY
Status: DISCONTINUED | OUTPATIENT
Start: 2019-05-26 | End: 2019-05-28

## 2019-05-26 RX ORDER — OLANZAPINE 5 MG/1
2.5 TABLET, ORALLY DISINTEGRATING ORAL
Status: DISCONTINUED | OUTPATIENT
Start: 2019-05-26 | End: 2019-05-30 | Stop reason: HOSPADM

## 2019-05-26 RX ORDER — IBUPROFEN 400 MG/1
800 TABLET ORAL EVERY 8 HOURS PRN
Status: DISCONTINUED | OUTPATIENT
Start: 2019-05-26 | End: 2019-05-30 | Stop reason: HOSPADM

## 2019-05-26 RX ORDER — IBUPROFEN 600 MG/1
600 TABLET ORAL EVERY 6 HOURS PRN
Status: DISCONTINUED | OUTPATIENT
Start: 2019-05-26 | End: 2019-05-30 | Stop reason: HOSPADM

## 2019-05-26 RX ORDER — HYDROXYZINE HYDROCHLORIDE 25 MG/1
25 TABLET, FILM COATED ORAL EVERY 6 HOURS PRN
Status: DISCONTINUED | OUTPATIENT
Start: 2019-05-26 | End: 2019-05-30 | Stop reason: HOSPADM

## 2019-05-26 RX ORDER — HALOPERIDOL 5 MG/ML
2 INJECTION INTRAMUSCULAR EVERY 6 HOURS PRN
Status: DISCONTINUED | OUTPATIENT
Start: 2019-05-26 | End: 2019-05-26

## 2019-05-26 RX ORDER — HALOPERIDOL 5 MG/ML
2 INJECTION INTRAMUSCULAR EVERY 6 HOURS PRN
Status: DISCONTINUED | OUTPATIENT
Start: 2019-05-26 | End: 2019-05-30 | Stop reason: HOSPADM

## 2019-05-26 RX ORDER — OLANZAPINE 10 MG/1
5 INJECTION, POWDER, LYOPHILIZED, FOR SOLUTION INTRAMUSCULAR
Status: DISCONTINUED | OUTPATIENT
Start: 2019-05-26 | End: 2019-05-30 | Stop reason: HOSPADM

## 2019-05-26 RX ORDER — RISPERIDONE 1 MG/1
1 TABLET, ORALLY DISINTEGRATING ORAL EVERY 8 HOURS PRN
Status: DISCONTINUED | OUTPATIENT
Start: 2019-05-26 | End: 2019-05-26

## 2019-05-26 RX ORDER — LANOLIN ALCOHOL/MO/W.PET/CERES
3 CREAM (GRAM) TOPICAL
Status: DISCONTINUED | OUTPATIENT
Start: 2019-05-26 | End: 2019-05-30 | Stop reason: HOSPADM

## 2019-05-26 RX ORDER — LORAZEPAM 2 MG/ML
1 INJECTION INTRAMUSCULAR EVERY 6 HOURS PRN
Status: DISCONTINUED | OUTPATIENT
Start: 2019-05-26 | End: 2019-05-30 | Stop reason: HOSPADM

## 2019-05-26 RX ORDER — BENZTROPINE MESYLATE 1 MG/1
1 TABLET ORAL EVERY 8 HOURS PRN
Status: DISCONTINUED | OUTPATIENT
Start: 2019-05-26 | End: 2019-05-30 | Stop reason: HOSPADM

## 2019-05-26 RX ORDER — MELATONIN
2000 DAILY
Status: DISCONTINUED | OUTPATIENT
Start: 2019-05-26 | End: 2019-05-30 | Stop reason: HOSPADM

## 2019-05-26 RX ORDER — LORAZEPAM 0.5 MG/1
0.5 TABLET ORAL EVERY 6 HOURS PRN
Status: DISCONTINUED | OUTPATIENT
Start: 2019-05-26 | End: 2019-05-30 | Stop reason: HOSPADM

## 2019-05-26 RX ORDER — BENZTROPINE MESYLATE 1 MG/ML
1 INJECTION INTRAMUSCULAR; INTRAVENOUS EVERY 8 HOURS PRN
Status: DISCONTINUED | OUTPATIENT
Start: 2019-05-26 | End: 2019-05-26

## 2019-05-26 RX ORDER — LANOLIN ALCOHOL/MO/W.PET/CERES
1000 CREAM (GRAM) TOPICAL DAILY
Status: DISCONTINUED | OUTPATIENT
Start: 2019-05-26 | End: 2019-05-30 | Stop reason: HOSPADM

## 2019-05-26 RX ORDER — MAGNESIUM HYDROXIDE/ALUMINUM HYDROXICE/SIMETHICONE 120; 1200; 1200 MG/30ML; MG/30ML; MG/30ML
15 SUSPENSION ORAL EVERY 4 HOURS PRN
Status: DISCONTINUED | OUTPATIENT
Start: 2019-05-26 | End: 2019-05-30 | Stop reason: HOSPADM

## 2019-05-26 RX ORDER — IBUPROFEN 400 MG/1
400 TABLET ORAL EVERY 6 HOURS PRN
Status: DISCONTINUED | OUTPATIENT
Start: 2019-05-26 | End: 2019-05-30 | Stop reason: HOSPADM

## 2019-05-26 RX ORDER — OLANZAPINE 2.5 MG/1
2.5 TABLET ORAL
Status: DISCONTINUED | OUTPATIENT
Start: 2019-05-26 | End: 2019-05-26

## 2019-05-26 RX ORDER — BENZTROPINE MESYLATE 1 MG/ML
1 INJECTION INTRAMUSCULAR; INTRAVENOUS EVERY 8 HOURS PRN
Status: DISCONTINUED | OUTPATIENT
Start: 2019-05-26 | End: 2019-05-30 | Stop reason: HOSPADM

## 2019-05-26 RX ORDER — HALOPERIDOL 1 MG/1
1 TABLET ORAL EVERY 6 HOURS PRN
Status: DISCONTINUED | OUTPATIENT
Start: 2019-05-26 | End: 2019-05-30 | Stop reason: HOSPADM

## 2019-05-26 RX ORDER — RISPERIDONE 0.5 MG/1
0.5 TABLET, ORALLY DISINTEGRATING ORAL EVERY 8 HOURS PRN
Status: DISCONTINUED | OUTPATIENT
Start: 2019-05-26 | End: 2019-05-30 | Stop reason: HOSPADM

## 2019-05-26 RX ADMIN — LORAZEPAM 0.5 MG: 0.5 TABLET ORAL at 13:30

## 2019-05-26 RX ADMIN — CYANOCOBALAMIN TAB 1000 MCG 1000 MCG: 1000 TAB at 13:30

## 2019-05-26 RX ADMIN — MELATONIN TAB 3 MG 3 MG: 3 TAB at 21:33

## 2019-05-26 RX ADMIN — LORAZEPAM 0.5 MG: 0.5 TABLET ORAL at 17:05

## 2019-05-26 RX ADMIN — ARIPIPRAZOLE 2 MG: 2 TABLET ORAL at 13:30

## 2019-05-26 RX ADMIN — VITAMIN D, TAB 1000IU (100/BT) 2000 UNITS: 25 TAB at 13:30

## 2019-05-27 PROCEDURE — 99232 SBSQ HOSP IP/OBS MODERATE 35: CPT | Performed by: PHYSICIAN ASSISTANT

## 2019-05-27 RX ADMIN — VITAMIN D, TAB 1000IU (100/BT) 2000 UNITS: 25 TAB at 08:53

## 2019-05-27 RX ADMIN — LORAZEPAM 0.5 MG: 0.5 TABLET ORAL at 08:54

## 2019-05-27 RX ADMIN — MELATONIN TAB 3 MG 3 MG: 3 TAB at 21:08

## 2019-05-27 RX ADMIN — LORAZEPAM 0.5 MG: 0.5 TABLET ORAL at 17:02

## 2019-05-27 RX ADMIN — ARIPIPRAZOLE 2 MG: 2 TABLET ORAL at 08:53

## 2019-05-27 RX ADMIN — CYANOCOBALAMIN TAB 1000 MCG 1000 MCG: 1000 TAB at 08:54

## 2019-05-28 PROBLEM — F23 BRIEF PSYCHOTIC DISORDER (HCC): Status: ACTIVE | Noted: 2019-05-26

## 2019-05-28 PROBLEM — F31.60 BIPOLAR 1 DISORDER, MIXED (HCC): Status: ACTIVE | Noted: 2019-05-26

## 2019-05-28 PROCEDURE — 99232 SBSQ HOSP IP/OBS MODERATE 35: CPT | Performed by: PSYCHIATRY & NEUROLOGY

## 2019-05-28 RX ORDER — ARIPIPRAZOLE 5 MG/1
5 TABLET ORAL DAILY
Status: DISCONTINUED | OUTPATIENT
Start: 2019-05-29 | End: 2019-05-29

## 2019-05-28 RX ADMIN — LORAZEPAM 0.5 MG: 0.5 TABLET ORAL at 17:13

## 2019-05-28 RX ADMIN — IBUPROFEN 800 MG: 400 TABLET ORAL at 03:22

## 2019-05-28 RX ADMIN — MELATONIN TAB 3 MG 3 MG: 3 TAB at 21:40

## 2019-05-28 RX ADMIN — ARIPIPRAZOLE 2 MG: 2 TABLET ORAL at 08:22

## 2019-05-28 RX ADMIN — LORAZEPAM 0.5 MG: 0.5 TABLET ORAL at 08:22

## 2019-05-28 RX ADMIN — CYANOCOBALAMIN TAB 1000 MCG 1000 MCG: 1000 TAB at 08:22

## 2019-05-28 RX ADMIN — VITAMIN D, TAB 1000IU (100/BT) 2000 UNITS: 25 TAB at 08:22

## 2019-05-29 LAB
ATRIAL RATE: 86 BPM
QRS AXIS: -50 DEGREES
QRSD INTERVAL: 116 MS
QT INTERVAL: 458 MS
QTC INTERVAL: 548 MS
T WAVE AXIS: 9 DEGREES
T4 FREE SERPL-MCNC: 1.06 NG/DL (ref 0.76–1.46)
VENTRICULAR RATE: 86 BPM

## 2019-05-29 PROCEDURE — 93010 ELECTROCARDIOGRAM REPORT: CPT | Performed by: INTERNAL MEDICINE

## 2019-05-29 PROCEDURE — 84439 ASSAY OF FREE THYROXINE: CPT | Performed by: PSYCHIATRY & NEUROLOGY

## 2019-05-29 PROCEDURE — 99232 SBSQ HOSP IP/OBS MODERATE 35: CPT | Performed by: PSYCHIATRY & NEUROLOGY

## 2019-05-29 RX ADMIN — CYANOCOBALAMIN TAB 1000 MCG 1000 MCG: 1000 TAB at 08:52

## 2019-05-29 RX ADMIN — LORAZEPAM 0.5 MG: 0.5 TABLET ORAL at 17:26

## 2019-05-29 RX ADMIN — VITAMIN D, TAB 1000IU (100/BT) 2000 UNITS: 25 TAB at 08:52

## 2019-05-29 RX ADMIN — ARIPIPRAZOLE 7 MG: 2 TABLET ORAL at 21:09

## 2019-05-29 RX ADMIN — MELATONIN TAB 3 MG 3 MG: 3 TAB at 21:09

## 2019-05-30 VITALS
RESPIRATION RATE: 16 BRPM | BODY MASS INDEX: 28.89 KG/M2 | WEIGHT: 156.97 LBS | HEART RATE: 74 BPM | DIASTOLIC BLOOD PRESSURE: 66 MMHG | SYSTOLIC BLOOD PRESSURE: 112 MMHG | HEIGHT: 62 IN | OXYGEN SATURATION: 96 % | TEMPERATURE: 98.6 F

## 2019-05-30 PROCEDURE — 99239 HOSP IP/OBS DSCHRG MGMT >30: CPT | Performed by: PSYCHIATRY & NEUROLOGY

## 2019-05-30 RX ORDER — OMEPRAZOLE 20 MG/1
20 CAPSULE, DELAYED RELEASE ORAL DAILY
Qty: 30 CAPSULE | Refills: 0 | Status: SHIPPED | OUTPATIENT
Start: 2019-05-30 | End: 2019-11-04 | Stop reason: SDUPTHER

## 2019-05-30 RX ORDER — LANOLIN ALCOHOL/MO/W.PET/CERES
1000 CREAM (GRAM) TOPICAL DAILY
Qty: 30 TABLET | Refills: 0 | Status: SHIPPED | OUTPATIENT
Start: 2019-05-30

## 2019-05-30 RX ORDER — LORAZEPAM 0.5 MG/1
0.5 TABLET ORAL 2 TIMES DAILY
Qty: 20 TABLET | Refills: 0 | Status: SHIPPED | OUTPATIENT
Start: 2019-05-30 | End: 2019-06-12

## 2019-05-30 RX ORDER — ARIPIPRAZOLE 5 MG/1
5 TABLET ORAL DAILY
Qty: 21 TABLET | Refills: 0 | Status: SHIPPED | OUTPATIENT
Start: 2019-05-30 | End: 2019-06-12 | Stop reason: ALTCHOICE

## 2019-05-30 RX ORDER — LANOLIN ALCOHOL/MO/W.PET/CERES
3 CREAM (GRAM) TOPICAL
Qty: 30 TABLET | Refills: 0 | Status: ON HOLD | OUTPATIENT
Start: 2019-05-30 | End: 2021-04-28 | Stop reason: SDUPTHER

## 2019-05-30 RX ORDER — ARIPIPRAZOLE 5 MG/1
5 TABLET ORAL DAILY
Status: DISCONTINUED | OUTPATIENT
Start: 2019-05-30 | End: 2019-05-30 | Stop reason: HOSPADM

## 2019-05-30 RX ADMIN — CYANOCOBALAMIN TAB 1000 MCG 1000 MCG: 1000 TAB at 08:20

## 2019-05-30 RX ADMIN — VITAMIN D, TAB 1000IU (100/BT) 2000 UNITS: 25 TAB at 08:20

## 2019-06-04 ENCOUNTER — OFFICE VISIT (OUTPATIENT)
Dept: FAMILY MEDICINE CLINIC | Facility: OTHER | Age: 69
End: 2019-06-04
Payer: MEDICARE

## 2019-06-04 VITALS
TEMPERATURE: 99.5 F | DIASTOLIC BLOOD PRESSURE: 72 MMHG | SYSTOLIC BLOOD PRESSURE: 118 MMHG | HEART RATE: 83 BPM | OXYGEN SATURATION: 98 % | HEIGHT: 61 IN | WEIGHT: 159.38 LBS | BODY MASS INDEX: 30.09 KG/M2

## 2019-06-04 DIAGNOSIS — F23 BRIEF PSYCHOTIC DISORDER (HCC): Primary | ICD-10-CM

## 2019-06-04 DIAGNOSIS — F41.1 GAD (GENERALIZED ANXIETY DISORDER): Chronic | ICD-10-CM

## 2019-06-04 PROCEDURE — 99214 OFFICE O/P EST MOD 30 MIN: CPT | Performed by: NURSE PRACTITIONER

## 2019-06-04 PROCEDURE — 1124F ACP DISCUSS-NO DSCNMKR DOCD: CPT | Performed by: NURSE PRACTITIONER

## 2019-06-10 ENCOUNTER — TELEPHONE (OUTPATIENT)
Dept: FAMILY MEDICINE CLINIC | Facility: OTHER | Age: 69
End: 2019-06-10

## 2019-06-12 ENCOUNTER — OFFICE VISIT (OUTPATIENT)
Dept: PSYCHIATRY | Facility: CLINIC | Age: 69
End: 2019-06-12
Payer: MEDICARE

## 2019-06-12 DIAGNOSIS — F23 BRIEF PSYCHOTIC DISORDER (HCC): Primary | ICD-10-CM

## 2019-06-12 PROBLEM — F41.1 GAD (GENERALIZED ANXIETY DISORDER): Chronic | Status: RESOLVED | Noted: 2019-05-26 | Resolved: 2019-06-12

## 2019-06-12 PROCEDURE — 99213 OFFICE O/P EST LOW 20 MIN: CPT | Performed by: PSYCHIATRY & NEUROLOGY

## 2019-07-01 ENCOUNTER — TRANSCRIBE ORDERS (OUTPATIENT)
Dept: LAB | Facility: CLINIC | Age: 69
End: 2019-07-01

## 2019-07-01 ENCOUNTER — APPOINTMENT (OUTPATIENT)
Dept: LAB | Facility: CLINIC | Age: 69
End: 2019-07-01
Payer: MEDICARE

## 2019-07-01 DIAGNOSIS — E78.00 HYPERCHOLESTEROLEMIA: ICD-10-CM

## 2019-07-01 DIAGNOSIS — E03.8 SUBCLINICAL HYPOTHYROIDISM: ICD-10-CM

## 2019-07-01 DIAGNOSIS — D70.9 NEUTROPENIA, UNSPECIFIED TYPE (HCC): ICD-10-CM

## 2019-07-01 DIAGNOSIS — R73.03 PREDIABETES: ICD-10-CM

## 2019-07-01 LAB
ALBUMIN SERPL BCP-MCNC: 3.8 G/DL (ref 3.5–5)
ALP SERPL-CCNC: 95 U/L (ref 46–116)
ALT SERPL W P-5'-P-CCNC: 29 U/L (ref 12–78)
ANION GAP SERPL CALCULATED.3IONS-SCNC: 9 MMOL/L (ref 4–13)
AST SERPL W P-5'-P-CCNC: 21 U/L (ref 5–45)
BASOPHILS # BLD AUTO: 0.01 THOUSANDS/ΜL (ref 0–0.1)
BASOPHILS NFR BLD AUTO: 0 % (ref 0–1)
BILIRUB SERPL-MCNC: 0.4 MG/DL (ref 0.2–1)
BUN SERPL-MCNC: 12 MG/DL (ref 5–25)
CALCIUM SERPL-MCNC: 9.2 MG/DL (ref 8.3–10.1)
CHLORIDE SERPL-SCNC: 103 MMOL/L (ref 100–108)
CHOLEST SERPL-MCNC: 219 MG/DL (ref 50–200)
CO2 SERPL-SCNC: 29 MMOL/L (ref 21–32)
CREAT SERPL-MCNC: 0.99 MG/DL (ref 0.6–1.3)
EOSINOPHIL # BLD AUTO: 0.14 THOUSAND/ΜL (ref 0–0.61)
EOSINOPHIL NFR BLD AUTO: 5 % (ref 0–6)
ERYTHROCYTE [DISTWIDTH] IN BLOOD BY AUTOMATED COUNT: 12.2 % (ref 11.6–15.1)
EST. AVERAGE GLUCOSE BLD GHB EST-MCNC: 111 MG/DL
GFR SERPL CREATININE-BSD FRML MDRD: 60 ML/MIN/1.73SQ M
GLUCOSE P FAST SERPL-MCNC: 96 MG/DL (ref 65–99)
HBA1C MFR BLD: 5.5 % (ref 4.2–6.3)
HCT VFR BLD AUTO: 41.3 % (ref 34.8–46.1)
HDLC SERPL-MCNC: 62 MG/DL (ref 40–60)
HGB BLD-MCNC: 13.8 G/DL (ref 11.5–15.4)
IMM GRANULOCYTES # BLD AUTO: 0 THOUSAND/UL (ref 0–0.2)
IMM GRANULOCYTES NFR BLD AUTO: 0 % (ref 0–2)
LDLC SERPL CALC-MCNC: 142 MG/DL (ref 0–100)
LYMPHOCYTES # BLD AUTO: 1.03 THOUSANDS/ΜL (ref 0.6–4.47)
LYMPHOCYTES NFR BLD AUTO: 33 % (ref 14–44)
MCH RBC QN AUTO: 31.9 PG (ref 26.8–34.3)
MCHC RBC AUTO-ENTMCNC: 33.4 G/DL (ref 31.4–37.4)
MCV RBC AUTO: 96 FL (ref 82–98)
MONOCYTES # BLD AUTO: 0.27 THOUSAND/ΜL (ref 0.17–1.22)
MONOCYTES NFR BLD AUTO: 9 % (ref 4–12)
NEUTROPHILS # BLD AUTO: 1.69 THOUSANDS/ΜL (ref 1.85–7.62)
NEUTS SEG NFR BLD AUTO: 53 % (ref 43–75)
NRBC BLD AUTO-RTO: 0 /100 WBCS
PLATELET # BLD AUTO: 281 THOUSANDS/UL (ref 149–390)
PMV BLD AUTO: 8.9 FL (ref 8.9–12.7)
POTASSIUM SERPL-SCNC: 4.2 MMOL/L (ref 3.5–5.3)
PROT SERPL-MCNC: 7.5 G/DL (ref 6.4–8.2)
RBC # BLD AUTO: 4.32 MILLION/UL (ref 3.81–5.12)
SODIUM SERPL-SCNC: 141 MMOL/L (ref 136–145)
T4 FREE SERPL-MCNC: 0.95 NG/DL (ref 0.76–1.46)
TRIGL SERPL-MCNC: 77 MG/DL
TSH SERPL DL<=0.05 MIU/L-ACNC: 4.06 UIU/ML (ref 0.36–3.74)
WBC # BLD AUTO: 3.14 THOUSAND/UL (ref 4.31–10.16)

## 2019-07-01 PROCEDURE — 84439 ASSAY OF FREE THYROXINE: CPT

## 2019-07-01 PROCEDURE — 36415 COLL VENOUS BLD VENIPUNCTURE: CPT

## 2019-07-01 PROCEDURE — 83036 HEMOGLOBIN GLYCOSYLATED A1C: CPT

## 2019-07-01 PROCEDURE — 80053 COMPREHEN METABOLIC PANEL: CPT

## 2019-07-01 PROCEDURE — 80061 LIPID PANEL: CPT

## 2019-07-01 PROCEDURE — 85025 COMPLETE CBC W/AUTO DIFF WBC: CPT

## 2019-07-01 PROCEDURE — 84443 ASSAY THYROID STIM HORMONE: CPT

## 2019-07-02 ENCOUNTER — TELEPHONE (OUTPATIENT)
Dept: FAMILY MEDICINE CLINIC | Facility: OTHER | Age: 69
End: 2019-07-02

## 2019-07-02 DIAGNOSIS — R73.03 PREDIABETES: Primary | ICD-10-CM

## 2019-07-02 NOTE — TELEPHONE ENCOUNTER
Pt notified    ----- Message from [facility]que, [address]  sent at 7/2/2019  4:54 PM EDT -----  Can we let Rhiannon Urbina know that her blood work came back fine including stable white count, improved A1C = average blood sugar (no longer in prediabetes range, now normal; good job!), improved cholesterol numbers, stable thyroid level    Thanks

## 2019-10-08 ENCOUNTER — TELEPHONE (OUTPATIENT)
Dept: FAMILY MEDICINE CLINIC | Facility: OTHER | Age: 69
End: 2019-10-08

## 2019-10-08 NOTE — TELEPHONE ENCOUNTER
Patient called and is requesting a prescription for nasal spray, she is having post nasal drip and causing her to have tickle in throat and makes her cough  She believes its allegies  Please advise what is recommended

## 2019-10-09 DIAGNOSIS — J30.9 ALLERGIC RHINITIS, UNSPECIFIED SEASONALITY, UNSPECIFIED TRIGGER: Primary | ICD-10-CM

## 2019-10-09 RX ORDER — AZELASTINE 1 MG/ML
2 SPRAY, METERED NASAL 2 TIMES DAILY
Qty: 1 BOTTLE | Refills: 1 | Status: SHIPPED | OUTPATIENT
Start: 2019-10-09 | End: 2021-05-12

## 2019-11-04 DIAGNOSIS — Z12.39 SCREENING FOR BREAST CANCER: Primary | ICD-10-CM

## 2019-11-04 DIAGNOSIS — K21.9 GASTROESOPHAGEAL REFLUX DISEASE, ESOPHAGITIS PRESENCE NOT SPECIFIED: ICD-10-CM

## 2019-11-04 RX ORDER — OMEPRAZOLE 20 MG/1
20 CAPSULE, DELAYED RELEASE ORAL DAILY
Qty: 90 CAPSULE | Refills: 1 | Status: SHIPPED | OUTPATIENT
Start: 2019-11-04 | End: 2020-03-18 | Stop reason: SDUPTHER

## 2019-11-19 ENCOUNTER — OFFICE VISIT (OUTPATIENT)
Dept: FAMILY MEDICINE CLINIC | Facility: OTHER | Age: 69
End: 2019-11-19
Payer: MEDICARE

## 2019-11-19 VITALS
BODY MASS INDEX: 29.66 KG/M2 | HEART RATE: 89 BPM | WEIGHT: 157.13 LBS | OXYGEN SATURATION: 98 % | SYSTOLIC BLOOD PRESSURE: 104 MMHG | DIASTOLIC BLOOD PRESSURE: 68 MMHG | HEIGHT: 61 IN | TEMPERATURE: 98 F

## 2019-11-19 DIAGNOSIS — E78.00 HYPERCHOLESTEROLEMIA: ICD-10-CM

## 2019-11-19 DIAGNOSIS — E03.8 SUBCLINICAL HYPOTHYROIDISM: ICD-10-CM

## 2019-11-19 DIAGNOSIS — Z13.1 SCREENING FOR DIABETES MELLITUS: ICD-10-CM

## 2019-11-19 DIAGNOSIS — D70.9 NEUTROPENIA, UNSPECIFIED TYPE (HCC): ICD-10-CM

## 2019-11-19 DIAGNOSIS — Z00.00 MEDICARE ANNUAL WELLNESS VISIT, SUBSEQUENT: Primary | ICD-10-CM

## 2019-11-19 PROCEDURE — G0439 PPPS, SUBSEQ VISIT: HCPCS | Performed by: NURSE PRACTITIONER

## 2019-11-19 NOTE — PATIENT INSTRUCTIONS
Medicare Preventive Visit Patient Instructions  Thank you for completing your Welcome to Medicare Visit or Medicare Annual Wellness Visit today  Your next wellness visit will be due in one year (11/19/2020)  The screening/preventive services that you may require over the next 5-10 years are detailed below  Some tests may not apply to you based off risk factors and/or age  Screening tests ordered at today's visit but not completed yet may show as past due  Also, please note that scanned in results may not display below  Preventive Screenings:  Service Recommendations Previous Testing/Comments   Colorectal Cancer Screening  * Colonoscopy    * Fecal Occult Blood Test (FOBT)/Fecal Immunochemical Test (FIT)  * Fecal DNA/Cologuard Test  * Flexible Sigmoidoscopy Age: 54-65 years old   Colonoscopy: every 10 years (may be performed more frequently if at higher risk)  OR  FOBT/FIT: every 1 year  OR  Cologuard: every 3 years  OR  Sigmoidoscopy: every 5 years  Screening may be recommended earlier than age 48 if at higher risk for colorectal cancer  Also, an individualized decision between you and your healthcare provider will decide whether screening between the ages of 74-80 would be appropriate  Colonoscopy: 01/31/2019  FOBT/FIT: Not on file  Cologuard: Not on file  Sigmoidoscopy: Not on file    Screening Current     Breast Cancer Screening Age: 36 years old  Frequency: every 1-2 years  Not required if history of left and right mastectomy Mammogram: 06/08/2016       Cervical Cancer Screening Between the ages of 21-29, pap smear recommended once every 3 years  Between the ages of 33-67, can perform pap smear with HPV co-testing every 5 years     Recommendations may differ for women with a history of total hysterectomy, cervical cancer, or abnormal pap smears in past  Pap Smear: Not on file    Screening Not Indicated   Hepatitis C Screening Once for adults born between 1945 and 1965  More frequently in patients at high risk for Hepatitis C Hep C Antibody: 12/04/2018    Screening Current   Diabetes Screening 1-2 times per year if you're at risk for diabetes or have pre-diabetes Fasting glucose: 96 mg/dL   A1C: 5 5 %    Screening Current   Cholesterol Screening Once every 5 years if you don't have a lipid disorder  May order more often based on risk factors  Lipid panel: 07/01/2019    Screening Not Indicated  History Lipid Disorder     Other Preventive Screenings Covered by Medicare:  1  Abdominal Aortic Aneurysm (AAA) Screening: covered once if your at risk  You're considered to be at risk if you have a family history of AAA  2  Lung Cancer Screening: covers low dose CT scan once per year if you meet all of the following conditions: (1) Age 50-69; (2) No signs or symptoms of lung cancer; (3) Current smoker or have quit smoking within the last 15 years; (4) You have a tobacco smoking history of at least 30 pack years (packs per day multiplied by number of years you smoked); (5) You get a written order from a healthcare provider  3  Glaucoma Screening: covered annually if you're considered high risk: (1) You have diabetes OR (2) Family history of glaucoma OR (3)  aged 48 and older OR (3)  American aged 72 and older  3  Osteoporosis Screening: covered every 2 years if you meet one of the following conditions: (1) You're estrogen deficient and at risk for osteoporosis based off medical history and other findings; (2) Have a vertebral abnormality; (3) On glucocorticoid therapy for more than 3 months; (4) Have primary hyperparathyroidism; (5) On osteoporosis medications and need to assess response to drug therapy  · Last bone density test (DXA Scan): 12/10/2018  5  HIV Screening: covered annually if you're between the age of 12-76  Also covered annually if you are younger than 13 and older than 72 with risk factors for HIV infection   For pregnant patients, it is covered up to 3 times per pregnancy  Immunizations:  Immunization Recommendations   Influenza Vaccine Annual influenza vaccination during flu season is recommended for all persons aged >= 6 months who do not have contraindications   Pneumococcal Vaccine (Prevnar and Pneumovax)  * Prevnar = PCV13  * Pneumovax = PPSV23   Adults 25-60 years old: 1-3 doses may be recommended based on certain risk factors  Adults 72 years old: Prevnar (PCV13) vaccine recommended followed by Pneumovax (PPSV23) vaccine  If already received PPSV23 since turning 65, then PCV13 recommended at least one year after PPSV23 dose  Hepatitis B Vaccine 3 dose series if at intermediate or high risk (ex: diabetes, end stage renal disease, liver disease)   Tetanus (Td) Vaccine - COST NOT COVERED BY MEDICARE PART B Following completion of primary series, a booster dose should be given every 10 years to maintain immunity against tetanus  Td may also be given as tetanus wound prophylaxis  Tdap Vaccine - COST NOT COVERED BY MEDICARE PART B Recommended at least once for all adults  For pregnant patients, recommended with each pregnancy  Shingles Vaccine (Shingrix) - COST NOT COVERED BY MEDICARE PART B  2 shot series recommended in those aged 48 and above     Health Maintenance Due:      Topic Date Due    MAMMOGRAM  01/09/2020 (Originally 6/8/2018)    CRC Screening: Colonoscopy  01/31/2029    Hepatitis C Screening  Completed     Immunizations Due:  There are no preventive care reminders to display for this patient  Advance Directives   What are advance directives? Advance directives are legal documents that state your wishes and plans for medical care  These plans are made ahead of time in case you lose your ability to make decisions for yourself  Advance directives can apply to any medical decision, such as the treatments you want, and if you want to donate organs  What are the types of advance directives?   There are many types of advance directives, and each state has rules about how to use them  You may choose a combination of any of the following:  · Living will: This is a written record of the treatment you want  You can also choose which treatments you do not want, which to limit, and which to stop at a certain time  This includes surgery, medicine, IV fluid, and tube feedings  · Durable power of  for healthcare [facility]): This is a written record that states who you want to make healthcare choices for you when you are unable to make them for yourself  This person, called a proxy, is usually a family member or a friend  You may choose more than 1 proxy  · Do not resuscitate (DNR) order:  A DNR order is used in case your heart stops beating or you stop breathing  It is a request not to have certain forms of treatment, such as CPR  A DNR order may be included in other types of advance directives  · Medical directive: This covers the care that you want if you are in a coma, near death, or unable to make decisions for yourself  You can list the treatments you want for each condition  Treatment may include pain medicine, surgery, blood transfusions, dialysis, IV or tube feedings, and a ventilator (breathing machine)  · Values history: This document has questions about your views, beliefs, and how you feel and think about life  This information can help others choose the care that you would choose  Why are advance directives important? An advance directive helps you control your care  Although spoken wishes may be used, it is better to have your wishes written down  Spoken wishes can be misunderstood, or not followed  Treatments may be given even if you do not want them  An advance directive may make it easier for your family to make difficult choices about your care     Weight Management   Why it is important to manage your weight:  Being overweight increases your risk of health conditions such as heart disease, high blood pressure, type 2 diabetes, and certain types of cancer  It can also increase your risk for osteoarthritis, sleep apnea, and other respiratory problems  Aim for a slow, steady weight loss  Even a small amount of weight loss can lower your risk of health problems  How to lose weight safely:  A safe and healthy way to lose weight is to eat fewer calories and get regular exercise  You can lose up about 1 pound a week by decreasing the number of calories you eat by 500 calories each day  Healthy meal plan for weight management:  A healthy meal plan includes a variety of foods, contains fewer calories, and helps you stay healthy  A healthy meal plan includes the following:  · Eat whole-grain foods more often  A healthy meal plan should contain fiber  Fiber is the part of grains, fruits, and vegetables that is not broken down by your body  Whole-grain foods are healthy and provide extra fiber in your diet  Some examples of whole-grain foods are whole-wheat breads and pastas, oatmeal, brown rice, and bulgur  · Eat a variety of vegetables every day  Include dark, leafy greens such as spinach, kale, sinai greens, and mustard greens  Eat yellow and orange vegetables such as carrots, sweet potatoes, and winter squash  · Eat a variety of fruits every day  Choose fresh or canned fruit (canned in its own juice or light syrup) instead of juice  Fruit juice has very little or no fiber  · Eat low-fat dairy foods  Drink fat-free (skim) milk or 1% milk  Eat fat-free yogurt and low-fat cottage cheese  Try low-fat cheeses such as mozzarella and other reduced-fat cheeses  · Choose meat and other protein foods that are low in fat  Choose beans or other legumes such as split peas or lentils  Choose fish, skinless poultry (chicken or turkey), or lean cuts of red meat (beef or pork)  Before you cook meat or poultry, cut off any visible fat  · Use less fat and oil  Try baking foods instead of frying them   Add less fat, such as margarine, sour cream, regular salad dressing and mayonnaise to foods  Eat fewer high-fat foods  Some examples of high-fat foods include french fries, doughnuts, ice cream, and cakes  · Eat fewer sweets  Limit foods and drinks that are high in sugar  This includes candy, cookies, regular soda, and sweetened drinks  Exercise:  Exercise at least 30 minutes per day on most days of the week  Some examples of exercise include walking, biking, dancing, and swimming  You can also fit in more physical activity by taking the stairs instead of the elevator or parking farther away from stores  Ask your healthcare provider about the best exercise plan for you  © Copyright [employer] 2018 Information is for End User's use only and may not be sold, redistributed or otherwise used for commercial purposes  All illustrations and images included in CareNotes® are the copyrighted property of [employer]  or Ruddy Mancilla   Weight Management   WHAT YOU NEED TO KNOW:   Being overweight increases your risk of health conditions such as heart disease, high blood pressure, type 2 diabetes, and certain types of cancer  It can also increase your risk for osteoarthritis, sleep apnea, and other respiratory problems  Aim for a slow, steady weight loss  Even a small amount of weight loss can lower your risk of health problems  DISCHARGE INSTRUCTIONS:   How to lose weight safely:  A safe and healthy way to lose weight is to eat fewer calories and get regular exercise  You can lose up about 1 pound a week by decreasing the number of calories you eat by 500 calories each day  You can decrease calories by eating smaller portion sizes or by cutting out high-calorie foods  Read labels to find out how many calories are in the foods you eat  You can also burn calories with exercise such as walking, swimming, or biking  You will be more likely to keep weight off if you make these changes part of your lifestyle     Healthy meal plan for weight management:  A healthy meal plan includes a variety of foods, contains fewer calories, and helps you stay healthy  A healthy meal plan includes the following:  · Eat whole-grain foods more often  A healthy meal plan should contain fiber  Fiber is the part of grains, fruits, and vegetables that is not broken down by your body  Whole-grain foods are healthy and provide extra fiber in your diet  Some examples of whole-grain foods are whole-wheat breads and pastas, oatmeal, brown rice, and bulgur  · Eat a variety of vegetables every day  Include dark, leafy greens such as spinach, kale, sinai greens, and mustard greens  Eat yellow and orange vegetables such as carrots, sweet potatoes, and winter squash  · Eat a variety of fruits every day  Choose fresh or canned fruit (canned in its own juice or light syrup) instead of juice  Fruit juice has very little or no fiber  · Eat low-fat dairy foods  Drink fat-free (skim) milk or 1% milk  Eat fat-free yogurt and low-fat cottage cheese  Try low-fat cheeses such as mozzarella and other reduced-fat cheeses  · Choose meat and other protein foods that are low in fat  Choose beans or other legumes such as split peas or lentils  Choose fish, skinless poultry (chicken or turkey), or lean cuts of red meat (beef or pork)  Before you cook meat or poultry, cut off any visible fat  · Use less fat and oil  Try baking foods instead of frying them  Add less fat, such as margarine, sour cream, regular salad dressing, and mayonnaise to foods  Eat fewer high-fat foods  Some examples of high-fat foods include french fries, doughnuts, ice cream, and cakes  · Eat fewer sweets  Limit foods and drinks that are high in sugar  This includes candy, cookies, regular soda, and sweetened drinks  Ways to decrease calories:   · Eat smaller portions  ¨ Use a small plate with smaller servings  ¨ Do not eat second helpings      ¨ When you eat at a restaurant, ask for a box and place half of your meal in the box before you eat  ¨ Share an entrée with someone else  · Replace high-calorie snacks with healthy, low-calorie snacks  ¨ Choose fresh fruit, vegetables, fat-free rice cakes, or air-popped popcorn instead of potato chips, nuts, or chocolate  ¨ Choose water or calorie-free drinks instead of soda or sweetened drinks  · Eat regular meals  Skipping meals can lead to overeating later in the day  Eat a healthy snack in place of a meal if you do not have time to eat a regular meal      · Do not shop for groceries when you are hungry  You may be more likely to make unhealthy food choices  Take a grocery list of healthy foods and shop after you have eaten  Exercise:  Exercise at least 30 minutes per day on most days of the week  Some examples of exercise include walking, biking, dancing, and swimming  You can also fit in more physical activity by taking the stairs instead of the elevator or parking farther away from stores  Ask your healthcare provider about the best exercise plan for you  Other things to consider as you try to lose weight:   · Be aware of situations that may give you the urge to overeat, such as eating while watching television  Find ways to avoid these situations  For example, read a book, go for a walk, or do crafts  · Meet with a weight loss support group or friends who are also trying to lose weight  This may help you stay motivated to continue working on your weight loss goals  © 2017 [address] Information is for End User's use only and may not be sold, redistributed or otherwise used for commercial purposes  All illustrations and images included in CareNotes® are the copyrighted property of [employer]  or Levi Souza  The above information is an  only  It is not intended as medical advice for individual conditions or treatments   Talk to your doctor, nurse or pharmacist before following any medical regimen to see if it is safe and effective for you

## 2019-11-19 NOTE — PROGRESS NOTES
Assessment and Plan:     Problem List Items Addressed This Visit     Subclinical hypothyroidism    Relevant Orders    TSH, 3rd generation with Free T4 reflex    Hypercholesterolemia    Relevant Orders    TSH, 3rd generation with Free T4 reflex    Lipid Panel with Direct LDL reflex      Other Visit Diagnoses     Medicare annual wellness visit, subsequent    -  Primary  --Orders given for yearly labs  Previous negative hepatitis C  --Weight loss measures encouraged, handout given  --Has order for mammogram, which she was again strongly urged to schedule  --UTD with DEXA, colonoscopy  --Declining all immunizations including flu, PVX, Prevnar, Shingrix  --UTD with eye exam    --Had advanced directive packet at home  Reminded to complete, getting us copy for records  Screening for diabetes mellitus        Relevant Orders    CBC and differential    TSH, 3rd generation with Free T4 reflex    Hemoglobin A1C    UA (URINE) with reflex to Scope        RTO 6 months with labs prior        Depression Screening and Follow-up Plan: Patient's depression screening was positive with a PHQ-2 score of 0  Clincally patient does not have depression  No treatment is required  Preventive health issues were discussed with patient, and age appropriate screening tests were ordered as noted in patient's After Visit Summary  Personalized health advice and appropriate referrals for health education or preventive services given if needed, as noted in patient's After Visit Summary  History of Present Illness:     Patient presents for Medicare Annual Wellness visit    No acute complaints or issues       Patient Care Team:  KOBI Oliver as PCP - General  SIOBHAN Dickerson PA-C Melina Luz, MD Carmelia Spell, MD Daryl Ammie Green, MD Wynette Nett, MD Delaney Hedger, MD as Endoscopist     Problem List:     Patient Active Problem List   Diagnosis    Back pain, chronic  Bunion, right foot    Cervical post-laminectomy syndrome    Deformity of toe of left foot    DJD (degenerative joint disease) of thoracic spine    Foot pain, bilateral    Leukopenia    Myofascial pain syndrome    Osteopenia    Pain syndrome, chronic    Thoracic neuralgia    Thoracic spondylosis without myelopathy    Vitiligo    Mild persistent asthma with acute exacerbation    History of gastroesophageal reflux (GERD)    Hypercholesterolemia    Seasonal allergies    Osteoporosis screening    Subclinical hypothyroidism    Acute diarrhea      Past Medical and Surgical History:     Past Medical History:   Diagnosis Date    Anemia     last assessed - [id number]    Anxiety disorder     Arthritis     Asthma     Back pain     Bunion, right foot     last assessed - [id number]    Deformity of toe of left foot     last assessed - [id number]    Discitis of thoracolumbar region     last assessed - 91Zea8871    Fall     Fibrocystic disease of breast     Fracture of wrist     and hand, left    GERD (gastroesophageal reflux disease)     Head injury     Hypercholesterolemia     Leukopenia     last assessed - [id number]    Osteopenia     last assessed - [id number]    Polyarthritis     Prediabetes     Seasonal allergies     Shortness of breath     Sleep disturbance     Subclinical hypothyroidism     Toe deformity     last assessed - 03Dyd2331    Trochanteric bursitis of left hip     last assessed - 63Der9588    Vitamin D deficiency     last assessed - 69Feu6404    Vitiligo     last assessed - [id number]    Wears eyeglasses      Past Surgical History:   Procedure Laterality Date    BACK SURGERY      4 back surgeries, fusion, bone replacement    BACK SURGERY  2012    Lumbar PLIF surgery    CARPAL TUNNEL RELEASE Bilateral      SECTION  1980    CHOLECYSTECTOMY  1997    HAND TENDON SURGERY Bilateral     HERNIA REPAIR      NECK SURGERY  2012    ACDF Surgery    [place] COLONOSCOPY FLX DX W/COLLJ SPEC WHEN PFRMD N/A 1/31/2019    Procedure: COLONOSCOPY;  Surgeon: Adam Blackmon MD;  [facility]; Service: Gastroenterology    VT ESOPHAGOGASTRODUODENOSCOPY TRANSORAL DIAGNOSTIC N/A 1/31/2019    Procedure: ESOPHAGOGASTRODUODENOSCOPY (EGD); Surgeon: Adam Blackmon MD;  [facility]; Service: Gastroenterology    SPINAL CORD STIMULATOR IMPLANT N/A 7/12/2016    Procedure: PLACEMENT OF THORACIC PARASPINAL PERIPHERAL NERVE STIMULATING ELECTODES WITH LEFT BUTTOCK GENERATOR;  Surgeon: Edgardo Gant MD;  Location:  MAIN OR;  Service:    Elam Manuel NERVE REPAIR Bilateral 1989      Family History:     Family History   Problem Relation Age of Onset    Dementia Mother     Emphysema Father         lung    Lung cancer Father     Other Paternal Grandfather         gangrene    Hypertension Family     Other Family         back trouble      Social History:     Social History     Socioeconomic History    Marital status: /Civil Union     Spouse name: None    Number of children: 2    Years of education: HS or GED    Highest education level: None   Occupational History    None   Social Needs    Financial resource strain: None    Food insecurity:     Worry: None     Inability: None    Transportation needs:     Medical: None     Non-medical: None   Tobacco Use    Smoking status: Never Smoker    Smokeless tobacco: Never Used   Substance and Sexual Activity    Alcohol use: Not Currently     Frequency: Never     Comment: ocassional    Drug use: No    Sexual activity: Yes     Partners: Male     Comment: No known STD risk factors;  Denied currently sexually active   Lifestyle    Physical activity:     Days per week: None     Minutes per session: None    Stress: None   Relationships    Social connections:     Talks on phone: None     Gets together: None     Attends Gnosticist service: None     Active member of club or organization: None     Attends meetings of clubs or organizations: None     Relationship status: None    Intimate partner violence:     Fear of current or ex partner: None     Emotionally abused: None     Physically abused: None     Forced sexual activity: None   Other Topics Concern    None   Social History Narrative    No known risk factors    Physical Disability       Medications and Allergies:     Current Outpatient Medications   Medication Sig Dispense Refill    azelastine (ASTELIN) 0 1 % nasal spray 2 sprays into each nostril 2 (two) times a day Use in each nostril as directed 1 Bottle 1    cyanocobalamin (VITAMIN B-12) 1,000 mcg tablet Take 1 tablet (1,000 mcg total) by mouth daily 30 tablet 0    ibuprofen (MOTRIN) 200 mg tablet 3 tabs qd      melatonin 3 mg Take 1 tablet (3 mg total) by mouth daily at bedtime 30 tablet 0    omeprazole (PriLOSEC) 20 mg delayed release capsule Take 1 capsule (20 mg total) by mouth daily 90 capsule 1     No current facility-administered medications for this visit  Allergies   Allergen Reactions    Influenza A (H1n1) Monoval Vac Anaphylaxis    Lyrica [Pregabalin] Swelling    Other Anaphylaxis     FLU SHOT    Acetaminophen Other (See Comments)     GI Upset, "it upps my triglycerides"    Percocet [Oxycodone-Acetaminophen] GI Intolerance      Immunizations:     Immunization History   Administered Date(s) Administered    Tdap 06/04/2014      Health Maintenance:         Topic Date Due    MAMMOGRAM  01/09/2020 (Originally 6/8/2018)    CRC Screening: Colonoscopy  01/31/2029    Hepatitis C Screening  Completed     There are no preventive care reminders to display for this patient  Medicare Health Risk Assessment:     /68 (BP Location: Left arm, Patient Position: Sitting, Cuff Size: Large)   Pulse 89   Temp 98 °F (36 7 °C) (Tympanic)   Ht 5' 1" (1 549 m)   Wt 71 3 kg (157 lb 2 oz)   SpO2 98%   BMI 29 69 kg/m²      Pat is here for her Subsequent Wellness visit   Last Medicare Wellness visit information reviewed, patient interviewed and updates made to the record today  Health Risk Assessment:   Patient rates overall health as very good  Patient feels that their physical health rating is same  Eyesight was rated as same  Hearing was rated as same  Patient feels that their emotional and mental health rating is same  Pain experienced in the last 7 days has been none  Depression Screening:   PHQ-2 Score: 0  PHQ-9 Score: 0      Fall Risk Screening: In the past year, patient has experienced: no history of falling in past year      Urinary Incontinence Screening:   Patient has not leaked urine accidently in the last six months  Home Safety:  Patient does not have trouble with stairs inside or outside of their home  Patient has working smoke alarms and has working carbon monoxide detector  Home safety hazards include: none  Nutrition:   Current diet is Regular and No Added Salt  Activities of Daily Living (ADLs)/Instrumental Activities of Daily Living (IADLs):   Walk and transfer into and out of bed and chair?: Yes  Dress and groom yourself?: Yes    Bathe or shower yourself?: Yes    Feed yourself? Yes  Do your laundry/housekeeping?: Yes  Manage your money, pay your bills and track your expenses?: Yes  Make your own meals?: Yes    Do your own shopping?: Yes    Advance Care Planning:   Living will: Yes    Durable POA for healthcare:  Yes    Advanced directive: Yes      PREVENTIVE SCREENINGS      Cardiovascular Screening:    General: Screening Not Indicated and History Lipid Disorder      Diabetes Screening:     General: Screening Current      Colorectal Cancer Screening:     General: Screening Current      Cervical Cancer Screening:    General: Screening Not Indicated      Hepatitis C Screening:    General: Screening Current      KOBI Monroe

## 2020-02-28 ENCOUNTER — APPOINTMENT (OUTPATIENT)
Dept: LAB | Facility: CLINIC | Age: 70
End: 2020-02-28
Payer: MEDICARE

## 2020-02-28 DIAGNOSIS — E78.00 HYPERCHOLESTEROLEMIA: ICD-10-CM

## 2020-02-28 DIAGNOSIS — Z13.1 SCREENING FOR DIABETES MELLITUS: ICD-10-CM

## 2020-02-28 DIAGNOSIS — E03.8 SUBCLINICAL HYPOTHYROIDISM: ICD-10-CM

## 2020-02-28 LAB
ALBUMIN SERPL BCP-MCNC: 3.6 G/DL (ref 3.5–5)
ALP SERPL-CCNC: 81 U/L (ref 46–116)
ALT SERPL W P-5'-P-CCNC: 33 U/L (ref 12–78)
ANION GAP SERPL CALCULATED.3IONS-SCNC: 10 MMOL/L (ref 4–13)
AST SERPL W P-5'-P-CCNC: 20 U/L (ref 5–45)
BACTERIA UR QL AUTO: ABNORMAL /HPF
BASOPHILS # BLD AUTO: 0.05 THOUSANDS/ΜL (ref 0–0.1)
BASOPHILS NFR BLD AUTO: 2 % (ref 0–1)
BILIRUB SERPL-MCNC: 0.34 MG/DL (ref 0.2–1)
BILIRUB UR QL STRIP: NEGATIVE
BUN SERPL-MCNC: 17 MG/DL (ref 5–25)
CALCIUM SERPL-MCNC: 9.1 MG/DL (ref 8.3–10.1)
CHLORIDE SERPL-SCNC: 105 MMOL/L (ref 100–108)
CHOLEST SERPL-MCNC: 238 MG/DL (ref 50–200)
CLARITY UR: CLEAR
CO2 SERPL-SCNC: 27 MMOL/L (ref 21–32)
COLOR UR: YELLOW
CREAT SERPL-MCNC: 1.04 MG/DL (ref 0.6–1.3)
EOSINOPHIL # BLD AUTO: 0.03 THOUSAND/ΜL (ref 0–0.61)
EOSINOPHIL NFR BLD AUTO: 1 % (ref 0–6)
ERYTHROCYTE [DISTWIDTH] IN BLOOD BY AUTOMATED COUNT: 12.4 % (ref 11.6–15.1)
EST. AVERAGE GLUCOSE BLD GHB EST-MCNC: 108 MG/DL
GFR SERPL CREATININE-BSD FRML MDRD: 56 ML/MIN/1.73SQ M
GLUCOSE P FAST SERPL-MCNC: 89 MG/DL (ref 65–99)
GLUCOSE UR STRIP-MCNC: NEGATIVE MG/DL
HBA1C MFR BLD: 5.4 %
HCT VFR BLD AUTO: 41 % (ref 34.8–46.1)
HDLC SERPL-MCNC: 64 MG/DL
HGB BLD-MCNC: 13.5 G/DL (ref 11.5–15.4)
HGB UR QL STRIP.AUTO: NEGATIVE
IMM GRANULOCYTES # BLD AUTO: 0 THOUSAND/UL (ref 0–0.2)
IMM GRANULOCYTES NFR BLD AUTO: 0 % (ref 0–2)
KETONES UR STRIP-MCNC: NEGATIVE MG/DL
LDLC SERPL CALC-MCNC: 155 MG/DL (ref 0–100)
LEUKOCYTE ESTERASE UR QL STRIP: ABNORMAL
LYMPHOCYTES # BLD AUTO: 1.24 THOUSANDS/ΜL (ref 0.6–4.47)
LYMPHOCYTES NFR BLD AUTO: 40 % (ref 14–44)
MCH RBC QN AUTO: 31.5 PG (ref 26.8–34.3)
MCHC RBC AUTO-ENTMCNC: 32.9 G/DL (ref 31.4–37.4)
MCV RBC AUTO: 96 FL (ref 82–98)
MONOCYTES # BLD AUTO: 0.31 THOUSAND/ΜL (ref 0.17–1.22)
MONOCYTES NFR BLD AUTO: 10 % (ref 4–12)
NEUTROPHILS # BLD AUTO: 1.45 THOUSANDS/ΜL (ref 1.85–7.62)
NEUTS SEG NFR BLD AUTO: 47 % (ref 43–75)
NITRITE UR QL STRIP: NEGATIVE
NON-SQ EPI CELLS URNS QL MICRO: ABNORMAL /HPF
NRBC BLD AUTO-RTO: 0 /100 WBCS
PH UR STRIP.AUTO: 6 [PH]
PLATELET # BLD AUTO: 258 THOUSANDS/UL (ref 149–390)
PMV BLD AUTO: 9.1 FL (ref 8.9–12.7)
POTASSIUM SERPL-SCNC: 4 MMOL/L (ref 3.5–5.3)
PROT SERPL-MCNC: 7.3 G/DL (ref 6.4–8.2)
PROT UR STRIP-MCNC: NEGATIVE MG/DL
RBC # BLD AUTO: 4.29 MILLION/UL (ref 3.81–5.12)
RBC #/AREA URNS AUTO: ABNORMAL /HPF
SODIUM SERPL-SCNC: 142 MMOL/L (ref 136–145)
SP GR UR STRIP.AUTO: <=1.005 (ref 1–1.03)
TRIGL SERPL-MCNC: 95 MG/DL
TSH SERPL DL<=0.05 MIU/L-ACNC: 3.45 UIU/ML (ref 0.36–3.74)
UROBILINOGEN UR QL STRIP.AUTO: 0.2 E.U./DL
WBC # BLD AUTO: 3.08 THOUSAND/UL (ref 4.31–10.16)
WBC #/AREA URNS AUTO: ABNORMAL /HPF

## 2020-02-28 PROCEDURE — 85025 COMPLETE CBC W/AUTO DIFF WBC: CPT

## 2020-02-28 PROCEDURE — 84443 ASSAY THYROID STIM HORMONE: CPT

## 2020-02-28 PROCEDURE — 80053 COMPREHEN METABOLIC PANEL: CPT

## 2020-02-28 PROCEDURE — 36415 COLL VENOUS BLD VENIPUNCTURE: CPT

## 2020-02-28 PROCEDURE — 83036 HEMOGLOBIN GLYCOSYLATED A1C: CPT

## 2020-02-28 PROCEDURE — 80061 LIPID PANEL: CPT

## 2020-02-28 PROCEDURE — 81001 URINALYSIS AUTO W/SCOPE: CPT

## 2020-03-02 ENCOUNTER — TELEPHONE (OUTPATIENT)
Dept: FAMILY MEDICINE CLINIC | Facility: OTHER | Age: 70
End: 2020-03-02

## 2020-03-02 NOTE — TELEPHONE ENCOUNTER
Patient informed    ----- Message from Eugenie Ballard, [address] sent at 2/29/2020 11:39 AM EST -----  Can we let Senthil Yung know that her blood work results came back fine, including blood sugar and thyroid level  Only issue is continued elevated total and LDL ("bad") cholesterol  Maintaining healthy weight, getting plenty of fiber in diet, and avoiding bad fats will help with this  Can discuss further at next follow-up office visit   Thanks

## 2020-03-18 DIAGNOSIS — K21.9 GASTROESOPHAGEAL REFLUX DISEASE, ESOPHAGITIS PRESENCE NOT SPECIFIED: ICD-10-CM

## 2020-03-18 RX ORDER — OMEPRAZOLE 20 MG/1
20 CAPSULE, DELAYED RELEASE ORAL DAILY
Qty: 90 CAPSULE | Refills: 1 | Status: SHIPPED | OUTPATIENT
Start: 2020-03-18 | End: 2021-05-12

## 2020-05-01 ENCOUNTER — NURSE TRIAGE (OUTPATIENT)
Dept: OTHER | Facility: OTHER | Age: 70
End: 2020-05-01

## 2021-01-14 ENCOUNTER — TELEPHONE (OUTPATIENT)
Dept: FAMILY MEDICINE CLINIC | Facility: OTHER | Age: 71
End: 2021-01-14

## 2021-01-14 DIAGNOSIS — B34.9 VIRAL INFECTION, UNSPECIFIED: ICD-10-CM

## 2021-01-14 DIAGNOSIS — Z20.822 EXPOSURE TO COVID-19 VIRUS: ICD-10-CM

## 2021-01-14 PROCEDURE — U0003 INFECTIOUS AGENT DETECTION BY NUCLEIC ACID (DNA OR RNA); SEVERE ACUTE RESPIRATORY SYNDROME CORONAVIRUS 2 (SARS-COV-2) (CORONAVIRUS DISEASE [COVID-19]), AMPLIFIED PROBE TECHNIQUE, MAKING USE OF HIGH THROUGHPUT TECHNOLOGIES AS DESCRIBED BY CMS-2020-01-R: HCPCS

## 2021-01-14 PROCEDURE — U0005 INFEC AGEN DETEC AMPLI PROBE: HCPCS

## 2021-01-14 NOTE — TELEPHONE ENCOUNTER
Testing ordered  Please direct pt to [facility] or [facility] for [place]  Results take 48-72 hrs  Will need virtual visit to evaluate tomorrow  ADVISE:   COVID-19 testing at [facility]  Pt instructed to stay in vehicle and that mask will be provided if they don't have one already  The mask should be worn until instructed otherwise  A swab will be done, then a pamphlet with info on isolation will be provided  [facility] Home isolation once she is home  Discontinuation of home isolation if covid negative with low clinical suspicion or when all of the following criteria met:  o At least 3 days (72 hours) since recovery defined as fever resolution without use of fever-reducing medications and  o Improvement in respiratory symptoms (eg  Cough, SOB) and  o At least 10 days have passed since symptoms first appeared   Instructed pt to call clinic if symptoms worsen  If unable to reach us, call 911, and be sure to tell  you are COVID positive and wear mask before their arrival    Advised the following:  o Monitor your symptoms     o Stay home except to get medical care  o Separate yourself from other people   o Wear a face mask when around others  o Avoid sharing personal household items/utensils  o Clean your hands often  o Clean all high touch surfaces daily     Dior Chairez, DO

## 2021-01-15 ENCOUNTER — TELEMEDICINE (OUTPATIENT)
Dept: FAMILY MEDICINE CLINIC | Facility: OTHER | Age: 71
End: 2021-01-15
Payer: MEDICARE

## 2021-01-15 DIAGNOSIS — Z20.822 EXPOSURE TO COVID-19 VIRUS: Primary | ICD-10-CM

## 2021-01-15 LAB — SARS-COV-2 RNA RESP QL NAA+PROBE: NEGATIVE

## 2021-01-15 PROCEDURE — 99442 PR PHYS/QHP TELEPHONE EVALUATION 11-20 MIN: CPT | Performed by: FAMILY MEDICINE

## 2021-01-15 NOTE — RESULT ENCOUNTER NOTE
I called Ilda Yañez and let her know that her COVID-19 swab was negative  I advised her to continue social distancing procedures    Keep f/u 1/18

## 2021-01-15 NOTE — PROGRESS NOTES
COVID-19 Virtual Visit     Assessment/Plan:    Problem List Items Addressed This Visit     None      Visit Diagnoses     Exposure to COVID-19 virus    -  Primary         Disposition:     I recommended self-quarantine for 10 days and to watch for symptoms until 14 days after exposure  If patient were to develop symptoms, they should self isolate and call our office for further guidance  I have spent 11 minutes directly with the patient  Patient's result is pending    Return in about 3 days (around 1/18/2021) for Recheck COVID-19 symptoms   The patient indicates understanding of these issues and agrees with the plan  Encounter provider Luis Felipe Knott MD    Provider located at [address] [id number]    Recent Visits  Date Type Provider Dept   01/14/21 Telephone Natty Napoles recent visits within past 7 days and meeting all other requirements     Today's Visits  Date Type Provider Dept   01/15/21 Telemedicine MD Que Davison   Showing today's visits and meeting all other requirements     Future Appointments  No visits were found meeting these conditions  Showing future appointments within next 150 days and meeting all other requirements      This virtual check-in was done via [employer]    and patient was informed that this is not a secure, HIPAA-compliant platform  She agrees to proceed  My office door was closed  No one else was in the room  Yadiel Mays acknowledged consent and understanding of privacy and security of the telemedicine visit  I informed the patient that I have reviewed her record in Epic and presented the opportunity for her to ask any questions regarding the visit today  The patient agreed to participate  Subjective:   Yadiel Mays is a 78 y o  female who is concerned about COVID-19   Patient's symptoms include fatigue, nasal congestion, sore throat and cough (  productive of clear sputum)  Patient denies fever, chills, rhinorrhea, anosmia, loss of taste, shortness of breath, chest tightness, abdominal pain, nausea, vomiting, diarrhea, myalgias and headaches  Date of exposure: 2021  Patient states that she has been experiencing above symptoms since 2020  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 for the past 1-2 months is COVID-19 positive  Patient was diagnosed with COVID-19 earlier this week and Pat's last exposure to this patient was 2021 as listed above       Past Medical History:   Diagnosis Date    Anemia     last assessed - [id number]    Anxiety disorder     Arthritis     Asthma     Back pain     Bunion, right foot     last assessed - [id number]    Deformity of toe of left foot     last assessed - 42Qbd7072    Discitis of thoracolumbar region     last assessed - 12Unj9608    Fall     Fibrocystic disease of breast     Fracture of wrist     and hand, left    GERD (gastroesophageal reflux disease)     Head injury     Hypercholesterolemia     Leukopenia     last assessed - 71Qon6579    Osteopenia     last assessed - 66OKO1324    Polyarthritis     Prediabetes     Seasonal allergies     Shortness of breath     Sleep disturbance     Subclinical hypothyroidism     Toe deformity     last assessed - 87Fjp4436    Trochanteric bursitis of left hip     last assessed - 39Biv8183    Vitamin D deficiency     last assessed - 49Bsh1800    Vitiligo     last assessed - 11HMQ8186    Wears eyeglasses      Past Surgical History:   Procedure Laterality Date    BACK SURGERY      4 back surgeries, fusion, bone replacement    BACK SURGERY  2012    Lumbar PLIF surgery    CARPAL TUNNEL RELEASE Bilateral      SECTION  1980    CHOLECYSTECTOMY  1997    HAND TENDON SURGERY Bilateral     HERNIA REPAIR      NECK SURGERY  2012    ACDF Surgery    [place] COLONOSCOPY FLX DX W/COLLJ SPEC WHEN PFRMD N/A 1/31/2019    Procedure: COLONOSCOPY;  Surgeon: Kaushal Zhao MD;  [facility]; Service: Gastroenterology    [place] ESOPHAGOGASTRODUODENOSCOPY TRANSORAL DIAGNOSTIC N/A 1/31/2019    Procedure: ESOPHAGOGASTRODUODENOSCOPY (EGD); Surgeon: Kaushal Zhao MD;  [facility]; Service: Gastroenterology    SPINAL CORD STIMULATOR IMPLANT N/A 7/12/2016    Procedure: PLACEMENT OF THORACIC PARASPINAL PERIPHERAL NERVE STIMULATING ELECTODES WITH LEFT BUTTOCK GENERATOR;  Surgeon: Eve Rothman MD;  [facility];  Service:    Esthela Lark NERVE REPAIR Bilateral 1989     Current Outpatient Medications   Medication Sig Dispense Refill    azelastine (ASTELIN) 0 1 % nasal spray 2 sprays into each nostril 2 (two) times a day Use in each nostril as directed 1 Bottle 1    cyanocobalamin (VITAMIN B-12) 1,000 mcg tablet Take 1 tablet (1,000 mcg total) by mouth daily 30 tablet 0    ibuprofen (MOTRIN) 200 mg tablet 3 tabs qd      melatonin 3 mg Take 1 tablet (3 mg total) by mouth daily at bedtime 30 tablet 0    omeprazole (PriLOSEC) 20 mg delayed release capsule Take 1 capsule (20 mg total) by mouth daily 90 capsule 1     No current facility-administered medications for this visit  Allergies   Allergen Reactions    Influenza A (H1n1) Monoval Vac Anaphylaxis    Lyrica [Pregabalin] Swelling    Other Anaphylaxis     FLU SHOT    Acetaminophen Other (See Comments)     GI Upset, "it upps my triglycerides"    Percocet [Oxycodone-Acetaminophen] GI Intolerance       Review of Systems   Constitutional: Positive for fatigue  Negative for chills and fever  HENT: Positive for congestion and sore throat  Negative for rhinorrhea  Eyes: Negative for visual disturbance  Respiratory: Positive for cough (  productive of clear sputum)  Negative for chest tightness and shortness of breath  Gastrointestinal: Negative for abdominal pain, diarrhea, nausea and vomiting     Genitourinary: Negative for difficulty urinating and dysuria  Musculoskeletal: Negative for myalgias  Skin: Negative for pallor and rash  Neurological: Negative for dizziness, weakness and headaches  Psychiatric/Behavioral: Negative for sleep disturbance  Objective: There were no vitals filed for this visit  Physical Exam   It was my intent to perform this visit via video technology but the patient was not able to do a video connection so the visit was completed via audio telephone only  VIRTUAL VISIT DISCLAIMER    Evy Emery acknowledges that she has consented to an online visit or consultation  She understands that the online visit is based solely on information provided by her, and that, in the absence of a face-to-face physical evaluation by the physician, the diagnosis she receives is both limited and provisional in terms of accuracy and completeness  This is not intended to replace a full medical face-to-face evaluation by the physician  Evy Emery understands and accepts these terms

## 2021-01-18 ENCOUNTER — TELEMEDICINE (OUTPATIENT)
Dept: FAMILY MEDICINE CLINIC | Facility: OTHER | Age: 71
End: 2021-01-18
Payer: MEDICARE

## 2021-01-18 DIAGNOSIS — Z20.822 EXPOSURE TO COVID-19 VIRUS: Primary | ICD-10-CM

## 2021-01-18 PROCEDURE — 99213 OFFICE O/P EST LOW 20 MIN: CPT | Performed by: FAMILY MEDICINE

## 2021-01-18 NOTE — PROGRESS NOTES
COVID-19 Virtual Visit     Assessment/Plan:    Problem List Items Addressed This Visit     None      Visit Diagnoses     Exposure to COVID-19 virus  - Given patient was asymptomatic and last COVID-19 exposure occurred on 1/14/21, recommending patient obtain repeat testing on 1/19/21 (5 days after exposure)  -Counseled patient that earliest she could return to work is 1/21/21 if testing is negative  - Patient directed to continue social distancing precautions, wearing a mask at home given  is COVID+, and wiping down all surfaces of common areas  -     Novel Coronavirus (Covid-19),PCR [facility]; Future         Disposition:         Patient remains asymptomatic, given last exposure on 1/14/21, I recommended COVID-19 PCR testing on or after day 5 since last exposure (1/19/21) and if negative can end quarantine after 7 days  I have spent 11 minutes directly with the patient  Greater than 50% of this time was spent in counseling/coordination of care regarding: diagnostic results and instructions for management  Encounter provider Anshu Cheek MD    Provider located at [address] [address]    Recent Visits  Date Type Provider Dept   01/18/21 Telemedicine Anshu Cheek MD [facility]   01/15/21 Diamond Cuadra MD [facility]   01/14/21 Telephone Trenton Kelley [facility] [place]   Showing recent visits within past 7 days and meeting all other requirements     Future Appointments  No visits were found meeting these conditions  Showing future appointments within next 150 days and meeting all other requirements      This virtual check-in was done via [employer] and patient was informed that this is a secure, HIPAA-compliant platform  She agrees to proceed      Patient agrees to participate in a virtual check in via telephone or video visit instead of presenting to the office to address urgent/immediate medical needs  Patient is aware this is a billable service  After connecting through [facility], the patient was identified by name and date of birth  Rachel House was informed that this was a telemedicine visit and that the exam was being conducted confidentially over secure lines  My office door was closed  No one else was in the room  Rachel House acknowledged consent and understanding of privacy and security of the telemedicine visit  I informed the patient that I have reviewed her record in Epic and presented the opportunity for her to ask any questions regarding the visit today  The patient agreed to participate  Subjective:   Rachel House is a 78 y o  female who has been screened for COVID-19  Patient is currently asymptomatic  Patient denies fever, chills, fatigue, malaise, congestion, rhinorrhea, sore throat, anosmia, loss of taste, cough, shortness of breath, chest tightness, abdominal pain, nausea, vomiting, diarrhea, myalgias and headaches  Johana Marcum has been staying home and has isolated themselves in her home  She is taking care to not share personal items and is cleaning all surfaces that are touched often, like counters, tabletops, and doorknobs using household cleaning sprays or wipes  She is wearing a mask when she leaves her room  Date of exposure: 1/14/2021    Patient works as a CNA and reports that one of her patient's was diagnosed with COVID-19 on 1/14/21  Of note, patient's  was also diagnosed with COVID-19 and is currently completing his isolation  I reviewed lab results with the patient and discussed that given she remains symptomatic,  I recommended COVID-19 testing on or after day 5 since last exposure (1/19/21) and if negative can end quarantine after 7 days  Unfortunately, patient reports this is now the 5th exposure she has experienced and she is concerned she may lose her employment if she continues to quarantine   Encouraged patient to discuss with employer and to notify the office if there is any other assistance we can provide in this regard  Lab Results   Component Value Date    SARSCOV2 Negative 2021     Past Medical History:   Diagnosis Date    Anemia     last assessed - [id number]    Anxiety disorder     Arthritis     Asthma     Back pain     Bunion, right foot     last assessed - [id number]    Deformity of toe of left foot     last assessed - [id number]    Discitis of thoracolumbar region     last assessed - [id number]    Fall     Fibrocystic disease of breast     Fracture of wrist     and hand, left    GERD (gastroesophageal reflux disease)     Head injury     Hypercholesterolemia     Leukopenia     last assessed - [id number]    Osteopenia     last assessed - [id number]    Polyarthritis     Prediabetes     Seasonal allergies     Shortness of breath     Sleep disturbance     Subclinical hypothyroidism     Toe deformity     last assessed - [id number]    Trochanteric bursitis of left hip     last assessed - [id number]    Vitamin D deficiency     last assessed - [id number]    Vitiligo     last assessed - [id number]    Wears eyeglasses      Past Surgical History:   Procedure Laterality Date    BACK SURGERY      4 back surgeries, fusion, bone replacement    BACK SURGERY  2012    Lumbar PLIF surgery    CARPAL TUNNEL RELEASE Bilateral      SECTION  1980    CHOLECYSTECTOMY  1997    HAND TENDON SURGERY Bilateral     HERNIA REPAIR      NECK SURGERY  2012    ACDF Surgery    IA COLONOSCOPY FLX DX W/COLLJ SPEC WHEN PFRMD N/A 2019    Procedure: COLONOSCOPY;  Surgeon: Dayton Garcia MD;  [facility]; Service: Gastroenterology    IA ESOPHAGOGASTRODUODENOSCOPY TRANSORAL DIAGNOSTIC N/A 2019    Procedure: ESOPHAGOGASTRODUODENOSCOPY (EGD); Surgeon: Dayton Garcia MD;  [facility];   Service: Gastroenterology    SPINAL CORD STIMULATOR IMPLANT N/A 7/12/2016    Procedure: PLACEMENT OF THORACIC PARASPINAL PERIPHERAL NERVE STIMULATING ELECTODES WITH LEFT BUTTOCK GENERATOR;  Surgeon: Zell Lanes, MD;  Location[facility];  Service:    David Glimpse NERVE REPAIR Bilateral 1989     Current Outpatient Medications   Medication Sig Dispense Refill    azelastine (ASTELIN) 0 1 % nasal spray 2 sprays into each nostril 2 (two) times a day Use in each nostril as directed 1 Bottle 1    cyanocobalamin (VITAMIN B-12) 1,000 mcg tablet Take 1 tablet (1,000 mcg total) by mouth daily 30 tablet 0    ibuprofen (MOTRIN) 200 mg tablet 3 tabs qd      melatonin 3 mg Take 1 tablet (3 mg total) by mouth daily at bedtime 30 tablet 0    omeprazole (PriLOSEC) 20 mg delayed release capsule Take 1 capsule (20 mg total) by mouth daily 90 capsule 1     No current facility-administered medications for this visit  Allergies   Allergen Reactions    Influenza A (H1n1) Monoval Vac Anaphylaxis    Lyrica [Pregabalin] Swelling    Other Anaphylaxis     FLU SHOT    Acetaminophen Other (See Comments)     GI Upset, "it upps my triglycerides"    Percocet [Oxycodone-Acetaminophen] GI Intolerance       Review of Systems   Constitutional: Negative for chills, fatigue and fever  HENT: Negative for congestion, rhinorrhea and sore throat  Respiratory: Negative for cough, chest tightness and shortness of breath  Gastrointestinal: Negative for abdominal pain, diarrhea, nausea and vomiting  Musculoskeletal: Negative for myalgias  Neurological: Negative for headaches  Objective: There were no vitals filed for this visit  Physical Exam  Constitutional:       Appearance: Normal appearance  HENT:      Head: Normocephalic and atraumatic  Right Ear: External ear normal       Left Ear: External ear normal    Eyes:      Conjunctiva/sclera: Conjunctivae normal    Neck:      Musculoskeletal: Normal range of motion and neck supple     Pulmonary:      Effort: Pulmonary effort is normal  Neurological:      Mental Status: She is alert and oriented to person, place, and time  Psychiatric:         Mood and Affect: Mood normal          Thought Content: Thought content normal        VIRTUAL VISIT DISCLAIMER    Pat Hurtado acknowledges that she has consented to an online visit or consultation  She understands that the online visit is based solely on information provided by her, and that, in the absence of a face-to-face physical evaluation by the physician, the diagnosis she receives is both limited and provisional in terms of accuracy and completeness  This is not intended to replace a full medical face-to-face evaluation by the physician  Peggy Jones understands and accepts these terms

## 2021-01-18 NOTE — LETTER
January 18, 2021    Patient:  Aster Horn  YOB: 1950  Date of Last Encounter: 1/15/2021    To whom it may concern:    Aster Horn has tested negative for COVID-19 (Coronavirus)  She may return to work on 1/19/21      Sincerely,        Shaanjennifer Josue MD

## 2021-01-19 ENCOUNTER — TELEPHONE (OUTPATIENT)
Dept: OTHER | Facility: OTHER | Age: 71
End: 2021-01-19

## 2021-01-19 NOTE — TELEPHONE ENCOUNTER
PT  Called in stating she doesn't believe she is going to have covid, if the test comes back negative she states she is going to work  PT just wanted to inform office

## 2021-01-20 DIAGNOSIS — Z20.822 EXPOSURE TO COVID-19 VIRUS: ICD-10-CM

## 2021-01-20 PROCEDURE — U0003 INFECTIOUS AGENT DETECTION BY NUCLEIC ACID (DNA OR RNA); SEVERE ACUTE RESPIRATORY SYNDROME CORONAVIRUS 2 (SARS-COV-2) (CORONAVIRUS DISEASE [COVID-19]), AMPLIFIED PROBE TECHNIQUE, MAKING USE OF HIGH THROUGHPUT TECHNOLOGIES AS DESCRIBED BY CMS-2020-01-R: HCPCS | Performed by: FAMILY MEDICINE

## 2021-01-20 PROCEDURE — U0005 INFEC AGEN DETEC AMPLI PROBE: HCPCS | Performed by: FAMILY MEDICINE

## 2021-01-21 LAB — SARS-COV-2 RNA RESP QL NAA+PROBE: NEGATIVE

## 2021-01-23 NOTE — RESULT ENCOUNTER NOTE
Please call patient and let her know that her repeating testing for COVID-19 was negative!     Thank you,  Dr SELLERS

## 2021-01-25 ENCOUNTER — TELEPHONE (OUTPATIENT)
Dept: FAMILY MEDICINE CLINIC | Facility: OTHER | Age: 71
End: 2021-01-25

## 2021-01-25 NOTE — TELEPHONE ENCOUNTER
----- Message from Alonso Delacruz MD sent at 1/23/2021  2:11 PM EST -----  Please call patient and let her know that her repeating testing for COVID-19 was negative!     Thank you,  Dr SELLERS

## 2021-02-10 ENCOUNTER — OFFICE VISIT (OUTPATIENT)
Dept: FAMILY MEDICINE CLINIC | Facility: OTHER | Age: 71
End: 2021-02-10
Payer: MEDICARE

## 2021-02-10 VITALS
HEART RATE: 82 BPM | OXYGEN SATURATION: 99 % | BODY MASS INDEX: 30.49 KG/M2 | WEIGHT: 161.5 LBS | SYSTOLIC BLOOD PRESSURE: 140 MMHG | DIASTOLIC BLOOD PRESSURE: 84 MMHG | HEIGHT: 61 IN | TEMPERATURE: 97.5 F

## 2021-02-10 DIAGNOSIS — Z13.1 SCREENING FOR DIABETES MELLITUS: ICD-10-CM

## 2021-02-10 DIAGNOSIS — Z12.31 VISIT FOR SCREENING MAMMOGRAM: ICD-10-CM

## 2021-02-10 DIAGNOSIS — M85.832 OTHER SPECIFIED DISORDERS OF BONE DENSITY AND STRUCTURE, LEFT FOREARM: ICD-10-CM

## 2021-02-10 DIAGNOSIS — Z86.2 HISTORY OF ANEMIA: ICD-10-CM

## 2021-02-10 DIAGNOSIS — E78.00 HYPERCHOLESTEROLEMIA: ICD-10-CM

## 2021-02-10 DIAGNOSIS — Z00.00 MEDICARE ANNUAL WELLNESS VISIT, SUBSEQUENT: Primary | ICD-10-CM

## 2021-02-10 DIAGNOSIS — R03.0 ELEVATED BLOOD PRESSURE READING: ICD-10-CM

## 2021-02-10 DIAGNOSIS — E03.8 SUBCLINICAL HYPOTHYROIDISM: ICD-10-CM

## 2021-02-10 PROCEDURE — G0439 PPPS, SUBSEQ VISIT: HCPCS | Performed by: FAMILY MEDICINE

## 2021-02-10 PROCEDURE — 1123F ACP DISCUSS/DSCN MKR DOCD: CPT | Performed by: FAMILY MEDICINE

## 2021-02-10 NOTE — PATIENT INSTRUCTIONS
Medicare Preventive Visit Patient Instructions  Thank you for completing your Welcome to Medicare Visit or Medicare Annual Wellness Visit today  Your next wellness visit will be due in one year (2/10/2022)  The screening/preventive services that you may require over the next 5-10 years are detailed below  Some tests may not apply to you based off risk factors and/or age  Screening tests ordered at today's visit but not completed yet may show as past due  Also, please note that scanned in results may not display below  Preventive Screenings:  Service Recommendations Previous Testing/Comments   Colorectal Cancer Screening  * Colonoscopy    * Fecal Occult Blood Test (FOBT)/Fecal Immunochemical Test (FIT)  * Fecal DNA/Cologuard Test  * Flexible Sigmoidoscopy Age: 54-65 years old   Colonoscopy: every 10 years (may be performed more frequently if at higher risk)  OR  FOBT/FIT: every 1 year  OR  Cologuard: every 3 years  OR  Sigmoidoscopy: every 5 years  Screening may be recommended earlier than age 48 if at higher risk for colorectal cancer  Also, an individualized decision between you and your healthcare provider will decide whether screening between the ages of 74-80 would be appropriate  Colonoscopy: 01/31/2019  FOBT/FIT: Not on file  Cologuard: Not on file  Sigmoidoscopy: Not on file    Screening Current     Breast Cancer Screening Age: 36 years old  Frequency: every 1-2 years  Not required if history of left and right mastectomy Mammogram: 06/08/2016       Cervical Cancer Screening Between the ages of 21-29, pap smear recommended once every 3 years  Between the ages of 33-67, can perform pap smear with HPV co-testing every 5 years     Recommendations may differ for women with a history of total hysterectomy, cervical cancer, or abnormal pap smears in past  Pap Smear: Not on file    Screening Not Indicated   Hepatitis C Screening Once for adults born between 1945 and 1965  More frequently in patients at high risk for Hepatitis C Hep C Antibody: 12/04/2018    Screening Current   Diabetes Screening 1-2 times per year if you're at risk for diabetes or have pre-diabetes Fasting glucose: 89 mg/dL   A1C: 5 4 %    Screening Current   Cholesterol Screening Once every 5 years if you don't have a lipid disorder  May order more often based on risk factors  Lipid panel: 02/28/2020    Screening Not Indicated  History Lipid Disorder     Other Preventive Screenings Covered by Medicare:  1  Abdominal Aortic Aneurysm (AAA) Screening: covered once if your at risk  You're considered to be at risk if you have a family history of AAA  2  Lung Cancer Screening: covers low dose CT scan once per year if you meet all of the following conditions: (1) Age 50-69; (2) No signs or symptoms of lung cancer; (3) Current smoker or have quit smoking within the last 15 years; (4) You have a tobacco smoking history of at least 30 pack years (packs per day multiplied by number of years you smoked); (5) You get a written order from a healthcare provider  3  Glaucoma Screening: covered annually if you're considered high risk: (1) You have diabetes OR (2) Family history of glaucoma OR (3)  aged 48 and older OR (3)  American aged 72 and older  3  Osteoporosis Screening: covered every 2 years if you meet one of the following conditions: (1) You're estrogen deficient and at risk for osteoporosis based off medical history and other findings; (2) Have a vertebral abnormality; (3) On glucocorticoid therapy for more than 3 months; (4) Have primary hyperparathyroidism; (5) On osteoporosis medications and need to assess response to drug therapy  · Last bone density test (DXA Scan): 12/10/2018  5  HIV Screening: covered annually if you're between the age of 12-76  Also covered annually if you are younger than 13 and older than 72 with risk factors for HIV infection   For pregnant patients, it is covered up to 3 times per pregnancy  Immunizations:  Immunization Recommendations   Influenza Vaccine Annual influenza vaccination during flu season is recommended for all persons aged >= 6 months who do not have contraindications   Pneumococcal Vaccine (Prevnar and Pneumovax)  * Prevnar = PCV13  * Pneumovax = PPSV23   Adults 25-60 years old: 1-3 doses may be recommended based on certain risk factors  Adults 72 years old: Prevnar (PCV13) vaccine recommended followed by Pneumovax (PPSV23) vaccine  If already received PPSV23 since turning 65, then PCV13 recommended at least one year after PPSV23 dose  Hepatitis B Vaccine 3 dose series if at intermediate or high risk (ex: diabetes, end stage renal disease, liver disease)   Tetanus (Td) Vaccine - COST NOT COVERED BY MEDICARE PART B Following completion of primary series, a booster dose should be given every 10 years to maintain immunity against tetanus  Td may also be given as tetanus wound prophylaxis  Tdap Vaccine - COST NOT COVERED BY MEDICARE PART B Recommended at least once for all adults  For pregnant patients, recommended with each pregnancy  Shingles Vaccine (Shingrix) - COST NOT COVERED BY MEDICARE PART B  2 shot series recommended in those aged 48 and above     Health Maintenance Due:      Topic Date Due    MAMMOGRAM  06/08/2018    DXA SCAN  12/10/2023    Colorectal Cancer Screening  01/31/2029    Hepatitis C Screening  Completed     Immunizations Due:  There are no preventive care reminders to display for this patient  Advance Directives   What are advance directives? Advance directives are legal documents that state your wishes and plans for medical care  These plans are made ahead of time in case you lose your ability to make decisions for yourself  Advance directives can apply to any medical decision, such as the treatments you want, and if you want to donate organs  What are the types of advance directives?   There are many types of advance directives, and each state has rules about how to use them  You may choose a combination of any of the following:  · Living will: This is a written record of the treatment you want  You can also choose which treatments you do not want, which to limit, and which to stop at a certain time  This includes surgery, medicine, IV fluid, and tube feedings  · Durable power of  for healthcare [facility]): This is a written record that states who you want to make healthcare choices for you when you are unable to make them for yourself  This person, called a proxy, is usually a family member or a friend  You may choose more than 1 proxy  · Do not resuscitate (DNR) order:  A DNR order is used in case your heart stops beating or you stop breathing  It is a request not to have certain forms of treatment, such as CPR  A DNR order may be included in other types of advance directives  · Medical directive: This covers the care that you want if you are in a coma, near death, or unable to make decisions for yourself  You can list the treatments you want for each condition  Treatment may include pain medicine, surgery, blood transfusions, dialysis, IV or tube feedings, and a ventilator (breathing machine)  · Values history: This document has questions about your views, beliefs, and how you feel and think about life  This information can help others choose the care that you would choose  Why are advance directives important? An advance directive helps you control your care  Although spoken wishes may be used, it is better to have your wishes written down  Spoken wishes can be misunderstood, or not followed  Treatments may be given even if you do not want them  An advance directive may make it easier for your family to make difficult choices about your care     Weight Management   Why it is important to manage your weight:  Being overweight increases your risk of health conditions such as heart disease, high blood pressure, type 2 diabetes, and certain types of cancer  It can also increase your risk for osteoarthritis, sleep apnea, and other respiratory problems  Aim for a slow, steady weight loss  Even a small amount of weight loss can lower your risk of health problems  How to lose weight safely:  A safe and healthy way to lose weight is to eat fewer calories and get regular exercise  You can lose up about 1 pound a week by decreasing the number of calories you eat by 500 calories each day  Healthy meal plan for weight management:  A healthy meal plan includes a variety of foods, contains fewer calories, and helps you stay healthy  A healthy meal plan includes the following:  · Eat whole-grain foods more often  A healthy meal plan should contain fiber  Fiber is the part of grains, fruits, and vegetables that is not broken down by your body  Whole-grain foods are healthy and provide extra fiber in your diet  Some examples of whole-grain foods are whole-wheat breads and pastas, oatmeal, brown rice, and bulgur  · Eat a variety of vegetables every day  Include dark, leafy greens such as spinach, kale, sinai greens, and mustard greens  Eat yellow and orange vegetables such as carrots, sweet potatoes, and winter squash  · Eat a variety of fruits every day  Choose fresh or canned fruit (canned in its own juice or light syrup) instead of juice  Fruit juice has very little or no fiber  · Eat low-fat dairy foods  Drink fat-free (skim) milk or 1% milk  Eat fat-free yogurt and low-fat cottage cheese  Try low-fat cheeses such as mozzarella and other reduced-fat cheeses  · Choose meat and other protein foods that are low in fat  Choose beans or other legumes such as split peas or lentils  Choose fish, skinless poultry (chicken or turkey), or lean cuts of red meat (beef or pork)  Before you cook meat or poultry, cut off any visible fat  · Use less fat and oil  Try baking foods instead of frying them   Add less fat, such as margarine, sour cream, regular salad dressing and mayonnaise to foods  Eat fewer high-fat foods  Some examples of high-fat foods include french fries, doughnuts, ice cream, and cakes  · Eat fewer sweets  Limit foods and drinks that are high in sugar  This includes candy, cookies, regular soda, and sweetened drinks  Exercise:  Exercise at least 30 minutes per day on most days of the week  Some examples of exercise include walking, biking, dancing, and swimming  You can also fit in more physical activity by taking the stairs instead of the elevator or parking farther away from stores  Ask your healthcare provider about the best exercise plan for you  © Copyright [employer] 2018 Information is for End User's use only and may not be sold, redistributed or otherwise used for commercial purposes   All illustrations and images included in CareNotes® are the copyrighted property of [employer]  or [address] [place] [address]

## 2021-02-10 NOTE — PROGRESS NOTES
Assessment and Plan:     Problem List Items Addressed This Visit        Endocrine    Subclinical hypothyroidism    Relevant Orders    TSH, 3rd generation with Free T4 reflex       Other    Hypercholesterolemia    Relevant Orders    Lipid Panel with Direct LDL reflex    Elevated blood pressure reading     Pt reports being on an antihypertensive medication in the past which was stopped after she had a presyncopal episode and hit her head  · Advised pt to adhere to low salt diet  · Will recheck at f/u visit           Other Visit Diagnoses     Medicare annual wellness visit, subsequent    -  Primary    History of anemia        Relevant Orders    CBC and differential    Other specified disorders of bone density and structure, left forearm         Relevant Orders    DXA bone density spine hip and pelvis    Visit for screening mammogram        Relevant Orders    Mammo screening bilateral w 3d & cad    Screening for diabetes mellitus        Relevant Orders    Comprehensive metabolic panel        BMI Counseling: Body mass index is 30 52 kg/m²  The BMI is above normal  Nutrition recommendations include encouraging healthy choices of fruits and vegetables, limiting drinks that contain sugar and moderation in carbohydrate intake  Exercise recommendations include moderate physical activity 150 minutes/week  No pharmacotherapy was ordered  Preventive health issues were discussed with patient, and age appropriate screening tests were ordered as noted in patient's After Visit Summary  Personalized health advice and appropriate referrals for health education or preventive services given if needed, as noted in patient's After Visit Summary  History of Present Illness:     Patient presents for Medicare Annual Wellness visit      Patient Care Team:  Denisa Caro MD as PCP - General (Family Medicine)  SIOBHAN Prado PA-C  [address] [facility] [place] MD Daryl Katina Molt, MD [place] KOBI Bishop MD Bubba Coe, MD as Endoscopist     Problem List:     Patient Active Problem List   Diagnosis    Back pain, chronic    Bunion, right foot    Cervical post-laminectomy syndrome    Deformity of toe of left foot    DJD (degenerative joint disease) of thoracic spine    Foot pain, bilateral    Leukopenia    Myofascial pain syndrome    Osteopenia    Pain syndrome, chronic    Thoracic neuralgia    Thoracic spondylosis without myelopathy    Vitiligo    Mild persistent asthma with acute exacerbation    History of gastroesophageal reflux (GERD)    Hypercholesterolemia    Seasonal allergies    Osteoporosis screening    Subclinical hypothyroidism    Acute diarrhea    Elevated blood pressure reading      Past Medical and Surgical History:     Past Medical History:   Diagnosis Date    Anemia     last assessed - [id number]    Anxiety disorder     Arthritis     Asthma     Back pain     Bunion, right foot     last assessed - [id number]    Deformity of toe of left foot     last assessed - [id number]    Discitis of thoracolumbar region     last assessed - [id number]    Fall     Fibrocystic disease of breast     Fracture of wrist     and hand, left    GERD (gastroesophageal reflux disease)     Head injury     Hypercholesterolemia     Leukopenia     last assessed - [id number]    Osteopenia     last assessed - [id number]    Polyarthritis     Prediabetes     Seasonal allergies     Shortness of breath     Sleep disturbance     Subclinical hypothyroidism     Toe deformity     last assessed - 57Atm9786    Trochanteric bursitis of left hip     last assessed - [id number]    Vitamin D deficiency     last assessed - [id number]    Vitiligo     last assessed - [id number]    Wears eyeglasses      Past Surgical History:   Procedure Laterality Date    BACK SURGERY      4 back surgeries, fusion, bone replacement    BACK SURGERY  03/06/2012    Lumbar PLIF surgery    CARPAL TUNNEL RELEASE Bilateral      SECTION  1980    CHOLECYSTECTOMY  1997    HAND TENDON SURGERY Bilateral     HERNIA REPAIR      NECK SURGERY  2012    ACDF Surgery    MD COLONOSCOPY FLX DX W/COLLJ SPEC WHEN PFRMD N/A 2019    Procedure: COLONOSCOPY;  Surgeon: Yasmany Quevedo MD;  Location[facility]; Service: Gastroenterology    MD ESOPHAGOGASTRODUODENOSCOPY TRANSORAL DIAGNOSTIC N/A 2019    Procedure: ESOPHAGOGASTRODUODENOSCOPY (EGD); Surgeon: Yasmany Quevedo MD;  Location[facility]; Service: Gastroenterology    SPINAL CORD STIMULATOR IMPLANT N/A 2016    Procedure: PLACEMENT OF THORACIC PARASPINAL PERIPHERAL NERVE STIMULATING ELECTODES WITH LEFT BUTTOCK GENERATOR;  Surgeon: Josie Mosley MD;  Location:  MAIN OR;  Service:    [place] Joseph NERVE REPAIR Bilateral       Family History:     Family History   Problem Relation Age of Onset    Dementia Mother     Emphysema Father         lung    Lung cancer Father     Other Paternal Grandfather         gangrene    Hypertension Family     Other Family         back trouble      Social History:        Social History     Socioeconomic History    Marital status: /Civil Union     Spouse name: None    Number of children: 2    Years of education: HS or GED    Highest education level: None   Occupational History    None   Social Needs    Financial resource strain: None    Food insecurity     Worry: None     Inability: None    Transportation needs     Medical: None     Non-medical: None   Tobacco Use    Smoking status: Never Smoker    Smokeless tobacco: Never Used   Substance and Sexual Activity    Alcohol use: Not Currently     Frequency: Never     Comment: ocassional    Drug use: No    Sexual activity: Yes     Partners: Male     Comment: No known STD risk factors;  Denied currently sexually active   Lifestyle    Physical activity     Days per week: None     Minutes per session: None    Stress: None   Relationships    Social connections     Talks on phone: None     Gets together: None     Attends [employer] service: None     Active member of club or organization: None     Attends meetings of clubs or organizations: None     Relationship status: None    Intimate partner violence     Fear of current or ex partner: None     Emotionally abused: None     Physically abused: None     Forced sexual activity: None   Other Topics Concern    None   Social History Narrative    No known risk factors    Physical Disability      Medications and Allergies:     Current Outpatient Medications   Medication Sig Dispense Refill    azelastine (ASTELIN) 0 1 % nasal spray 2 sprays into each nostril 2 (two) times a day Use in each nostril as directed 1 Bottle 1    cyanocobalamin (VITAMIN B-12) 1,000 mcg tablet Take 1 tablet (1,000 mcg total) by mouth daily 30 tablet 0    ibuprofen (MOTRIN) 200 mg tablet 3 tabs qd      melatonin 3 mg Take 1 tablet (3 mg total) by mouth daily at bedtime 30 tablet 0    omeprazole (PriLOSEC) 20 mg delayed release capsule Take 1 capsule (20 mg total) by mouth daily (Patient not taking: Reported on 2/10/2021) 90 capsule 1     No current facility-administered medications for this visit  Allergies   Allergen Reactions    Influenza A (H1n1) Monoval Vac Anaphylaxis    Lyrica [Pregabalin] Swelling    Other Anaphylaxis     FLU SHOT    Acetaminophen Other (See Comments)     GI Upset, "it upps my triglycerides"    Percocet [Oxycodone-Acetaminophen] GI Intolerance      Immunizations:     Immunization History   Administered Date(s) Administered    Tdap 06/04/2014      Health Maintenance:         Topic Date Due    MAMMOGRAM  06/08/2018    DXA SCAN  12/10/2023    Colorectal Cancer Screening  01/31/2029    Hepatitis C Screening  Completed     There are no preventive care reminders to display for this patient     Medicare Health Risk Assessment:     /84 (BP Location: Right arm, Patient Position: Standing, Cuff Size: Large)   Pulse 82   Temp 97 5 °F (36 4 °C) (Temporal)   Ht 5' 1" (1 549 m)   Wt 73 3 kg (161 lb 8 oz)   SpO2 99%   BMI 30 52 kg/m²      Pat is here for her Subsequent Wellness visit  Health Risk Assessment:   Patient rates overall health as very good  Patient feels that their physical health rating is much better  Eyesight was rated as same  Hearing was rated as slightly worse  Patient feels that their emotional and mental health rating is same  Pain experienced in the last 7 days has been some  Patient's pain rating has been 3/10  Patient states that she has experienced no weight loss or gain in last 6 months  Depression Screening:   PHQ-2 Score: 0      Fall Risk Screening: In the past year, patient has experienced: no history of falling in past year      Urinary Incontinence Screening:   Patient has not leaked urine accidently in the last six months  Home Safety:  Patient does not have trouble with stairs inside or outside of their home  Patient has working smoke alarms and has no working carbon monoxide detector  Home safety hazards include: none  Nutrition:   Current diet is Regular and No Added Salt  Medications:   Patient is currently taking over-the-counter supplements  OTC medications include: see medication list  Patient is able to manage medications  Activities of Daily Living (ADLs)/Instrumental Activities of Daily Living (IADLs):   Walk and transfer into and out of bed and chair?: Yes  Dress and groom yourself?: Yes    Bathe or shower yourself?: Yes    Feed yourself?  Yes  Do your laundry/housekeeping?: Yes  Manage your money, pay your bills and track your expenses?: Yes  Make your own meals?: Yes    Do your own shopping?: Yes    Previous Hospitalizations:   Any hospitalizations or ED visits within the last 12 months?: No      Advance Care Planning:   Living will: No    Durable POA for healthcare: No    Advanced directive: No Cognitive Screening:   Provider or family/friend/caregiver concerned regarding cognition?: No    PREVENTIVE SCREENINGS      Cardiovascular Screening:    General: History Lipid Disorder    Due for: Lipid Panel      Diabetes Screening:     General: Screening Current    Due for: Blood Glucose      Colorectal Cancer Screening:     General: Screening Current      Breast Cancer Screening:       Due for: Mammogram        Cervical Cancer Screening:    General: Screening Not Indicated      Osteoporosis Screening:    General: Screening Current    Due for: DXA Axial      Abdominal Aortic Aneurysm (AAA) Screening:        General: Screening Not Indicated      Lung Cancer Screening:     General: Screening Not Indicated      Hepatitis C Screening:    General: Screening Current    Physical Exam  Vitals signs reviewed  Constitutional:       General: She is not in acute distress  Appearance: She is well-developed  She is obese  She is not diaphoretic  HENT:      Head: Normocephalic and atraumatic  Right Ear: External ear normal       Left Ear: External ear normal       Nose: Nose normal       Mouth/Throat:      Mouth: Mucous membranes are moist       Pharynx: Oropharynx is clear  Comments: Edentulous  Eyes:      Extraocular Movements: Extraocular movements intact  Conjunctiva/sclera: Conjunctivae normal       Pupils: Pupils are equal, round, and reactive to light  Neck:      Musculoskeletal: Normal range of motion and neck supple  Cardiovascular:      Rate and Rhythm: Normal rate and regular rhythm  Heart sounds: Normal heart sounds  No murmur  No friction rub  No gallop  Pulmonary:      Effort: Pulmonary effort is normal  No respiratory distress  Breath sounds: Normal breath sounds  No stridor  No wheezing, rhonchi or rales  Abdominal:      General: Bowel sounds are normal  There is no distension  Palpations: Abdomen is soft  Tenderness: There is no abdominal tenderness   There is no right CVA tenderness, left CVA tenderness or guarding  Musculoskeletal: Normal range of motion  Right lower leg: No edema  Left lower leg: No edema  Lymphadenopathy:      Cervical: No cervical adenopathy  Skin:     General: Skin is warm and dry  Comments: Scattered areas of hypopigmentation c/w hx of vitiligo   Neurological:      General: No focal deficit present  Mental Status: She is alert and oriented to person, place, and time     Psychiatric:         Mood and Affect: Mood normal          Behavior: Behavior normal            Bhavna Avila MD

## 2021-02-11 NOTE — ASSESSMENT & PLAN NOTE
Pt reports being on an antihypertensive medication in the past which was stopped after she had a presyncopal episode and hit her head    · Advised pt to adhere to low salt diet  · Will recheck at f/u visit

## 2021-03-01 ENCOUNTER — CLINICAL SUPPORT (OUTPATIENT)
Dept: FAMILY MEDICINE CLINIC | Facility: OTHER | Age: 71
End: 2021-03-01
Payer: MEDICARE

## 2021-03-01 DIAGNOSIS — Z11.1 ENCOUNTER FOR PPD TEST: Primary | ICD-10-CM

## 2021-03-01 PROCEDURE — 86580 TB INTRADERMAL TEST: CPT

## 2021-03-03 ENCOUNTER — CLINICAL SUPPORT (OUTPATIENT)
Dept: FAMILY MEDICINE CLINIC | Facility: OTHER | Age: 71
End: 2021-03-03

## 2021-03-03 DIAGNOSIS — Z11.1 ENCOUNTER FOR PPD TEST: Primary | ICD-10-CM

## 2021-03-03 LAB
INDURATION: 0 MM
TB SKIN TEST: NEGATIVE

## 2021-03-15 ENCOUNTER — TRANSCRIBE ORDERS (OUTPATIENT)
Dept: LAB | Facility: CLINIC | Age: 71
End: 2021-03-15

## 2021-03-15 ENCOUNTER — LAB (OUTPATIENT)
Dept: LAB | Facility: CLINIC | Age: 71
End: 2021-03-15
Payer: MEDICARE

## 2021-03-15 DIAGNOSIS — Z86.2 HISTORY OF ANEMIA: ICD-10-CM

## 2021-03-15 DIAGNOSIS — Z13.1 SCREENING FOR DIABETES MELLITUS: ICD-10-CM

## 2021-03-15 DIAGNOSIS — E03.8 SUBCLINICAL HYPOTHYROIDISM: ICD-10-CM

## 2021-03-15 DIAGNOSIS — E78.00 HYPERCHOLESTEROLEMIA: ICD-10-CM

## 2021-03-15 LAB
ALBUMIN SERPL BCP-MCNC: 3.9 G/DL (ref 3.5–5)
ALP SERPL-CCNC: 84 U/L (ref 46–116)
ALT SERPL W P-5'-P-CCNC: 32 U/L (ref 12–78)
ANION GAP SERPL CALCULATED.3IONS-SCNC: 9 MMOL/L (ref 4–13)
AST SERPL W P-5'-P-CCNC: 26 U/L (ref 5–45)
BASOPHILS # BLD AUTO: 0 THOUSANDS/ΜL (ref 0–0.1)
BASOPHILS NFR BLD AUTO: 0 % (ref 0–1)
BILIRUB SERPL-MCNC: 0.39 MG/DL (ref 0.2–1)
BUN SERPL-MCNC: 17 MG/DL (ref 5–25)
CALCIUM SERPL-MCNC: 9.5 MG/DL (ref 8.3–10.1)
CHLORIDE SERPL-SCNC: 104 MMOL/L (ref 100–108)
CHOLEST SERPL-MCNC: 265 MG/DL (ref 50–200)
CO2 SERPL-SCNC: 28 MMOL/L (ref 21–32)
CREAT SERPL-MCNC: 1.09 MG/DL (ref 0.6–1.3)
EOSINOPHIL # BLD AUTO: 0 THOUSAND/ΜL (ref 0–0.61)
EOSINOPHIL NFR BLD AUTO: 0 % (ref 0–6)
ERYTHROCYTE [DISTWIDTH] IN BLOOD BY AUTOMATED COUNT: 12.3 % (ref 11.6–15.1)
GFR SERPL CREATININE-BSD FRML MDRD: 52 ML/MIN/1.73SQ M
GLUCOSE P FAST SERPL-MCNC: 96 MG/DL (ref 65–99)
HCT VFR BLD AUTO: 41.4 % (ref 34.8–46.1)
HDLC SERPL-MCNC: 79 MG/DL
HGB BLD-MCNC: 13.7 G/DL (ref 11.5–15.4)
IMM GRANULOCYTES # BLD AUTO: 0 THOUSAND/UL (ref 0–0.2)
IMM GRANULOCYTES NFR BLD AUTO: 0 % (ref 0–2)
LDLC SERPL CALC-MCNC: 176 MG/DL (ref 0–100)
LYMPHOCYTES # BLD AUTO: 1.06 THOUSANDS/ΜL (ref 0.6–4.47)
LYMPHOCYTES NFR BLD AUTO: 32 % (ref 14–44)
MCH RBC QN AUTO: 31 PG (ref 26.8–34.3)
MCHC RBC AUTO-ENTMCNC: 33.1 G/DL (ref 31.4–37.4)
MCV RBC AUTO: 94 FL (ref 82–98)
MONOCYTES # BLD AUTO: 0.24 THOUSAND/ΜL (ref 0.17–1.22)
MONOCYTES NFR BLD AUTO: 7 % (ref 4–12)
NEUTROPHILS # BLD AUTO: 2.04 THOUSANDS/ΜL (ref 1.85–7.62)
NEUTS SEG NFR BLD AUTO: 61 % (ref 43–75)
NRBC BLD AUTO-RTO: 0 /100 WBCS
PLATELET # BLD AUTO: 249 THOUSANDS/UL (ref 149–390)
PMV BLD AUTO: 8.9 FL (ref 8.9–12.7)
POTASSIUM SERPL-SCNC: 4.1 MMOL/L (ref 3.5–5.3)
PROT SERPL-MCNC: 7.4 G/DL (ref 6.4–8.2)
RBC # BLD AUTO: 4.42 MILLION/UL (ref 3.81–5.12)
SODIUM SERPL-SCNC: 141 MMOL/L (ref 136–145)
TRIGL SERPL-MCNC: 50 MG/DL
TSH SERPL DL<=0.05 MIU/L-ACNC: 2.13 UIU/ML (ref 0.36–3.74)
WBC # BLD AUTO: 3.34 THOUSAND/UL (ref 4.31–10.16)

## 2021-03-15 PROCEDURE — 36415 COLL VENOUS BLD VENIPUNCTURE: CPT

## 2021-03-15 PROCEDURE — 84443 ASSAY THYROID STIM HORMONE: CPT

## 2021-03-15 PROCEDURE — 85025 COMPLETE CBC W/AUTO DIFF WBC: CPT

## 2021-03-15 PROCEDURE — 80061 LIPID PANEL: CPT

## 2021-03-15 PROCEDURE — 80053 COMPREHEN METABOLIC PANEL: CPT

## 2021-03-21 NOTE — RESULT ENCOUNTER NOTE
Discussed results with patient  Pt states that she will try to make lifestyle changes to improve cholesterol level

## 2021-03-26 ENCOUNTER — HOSPITAL ENCOUNTER (EMERGENCY)
Facility: HOSPITAL | Age: 71
Discharge: HOME | DRG: 641 | End: 2021-03-26
Attending: EMERGENCY MEDICINE | Admitting: EMERGENCY MEDICINE
Payer: MEDICARE

## 2021-03-26 VITALS
BODY MASS INDEX: 30.21 KG/M2 | DIASTOLIC BLOOD PRESSURE: 88 MMHG | SYSTOLIC BLOOD PRESSURE: 139 MMHG | TEMPERATURE: 97.2 F | RESPIRATION RATE: 18 BRPM | WEIGHT: 160 LBS | HEIGHT: 61 IN | HEART RATE: 81 BPM | OXYGEN SATURATION: 99 %

## 2021-03-26 DIAGNOSIS — F41.9 ANXIETY: Primary | ICD-10-CM

## 2021-03-26 DIAGNOSIS — R44.3 HALLUCINATIONS: ICD-10-CM

## 2021-03-26 DIAGNOSIS — F22 PARANOIA (HCC): ICD-10-CM

## 2021-03-26 LAB
ALBUMIN SERPL BCP-MCNC: 4.2 G/DL (ref 3.4–4.8)
ALP SERPL-CCNC: 64.7 U/L (ref 35–140)
ALT SERPL W P-5'-P-CCNC: 32 U/L (ref 5–54)
AMPHETAMINES SERPL QL SCN: NEGATIVE
ANION GAP SERPL CALCULATED.3IONS-SCNC: 11 MMOL/L (ref 4–13)
AST SERPL W P-5'-P-CCNC: 39 U/L (ref 15–41)
BACTERIA UR QL AUTO: NORMAL /HPF
BARBITURATES UR QL: NEGATIVE
BASOPHILS # BLD AUTO: 0 THOUSANDS/ΜL (ref 0–0.1)
BASOPHILS NFR BLD AUTO: 0 % (ref 0–1)
BENZODIAZ UR QL: NEGATIVE
BILIRUB SERPL-MCNC: 0.54 MG/DL (ref 0.3–1.2)
BILIRUB UR QL STRIP: NEGATIVE
BUN SERPL-MCNC: 12 MG/DL (ref 6–20)
CALCIUM SERPL-MCNC: 9.1 MG/DL (ref 8.4–10.2)
CHLORIDE SERPL-SCNC: 98 MMOL/L (ref 96–108)
CLARITY UR: CLEAR
CO2 SERPL-SCNC: 23 MMOL/L (ref 22–33)
COCAINE UR QL: NEGATIVE
COLOR UR: ABNORMAL
CREAT SERPL-MCNC: 0.88 MG/DL (ref 0.4–1.1)
EOSINOPHIL # BLD AUTO: 0 THOUSAND/ΜL (ref 0–0.61)
EOSINOPHIL NFR BLD AUTO: 0 % (ref 0–6)
ERYTHROCYTE [DISTWIDTH] IN BLOOD BY AUTOMATED COUNT: 11.9 % (ref 11.6–15.1)
ETHANOL EXG-MCNC: 0 MG/DL
FLUAV RNA RESP QL NAA+PROBE: NEGATIVE
FLUBV RNA RESP QL NAA+PROBE: NEGATIVE
GFR SERPL CREATININE-BSD FRML MDRD: 68 ML/MIN/1.73SQ M
GLUCOSE SERPL-MCNC: 121 MG/DL (ref 65–140)
GLUCOSE UR STRIP-MCNC: NEGATIVE MG/DL
HCT VFR BLD AUTO: 37.9 % (ref 34.8–46.1)
HGB BLD-MCNC: 13.2 G/DL (ref 11.5–15.4)
HGB UR QL STRIP.AUTO: ABNORMAL
IMM GRANULOCYTES # BLD AUTO: 0 THOUSAND/UL (ref 0–0.2)
IMM GRANULOCYTES NFR BLD AUTO: 0 % (ref 0–2)
KETONES UR STRIP-MCNC: NEGATIVE MG/DL
LEUKOCYTE ESTERASE UR QL STRIP: NEGATIVE
LYMPHOCYTES # BLD AUTO: 1.02 THOUSANDS/ΜL (ref 0.6–4.47)
LYMPHOCYTES NFR BLD AUTO: 24 % (ref 14–44)
MCH RBC QN AUTO: 31.2 PG (ref 26.8–34.3)
MCHC RBC AUTO-ENTMCNC: 34.8 G/DL (ref 31.4–37.4)
MCV RBC AUTO: 90 FL (ref 82–98)
METHADONE UR QL: NEGATIVE
MONOCYTES # BLD AUTO: 0.29 THOUSAND/ΜL (ref 0.17–1.22)
MONOCYTES NFR BLD AUTO: 7 % (ref 4–12)
NEUTROPHILS # BLD AUTO: 2.97 THOUSANDS/ΜL (ref 1.85–7.62)
NEUTS SEG NFR BLD AUTO: 69 % (ref 43–75)
NITRITE UR QL STRIP: NEGATIVE
NON-SQ EPI CELLS URNS QL MICRO: NORMAL /HPF
OPIATES UR QL SCN: NEGATIVE
OXYCODONE+OXYMORPHONE UR QL SCN: NEGATIVE
PCP UR QL: NEGATIVE
PH UR STRIP.AUTO: 6 [PH]
PLATELET # BLD AUTO: 254 THOUSANDS/UL (ref 149–390)
PMV BLD AUTO: 9.2 FL (ref 8.9–12.7)
POTASSIUM SERPL-SCNC: 3.5 MMOL/L (ref 3.5–5)
PROT SERPL-MCNC: 6.8 G/DL (ref 6.4–8.3)
PROT UR STRIP-MCNC: NEGATIVE MG/DL
RBC # BLD AUTO: 4.23 MILLION/UL (ref 3.81–5.12)
RBC #/AREA URNS AUTO: NORMAL /HPF
RSV RNA RESP QL NAA+PROBE: NEGATIVE
SARS-COV-2 RNA RESP QL NAA+PROBE: NEGATIVE
SODIUM SERPL-SCNC: 132 MMOL/L (ref 133–145)
SP GR UR STRIP.AUTO: <=1.005 (ref 1–1.03)
THC UR QL: NEGATIVE
TSH SERPL DL<=0.05 MIU/L-ACNC: 2 UIU/ML (ref 0.34–5.6)
UROBILINOGEN UR QL STRIP.AUTO: 0.2 E.U./DL
WBC # BLD AUTO: 4.28 THOUSAND/UL (ref 4.31–10.16)
WBC #/AREA URNS AUTO: NORMAL /HPF

## 2021-03-26 PROCEDURE — 84443 ASSAY THYROID STIM HORMONE: CPT | Performed by: PHYSICIAN ASSISTANT

## 2021-03-26 PROCEDURE — 80307 DRUG TEST PRSMV CHEM ANLYZR: CPT | Performed by: PHYSICIAN ASSISTANT

## 2021-03-26 PROCEDURE — 80053 COMPREHEN METABOLIC PANEL: CPT | Performed by: PHYSICIAN ASSISTANT

## 2021-03-26 PROCEDURE — 82075 ASSAY OF BREATH ETHANOL: CPT | Performed by: PHYSICIAN ASSISTANT

## 2021-03-26 PROCEDURE — 99285 EMERGENCY DEPT VISIT HI MDM: CPT | Performed by: PHYSICIAN ASSISTANT

## 2021-03-26 PROCEDURE — 0241U HB NFCT DS VIR RESP RNA 4 TRGT: CPT | Performed by: PHYSICIAN ASSISTANT

## 2021-03-26 PROCEDURE — 36415 COLL VENOUS BLD VENIPUNCTURE: CPT | Performed by: PHYSICIAN ASSISTANT

## 2021-03-26 PROCEDURE — 81001 URINALYSIS AUTO W/SCOPE: CPT | Performed by: PHYSICIAN ASSISTANT

## 2021-03-26 PROCEDURE — 85025 COMPLETE CBC W/AUTO DIFF WBC: CPT | Performed by: PHYSICIAN ASSISTANT

## 2021-03-26 PROCEDURE — 99285 EMERGENCY DEPT VISIT HI MDM: CPT

## 2021-03-26 PROCEDURE — 93005 ELECTROCARDIOGRAM TRACING: CPT

## 2021-03-26 RX ORDER — LORAZEPAM 0.5 MG/1
0.5 TABLET ORAL ONCE
Status: COMPLETED | OUTPATIENT
Start: 2021-03-26 | End: 2021-03-26

## 2021-03-26 RX ADMIN — LORAZEPAM 0.5 MG: 0.5 TABLET ORAL at 21:04

## 2021-03-26 RX ADMIN — LORAZEPAM 0.5 MG: 0.5 TABLET ORAL at 14:22

## 2021-03-26 NOTE — ED NOTES
Pt tolerated dinner well  NAD at this time  Pending crisis worker evaluation  Denies SI/HI at this time  Will continue to monitor, safety and comfort maintained        Franco Turner RN  03/26/21 [id number]
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
## 2021-03-26 NOTE — ED NOTES
Patient is resting comfortably  Pending lab results  Calm and cooperative   States feeling less anxious s/p 325 Oumou Harrell RN  03/26/21 0400

## 2021-03-27 ENCOUNTER — HOSPITAL ENCOUNTER (INPATIENT)
Facility: HOSPITAL | Age: 71
LOS: 2 days | DRG: 641 | End: 2021-03-29
Admitting: INTERNAL MEDICINE
Payer: MEDICARE

## 2021-03-27 ENCOUNTER — APPOINTMENT (EMERGENCY)
Dept: CT IMAGING | Facility: HOSPITAL | Age: 71
DRG: 641 | End: 2021-03-27
Payer: MEDICARE

## 2021-03-27 DIAGNOSIS — F22 DELUSIONAL THOUGHTS (HCC): ICD-10-CM

## 2021-03-27 DIAGNOSIS — E87.1 HYPONATREMIA: Primary | ICD-10-CM

## 2021-03-27 DIAGNOSIS — R45.1 AGITATION: ICD-10-CM

## 2021-03-27 PROBLEM — E87.6 HYPOKALEMIA: Status: ACTIVE | Noted: 2021-03-27

## 2021-03-27 LAB
ALBUMIN SERPL BCP-MCNC: 4.2 G/DL (ref 3.4–4.8)
ALP SERPL-CCNC: 62.4 U/L (ref 35–140)
ALT SERPL W P-5'-P-CCNC: 34 U/L (ref 5–54)
ANION GAP SERPL CALCULATED.3IONS-SCNC: 11 MMOL/L (ref 4–13)
ANION GAP SERPL CALCULATED.3IONS-SCNC: 7 MMOL/L (ref 4–13)
AST SERPL W P-5'-P-CCNC: 47 U/L (ref 15–41)
BASOPHILS # BLD AUTO: 0 THOUSANDS/ΜL (ref 0–0.1)
BASOPHILS NFR BLD AUTO: 0 % (ref 0–1)
BILIRUB SERPL-MCNC: 0.62 MG/DL (ref 0.3–1.2)
BUN SERPL-MCNC: 8 MG/DL (ref 6–20)
BUN SERPL-MCNC: 9 MG/DL (ref 6–20)
CALCIUM SERPL-MCNC: 8.7 MG/DL (ref 8.4–10.2)
CALCIUM SERPL-MCNC: 9 MG/DL (ref 8.4–10.2)
CHLORIDE SERPL-SCNC: 92 MMOL/L (ref 96–108)
CHLORIDE SERPL-SCNC: 98 MMOL/L (ref 96–108)
CO2 SERPL-SCNC: 22 MMOL/L (ref 22–33)
CO2 SERPL-SCNC: 25 MMOL/L (ref 22–33)
CREAT SERPL-MCNC: 0.75 MG/DL (ref 0.4–1.1)
CREAT SERPL-MCNC: 0.75 MG/DL (ref 0.4–1.1)
EOSINOPHIL # BLD AUTO: 0 THOUSAND/ΜL (ref 0–0.61)
EOSINOPHIL NFR BLD AUTO: 0 % (ref 0–6)
ERYTHROCYTE [DISTWIDTH] IN BLOOD BY AUTOMATED COUNT: 11.7 % (ref 11.6–15.1)
ETHANOL SERPL-MCNC: <10 MG/DL
GFR SERPL CREATININE-BSD FRML MDRD: 82 ML/MIN/1.73SQ M
GFR SERPL CREATININE-BSD FRML MDRD: 82 ML/MIN/1.73SQ M
GLUCOSE SERPL-MCNC: 112 MG/DL (ref 65–140)
GLUCOSE SERPL-MCNC: 88 MG/DL (ref 65–140)
HCT VFR BLD AUTO: 37.5 % (ref 34.8–46.1)
HGB BLD-MCNC: 13.4 G/DL (ref 11.5–15.4)
IMM GRANULOCYTES # BLD AUTO: 0 THOUSAND/UL (ref 0–0.2)
IMM GRANULOCYTES NFR BLD AUTO: 0 % (ref 0–2)
LYMPHOCYTES # BLD AUTO: 0.73 THOUSANDS/ΜL (ref 0.6–4.47)
LYMPHOCYTES NFR BLD AUTO: 18 % (ref 14–44)
MCH RBC QN AUTO: 31.1 PG (ref 26.8–34.3)
MCHC RBC AUTO-ENTMCNC: 35.7 G/DL (ref 31.4–37.4)
MCV RBC AUTO: 87 FL (ref 82–98)
MONOCYTES # BLD AUTO: 0.32 THOUSAND/ΜL (ref 0.17–1.22)
MONOCYTES NFR BLD AUTO: 8 % (ref 4–12)
NEUTROPHILS # BLD AUTO: 3.09 THOUSANDS/ΜL (ref 1.85–7.62)
NEUTS SEG NFR BLD AUTO: 74 % (ref 43–75)
PLATELET # BLD AUTO: 248 THOUSANDS/UL (ref 149–390)
PMV BLD AUTO: 8.7 FL (ref 8.9–12.7)
POTASSIUM SERPL-SCNC: 3.4 MMOL/L (ref 3.5–5)
POTASSIUM SERPL-SCNC: 3.8 MMOL/L (ref 3.5–5)
PROT SERPL-MCNC: 7 G/DL (ref 6.4–8.3)
RBC # BLD AUTO: 4.31 MILLION/UL (ref 3.81–5.12)
SODIUM SERPL-SCNC: 125 MMOL/L (ref 133–145)
SODIUM SERPL-SCNC: 130 MMOL/L (ref 133–145)
WBC # BLD AUTO: 4.14 THOUSAND/UL (ref 4.31–10.16)

## 2021-03-27 PROCEDURE — 80048 BASIC METABOLIC PNL TOTAL CA: CPT | Performed by: INTERNAL MEDICINE

## 2021-03-27 PROCEDURE — 96374 THER/PROPH/DIAG INJ IV PUSH: CPT

## 2021-03-27 PROCEDURE — 99285 EMERGENCY DEPT VISIT HI MDM: CPT

## 2021-03-27 PROCEDURE — 96372 THER/PROPH/DIAG INJ SC/IM: CPT

## 2021-03-27 PROCEDURE — 36415 COLL VENOUS BLD VENIPUNCTURE: CPT

## 2021-03-27 PROCEDURE — 70450 CT HEAD/BRAIN W/O DYE: CPT

## 2021-03-27 PROCEDURE — 82077 ASSAY SPEC XCP UR&BREATH IA: CPT

## 2021-03-27 PROCEDURE — 80053 COMPREHEN METABOLIC PANEL: CPT

## 2021-03-27 PROCEDURE — 85025 COMPLETE CBC W/AUTO DIFF WBC: CPT

## 2021-03-27 PROCEDURE — G1004 CDSM NDSC: HCPCS

## 2021-03-27 PROCEDURE — 99222 1ST HOSP IP/OBS MODERATE 55: CPT | Performed by: INTERNAL MEDICINE

## 2021-03-27 RX ORDER — LANOLIN ALCOHOL/MO/W.PET/CERES
3 CREAM (GRAM) TOPICAL
Status: DISCONTINUED | OUTPATIENT
Start: 2021-03-27 | End: 2021-03-29 | Stop reason: HOSPADM

## 2021-03-27 RX ORDER — OLANZAPINE 2.5 MG/1
2.5 TABLET ORAL EVERY 6 HOURS PRN
Status: DISCONTINUED | OUTPATIENT
Start: 2021-03-27 | End: 2021-03-29 | Stop reason: HOSPADM

## 2021-03-27 RX ORDER — PANTOPRAZOLE SODIUM 20 MG/1
20 TABLET, DELAYED RELEASE ORAL
Status: DISCONTINUED | OUTPATIENT
Start: 2021-03-28 | End: 2021-03-29 | Stop reason: HOSPADM

## 2021-03-27 RX ORDER — OLANZAPINE 2.5 MG/1
2.5 TABLET ORAL
Status: DISCONTINUED | OUTPATIENT
Start: 2021-03-27 | End: 2021-03-28

## 2021-03-27 RX ORDER — LORAZEPAM 2 MG/ML
1 INJECTION INTRAMUSCULAR ONCE
Status: COMPLETED | OUTPATIENT
Start: 2021-03-27 | End: 2021-03-27

## 2021-03-27 RX ORDER — OLANZAPINE 10 MG/1
5 INJECTION, POWDER, LYOPHILIZED, FOR SOLUTION INTRAMUSCULAR ONCE
Status: COMPLETED | OUTPATIENT
Start: 2021-03-27 | End: 2021-03-27

## 2021-03-27 RX ORDER — SODIUM CHLORIDE 9 MG/ML
50 INJECTION, SOLUTION INTRAVENOUS CONTINUOUS
Status: DISCONTINUED | OUTPATIENT
Start: 2021-03-27 | End: 2021-03-27

## 2021-03-27 RX ORDER — LORAZEPAM 2 MG/ML
0.5 INJECTION INTRAMUSCULAR ONCE
Status: COMPLETED | OUTPATIENT
Start: 2021-03-27 | End: 2021-03-27

## 2021-03-27 RX ORDER — POTASSIUM CHLORIDE 14.9 MG/ML
20 INJECTION INTRAVENOUS ONCE
Status: COMPLETED | OUTPATIENT
Start: 2021-03-27 | End: 2021-03-27

## 2021-03-27 RX ADMIN — SODIUM CHLORIDE 50 ML/HR: 0.9 INJECTION, SOLUTION INTRAVENOUS at 15:53

## 2021-03-27 RX ADMIN — OLANZAPINE 5 MG: 10 INJECTION, POWDER, FOR SOLUTION INTRAMUSCULAR at 12:38

## 2021-03-27 RX ADMIN — POTASSIUM CHLORIDE 20 MEQ: 14.9 INJECTION, SOLUTION INTRAVENOUS at 17:34

## 2021-03-27 RX ADMIN — LORAZEPAM 0.5 MG: 2 INJECTION INTRAMUSCULAR; INTRAVENOUS at 12:29
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
## 2021-03-27 NOTE — ED PROVIDER NOTES
History  Chief Complaint   Patient presents with    Altered Mental Status     pt seen here yesterday dx with paranoia, found in [place] making paranoid statements     72-year-old female known history of behavior health issues and psychosis presents secondary to being found in public agitated today  Police were called due to patient's behavior  EMS evaluated patient in plan was to bring her to the hospital for evaluation the patient became extremely upset started fighting and punching and kicking EMS  Patient received Ativan 2 mg IM in the field prior to arrival   This allowed her to be somewhat calmer bedtime they transported her here  Patient remained awake and alert still with what appeared to be significant paranoid delusional thoughts  She stating that her daughter wants to kill patient has dogs  She stating that the family is trying to harm her and kicked her out of her home  Patient apparently was brought to a relative's house today and was told that she would need to stay there to be helped apparently other relative did not want to patient there patient was found wandering around the relatives neighborhood agitated not making sense  Patient was then confronted by police and EMS as per above  Patient arrives here in states the wanted be here and he can keep me here you people trying to hurt me is what happens when people to help me remains trying her main kill me can not make me stay  Prior to Admission Medications   Prescriptions Last Dose Informant Patient Reported?  Taking?   azelastine (ASTELIN) 0 1 % nasal spray Not Taking at Unknown time Self No No   Si sprays into each nostril 2 (two) times a day Use in each nostril as directed   Patient not taking: Reported on 3/26/2021   cyanocobalamin (VITAMIN B-12) 1,000 mcg tablet Not Taking at Unknown time Self No No   Sig: Take 1 tablet (1,000 mcg total) by mouth daily   Patient not taking: Reported on 3/26/2021   ibuprofen (MOTRIN) 200 mg tablet Not Taking at Unknown time Self Yes No   Sig: 3 tabs qd   melatonin 3 mg Not Taking at Unknown time Self No No   Sig: Take 1 tablet (3 mg total) by mouth daily at bedtime   Patient not taking: Reported on 3/26/2021   omeprazole (PriLOSEC) 20 mg delayed release capsule Not Taking at Unknown time Self No No   Sig: Take 1 capsule (20 mg total) by mouth daily   Patient not taking: Reported on 2/10/2021      Facility-Administered Medications: None       Past Medical History:   Diagnosis Date    Anemia     last assessed - [id number]    Anxiety disorder     Arthritis     Asthma     Back pain     Bunion, right foot     last assessed - [id number]    Deformity of toe of left foot     last assessed - 75Pti6314    Discitis of thoracolumbar region     last assessed - 90Iki0547    Fall     Fibrocystic disease of breast     Fracture of wrist     and hand, left    GERD (gastroesophageal reflux disease)     Head injury     Hypercholesterolemia     Leukopenia     last assessed - 11Gwf4810    Osteopenia     last assessed - 01GJI9203    Polyarthritis     Prediabetes     Seasonal allergies     Shortness of breath     Sleep disturbance     Subclinical hypothyroidism     Toe deformity     last assessed - 86Ucm3404    Trochanteric bursitis of left hip     last assessed - 78Afi4317    Vitamin D deficiency     last assessed - 49Ltf4317    Vitiligo     last assessed - 94KZO7425    Wears eyeglasses        Past Surgical History:   Procedure Laterality Date    BACK SURGERY      4 back surgeries, fusion, bone replacement    BACK SURGERY  2012    Lumbar PLIF surgery    CARPAL TUNNEL RELEASE Bilateral      SECTION  1980    CHOLECYSTECTOMY  1997    HAND TENDON SURGERY Bilateral     HERNIA REPAIR      NECK SURGERY  2012    ACDF Surgery    [place] COLONOSCOPY FLX DX W/COLLJ SPEC WHEN PFRMD N/A 2019    Procedure: COLONOSCOPY;  Surgeon: Elisabeth Yeager MD;  [facility]; Service: Gastroenterology    MD ESOPHAGOGASTRODUODENOSCOPY TRANSORAL DIAGNOSTIC N/A 1/31/2019    Procedure: ESOPHAGOGASTRODUODENOSCOPY (EGD); Surgeon: Jose Andrade MD;  [facility]; Service: Gastroenterology    SPINAL CORD STIMULATOR IMPLANT N/A 7/12/2016    Procedure: PLACEMENT OF THORACIC PARASPINAL PERIPHERAL NERVE STIMULATING ELECTODES WITH LEFT BUTTOCK GENERATOR;  Surgeon: Lillian Gandhi MD;  [facility];  Service:    [place] NERVE REPAIR Bilateral 1989       Family History   Problem Relation Age of Onset    Dementia Mother     Emphysema Father         lung    Lung cancer Father     Other Paternal Grandfather         gangrene    Hypertension Family     Other Family         back trouble     I have reviewed and agree with the history as documented  E-Cigarette/Vaping    E-Cigarette Use Never User      E-Cigarette/Vaping Substances     Social History     Tobacco Use    Smoking status: Never Smoker    Smokeless tobacco: Never Used   Substance Use Topics    Alcohol use: Not Currently     Frequency: Never     Comment: ocassional    Drug use: No       Review of Systems   Constitutional: Negative for chills and fever  HENT: Negative for congestion  Eyes: Negative for visual disturbance  Respiratory: Negative for shortness of breath  Cardiovascular: Negative for chest pain  Gastrointestinal: Negative for abdominal pain  Endocrine: Negative for cold intolerance  Genitourinary: Negative for frequency  Musculoskeletal: Negative for gait problem  Skin: Negative for rash  Neurological: Negative for dizziness  Psychiatric/Behavioral: Positive for agitation, behavioral problems and confusion  Physical Exam  Physical Exam  Vitals signs and nursing note reviewed  Constitutional:       Appearance: She is well-developed  HENT:      Head: Normocephalic and atraumatic     Eyes:      Conjunctiva/sclera: Conjunctivae normal       Pupils: Pupils are equal, round, and reactive to light  Neck:      Musculoskeletal: Normal range of motion and neck supple  Cardiovascular:      Rate and Rhythm: Normal rate and regular rhythm  Heart sounds: Normal heart sounds  Pulmonary:      Effort: Pulmonary effort is normal       Breath sounds: Normal breath sounds  Abdominal:      General: Bowel sounds are normal       Palpations: Abdomen is soft  Musculoskeletal: Normal range of motion  Skin:     General: Skin is warm and dry  Capillary Refill: Capillary refill takes less than 2 seconds  Neurological:      Mental Status: She is alert and oriented to person, place, and time     Psychiatric:      Comments: Behavior is agitated         Vital Signs  ED Triage Vitals [03/27/21 1204]   Temperature Pulse Respirations Blood Pressure SpO2   98 8 °F (37 1 °C) 102 18 147/100 98 %      Temp Source Heart Rate Source Patient Position - Orthostatic VS BP Location FiO2 (%)   Oral Monitor Lying Left arm --      Pain Score       --           Vitals:    03/27/21 1204 03/27/21 1300   BP: 147/100 142/84   Pulse: 102 88   Patient Position - Orthostatic VS: Lying          Visual Acuity      ED Medications  Medications   sterile water injection **ADS Override Pull** (has no administration in time range)   OLANZapine (ZyPREXA) IM injection 5 mg (5 mg Intramuscular Given 3/27/21 1238)   LORazepam (FOR EMS ONLY) (ATIVAN) 2 mg/mL injection 2 mg (0 mg Does not apply Given to EMS 3/27/21 1210)   LORazepam (ATIVAN) injection 0 5 mg (0 5 mg Intravenous Given 3/27/21 1229)       Diagnostic Studies  Results Reviewed     Procedure Component Value Units Date/Time    Comprehensive metabolic panel [[id number]]  (Abnormal) Collected: 03/27/21 1229    Lab Status: Final result Specimen: Blood from Arm, Left Updated: 03/27/21 1254     Sodium 125 mmol/L      Potassium 3 4 mmol/L      Chloride 92 mmol/L      CO2 22 mmol/L      ANION GAP 11 mmol/L      BUN 9 mg/dL      Creatinine 0 75 mg/dL      Glucose 112 mg/dL Calcium 9 0 mg/dL      AST 47 U/L      ALT 34 U/L      Alkaline Phosphatase 62 4 U/L      Total Protein 7 0 g/dL      Albumin 4 2 g/dL      Total Bilirubin 0 62 mg/dL      eGFR 82 ml/min/1 73sq m     Narrative:      [place] guidelines for Chronic Kidney Disease (CKD):     Stage 1 with normal or high GFR (GFR > 90 mL/min/1 73 square meters)    Stage 2 Mild CKD (GFR = 60-89 mL/min/1 73 square meters)    Stage 3A Moderate CKD (GFR = 45-59 mL/min/1 73 square meters)    Stage 3B Moderate CKD (GFR = 30-44 mL/min/1 73 square meters)    Stage 4 Severe CKD (GFR = 15-29 mL/min/1 73 square meters)    Stage 5 End Stage CKD (GFR <15 mL/min/1 73 square meters)  Note: GFR calculation is accurate only with a steady state creatinine    Ethanol [[id number]]  (Normal) Collected: 03/27/21 1229    Lab Status: Final result Specimen: Blood from Arm, Left Updated: 03/27/21 1250     Ethanol Lvl <10 mg/dL     CBC and differential [[id number]]  (Abnormal) Collected: 03/27/21 1229    Lab Status: Final result Specimen: Blood from Arm, Left Updated: 03/27/21 1233     WBC 4 14 Thousand/uL      RBC 4 31 Million/uL      Hemoglobin 13 4 g/dL      Hematocrit 37 5 %      MCV 87 fL      MCH 31 1 pg      MCHC 35 7 g/dL      RDW 11 7 %      MPV 8 7 fL      Platelets 071 Thousands/uL      Neutrophils Relative 74 %      Immat GRANS % 0 %      Lymphocytes Relative 18 %      Monocytes Relative 8 %      Eosinophils Relative 0 %      Basophils Relative 0 %      Neutrophils Absolute 3 09 Thousands/µL      Immature Grans Absolute 0 00 Thousand/uL      Lymphocytes Absolute 0 73 Thousands/µL      Monocytes Absolute 0 32 Thousand/µL      Eosinophils Absolute 0 00 Thousand/µL      Basophils Absolute 0 00 Thousands/µL                  CT head without contrast   Final Result by Renetta Gasca MD (03/27 1339)      No acute intracranial abnormality                    Workstation performed: [id number] Procedures  Procedures         ED Course                             SBIRT 20yo+      Most Recent Value   SBIRT (24 yo +)   In order to provide better care to our patients, we are screening all of our patients for alcohol and drug use  Would it be okay to ask you these screening questions? Yes Filed at: 03/27/2021 1210   Initial Alcohol Screen: US AUDIT-C    1  How often do you have a drink containing alcohol?  0 Filed at: 03/27/2021 1210   2  How many drinks containing alcohol do you have on a typical day you are drinking? 0 Filed at: 03/27/2021 1210   3a  Male UNDER 65: How often do you have five or more drinks on one occasion? 0 Filed at: 03/27/2021 1210   3b  FEMALE Any Age, or MALE 65+: How often do you have 4 or more drinks on one occassion? 0 Filed at: 03/27/2021 1210   Audit-C Score  0 Filed at: 03/27/2021 1210   JOLEEN: How many times in the past year have you    Used an illegal drug or used a prescription medication for non-medical reasons? Never Filed at: 03/27/2021 1210                    MDM  Number of Diagnoses or Management Options  Diagnosis management comments: Patient was monitored in the emergency department she was very agitated upon arrival would not really cooperate with diagnostic testing she was very comforted shin with me stated I was trying to harm her and that she did not trust me  Patient received Ativan 0 5 mg IV and Zyprexa 5 mg IM  She did have some improvement of her behavior  Next step patient had labs which demonstrated significant hyponatremia at 1:25 a m  Which is actually worsening from yesterday at 1:31 a m  Yevgeniy Mcneillist Uncertain of the etiology for this but patient does not appear to be dehydrated there is no vomiting no history of renal conditions 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  I cannot medically clear patient for psychiatric admission given her hyponatremia with her confusion    Plan will be to admit the patient to the hospital for evaluation treatment of hyponatremia and psychiatric consultation  Disposition  Final diagnoses:   Hyponatremia   Delusional thoughts ([address] )     Time reflects when diagnosis was documented in both MDM as applicable and the Disposition within this note     Time User Action Codes Description Comment    3/27/2021  2:38 PM Angelique Duverney Add [E87 1] Hyponatremia     3/27/2021  2:38 PM Angelique Duverney Add [F22] Delusional thoughts [facility])       ED Disposition     ED Disposition Condition Date/Time Comment    Admit Stable Sat Mar 27, 2021  2:38 PM Case was discussed with hospitalist and the patient's admission status was agreed to be Admission Status: inpatient status to the service of Dr Dorothy Mejia   Follow-up Information    None         Patient's Medications   Discharge Prescriptions    No medications on file     No discharge procedures on file      PDMP Review     None          ED Provider  Electronically Signed by           Stanley Cleveland MD  03/27/21 7425

## 2021-03-27 NOTE — ASSESSMENT & PLAN NOTE
Likely secondary to poor oral intake, her sodium yesterday was 132, today it is 125  Two weeks ago it was 141  It appears the patient is having issues with anxiety, paranoid, delusions  She will be started on IV fluids, recheck BMP at 1800  Will adjust the fluids according to that  At this point I will give her gentle hydration

## 2021-03-27 NOTE — H&P
Nisreen U  68   H&P- Gayle Bean 1950, 78 y o  female MRN: [id number]  Unit/Bed#: -01 Encounter: [id number]  Primary Care Provider: Miriam Villalobos MD   Date and time admitted to hospital: 3/27/2021 12:03 PM    * Hyponatremia  Assessment & Plan  Likely secondary to poor oral intake, her sodium yesterday was 132, today it is 125  Two weeks ago it was 141  It appears the patient is having issues with anxiety, paranoid, delusions  She will be started on IV fluids, recheck BMP at 1800  Will adjust the fluids according to that  At this point I will give her gentle hydration  Agitation  Assessment & Plan  Patient is unable to give any information due to sedation, history obtained from ER physician and documentation, she was found to be significantly agitated, delusional, paranoid, receive Ativan  Currently she is drowsy  She was made 3 O2 by ER physician due to home to self and others  Will keep the patient on one-to-one observation, consulted Psychiatry  Will order Zyprexa as needed  Hypercholesterolemia  Assessment & Plan  Does not take any medication at home  Mild persistent asthma with acute exacerbation  Assessment & Plan  Currently not on any inhalers, she appears asymptomatic  Hypokalemia  Assessment & Plan  Supplement potassium  Recheck hydrating 1800    Elevated blood pressure reading  Assessment & Plan  Currently not on any antihypertensive medication, monitor blood pressure      VTE Prophylaxis: Enoxaparin (Lovenox)  / sequential compression device   Code Status:  Full code  POLST: There is no POLST form on file for this patient (pre-hospital)  Discussion with family:  Attempted to reach patient's  Faheem Bean, called the phone number listed in the computer, no answer, message to call back  Anticipated Length of Stay:  Patient will be admitted on an Inpatient basis with an anticipated length of stay of  more than 2 midnights     Justification for Hospital Stay:  Hyponatremia    Chief Complaint:   Agitation    History of Present Illness:    Velna Cowden is a 78 y o  female who was here yesterday in the emergency department on 03/26/2021 for anxiety, seen by the crisis worker and felt stable to  to be discharged to home with outpatient support, 302 was not done  Today patient was brought to the emergency department by EMS as she was seen by police wandering in the streets, delusional, confused, had some agitation, did not want come to the emergency department, received Ativan IM and then brought to the emergency department, here in the emergency department as per the ED physician documents she was delusional, paranoid  She was made 302  Workup showed hyponatremia and hypokalemia and hence they were unable to clear medically, currently being admitted to medical floor to stabilize medically and psychiatric evaluation  Have seen the patient, she is arousable but very drowsy, does not have evidence of acute inflammation      Review of Systems:    Unable to obtain due to mental status    Past Medical and Surgical History:     Past Medical History:   Diagnosis Date    Anemia     last assessed - [id number]    Anxiety disorder     Arthritis     Asthma     Back pain     Bunion, right foot     last assessed - 18RDP2699    Deformity of toe of left foot     last assessed - 06Qdh7968    Discitis of thoracolumbar region     last assessed - 18Thj8768    Fall     Fibrocystic disease of breast     Fracture of wrist     and hand, left    GERD (gastroesophageal reflux disease)     Head injury     Hypercholesterolemia     Leukopenia     last assessed - 60Jkd4848    Osteopenia     last assessed - 75QRJ9763    Polyarthritis     Prediabetes     Seasonal allergies     Shortness of breath     Sleep disturbance     Subclinical hypothyroidism     Toe deformity     last assessed - 17Wik9784    Trochanteric bursitis of left hip     last assessed - 49Jim0776  Vitamin D deficiency     last assessed - 29Yzp8026    Vitiligo     last assessed - 16RMI5359    Wears eyeglasses        Past Surgical History:   Procedure Laterality Date    BACK SURGERY      4 back surgeries, fusion, bone replacement    BACK SURGERY  2012    Lumbar PLIF surgery    CARPAL TUNNEL RELEASE Bilateral      SECTION  1980    CHOLECYSTECTOMY  1997    HAND TENDON SURGERY Bilateral     HERNIA REPAIR      NECK SURGERY  2012    ACDF Surgery    [place] COLONOSCOPY FLX DX W/COLLJ SPEC WHEN PFRMD N/A 2019    Procedure: COLONOSCOPY;  Surgeon: Rosana Deleon MD;  [facility]; Service: Gastroenterology    [place] ESOPHAGOGASTRODUODENOSCOPY TRANSORAL DIAGNOSTIC N/A 2019    Procedure: ESOPHAGOGASTRODUODENOSCOPY (EGD); Surgeon: Rosana Deleon MD;  [facility]; Service: Gastroenterology    SPINAL CORD STIMULATOR IMPLANT N/A 2016    Procedure: PLACEMENT OF THORACIC PARASPINAL PERIPHERAL NERVE STIMULATING ELECTODES WITH LEFT BUTTOCK GENERATOR;  Surgeon: Brandon Lewis MD;  [facility];  Service:    Jackqulyn Ode NERVE REPAIR Bilateral        Meds/Allergies:    Prior to Admission medications    Medication Sig Start Date End Date Taking?  Authorizing Provider   azelastine (ASTELIN) 0 1 % nasal spray 2 sprays into each nostril 2 (two) times a day Use in each nostril as directed  Patient not taking: Reported on 3/26/2021 10/9/19   [place] Ex[employer], CRNP   cyanocobalamin (VITAMIN B-12) 1,000 mcg tablet Take 1 tablet (1,000 mcg total) by mouth daily  Patient not taking: Reported on 3/26/2021 5/30/19   Beata Castañeda MD   ibuprofen (MOTRIN) 200 mg tablet 3 tabs qd 14   Historical Provider, MD   melatonin 3 mg Take 1 tablet (3 mg total) by mouth daily at bedtime  Patient not taking: Reported on 3/26/2021 5/30/19   Beata Castañeda MD   omeprazole (PriLOSEC) 20 mg delayed release capsule Take 1 capsule (20 mg total) by mouth daily  Patient not taking: Reported on 2/10/2021 3/18/20   KOBI Avendaño     I have reviewed home medications using allscripts  Allergies: Allergies   Allergen Reactions    Influenza A (H1n1) Monoval Vac Anaphylaxis    Lyrica [Pregabalin] Swelling    Other Anaphylaxis     FLU SHOT    Acetaminophen Other (See Comments)     GI Upset, "it upps my triglycerides"    Percocet [Oxycodone-Acetaminophen] GI Intolerance       Social History:     Marital Status: /Civil Union     Social History     Substance and Sexual Activity   Alcohol Use Not Currently    Frequency: Never    Comment: ocassional     Social History     Tobacco Use   Smoking Status Never Smoker   Smokeless Tobacco Never Used     Social History     Substance and Sexual Activity   Drug Use No       Family History:    non-contributory    Physical Exam:     Vitals:   Blood Pressure: 142/84 (03/27/21 1300)  Pulse: 88 (03/27/21 1300)  Temperature: 98 8 °F (37 1 °C) (03/27/21 1204)  Temp Source: Oral (03/27/21 1204)  Respirations: 18 (03/27/21 1300)  SpO2: 97 % (03/27/21 1300)    Physical Exam    (  General appearance:  Patient not in acute distress  Eyes:  Pupils equal reacting to light  CVS:  S1-S2 heard, regular rate and rhythm, no pedal edema  Chest:  Bilateral air entry present, clear to auscultation  Abdomen:  Soft, nontender  CNS:  Patient is drowsy, does not follow commands  Genitourinary: deferred  Skin:  No acute rash   psychiatric:  Sedated  Musculoskeletal:  No joint deformities       Additional Data:     Lab Results: I have personally reviewed pertinent reports        Results from last 7 days   Lab Units 03/27/21  1229   WBC Thousand/uL 4 14*   HEMOGLOBIN g/dL 13 4   HEMATOCRIT % 37 5   PLATELETS Thousands/uL 248   NEUTROS PCT % 74   LYMPHS PCT % 18   MONOS PCT % 8   EOS PCT % 0     Results from last 7 days   Lab Units 03/27/21  1229   SODIUM mmol/L 125*   POTASSIUM mmol/L 3 4*   CHLORIDE mmol/L 92*   CO2 mmol/L 22   BUN mg/dL 9   CREATININE mg/dL 0 75 ANION GAP mmol/L 11   CALCIUM mg/dL 9 0   ALBUMIN g/dL 4 2   TOTAL BILIRUBIN mg/dL 0 62   ALK PHOS U/L 62 4   ALT U/L 34   AST U/L 47*   GLUCOSE RANDOM mg/dL 112                       Imaging: I have personally reviewed pertinent reports  CT head without contrast   Final Result by Sd Mercedes MD (03/27 1339)      No acute intracranial abnormality  Workstation performed: [id number]               Allscripts / Epic Records Reviewed: Yes     ** Please Note: This note has been constructed using a voice recognition system   **

## 2021-03-27 NOTE — QUICK NOTE
2489 Pt received call in pt room from daughter, Serina Mohan inquired if she would be able to come  mother's house keys in order to get into house to take care of dogs  Tiffanie explained due to mother's recent delirium, she has been helping with home and care of animals  Larissa BLANCAS explained to Tiffanie that she is not a listed emergency contact and mother is not alert to give consent  I informed I would need to get in contact with emergency contact, Suellen Denson, for permission  Tiffanie informed me that Josue Gabriel is currently admitted as [facility]  1710 call placed to [address] [place] [address] in attempt to get in contact with Josue Gabriel  No response  [phone] second call placed to [facility] in attempt to get into contact with Josue Gabriel gave verbal consent for Tiffanie to  house keys only  [id number] [facility] called and informed of verbal consent

## 2021-03-27 NOTE — ASSESSMENT & PLAN NOTE
Patient is unable to give any information due to sedation, history obtained from ER physician and documentation, she was found to be significantly agitated, delusional, paranoid, receive Ativan  Currently she is drowsy  She was made 3 O2 by ER physician due to home to self and others  Will keep the patient on one-to-one observation, consulted Psychiatry  Will order Zyprexa as needed

## 2021-03-27 NOTE — ED NOTES
Pt asleep  Awakens to verbal stimuli  Calm and cooperative at this time   Pending psyc evaluation      Shabana Alba RN  03/27/21 6539

## 2021-03-27 NOTE — PLAN OF CARE
Problem: PAIN - ADULT  Goal: Verbalizes/displays adequate comfort level or baseline comfort level  Description: Interventions:  - Encourage patient to monitor pain and request assistance  - Assess pain using appropriate pain scale  - Administer analgesics based on type and severity of pain and evaluate response  - Implement non-pharmacological measures as appropriate and evaluate response  - Consider cultural and social influences on pain and pain management  - Notify physician/advanced practitioner if interventions unsuccessful or patient reports new pain  Outcome: Progressing     Problem: SAFETY ADULT  Goal: Patient will remain free of falls  Description: INTERVENTIONS:  - Assess patient frequently for physical needs  -  Identify cognitive and physical deficits and behaviors that affect risk of falls    -  [place] fall precautions as indicated by assessment   - Educate patient/family on patient safety including physical limitations  - Instruct patient to call for assistance with activity based on assessment  - Modify environment to reduce risk of injury  - Consider OT/PT consult to assist with strengthening/mobility  Outcome: Progressing     Problem: NEUROSENSORY - ADULT  Goal: Remains free of injury related to seizures activity  Description: INTERVENTIONS  - Maintain airway, patient safety  and administer oxygen as ordered  - Monitor patient for seizure activity, document and report duration and description of seizure to physician/advanced practitioner  - If seizure occurs,  ensure patient safety during seizure  - Reorient patient post seizure  - Seizure pads on all 4 side rails  - Instruct patient/family to notify RN of any seizure activity including if an aura is experienced  - Instruct patient/family to call for assistance with activity based on nursing assessment  - Administer anti-seizure medications if ordered    Outcome: Progressing     Problem: NEUROSENSORY - ADULT  Goal: Achieves maximal functionality and self care  Description: INTERVENTIONS  - Monitor swallowing and airway patency with patient fatigue and changes in neurological status  - Encourage and assist patient to increase activity and self care     - Encourage visually impaired, hearing impaired and aphasic patients to use assistive/communication devices  Outcome: Not Progressing

## 2021-03-28 LAB
ANION GAP SERPL CALCULATED.3IONS-SCNC: 11 MMOL/L (ref 4–13)
BUN SERPL-MCNC: 9 MG/DL (ref 6–20)
CALCIUM SERPL-MCNC: 9.4 MG/DL (ref 8.4–10.2)
CHLORIDE SERPL-SCNC: 101 MMOL/L (ref 96–108)
CO2 SERPL-SCNC: 24 MMOL/L (ref 22–33)
CREAT SERPL-MCNC: 0.9 MG/DL (ref 0.4–1.1)
ERYTHROCYTE [DISTWIDTH] IN BLOOD BY AUTOMATED COUNT: 12 % (ref 11.6–15.1)
GFR SERPL CREATININE-BSD FRML MDRD: 66 ML/MIN/1.73SQ M
GLUCOSE SERPL-MCNC: 73 MG/DL (ref 65–140)
HCT VFR BLD AUTO: 38.3 % (ref 34.8–46.1)
HGB BLD-MCNC: 13.5 G/DL (ref 11.5–15.4)
MCH RBC QN AUTO: 31.3 PG (ref 26.8–34.3)
MCHC RBC AUTO-ENTMCNC: 35.2 G/DL (ref 31.4–37.4)
MCV RBC AUTO: 89 FL (ref 82–98)
PLATELET # BLD AUTO: 259 THOUSANDS/UL (ref 149–390)
PMV BLD AUTO: 9.4 FL (ref 8.9–12.7)
POTASSIUM SERPL-SCNC: 3.7 MMOL/L (ref 3.5–5)
RBC # BLD AUTO: 4.31 MILLION/UL (ref 3.81–5.12)
SODIUM SERPL-SCNC: 136 MMOL/L (ref 133–145)
WBC # BLD AUTO: 2.93 THOUSAND/UL (ref 4.31–10.16)

## 2021-03-28 PROCEDURE — 85027 COMPLETE CBC AUTOMATED: CPT | Performed by: INTERNAL MEDICINE

## 2021-03-28 PROCEDURE — 99232 SBSQ HOSP IP/OBS MODERATE 35: CPT | Performed by: INTERNAL MEDICINE

## 2021-03-28 PROCEDURE — 80048 BASIC METABOLIC PNL TOTAL CA: CPT | Performed by: INTERNAL MEDICINE

## 2021-03-28 RX ORDER — OLANZAPINE 2.5 MG/1
5 TABLET ORAL
Status: DISCONTINUED | OUTPATIENT
Start: 2021-03-28 | End: 2021-03-29 | Stop reason: HOSPADM

## 2021-03-28 RX ADMIN — OLANZAPINE 5 MG: 2.5 TABLET, FILM COATED ORAL at 22:05

## 2021-03-28 RX ADMIN — MELATONIN 3 MG: 3 TAB ORAL at 22:05

## 2021-03-28 RX ADMIN — OLANZAPINE 2.5 MG: 2.5 TABLET, FILM COATED ORAL at 12:28

## 2021-03-28 NOTE — CONSULTS
Consultation - Flomanuelene Cool 78 y o  female MRN: [id number]    Unit/Bed#: -01 Encounter: [id number]      Identifying Data:  78years old white female is admitted at [facility] on March 27, 2021 for severe agitation patient is under the 302 commitment  Patient was found wandering and police brought her to the emergency room patient was severely agitated and she needed to medicate with 2 times injection of Ativan and 1 time Zyprexa injection to calm herself down and she was admitted for medical evaluation and treatment  Psychiatric consultation is asked for evaluation  Patient examined spoke with the nurse discussed with the medical attending and the resident physician and the nurse [phone] petition reviewed and noted patient is described being paranoid and delusional patient was evaluated at emergency room day before by the crisis and they did not find the ground for the inpatient psychiatric care patient was brought again back to the emergency room yesterday for psychotic break patient is described being psychotic paranoid and delusional   Patient is a poor historian and she is mixed up she could not come to the point with the question I ask repeatedly and she continue talking about the different things that she left the house on Monday but she could not tell me where she was sleeping and what she was doing since then she also described that she went to the courthouse and she obtained the PFA against her son and she worries about her grandchild    Patient could not provide the background history I reviewed the record patient was admitted at [facility] in May 2019 under the 302 and she was treated with psychiatrist and given the Zyprexa for psychotic and delusional thoughts when I asked the patient she clearly denies any psychiatric history she denied psychiatric admissions in the past and she denied seeing a psychiatrist also she denied taking any medication for the psychiatric illness obviously patient is mixed up very psychotic and unreliable patient has been on one-to-one observation for the safety she still does not able to make clear that why she was brought to the hospital and what is wrong with her she clearly denies saying that nothing wrong with her she is not psychotic and she does not need help  Staff reported that she is mixed up and she is not reliable  Social history patient reports that she lives with her  they were  for 48 years she has 1 son and 1 daughter and her  is in a psychiatric hospital since last Thursday  Patient denies smoking denies abusing alcohol or drugs but she reports that he used to drink beer no history of alcohol abuse no DUI no rehab and she denies abusing drugs no history of IV drug abuse patient has some college education and she did work as CNA  Diagnosis psychosis NOS anemia anxiety arthritis asthma back pain left toe deformity fibrocystic breast disease left wrist fracture GERD history of head injury high cholesterol leukopenia osteopenia polyarthritis shortness of breath subclinical hypothyroidism vitamin-D deficiency back surgery carpal tunnel surgery  section cholecystectomy hand tendon surgery hernia repair neck surgery spinal cord stimulator implant currently patient is not on any psychotropic medications at home      Allergy influenza a  Lyrica  Acetaminophen  Percocet    Chief Complaints:  Psychosis paranoia delusion agitation anxiety not sleeping    Family History:  Mom and son have depression  Family History   Problem Relation Age of Onset    Dementia Mother     Emphysema Father         lung    Lung cancer Father     Other Paternal Grandfather         gangrene    Hypertension Family     Other Family         back trouble        Legal History:  Patient denies    Mental Status Exam:  78years old white female is able to tell me her age and she knows that she is at the hospital but she is tangential circumstantial paranoid and delusional she could not answer the question even I asked her repeatedly that why she is here why she came to the hospital and was wrong with her memory intact she continue blaming her son and she reports that she had PFA against son she obtain it on Monday from the courthouse I am not sure whether it is true  Patient is not sleeping she was found wandering and confused patient is severely agitated and she has no understanding of her illness her symptoms her problems and her behavior currently patient is delusional and she has no insight into her problems impaired judgment due to relapse of her psychosis poor concentration poor appetite she is not taking care of herself patient is grossly psychotic at this time  Patient is grandiose severe mood swings impulsive unpredictable and she can be severely agitated  Patient will benefit from inpatient psychiatric care to stabilize with her psychotic break  History of Present Illness     HPI: Gayle Bean is a 78y o  year old female who presents with agitation and psychosis    Consults      Historical Information   Past Psychiatric History:  Patient flat out denies psychiatric illness but the record shows that patient has at least 1 psychiatric admission under the 302 commitment at [facility] in May 2019 and she was treated with the Zyprexa but she never followed up as an outpatient currently she has not seen a psychiatrist when I asked her she denied psychiatric admissions obviously she is not reliable and she is not in a right frame of her mind at this time she is not able to provide any acute background history  Patient is not sleeping and she is not taking any psychotropic medications at present time patient denies suicide attempts in the past but her mainly behavior was psychotic paranoid delusional and she was not sleeping at that time    Patient denies history of drug and alcohol abuse patient denies legal history currently patient is not under psychiatric care  Past Medical History:   Diagnosis Date    Anemia     last assessed - [id number]    Anxiety disorder     Arthritis     Asthma     Back pain     Bunion, right foot     last assessed - [id number]    Deformity of toe of left foot     last assessed - [id number]    Discitis of thoracolumbar region     last assessed - [id number]    Fall     Fibrocystic disease of breast     Fracture of wrist     and hand, left    GERD (gastroesophageal reflux disease)     Head injury     Hypercholesterolemia     Leukopenia     last assessed - [id number]    Osteopenia     last assessed - [id number]    Polyarthritis     Prediabetes     Seasonal allergies     Shortness of breath     Sleep disturbance     Subclinical hypothyroidism     Toe deformity     last assessed - [id number]    Trochanteric bursitis of left hip     last assessed - [id number]    Vitamin D deficiency     last assessed - [id number]    Vitiligo     last assessed - [id number]    Wears eyeglasses      Past Surgical History:   Procedure Laterality Date    BACK SURGERY      4 back surgeries, fusion, bone replacement    BACK SURGERY  2012    Lumbar PLIF surgery    CARPAL TUNNEL RELEASE Bilateral      SECTION  1980    CHOLECYSTECTOMY      HAND TENDON SURGERY Bilateral     HERNIA REPAIR      NECK SURGERY  2012    ACDF Surgery    [place] COLONOSCOPY FLX DX W/COLLJ SPEC WHEN PFRMD N/A 2019    Procedure: COLONOSCOPY;  Surgeon: Anny Smith MD;  [facility]; Service: Gastroenterology    KS ESOPHAGOGASTRODUODENOSCOPY TRANSORAL DIAGNOSTIC N/A 2019    Procedure: ESOPHAGOGASTRODUODENOSCOPY (EGD); Surgeon: Anny Smith MD;  [facility];   Service: Gastroenterology    SPINAL CORD STIMULATOR IMPLANT N/A 2016    Procedure: PLACEMENT OF THORACIC PARASPINAL PERIPHERAL NERVE STIMULATING ELECTODES WITH LEFT BUTTOCK GENERATOR;  Surgeon: Jennifer Cheek MD;  Location:  MAIN OR;  Service:    Viktor Iss[place]ena NERVE REPAIR Bilateral 1989     Social History   Social History     Substance and Sexual Activity   Alcohol Use Not Currently    Frequency: Never    Comment: ocassional     Social History     Substance and Sexual Activity   Drug Use No     Social History     Tobacco Use   Smoking Status Never Smoker   Smokeless Tobacco Never Used       Meds/Allergies   current meds:   Current Facility-Administered Medications   Medication Dose Route Frequency    melatonin tablet 3 mg  3 mg Oral HS    OLANZapine (ZyPREXA) tablet 2 5 mg  2 5 mg Oral HS    OLANZapine (ZyPREXA) tablet 2 5 mg  2 5 mg Oral Q6H PRN    pantoprazole (PROTONIX) EC tablet 20 mg  20 mg Oral Early Morning    and PTA meds:    Medications Prior to Admission   Medication    azelastine (ASTELIN) 0 1 % nasal spray    cyanocobalamin (VITAMIN B-12) 1,000 mcg tablet    ibuprofen (MOTRIN) 200 mg tablet    melatonin 3 mg    omeprazole (PriLOSEC) 20 mg delayed release capsule     Allergies   Allergen Reactions    Influenza A (H1n1) Monoval Vac Anaphylaxis    Lyrica [Pregabalin] Swelling    Other Anaphylaxis     FLU SHOT    Acetaminophen Other (See Comments)     GI Upset, "it upps my triglycerides"    Percocet [Oxycodone-Acetaminophen] GI Intolerance       Objective   Vitals: Blood pressure 111/67, pulse 70, temperature 97 7 °F (36 5 °C), temperature source Tympanic, resp  rate 16, SpO2 98 %        Routine Lab Results:   Admission on 03/27/2021   Component Date Value Ref Range Status    WBC 03/27/2021 4 14* 4 31 - 10 16 Thousand/uL Final    RBC 03/27/2021 4 31  3 81 - 5 12 Million/uL Final    Hemoglobin 03/27/2021 13 4  11 5 - 15 4 g/dL Final    Hematocrit 03/27/2021 37 5  34 8 - 46 1 % Final    MCV 03/27/2021 87  82 - 98 fL Final    MCH 03/27/2021 31 1  26 8 - 34 3 pg Final    MCHC 03/27/2021 35 7  31 4 - 37 4 g/dL Final    RDW 03/27/2021 11 7  11 6 - 15 1 % Final    MPV 03/27/2021 8 7* 8 9 - 12 7 fL Final    Platelets 52/05/3155 248  149 - 390 Thousands/uL Final    Neutrophils Relative 03/27/2021 74  43 - 75 % Final    Immat GRANS % 03/27/2021 0  0 - 2 % Final    Lymphocytes Relative 03/27/2021 18  14 - 44 % Final    Monocytes Relative 03/27/2021 8  4 - 12 % Final    Eosinophils Relative 03/27/2021 0  0 - 6 % Final    Basophils Relative 03/27/2021 0  0 - 1 % Final    Neutrophils Absolute 03/27/2021 3 09  1 85 - 7 62 Thousands/µL Final    Immature Grans Absolute 03/27/2021 0 00  0 00 - 0 20 Thousand/uL Final    Lymphocytes Absolute 03/27/2021 0 73  0 60 - 4 47 Thousands/µL Final    Monocytes Absolute 03/27/2021 0 32  0 17 - 1 22 Thousand/µL Final    Eosinophils Absolute 03/27/2021 0 00  0 00 - 0 61 Thousand/µL Final    Basophils Absolute 03/27/2021 0 00  0 00 - 0 10 Thousands/µL Final    Ethanol Lvl 03/27/2021 <10  <10 mg/dL Final    Sodium 03/27/2021 125* 133 - 145 mmol/L Final    Potassium 03/27/2021 3 4* 3 5 - 5 0 mmol/L Final    Chloride 03/27/2021 92* 96 - 108 mmol/L Final    CO2 03/27/2021 22  22 - 33 mmol/L Final    ANION GAP 03/27/2021 11  4 - 13 mmol/L Final    BUN 03/27/2021 9  6 - 20 mg/dL Final    Creatinine 03/27/2021 0 75  0 40 - 1 10 mg/dL Final    Standardized to IDMS reference method    Glucose 03/27/2021 112  65 - 140 mg/dL Final    If the patient is fasting, the ADA then defines impaired fasting glucose as > 100 mg/dL and diabetes as > or equal to 123 mg/dL  Specimen collection should occur prior to Sulfasalazine administration due to the potential for falsely depressed results  Specimen collection should occur prior to Sulfapyridine administration due to the potential for falsely elevated results   Calcium 03/27/2021 9 0  8 4 - 10 2 mg/dL Final    AST 03/27/2021 47* 15 - 41 U/L Final    Specimen collection should occur prior to Sulfasalazine administration due to the potential for falsely depressed results       ALT 03/27/2021 34  5 - 54 U/L Final Specimen collection should occur prior to Sulfasalazine administration due to the potential for falsely depressed results   Alkaline Phosphatase 03/27/2021 62 4  35 - 140 U/L Final    Total Protein 03/27/2021 7 0  6 4 - 8 3 g/dL Final    Albumin 03/27/2021 4 2  3 4 - 4 8 g/dL Final    Total Bilirubin 03/27/2021 0 62  0 30 - 1 20 mg/dL Final    eGFR 03/27/2021 82  ml/min/1 73sq m Final    Sodium 03/27/2021 130* 133 - 145 mmol/L Final    Potassium 03/27/2021 3 8  3 5 - 5 0 mmol/L Final    Chloride 03/27/2021 98  96 - 108 mmol/L Final    CO2 03/27/2021 25  22 - 33 mmol/L Final    ANION GAP 03/27/2021 7  4 - 13 mmol/L Final    BUN 03/27/2021 8  6 - 20 mg/dL Final    Creatinine 03/27/2021 0 75  0 40 - 1 10 mg/dL Final    Standardized to IDMS reference method    Glucose 03/27/2021 88  65 - 140 mg/dL Final    If the patient is fasting, the ADA then defines impaired fasting glucose as > 100 mg/dL and diabetes as > or equal to 123 mg/dL  Specimen collection should occur prior to Sulfasalazine administration due to the potential for falsely depressed results  Specimen collection should occur prior to Sulfapyridine administration due to the potential for falsely elevated results   Calcium 03/27/2021 8 7  8 4 - 10 2 mg/dL Final    eGFR 03/27/2021 82  ml/min/1 73sq m Final    Sodium 03/28/2021 136  133 - 145 mmol/L Final    Potassium 03/28/2021 3 7  3 5 - 5 0 mmol/L Final    Chloride 03/28/2021 101  96 - 108 mmol/L Final    CO2 03/28/2021 24  22 - 33 mmol/L Final    ANION GAP 03/28/2021 11  4 - 13 mmol/L Final    BUN 03/28/2021 9  6 - 20 mg/dL Final    Creatinine 03/28/2021 0 90  0 40 - 1 10 mg/dL Final    Standardized to IDMS reference method    Glucose 03/28/2021 73  65 - 140 mg/dL Final    If the patient is fasting, the ADA then defines impaired fasting glucose as > 100 mg/dL and diabetes as > or equal to 123 mg/dL    Specimen collection should occur prior to Sulfasalazine administration due to the potential for falsely depressed results  Specimen collection should occur prior to Sulfapyridine administration due to the potential for falsely elevated results   Calcium 03/28/2021 9 4  8 4 - 10 2 mg/dL Final    eGFR 03/28/2021 66  ml/min/1 73sq m Final    WBC 03/28/2021 2 93* 4 31 - 10 16 Thousand/uL Final    RBC 03/28/2021 4 31  3 81 - 5 12 Million/uL Final    Hemoglobin 03/28/2021 13 5  11 5 - 15 4 g/dL Final    Hematocrit 03/28/2021 38 3  34 8 - 46 1 % Final    MCV 03/28/2021 89  82 - 98 fL Final    MCH 03/28/2021 31 3  26 8 - 34 3 pg Final    MCHC 03/28/2021 35 2  31 4 - 37 4 g/dL Final    RDW 03/28/2021 12 0  11 6 - 15 1 % Final    Platelets 70/52/6026 259  149 - 390 Thousands/uL Final    MPV 03/28/2021 9 4  8 9 - 12 7 fL Final         Diagnosis:  Bipolar disorder manic type  Anxiety  Insomnia  Noncompliance with the treatments    Plan:  Continue one-to-one precaution for the safety  Transfer to inpatient psychiatric care under the 302 commitment once patient is medically clear  Zyprexa 5 mg HS  Discussed with the medical attending resident physician and the nurse  Patient will need outpatient psychiatric follow-up after the discharge from the psychiatric hospital   I will follow up during the hospital stay      Familia Christian MD

## 2021-03-28 NOTE — PLAN OF CARE
Problem: SAFETY ADULT  Goal: Patient will remain free of falls  Description: INTERVENTIONS:  - Assess patient frequently for physical needs  -  Identify cognitive and physical deficits and behaviors that affect risk of falls    -  [place] fall precautions as indicated by assessment   - Educate patient/family on patient safety including physical limitations  - Instruct patient to call for assistance with activity based on assessment  - Modify environment to reduce risk of injury  - Consider OT/PT consult to assist with strengthening/mobility  Outcome: Not Progressing  Goal: Maintain or return to baseline ADL function  Description: INTERVENTIONS:  -  Assess patient's ability to carry out ADLs; assess patient's baseline for ADL function and identify physical deficits which impact ability to perform ADLs (bathing, care of mouth/teeth, toileting, grooming, dressing, etc )  - Assess/evaluate cause of self-care deficits   - Assess range of motion  - Assess patient's mobility; develop plan if impaired  - Assess patient's need for assistive devices and provide as appropriate  - Encourage maximum independence but intervene and supervise when necessary  - Involve family in performance of ADLs  - Assess for home care needs following discharge   - Consider OT consult to assist with ADL evaluation and planning for discharge  - Provide patient education as appropriate  Outcome: Not Progressing  Goal: Maintain or return mobility status to optimal level  Description: INTERVENTIONS:  - Assess patient's baseline mobility status (ambulation, transfers, stairs, etc )    - Identify cognitive and physical deficits and behaviors that affect mobility  - Identify mobility aids required to assist with transfers and/or ambulation (gait belt, sit-to-stand, lift, walker, cane, etc )  - [place] fall precautions as indicated by assessment  - Record patient progress and toleration of activity level on Mobility SBAR; progress patient to next Phase/Stage  - Instruct patient to call for assistance with activity based on assessment  - Consider rehabilitation consult to assist with strengthening/weightbearing, etc   Outcome: Not Progressing     Problem: DISCHARGE PLANNING  Goal: Discharge to home or other facility with appropriate resources  Description: INTERVENTIONS:  - Identify barriers to discharge w/patient and caregiver  - Arrange for needed discharge resources and transportation as appropriate  - Identify discharge learning needs (meds, wound care, etc )  - Arrange for interpretive services to assist at discharge as needed  - Refer to Case Management Department for coordinating discharge planning if the patient needs post-hospital services based on physician/advanced practitioner order or complex needs related to functional status, cognitive ability, or social support system  Outcome: Not Progressing     Problem: Knowledge Deficit  Goal: Patient/family/caregiver demonstrates understanding of disease process, treatment plan, medications, and discharge instructions  Description: Complete learning assessment and assess knowledge base    Interventions:  - Provide teaching at level of understanding  - Provide teaching via preferred learning methods  Outcome: Not Progressing     Problem: NEUROSENSORY - ADULT  Goal: Achieves stable or improved neurological status  Description: INTERVENTIONS  - Monitor and report changes in neurological status  - Monitor vital signs such as temperature, blood pressure, glucose, and any other labs ordered   - Initiate measures to prevent increased intracranial pressure  - Monitor for seizure activity and implement precautions if appropriate      Outcome: Not Progressing     Problem: Prexisting or High Potential for Compromised Skin Integrity  Goal: Skin integrity is maintained or improved  Description: INTERVENTIONS:  - Identify patients at risk for skin breakdown  - Assess and monitor skin integrity  - Assess and monitor nutrition and hydration status  - Monitor labs   - Assess for incontinence   - Turn and reposition patient  - Assist with mobility/ambulation  - Relieve pressure over bony prominences  - Avoid friction and shearing  - Provide appropriate hygiene as needed including keeping skin clean and dry  - Evaluate need for skin moisturizer/barrier cream  - Collaborate with interdisciplinary team   - Patient/family teaching  - Consider wound care consult   Outcome: Not Progressing

## 2021-03-28 NOTE — PROGRESS NOTES
[facility]  68   Progress Note - Catarino Peacock 1950, 78 y o  female MRN: [id number]  Unit/Bed#: -01 Encounter: [id number]  Primary Care Provider: Michelle Bey MD   Date and time admitted to hospital: 3/27/2021 12:03 PM    * Hyponatremia  Assessment & Plan  Likely secondary to poor oral intake, her sodium yesterday was 132, today it is 125  Two weeks ago it was 141  Improved with IV fluids, fluids were discontinued last night, patient had acute drop in sodium levels which has corrected  Agitation  Assessment & Plan  Patient is currently cooperative however appears confused  She has history of anxiety, patient also mentions that she was in psychiatric hospital in the past due to anxiety  Hypercholesterolemia  Assessment & Plan  Does not take any medication at home  Mild persistent asthma with acute exacerbation  Assessment & Plan  Currently not on any inhalers, she appears asymptomatic  Hypokalemia  Assessment & Plan  Resolved    Elevated blood pressure reading  Assessment & Plan  Elevated blood pressure upon admission however improved significantly and currently within normal limits she is currently not on any medication        VTE Pharmacologic Prophylaxis:   Pharmacologic: Pharmacologic VTE Prophylaxis contraindicated due to Allergy  Mechanical VTE Prophylaxis in Place: Yes    Patient Centered Rounds: I have performed bedside rounds with nursing staff today      Discussions with Specialists or Other Care Team Provider:  Psychiatry    Education and Discussions with Family / Patient:  Patient        Current Length of Stay: 1 day(s)    Current Patient Status: Inpatient   Certification Statement: The patient will continue to require additional inpatient hospital stay due to [facility] placement    Discharge Plan:  Patient is medically cleared for discharge to [facility] placement    Code Status: Level 1 - Full Code      Subjective:   Patient denies any headache, dizziness, chest pain, shortness of breath  Objective:     Vitals:   Temp (24hrs), Av °F (36 7 °C), Min:97 5 °F (36 4 °C), Max:98 8 °F (37 1 °C)    Temp:  [97 5 °F (36 4 °C)-98 8 °F (37 1 °C)] 97 7 °F (36 5 °C)  HR:  [] 70  Resp:  [16-18] 16  BP: (111-147)/() 111/67  SpO2:  [96 %-98 %] 98 %  There is no height or weight on file to calculate BMI  Input and Output Summary (last 24 hours): Intake/Output Summary (Last 24 hours) at 3/28/2021 8709  Last data filed at 3/28/2021 0101  Gross per 24 hour   Intake --   Output 1080 ml   Net -1080 ml       Physical Exam:     Physical Exam   General appearance:  Patient not in acute distress  Eyes:  Pupils equal reacting to light  ENT:  Moist oral mucous membranes  CVS:  S1-S2 heard, regular rate and rhythm, no pedal edema  Chest:  Bilateral air entry present, clear to auscultation  Abdomen:  Soft, nontender, bowel sounds present  CNS:  No focal neurological deficits  Genitourinary: deferred  Skin:  No acute rash   psychiatric:  No psychosis  Musculoskeletal:  No joint deformities      Additional Data:     Labs:    Results from last 7 days   Lab Units 21  0541 21  1229   WBC Thousand/uL 2 93* 4 14*   HEMOGLOBIN g/dL 13 5 13 4   HEMATOCRIT % 38 3 37 5   PLATELETS Thousands/uL 259 248   NEUTROS PCT %  --  74   LYMPHS PCT %  --  18   MONOS PCT %  --  8   EOS PCT %  --  0     Results from last 7 days   Lab Units 21  0541  21  1229   SODIUM mmol/L 136   < > 125*   POTASSIUM mmol/L 3 7   < > 3 4*   CHLORIDE mmol/L 101   < > 92*   CO2 mmol/L 24   < > 22   BUN mg/dL 9   < > 9   CREATININE mg/dL 0 90   < > 0 75   ANION GAP mmol/L 11   < > 11   CALCIUM mg/dL 9 4   < > 9 0   ALBUMIN g/dL  --   --  4 2   TOTAL BILIRUBIN mg/dL  --   --  0 62   ALK PHOS U/L  --   --  62 4   ALT U/L  --   --  34   AST U/L  --   --  47*   GLUCOSE RANDOM mg/dL 73   < > 112    < > = values in this interval not displayed  * I Have Reviewed All Lab Data Listed Above  * Additional Pertinent Lab Tests Reviewed: All [place] Admission Reviewed    Recent Cultures (last 7 days):           Last 24 Hours Medication List:   Current Facility-Administered Medications   Medication Dose Route Frequency Provider Last Rate    melatonin  3 mg Oral HS Dianne Dawn MD      OLANZapine  2 5 mg Oral HS Curry Dillard MD      OLANZapine  2 5 mg Oral Q6H PRN Curry Dillard MD      pantoprazole  20 mg Oral Early Morning Curry Dillard MD          Today, Patient Was Seen By: Sixto Winchester MD    ** Please Note: Dictation voice to text software may have been used in the creation of this document   **

## 2021-03-28 NOTE — ASSESSMENT & PLAN NOTE
Patient is currently cooperative however appears confused  She has history of anxiety, patient also mentions that she was in psychiatric hospital in the past due to anxiety

## 2021-03-28 NOTE — PLAN OF CARE
Problem: PAIN - ADULT  Goal: Verbalizes/displays adequate comfort level or baseline comfort level  Description: Interventions:  - Encourage patient to monitor pain and request assistance  - Assess pain using appropriate pain scale  - Administer analgesics based on type and severity of pain and evaluate response  - Implement non-pharmacological measures as appropriate and evaluate response  - Consider cultural and social influences on pain and pain management  - Notify physician/advanced practitioner if interventions unsuccessful or patient reports new pain  3/28/2021 1142 by Jane Leal RN  Outcome: Progressing  3/28/2021 1142 by Jane Leal RN  Outcome: Progressing     Problem: SAFETY ADULT  Goal: Patient will remain free of falls  Description: INTERVENTIONS:  - Assess patient frequently for physical needs  -  Identify cognitive and physical deficits and behaviors that affect risk of falls    -  [place] fall precautions as indicated by assessment   - Educate patient/family on patient safety including physical limitations  - Instruct patient to call for assistance with activity based on assessment  - Modify environment to reduce risk of injury  - Consider OT/PT consult to assist with strengthening/mobility  3/28/2021 1142 by Jane Leal RN  Outcome: Progressing  3/28/2021 1142 by Jane Leal RN  Outcome: Progressing  Goal: Maintain or return to baseline ADL function  Description: INTERVENTIONS:  -  Assess patient's ability to carry out ADLs; assess patient's baseline for ADL function and identify physical deficits which impact ability to perform ADLs (bathing, care of mouth/teeth, toileting, grooming, dressing, etc )  - Assess/evaluate cause of self-care deficits   - Assess range of motion  - Assess patient's mobility; develop plan if impaired  - Assess patient's need for assistive devices and provide as appropriate  - Encourage maximum independence but intervene and supervise when necessary  - Involve family in performance of ADLs  - Assess for home care needs following discharge   - Consider OT consult to assist with ADL evaluation and planning for discharge  - Provide patient education as appropriate  3/28/2021 1142 by Calvin Nunez RN  Outcome: Progressing  3/28/2021 1142 by Calvin Nunez RN  Outcome: Progressing  Goal: Maintain or return mobility status to optimal level  Description: INTERVENTIONS:  - Assess patient's baseline mobility status (ambulation, transfers, stairs, etc )    - Identify cognitive and physical deficits and behaviors that affect mobility  - Identify mobility aids required to assist with transfers and/or ambulation (gait belt, sit-to-stand, lift, walker, cane, etc )  - [place] fall precautions as indicated by assessment  - Record patient progress and toleration of activity level on Mobility SBAR; progress patient to next Phase/Stage  - Instruct patient to call for assistance with activity based on assessment  - Consider rehabilitation consult to assist with strengthening/weightbearing, etc   3/28/2021 1142 by Calvin Nunez RN  Outcome: Progressing  3/28/2021 1142 by Calvin Nunez RN  Outcome: Progressing     Problem: DISCHARGE PLANNING  Goal: Discharge to home or other facility with appropriate resources  Description: INTERVENTIONS:  - Identify barriers to discharge w/patient and caregiver  - Arrange for needed discharge resources and transportation as appropriate  - Identify discharge learning needs (meds, wound care, etc )  - Arrange for interpretive services to assist at discharge as needed  - Refer to Case Management Department for coordinating discharge planning if the patient needs post-hospital services based on physician/advanced practitioner order or complex needs related to functional status, cognitive ability, or social support system  3/28/2021 1142 by Calvin Nunez RN  Outcome: Progressing  3/28/2021 1142 by Calvin Nunez RN  Outcome: Progressing     Problem: Knowledge Deficit  Goal: Patient/family/caregiver demonstrates understanding of disease process, treatment plan, medications, and discharge instructions  Description: Complete learning assessment and assess knowledge base    Interventions:  - Provide teaching at level of understanding  - Provide teaching via preferred learning methods  3/28/2021 1142 by Brandon Weiss RN  Outcome: Progressing  3/28/2021 1142 by Brandon Weiss RN  Outcome: Progressing     Problem: NEUROSENSORY - ADULT  Goal: Achieves stable or improved neurological status  Description: INTERVENTIONS  - Monitor and report changes in neurological status  - Monitor vital signs such as temperature, blood pressure, glucose, and any other labs ordered   - Initiate measures to prevent increased intracranial pressure  - Monitor for seizure activity and implement precautions if appropriate      3/28/2021 1142 by Brandon Weiss RN  Outcome: Progressing  3/28/2021 1142 by Brandon Weiss RN  Outcome: Progressing  Goal: Remains free of injury related to seizures activity  Description: INTERVENTIONS  - Maintain airway, patient safety  and administer oxygen as ordered  - Monitor patient for seizure activity, document and report duration and description of seizure to physician/advanced practitioner  - If seizure occurs,  ensure patient safety during seizure  - Reorient patient post seizure  - Seizure pads on all 4 side rails  - Instruct patient/family to notify RN of any seizure activity including if an aura is experienced  - Instruct patient/family to call for assistance with activity based on nursing assessment  - Administer anti-seizure medications if ordered    3/28/2021 1142 by Brandon Weiss RN  Outcome: Progressing  3/28/2021 1142 by Brandon Weiss RN  Outcome: Progressing  Goal: Achieves maximal functionality and self care  Description: INTERVENTIONS  - Monitor swallowing and airway patency with patient fatigue and changes in neurological status  - Encourage and assist patient to increase activity and self care    - Encourage visually impaired, hearing impaired and aphasic patients to use assistive/communication devices  3/28/2021 1142 by Karley Her RN  Outcome: Progressing  3/28/2021 1142 by Karley Her RN  Outcome: Progressing     Problem: METABOLIC, FLUID AND ELECTROLYTES - ADULT  Goal: Electrolytes maintained within normal limits  Description: INTERVENTIONS:  - Monitor labs and assess patient for signs and symptoms of electrolyte imbalances  - Administer electrolyte replacement as ordered  - Monitor response to electrolyte replacements, including repeat lab results as appropriate  - Instruct patient on fluid and nutrition as appropriate  3/28/2021 1142 by Karley Her RN  Outcome: Progressing  3/28/2021 1142 by Karley Her RN  Outcome: Progressing  Goal: Fluid balance maintained  Description: INTERVENTIONS:  - Monitor labs   - Monitor I/O and WT  - Instruct patient on fluid and nutrition as appropriate  - Assess for signs & symptoms of volume excess or deficit  3/28/2021 1142 by Karley Her RN  Outcome: Progressing  3/28/2021 1142 by Karley Her RN  Outcome: Progressing  Goal: Glucose maintained within target range  Description: INTERVENTIONS:  - Monitor Blood Glucose as ordered  - Assess for signs and symptoms of hyperglycemia and hypoglycemia  - Administer ordered medications to maintain glucose within target range  - Assess nutritional intake and initiate nutrition service referral as needed  3/28/2021 1142 by Karley Her RN  Outcome: Progressing  3/28/2021 1142 by Karley Her RN  Outcome: Progressing     Problem: SKIN/TISSUE INTEGRITY - ADULT  Goal: Skin integrity remains intact  Description: INTERVENTIONS  - Identify patients at risk for skin breakdown  - Assess and monitor skin integrity  - Assess and monitor nutrition and hydration status  - Monitor labs (i e  albumin)  - Assess for incontinence   - Turn and reposition patient  - Assist with mobility/ambulation  - Relieve pressure over bony prominences  - Avoid friction and shearing  - Provide appropriate hygiene as needed including keeping skin clean and dry  - Evaluate need for skin moisturizer/barrier cream  - Collaborate with interdisciplinary team (i e  Nutrition, Rehabilitation, etc )   - Patient/family teaching  3/28/2021 1142 by Sekou Mccabe RN  Outcome: Progressing  3/28/2021 1142 by Sekou Mccabe RN  Outcome: Progressing  Goal: Incision(s), wounds(s) or drain site(s) healing without S/S of infection  Description: INTERVENTIONS  - Assess and document risk factors for skin impairment   - Assess and document dressing, incision, wound bed, drain sites and surrounding tissue  - Consider nutrition services referral as needed  - Oral mucous membranes remain intact  - Provide patient/ family education  3/28/2021 1142 by Sekou Mccabe RN  Outcome: Progressing  3/28/2021 1142 by Sekou Mccabe RN  Outcome: Progressing  Goal: Oral mucous membranes remain intact  Description: INTERVENTIONS  - Assess oral mucosa and hygiene practices  - Implement preventative oral hygiene regimen  - Implement oral medicated treatments as ordered  - Initiate Nutrition services referral as needed  3/28/2021 1142 by Sekou Mccabe RN  Outcome: Progressing  3/28/2021 1142 by Sekou Mccabe RN  Outcome: Progressing     Problem: Prexisting or High Potential for Compromised Skin Integrity  Goal: Skin integrity is maintained or improved  Description: INTERVENTIONS:  - Identify patients at risk for skin breakdown  - Assess and monitor skin integrity  - Assess and monitor nutrition and hydration status  - Monitor labs   - Assess for incontinence   - Turn and reposition patient  - Assist with mobility/ambulation  - Relieve pressure over bony prominences  - Avoid friction and shearing  - Provide appropriate hygiene as needed including keeping skin clean and dry  - Evaluate need for skin moisturizer/barrier cream  - Collaborate with interdisciplinary team   - Patient/family teaching  - Consider wound care consult   3/28/2021 1142 by Ivana Salazar RN  Outcome: Progressing  3/28/2021 1142 by Ivana Salazar RN  Outcome: Progressing

## 2021-03-28 NOTE — ASSESSMENT & PLAN NOTE
Likely secondary to poor oral intake, her sodium yesterday was 132, today it is 125  Two weeks ago it was 141  Improved with IV fluids, fluids were discontinued last night, patient had acute drop in sodium levels which has corrected

## 2021-03-28 NOTE — ASSESSMENT & PLAN NOTE
Elevated blood pressure upon admission however improved significantly and currently within normal limits she is currently not on any medication

## 2021-03-29 ENCOUNTER — HOSPITAL ENCOUNTER (INPATIENT)
Facility: HOSPITAL | Age: 71
LOS: 30 days | Discharge: HOME | DRG: 885 | End: 2021-04-28
Attending: PSYCHIATRY & NEUROLOGY | Admitting: PSYCHIATRY & NEUROLOGY
Payer: MEDICARE

## 2021-03-29 VITALS
OXYGEN SATURATION: 97 % | RESPIRATION RATE: 20 BRPM | TEMPERATURE: 98.1 F | WEIGHT: 160.05 LBS | SYSTOLIC BLOOD PRESSURE: 138 MMHG | HEART RATE: 84 BPM | HEIGHT: 61 IN | BODY MASS INDEX: 30.22 KG/M2 | DIASTOLIC BLOOD PRESSURE: 97 MMHG

## 2021-03-29 DIAGNOSIS — E87.1 HYPONATREMIA: ICD-10-CM

## 2021-03-29 DIAGNOSIS — F39 MOOD DISORDER WITH PSYCHOSIS (HCC): Primary | ICD-10-CM

## 2021-03-29 DIAGNOSIS — F23 BRIEF PSYCHOTIC DISORDER (HCC): ICD-10-CM

## 2021-03-29 PROBLEM — N18.2 CKD (CHRONIC KIDNEY DISEASE) STAGE 2, GFR 60-89 ML/MIN: Status: ACTIVE | Noted: 2021-03-29

## 2021-03-29 PROBLEM — Z00.8 MEDICAL CLEARANCE FOR PSYCHIATRIC ADMISSION: Status: ACTIVE | Noted: 2021-03-29

## 2021-03-29 PROBLEM — E87.6 HYPOKALEMIA: Status: RESOLVED | Noted: 2021-03-27 | Resolved: 2021-03-29

## 2021-03-29 LAB
ANION GAP SERPL CALCULATED.3IONS-SCNC: 9 MMOL/L (ref 4–13)
BUN SERPL-MCNC: 12 MG/DL (ref 6–20)
CALCIUM SERPL-MCNC: 9.3 MG/DL (ref 8.4–10.2)
CHLORIDE SERPL-SCNC: 105 MMOL/L (ref 96–108)
CO2 SERPL-SCNC: 25 MMOL/L (ref 22–33)
CREAT SERPL-MCNC: 0.93 MG/DL (ref 0.4–1.1)
GFR SERPL CREATININE-BSD FRML MDRD: 63 ML/MIN/1.73SQ M
GLUCOSE SERPL-MCNC: 92 MG/DL (ref 65–140)
POTASSIUM SERPL-SCNC: 3.7 MMOL/L (ref 3.5–5)
SODIUM SERPL-SCNC: 139 MMOL/L (ref 133–145)

## 2021-03-29 PROCEDURE — 80048 BASIC METABOLIC PNL TOTAL CA: CPT | Performed by: INTERNAL MEDICINE

## 2021-03-29 PROCEDURE — 99239 HOSP IP/OBS DSCHRG MGMT >30: CPT | Performed by: INTERNAL MEDICINE

## 2021-03-29 PROCEDURE — 99222 1ST HOSP IP/OBS MODERATE 55: CPT | Performed by: NURSE PRACTITIONER

## 2021-03-29 RX ORDER — MAGNESIUM HYDROXIDE/ALUMINUM HYDROXICE/SIMETHICONE 120; 1200; 1200 MG/30ML; MG/30ML; MG/30ML
30 SUSPENSION ORAL EVERY 4 HOURS PRN
Status: CANCELLED | OUTPATIENT
Start: 2021-03-29

## 2021-03-29 RX ORDER — MINERAL OIL AND PETROLATUM 150; 830 MG/G; MG/G
1 OINTMENT OPHTHALMIC
Status: DISCONTINUED | OUTPATIENT
Start: 2021-03-29 | End: 2021-04-28 | Stop reason: HOSPADM

## 2021-03-29 RX ORDER — OLANZAPINE 5 MG/1
5 TABLET ORAL
Status: DISCONTINUED | OUTPATIENT
Start: 2021-03-29 | End: 2021-04-28 | Stop reason: HOSPADM

## 2021-03-29 RX ORDER — OLANZAPINE 2.5 MG/1
5 TABLET ORAL
Status: CANCELLED | OUTPATIENT
Start: 2021-03-29

## 2021-03-29 RX ORDER — LANOLIN ALCOHOL/MO/W.PET/CERES
3 CREAM (GRAM) TOPICAL
Status: DISCONTINUED | OUTPATIENT
Start: 2021-03-29 | End: 2021-04-28 | Stop reason: HOSPADM

## 2021-03-29 RX ORDER — LANOLIN ALCOHOL/MO/W.PET/CERES
3 CREAM (GRAM) TOPICAL
Status: CANCELLED | OUTPATIENT
Start: 2021-03-29

## 2021-03-29 RX ORDER — OLANZAPINE 2.5 MG/1
2.5 TABLET ORAL
Status: DISCONTINUED | OUTPATIENT
Start: 2021-03-29 | End: 2021-04-28 | Stop reason: HOSPADM

## 2021-03-29 RX ORDER — AMOXICILLIN 250 MG
1 CAPSULE ORAL DAILY PRN
Status: CANCELLED | OUTPATIENT
Start: 2021-03-29

## 2021-03-29 RX ORDER — HYDROXYZINE HYDROCHLORIDE 25 MG/1
25 TABLET, FILM COATED ORAL
Status: CANCELLED | OUTPATIENT
Start: 2021-03-29

## 2021-03-29 RX ORDER — LORAZEPAM 1 MG/1
1 TABLET ORAL
Status: DISCONTINUED | OUTPATIENT
Start: 2021-03-29 | End: 2021-04-28 | Stop reason: HOSPADM

## 2021-03-29 RX ORDER — PANTOPRAZOLE SODIUM 20 MG/1
20 TABLET, DELAYED RELEASE ORAL
Status: DISCONTINUED | OUTPATIENT
Start: 2021-03-30 | End: 2021-04-28 | Stop reason: HOSPADM

## 2021-03-29 RX ORDER — HYDROXYZINE HYDROCHLORIDE 25 MG/1
25 TABLET, FILM COATED ORAL
Status: DISCONTINUED | OUTPATIENT
Start: 2021-03-29 | End: 2021-04-28 | Stop reason: HOSPADM

## 2021-03-29 RX ORDER — BISACODYL 10 MG
10 SUPPOSITORY, RECTAL RECTAL DAILY PRN
Status: CANCELLED | OUTPATIENT
Start: 2021-03-29

## 2021-03-29 RX ORDER — AMOXICILLIN 250 MG
1 CAPSULE ORAL DAILY PRN
Status: DISCONTINUED | OUTPATIENT
Start: 2021-03-29 | End: 2021-04-28 | Stop reason: HOSPADM

## 2021-03-29 RX ORDER — LORAZEPAM 2 MG/ML
1 INJECTION INTRAMUSCULAR
Status: CANCELLED | OUTPATIENT
Start: 2021-03-29

## 2021-03-29 RX ORDER — ALPRAZOLAM 0.25 MG/1
0.25 TABLET ORAL EVERY 6 HOURS PRN
Status: DISCONTINUED | OUTPATIENT
Start: 2021-03-29 | End: 2021-03-29 | Stop reason: HOSPADM

## 2021-03-29 RX ORDER — LORAZEPAM 0.5 MG/1
0.5 TABLET ORAL
Status: DISCONTINUED | OUTPATIENT
Start: 2021-03-29 | End: 2021-04-28 | Stop reason: HOSPADM

## 2021-03-29 RX ORDER — IBUPROFEN 600 MG/1
600 TABLET ORAL EVERY 8 HOURS PRN
Status: CANCELLED | OUTPATIENT
Start: 2021-03-29

## 2021-03-29 RX ORDER — MAGNESIUM HYDROXIDE/ALUMINUM HYDROXICE/SIMETHICONE 120; 1200; 1200 MG/30ML; MG/30ML; MG/30ML
30 SUSPENSION ORAL EVERY 4 HOURS PRN
Status: DISCONTINUED | OUTPATIENT
Start: 2021-03-29 | End: 2021-04-28 | Stop reason: HOSPADM

## 2021-03-29 RX ORDER — IBUPROFEN 200 MG
200 TABLET ORAL EVERY 6 HOURS PRN
Status: DISCONTINUED | OUTPATIENT
Start: 2021-03-29 | End: 2021-04-28 | Stop reason: HOSPADM

## 2021-03-29 RX ORDER — TRAZODONE HYDROCHLORIDE 50 MG/1
50 TABLET ORAL
Status: CANCELLED | OUTPATIENT
Start: 2021-03-29

## 2021-03-29 RX ORDER — LORAZEPAM 1 MG/1
1 TABLET ORAL
Status: CANCELLED | OUTPATIENT
Start: 2021-03-29

## 2021-03-29 RX ORDER — BISACODYL 10 MG
10 SUPPOSITORY, RECTAL RECTAL DAILY PRN
Status: DISCONTINUED | OUTPATIENT
Start: 2021-03-29 | End: 2021-04-28 | Stop reason: HOSPADM

## 2021-03-29 RX ORDER — IBUPROFEN 400 MG/1
400 TABLET ORAL EVERY 6 HOURS PRN
Status: CANCELLED | OUTPATIENT
Start: 2021-03-29

## 2021-03-29 RX ORDER — IBUPROFEN 200 MG
200 TABLET ORAL EVERY 6 HOURS PRN
Status: CANCELLED | OUTPATIENT
Start: 2021-03-29

## 2021-03-29 RX ORDER — OLANZAPINE 5 MG/1
5 TABLET ORAL
Status: DISCONTINUED | OUTPATIENT
Start: 2021-03-29 | End: 2021-03-31

## 2021-03-29 RX ORDER — BENZTROPINE MESYLATE 0.5 MG/1
0.5 TABLET ORAL
Status: DISCONTINUED | OUTPATIENT
Start: 2021-03-29 | End: 2021-04-28 | Stop reason: HOSPADM

## 2021-03-29 RX ORDER — OLANZAPINE 2.5 MG/1
2.5 TABLET ORAL
Status: CANCELLED | OUTPATIENT
Start: 2021-03-29

## 2021-03-29 RX ORDER — TRAZODONE HYDROCHLORIDE 50 MG/1
50 TABLET ORAL
Status: DISCONTINUED | OUTPATIENT
Start: 2021-03-29 | End: 2021-04-28 | Stop reason: HOSPADM

## 2021-03-29 RX ORDER — LORAZEPAM 2 MG/ML
1 INJECTION INTRAMUSCULAR
Status: DISCONTINUED | OUTPATIENT
Start: 2021-03-29 | End: 2021-04-28 | Stop reason: HOSPADM

## 2021-03-29 RX ORDER — POLYETHYLENE GLYCOL 3350 17 G/17G
17 POWDER, FOR SOLUTION ORAL DAILY PRN
Status: CANCELLED | OUTPATIENT
Start: 2021-03-29

## 2021-03-29 RX ORDER — PANTOPRAZOLE SODIUM 20 MG/1
20 TABLET, DELAYED RELEASE ORAL
Status: CANCELLED | OUTPATIENT
Start: 2021-03-30

## 2021-03-29 RX ORDER — OLANZAPINE 10 MG/1
5 INJECTION, POWDER, LYOPHILIZED, FOR SOLUTION INTRAMUSCULAR
Status: DISCONTINUED | OUTPATIENT
Start: 2021-03-29 | End: 2021-04-28 | Stop reason: HOSPADM

## 2021-03-29 RX ORDER — POLYETHYLENE GLYCOL 3350 17 G/17G
17 POWDER, FOR SOLUTION ORAL DAILY PRN
Status: DISCONTINUED | OUTPATIENT
Start: 2021-03-29 | End: 2021-04-28 | Stop reason: HOSPADM

## 2021-03-29 RX ORDER — MINERAL OIL AND PETROLATUM 150; 830 MG/G; MG/G
1 OINTMENT OPHTHALMIC
Status: CANCELLED | OUTPATIENT
Start: 2021-03-29

## 2021-03-29 RX ORDER — BENZTROPINE MESYLATE 1 MG/1
0.5 TABLET ORAL
Status: CANCELLED | OUTPATIENT
Start: 2021-03-29

## 2021-03-29 RX ORDER — OLANZAPINE 10 MG/1
5 INJECTION, POWDER, LYOPHILIZED, FOR SOLUTION INTRAMUSCULAR
Status: CANCELLED | OUTPATIENT
Start: 2021-03-29

## 2021-03-29 RX ORDER — IBUPROFEN 600 MG/1
600 TABLET ORAL EVERY 8 HOURS PRN
Status: DISCONTINUED | OUTPATIENT
Start: 2021-03-29 | End: 2021-04-28 | Stop reason: HOSPADM

## 2021-03-29 RX ORDER — IBUPROFEN 400 MG/1
400 TABLET ORAL EVERY 6 HOURS PRN
Status: DISCONTINUED | OUTPATIENT
Start: 2021-03-29 | End: 2021-04-28 | Stop reason: HOSPADM

## 2021-03-29 RX ORDER — LORAZEPAM 0.5 MG/1
0.5 TABLET ORAL
Status: CANCELLED | OUTPATIENT
Start: 2021-03-29

## 2021-03-29 RX ADMIN — PANTOPRAZOLE SODIUM 20 MG: 20 TABLET, DELAYED RELEASE ORAL at 06:31

## 2021-03-29 RX ADMIN — ALPRAZOLAM 0.25 MG: 0.25 TABLET ORAL at 12:19

## 2021-03-29 RX ADMIN — MELATONIN TAB 3 MG 3 MG: 3 TAB at 21:00

## 2021-03-29 RX ADMIN — LORAZEPAM 0.5 MG: 0.5 TABLET ORAL at 20:57

## 2021-03-29 RX ADMIN — OLANZAPINE 2.5 MG: 2.5 TABLET, FILM COATED ORAL at 06:51

## 2021-03-29 RX ADMIN — OLANZAPINE 5 MG: 5 TABLET, FILM COATED ORAL at 21:00

## 2021-03-29 NOTE — PLAN OF CARE
Problem: SELF HARM  Goal: Effect of psychiatric condition will be minimized and patient will be protected from self harm  Description: INTERVENTIONS:  - Assess impact of patient's symptoms on level of functioning, self-care needs and offer support as indicated  - Assess patient/family knowledge of depression, impact on illness and need for teaching  - Provide emotional support, presence and reassurance  - Assess for possible suicidal thoughts, ideation or self-harm  If patient expresses suicidal thoughts or statements do not leave alone, notify physician/AP immediately, initiate suicide precautions, and determine need for continual observation  - initiate consults and referrals as appropriate (a mental health professional, Spiritual Care  Outcome: Progressing     Problem: BEHAVIOR  Goal: Pt/Family maintain appropriate behavior and adhere to behavioral management agreement, if implemented  Description: INTERVENTIONS:  - Assess the family dynamic   - Encourage verbalization of thoughts and concerns in a socially appropriate manner  - Assess patient/family's coping skills and non-compliant behavior (including use of illegal substances)  - Utilize positive, consistent limit setting strategies supporting safety of patient, staff and others  - Initiate consult with Case Management, Spiritual Care or other ancillary services as appropriate  - If a patient's/visitor's behavior jeopardizes the safety of the patient, staff, or others, refer to organization procedure     - Notify Security of behavior or suspected illegal substances which indicate the need for search of the patient and/or belongings  - Encourage participation in the decision making process about a behavioral management agreement; implement if patient meets criteria  Outcome: Progressing     Problem: COPING  Goal: Pt/Family able to verbalize concerns and demonstrate effective coping strategies  Description: INTERVENTIONS:  - Assist patient/family to identify coping skills, available support systems and cultural and spiritual values  - Provide emotional support, including active listening and acknowledgement of concerns of patient and caregivers  - Reduce environmental stimuli, as able  - Provide patient education  - Assess for spiritual pain/suffering and initiate spiritual care, including notification of Pastoral Care or [place] based community as needed  - Assess effectiveness of coping strategies  Outcome: Not Progressing  Goal: Will report anxiety at manageable levels  Description: INTERVENTIONS:  - Administer medication as ordered  - Teach and encourage coping skills  - Provide emotional support  - Assess patient/family for anxiety and ability to cope  Outcome: Not Progressing     Problem: DECISION MAKING  Goal: Pt/Family able to effectively weigh alternatives and participate in decision making related to treatment and care  Description: INTERVENTIONS:  - Identify decision maker  - Determine when there are differences among patient's view, family's view, and healthcare provider's view of patient condition and care goals  - Facilitate patient/family articulation of goals for care  - Help patient/family identify pros/cons of alternative solutions  - Provide information as requested by patient/family  - Respect patient/family rights related to privacy and sharing information   - Serve as a liaison between patient, family and health care team  - Initiate consults as appropriate (Ethics Team, Palliative Care, Family Care Conference, etc )  Outcome: Not Progressing     Problem: CONFUSION/THOUGHT DISTURBANCE  Goal: Thought disturbances (confusion, delirium, depression, dementia or psychosis) are managed to maintain or return to baseline mental status and functional level  Description: INTERVENTIONS:  - Assess for possible contributors to  thought disturbance, including but not limited to medications, infection, impaired vision or hearing, underlying metabolic abnormalities, dehydration, respiratory compromise,  psychiatric diagnoses and notify attending PHYSICAN/AP  - Monitor and intervene to maintain adequate nutrition, hydration, elimination, sleep and activity  - Decrease environmental stimuli, including noise as appropriate  - Provide frequent contacts to provide refocusing, direction and reassurance as needed  Approach patient calmly with eye contact and at their level    - [place] high risk fall precautions, aspiration precautions and other safety measures, as indicated  - If delirium suspected, notify physician/AP of change in condition and request immediate in-person evaluation  - Pursue consults as appropriate including Geriatric (campus dependent), OT for cognitive evaluation/activity planning, psychiatric, pastoral care, etc   Outcome: Not Progressing

## 2021-03-29 NOTE — PLAN OF CARE
Problem: PAIN - ADULT  Goal: Verbalizes/displays adequate comfort level or baseline comfort level  Description: Interventions:  - Encourage patient to monitor pain and request assistance  - Assess pain using appropriate pain scale  - Administer analgesics based on type and severity of pain and evaluate response  - Implement non-pharmacological measures as appropriate and evaluate response  - Consider cultural and social influences on pain and pain management  - Notify physician/advanced practitioner if interventions unsuccessful or patient reports new pain  Outcome: Progressing     Problem: SAFETY ADULT  Goal: Patient will remain free of falls  Description: INTERVENTIONS:  - Assess patient frequently for physical needs  -  Identify cognitive and physical deficits and behaviors that affect risk of falls    -  [place] fall precautions as indicated by assessment   - Educate patient/family on patient safety including physical limitations  - Instruct patient to call for assistance with activity based on assessment  - Modify environment to reduce risk of injury  - Consider OT/PT consult to assist with strengthening/mobility  Outcome: Progressing  Goal: Maintain or return to baseline ADL function  Description: INTERVENTIONS:  -  Assess patient's ability to carry out ADLs; assess patient's baseline for ADL function and identify physical deficits which impact ability to perform ADLs (bathing, care of mouth/teeth, toileting, grooming, dressing, etc )  - Assess/evaluate cause of self-care deficits   - Assess range of motion  - Assess patient's mobility; develop plan if impaired  - Assess patient's need for assistive devices and provide as appropriate  - Encourage maximum independence but intervene and supervise when necessary  - Involve family in performance of ADLs  - Assess for home care needs following discharge   - Consider OT consult to assist with ADL evaluation and planning for discharge  - Provide patient education as appropriate  Outcome: Progressing  Goal: Maintain or return mobility status to optimal level  Description: INTERVENTIONS:  - Assess patient's baseline mobility status (ambulation, transfers, stairs, etc )    - Identify cognitive and physical deficits and behaviors that affect mobility  - Identify mobility aids required to assist with transfers and/or ambulation (gait belt, sit-to-stand, lift, walker, cane, etc )  - [place] fall precautions as indicated by assessment  - Record patient progress and toleration of activity level on Mobility SBAR; progress patient to next Phase/Stage  - Instruct patient to call for assistance with activity based on assessment  - Consider rehabilitation consult to assist with strengthening/weightbearing, etc   Outcome: Progressing     Problem: DISCHARGE PLANNING  Goal: Discharge to home or other facility with appropriate resources  Description: INTERVENTIONS:  - Identify barriers to discharge w/patient and caregiver  - Arrange for needed discharge resources and transportation as appropriate  - Identify discharge learning needs (meds, wound care, etc )  - Arrange for interpretive services to assist at discharge as needed  - Refer to Case Management Department for coordinating discharge planning if the patient needs post-hospital services based on physician/advanced practitioner order or complex needs related to functional status, cognitive ability, or social support system  Outcome: Progressing     Problem: Knowledge Deficit  Goal: Patient/family/caregiver demonstrates understanding of disease process, treatment plan, medications, and discharge instructions  Description: Complete learning assessment and assess knowledge base    Interventions:  - Provide teaching at level of understanding  - Provide teaching via preferred learning methods  Outcome: Progressing     Problem: NEUROSENSORY - ADULT  Goal: Achieves stable or improved neurological status  Description: INTERVENTIONS  - Monitor and report changes in neurological status  - Monitor vital signs such as temperature, blood pressure, glucose, and any other labs ordered   - Initiate measures to prevent increased intracranial pressure  - Monitor for seizure activity and implement precautions if appropriate      Outcome: Progressing  Goal: Remains free of injury related to seizures activity  Description: INTERVENTIONS  - Maintain airway, patient safety  and administer oxygen as ordered  - Monitor patient for seizure activity, document and report duration and description of seizure to physician/advanced practitioner  - If seizure occurs,  ensure patient safety during seizure  - Reorient patient post seizure  - Seizure pads on all 4 side rails  - Instruct patient/family to notify RN of any seizure activity including if an aura is experienced  - Instruct patient/family to call for assistance with activity based on nursing assessment  - Administer anti-seizure medications if ordered    Outcome: Progressing  Goal: Achieves maximal functionality and self care  Description: INTERVENTIONS  - Monitor swallowing and airway patency with patient fatigue and changes in neurological status  - Encourage and assist patient to increase activity and self care     - Encourage visually impaired, hearing impaired and aphasic patients to use assistive/communication devices  Outcome: Progressing     Problem: METABOLIC, FLUID AND ELECTROLYTES - ADULT  Goal: Electrolytes maintained within normal limits  Description: INTERVENTIONS:  - Monitor labs and assess patient for signs and symptoms of electrolyte imbalances  - Administer electrolyte replacement as ordered  - Monitor response to electrolyte replacements, including repeat lab results as appropriate  - Instruct patient on fluid and nutrition as appropriate  Outcome: Progressing  Goal: Fluid balance maintained  Description: INTERVENTIONS:  - Monitor labs   - Monitor I/O and WT  - Instruct patient on fluid and nutrition as appropriate  - Assess for signs & symptoms of volume excess or deficit  Outcome: Progressing  Goal: Glucose maintained within target range  Description: INTERVENTIONS:  - Monitor Blood Glucose as ordered  - Assess for signs and symptoms of hyperglycemia and hypoglycemia  - Administer ordered medications to maintain glucose within target range  - Assess nutritional intake and initiate nutrition service referral as needed  Outcome: Progressing     Problem: SKIN/TISSUE INTEGRITY - ADULT  Goal: Skin integrity remains intact  Description: INTERVENTIONS  - Identify patients at risk for skin breakdown  - Assess and monitor skin integrity  - Assess and monitor nutrition and hydration status  - Monitor labs (i e  albumin)  - Assess for incontinence   - Turn and reposition patient  - Assist with mobility/ambulation  - Relieve pressure over bony prominences  - Avoid friction and shearing  - Provide appropriate hygiene as needed including keeping skin clean and dry  - Evaluate need for skin moisturizer/barrier cream  - Collaborate with interdisciplinary team (i e  Nutrition, Rehabilitation, etc )   - Patient/family teaching  Outcome: Progressing  Goal: Incision(s), wounds(s) or drain site(s) healing without S/S of infection  Description: INTERVENTIONS  - Assess and document risk factors for skin impairment   - Assess and document dressing, incision, wound bed, drain sites and surrounding tissue  - Consider nutrition services referral as needed  - Oral mucous membranes remain intact  - Provide patient/ family education  Outcome: Progressing  Goal: Oral mucous membranes remain intact  Description: INTERVENTIONS  - Assess oral mucosa and hygiene practices  - Implement preventative oral hygiene regimen  - Implement oral medicated treatments as ordered  - Initiate Nutrition services referral as needed  Outcome: Progressing     Problem: Prexisting or High Potential for Compromised Skin Integrity  Goal: Skin integrity is maintained or improved  Description: INTERVENTIONS:  - Identify patients at risk for skin breakdown  - Assess and monitor skin integrity  - Assess and monitor nutrition and hydration status  - Monitor labs   - Assess for incontinence   - Turn and reposition patient  - Assist with mobility/ambulation  - Relieve pressure over bony prominences  - Avoid friction and shearing  - Provide appropriate hygiene as needed including keeping skin clean and dry  - Evaluate need for skin moisturizer/barrier cream  - Collaborate with interdisciplinary team   - Patient/family teaching  - Consider wound care consult   Outcome: Progressing

## 2021-03-29 NOTE — ASSESSMENT & PLAN NOTE
 The patient was evaluated, and is medically clear for admission to the [facility] and for treatment of the underlying psychiatric illness   There are no acute medical needs for the patient at this time  Medicine to sign off at this time   If an acute need develops please call for a consult

## 2021-03-29 NOTE — QUICK NOTE
Patient is medically cleared to be discharged  Vital signs are stable  BMP is unremarkable  Sodium is improved to 139  Complete note will follow

## 2021-03-29 NOTE — CASE MANAGEMENT
PRE-CERTIFICATION INFORMATION        None required due to MEDICARE coverage  ADMISSION INFORMATION  Patient is accepted at [address]  Patient is accepted by Dr Toya Mercado per Marli Motta  Transportation is arranged with [facility]  Transportation is scheduled for 1530  Patient may go to the floor upon arrival       Nurse report is to be called to [phone] prior to patient transfer  All parties made aware

## 2021-03-29 NOTE — CONSULTS
[address] 1950, 78 y o  female MRN: [id number]  Unit/Bed#: [facility] 2-22 Encounter: [id number]  Primary Care Provider: Lashell Richter MD   Date and time admitted to hospital: 3/29/2021  4:28 PM    Inpatient consult for Medical Clearance for [facility] patient  Consult performed by: KOBI Gerardo  Consult ordered by: Anthony Gallagher MD        Medical clearance for psychiatric admission  Assessment & Plan   The patient was evaluated, and is medically clear for admission to the [facility] and for treatment of the underlying psychiatric illness   There are no acute medical needs for the patient at this time  Medicine to sign off at this time   If an acute need develops please call for a consult  CKD (chronic kidney disease) stage 2, GFR 60-89 ml/min  Assessment & Plan  Lab Results   Component Value Date    EGFR 63 03/29/2021    EGFR 66 03/28/2021    EGFR 82 03/27/2021    CREATININE 0 93 03/29/2021    CREATININE 0 90 03/28/2021    CREATININE 0 75 03/27/2021     · Creatinine baseline approximately 1 0  · EGFR baseline low to mid 60s  · Patient currently at baseline  · Encourage p o  Hydration  · Avoid nephrotoxic medication      ECT Clearance:   History of recent seizure or stroke:  no   History of pheochromocytoma:  no   History of active bleeding (Intracranial hemorrhage, aneurysm or AVM):  no   History of metallic implants in the head or neck:  Yes ACDF   History of increased intracranial pressure with mass effect:  no   Does the patient have a current arrhythmia?  no      Based on above criteria, Patient is not medically cleared for ECT should it be recommended  Should it be recommended neurosurgery consultation is recommended     Counseling / Coordination of Care Time: 30 minutes  Greater than 50% of total time spent on patient counseling and coordination of care  Collaboration of Care:  Were Recommendations Directly Discussed with Primary Treatment Team? - Yes     History of Present Illness:    Julieta Alfaro is a 78 y o  female who is originally admitted to the psychiatry service due to increasing paranoia, patient was found by police and EMS wandering in public agitated  When EMS attempted bring patient to the hospital she became upset and fighting with EMS punching and kicking, patient received 2 mg Ativan IM in the field prior to arrival to the ED  We are consulted for medical clearance for admission to [facility] Unit and treatment of underlying psychiatric illness  Past medical history significant for hyponatremia which was treated the ED likely secondary to dehydration and poor nutrition  Patient currently with CKD stage 2    Review of Systems:    Review of Systems   Constitutional: Negative for chills, diaphoresis, fatigue and fever  HENT: Negative for congestion, ear pain and sore throat  Eyes: Negative for pain and visual disturbance  Respiratory: Negative for cough, shortness of breath and wheezing  Cardiovascular: Negative for chest pain and palpitations  Gastrointestinal: Negative for abdominal pain, constipation, diarrhea, nausea and vomiting  Genitourinary: Negative for difficulty urinating, frequency and urgency  Musculoskeletal: Negative for arthralgias and back pain  Skin: Negative for color change and rash  Neurological: Negative for dizziness, seizures, syncope, facial asymmetry, weakness, light-headedness, numbness and headaches         Past Medical and Surgical History:     Past Medical History:   Diagnosis Date    Anemia     last assessed - [id number]    Anxiety disorder     Arthritis     Asthma     Back pain     Bunion, right foot     last assessed - [id number]    Deformity of toe of left foot     last assessed - [id number]    Discitis of thoracolumbar region     last assessed - [id number]    Fall     Fibrocystic disease of breast     Fracture of wrist     and hand, left    GERD (gastroesophageal reflux disease)     Head injury     Hypercholesterolemia     Leukopenia     last assessed - [id number]    Osteopenia     last assessed - [id number]    Polyarthritis     Prediabetes     Seasonal allergies     Shortness of breath     Sleep disturbance     Subclinical hypothyroidism     Toe deformity     last assessed - [id number]    Trochanteric bursitis of left hip     last assessed - [id number]    Vitamin D deficiency     last assessed - [id number]    Vitiligo     last assessed - [id number]    Wears eyeglasses        Past Surgical History:   Procedure Laterality Date    BACK SURGERY      4 back surgeries, fusion, bone replacement    BACK SURGERY  2012    Lumbar PLIF surgery    CARPAL TUNNEL RELEASE Bilateral      SECTION  1980    CHOLECYSTECTOMY      HAND TENDON SURGERY Bilateral     HERNIA REPAIR      NECK SURGERY  2012    ACDF Surgery    NH COLONOSCOPY FLX DX W/COLLJ SPEC WHEN PFRMD N/A 2019    Procedure: COLONOSCOPY;  Surgeon: Jade Almanza MD;  [facility]; Service: Gastroenterology    NH ESOPHAGOGASTRODUODENOSCOPY TRANSORAL DIAGNOSTIC N/A 2019    Procedure: ESOPHAGOGASTRODUODENOSCOPY (EGD); Surgeon: Jade Almanza MD;  [facility]; Service: Gastroenterology    SPINAL CORD STIMULATOR IMPLANT N/A 2016    Procedure: PLACEMENT OF THORACIC PARASPINAL PERIPHERAL NERVE STIMULATING ELECTODES WITH LEFT BUTTOCK GENERATOR;  Surgeon: Jennifer Whitley MD;  Location: [facility] OR;  Service:    [facility] NERVE REPAIR Bilateral        Meds/Allergies:    all medications and allergies reviewed    Allergies:    Allergies   Allergen Reactions    Influenza A (H1n1) Monoval Vac Anaphylaxis    Lyrica [Pregabalin] Swelling    Other Anaphylaxis     FLU SHOT    Acetaminophen Other (See Comments)     GI Upset, "it upps my triglycerides"    Percocet [Oxycodone-Acetaminophen] GI Intolerance       Social History:     Marital Status: /Civil Union    Substance Use History:   Social History     Substance and Sexual Activity   Alcohol Use Not Currently    Frequency: Never    Comment: ocassional     Social History     Tobacco Use   Smoking Status Never Smoker   Smokeless Tobacco Never Used     Social History     Substance and Sexual Activity   Drug Use No       Family History:    Family History   Problem Relation Age of Onset    Dementia Mother     Emphysema Father         lung    Lung cancer Father     Other Paternal Grandfather         gangrene    Hypertension Family     Other Family         back trouble       Physical Exam:     Vitals:   Blood Pressure: 140/84 (03/29/21 1706)  Pulse: 94 (03/29/21 1706)  Temperature: 98 4 °F (36 9 °C) (03/29/21 1706)  Temp Source: Temporal (03/29/21 1706)  Respirations: 18 (03/29/21 1706)  Height: 5' 1" (154 9 cm) (03/29/21 1706)  Weight - Scale: 72 6 kg (160 lb) (03/29/21 1706)  SpO2: 97 % (03/29/21 1706)    Physical Exam  Vitals signs and nursing note reviewed  Constitutional:       General: She is not in acute distress  Appearance: Normal appearance  She is not ill-appearing or diaphoretic  HENT:      Head: Normocephalic and atraumatic  Nose: Nose normal       Mouth/Throat:      Mouth: Mucous membranes are moist    Eyes:      General: No scleral icterus  Right eye: No discharge  Left eye: No discharge  Cardiovascular:      Rate and Rhythm: Normal rate and regular rhythm  Heart sounds: Normal heart sounds  No murmur  Pulmonary:      Effort: Pulmonary effort is normal  No respiratory distress  Breath sounds: Normal breath sounds  No stridor  No wheezing or rhonchi  Abdominal:      General: Abdomen is flat  Bowel sounds are normal  There is no distension  Palpations: Abdomen is soft  There is no mass  Tenderness: There is no abdominal tenderness  There is no guarding or rebound  Hernia: No hernia is present     Skin:     General: Skin is warm and dry  Coloration: Skin is not jaundiced  Neurological:      General: No focal deficit present  Mental Status: She is alert  Mental status is at baseline  Psychiatric:         Attention and Perception: Attention normal          Mood and Affect: Mood is anxious  Affect is inappropriate  Speech: Speech is rapid and pressured and tangential          Behavior: Behavior is hyperactive  Behavior is cooperative  Thought Content: Thought content is paranoid and delusional          Cognition and Memory: Cognition is impaired  Memory is impaired  Judgment: Judgment is impulsive and inappropriate  Additional Data:     Lab Results: I have personally reviewed pertinent reports  Results from last 7 days   Lab Units 03/28/21  0541 03/27/21  1229   WBC Thousand/uL 2 93* 4 14*   HEMOGLOBIN g/dL 13 5 13 4   HEMATOCRIT % 38 3 37 5   PLATELETS Thousands/uL 259 248   NEUTROS PCT %  --  74   LYMPHS PCT %  --  18   MONOS PCT %  --  8   EOS PCT %  --  0     Results from last 7 days   Lab Units 03/29/21  0542  03/27/21  1229   SODIUM mmol/L 139   < > 125*   POTASSIUM mmol/L 3 7   < > 3 4*   CHLORIDE mmol/L 105   < > 92*   CO2 mmol/L 25   < > 22   BUN mg/dL 12   < > 9   CREATININE mg/dL 0 93   < > 0 75   ANION GAP mmol/L 9   < > 11   CALCIUM mg/dL 9 3   < > 9 0   ALBUMIN g/dL  --   --  4 2   TOTAL BILIRUBIN mg/dL  --   --  0 62   ALK PHOS U/L  --   --  62 4   ALT U/L  --   --  34   AST U/L  --   --  47*   GLUCOSE RANDOM mg/dL 92   < > 112    < > = values in this interval not displayed               Lab Results   Component Value Date/Time    HGBA1C 5 4 02/28/2020 09:29 AM    HGBA1C 5 5 07/01/2019 07:42 AM    HGBA1C 6 4 (H) 12/04/2018 08:52 AM    HGBA1C 5 6 04/29/2015 08:50 AM           EKG, Pathology, and Other Studies Reviewed on Admission:   · CT head without contrast (03/27/2021)  · No acute intracranial abnormalities noted      ** Please Note: This note has been constructed using a voice recognition system   **

## 2021-03-29 NOTE — CASE MANAGEMENT
302 petitioned by Dr Valentina Fish and upheld by Dr Imtiaz Lew  [facility] intake contacted regarding bed availability, CM to be notified pending discharge rounds on the units  CM will continue to follow

## 2021-03-29 NOTE — ASSESSMENT & PLAN NOTE
Lab Results   Component Value Date    EGFR 63 03/29/2021    EGFR 66 03/28/2021    EGFR 82 03/27/2021    CREATININE 0 93 03/29/2021    CREATININE 0 90 03/28/2021    CREATININE 0 75 03/27/2021     · Creatinine baseline approximately 1 0  · EGFR baseline low to mid 60s  · Patient currently at baseline  · Encourage p o   Hydration  · Avoid nephrotoxic medication

## 2021-03-29 NOTE — PROGRESS NOTES
Patient examined discussed with the medical attending and the resident physician spoke to the crisis worker  Patient is medically clear  302 commitment papers are completed  Patient remained grossly psychotic confused delusional she cannot answer the simple questions properly patient talks about things makes no sense she reports that her son is facing several charges and he is in [facility] now and he committed my  in the hospital he is going to shoot him patient keeps talking on and on and on she is grossly psychotic paranoid and delusional I repeatedly asked her that why she is here she reports that she lost her car key and she left her house she wanted to go to the store to buy the pet food for her 2 dogs they are starving to death etcetera patient clearly confused and could not explain why she is here she reports that she has stressed she is not sleeping  Patient looks grandiose pressured speech unable to relax she is tangential and circumstantial she keeps talking on and on and on makes no sense she jumps from 1 thing to the another  Patient cannot survive in the community without inpatient psychiatric care  Even though patient denies feeling suicidal but she is grossly psychotic and she is not taking care of herself  Patient has a psych history with at least 1 psychiatric admission in 2019 at [facility] and treated with the Zyprexa but she never followed up and currently she is not under psychiatric care she is not taking any psychotropic medications at home  Patient remain on one-to-one observation  I completed the paperwork and patient will be transfer to inpatient psychiatric care for further treatment and stabilization    Patient will need outpatient psychiatric follow-up and needs to comply with the psychotropic medications to maintain her illness currently patient is psychotic and she is all over the place when she is talking about things in response to the simple questions but it makes no sense  Patient is not lethargic patient is encouraged to take the medications  I will continue one-to-one observation for safety and continue Zyprexa as ordered

## 2021-03-29 NOTE — PLAN OF CARE
Problem: PAIN - ADULT  Goal: Verbalizes/displays adequate comfort level or baseline comfort level  Description: Interventions:  - Encourage patient to monitor pain and request assistance  - Assess pain using appropriate pain scale  - Administer analgesics based on type and severity of pain and evaluate response  - Implement non-pharmacological measures as appropriate and evaluate response  - Consider cultural and social influences on pain and pain management  - Notify physician/advanced practitioner if interventions unsuccessful or patient reports new pain  3/29/2021 0102 by Eva Yepez RN  Outcome: Progressing  3/29/2021 0050 by Eva Yepez RN  Outcome: Progressing     Problem: SAFETY ADULT  Goal: Patient will remain free of falls  Description: INTERVENTIONS:  - Assess patient frequently for physical needs  -  Identify cognitive and physical deficits and behaviors that affect risk of falls    -  [place] fall precautions as indicated by assessment   - Educate patient/family on patient safety including physical limitations  - Instruct patient to call for assistance with activity based on assessment  - Modify environment to reduce risk of injury  - Consider OT/PT consult to assist with strengthening/mobility  3/29/2021 0102 by Eva Yepez RN  Outcome: Progressing  3/29/2021 0050 by Eva Yepez RN  Outcome: Progressing  Goal: Maintain or return to baseline ADL function  Description: INTERVENTIONS:  -  Assess patient's ability to carry out ADLs; assess patient's baseline for ADL function and identify physical deficits which impact ability to perform ADLs (bathing, care of mouth/teeth, toileting, grooming, dressing, etc )  - Assess/evaluate cause of self-care deficits   - Assess range of motion  - Assess patient's mobility; develop plan if impaired  - Assess patient's need for assistive devices and provide as appropriate  - Encourage maximum independence but intervene and supervise when necessary  - Involve family in performance of ADLs  - Assess for home care needs following discharge   - Consider OT consult to assist with ADL evaluation and planning for discharge  - Provide patient education as appropriate  Outcome: Progressing  Goal: Maintain or return mobility status to optimal level  Description: INTERVENTIONS:  - Assess patient's baseline mobility status (ambulation, transfers, stairs, etc )    - Identify cognitive and physical deficits and behaviors that affect mobility  - Identify mobility aids required to assist with transfers and/or ambulation (gait belt, sit-to-stand, lift, walker, cane, etc )  - [place] fall precautions as indicated by assessment  - Record patient progress and toleration of activity level on Mobility SBAR; progress patient to next Phase/Stage  - Instruct patient to call for assistance with activity based on assessment  - Consider rehabilitation consult to assist with strengthening/weightbearing, etc   Outcome: Progressing     Problem: NEUROSENSORY - ADULT  Goal: Achieves stable or improved neurological status  Description: INTERVENTIONS  - Monitor and report changes in neurological status  - Monitor vital signs such as temperature, blood pressure, glucose, and any other labs ordered   - Initiate measures to prevent increased intracranial pressure  - Monitor for seizure activity and implement precautions if appropriate      Outcome: Progressing     Problem: METABOLIC, FLUID AND ELECTROLYTES - ADULT  Goal: Electrolytes maintained within normal limits  Description: INTERVENTIONS:  - Monitor labs and assess patient for signs and symptoms of electrolyte imbalances  - Administer electrolyte replacement as ordered  - Monitor response to electrolyte replacements, including repeat lab results as appropriate  - Instruct patient on fluid and nutrition as appropriate  Outcome: Progressing  Goal: Fluid balance maintained  Description: INTERVENTIONS:  - Monitor labs   - Monitor I/O and WT  - Instruct patient on fluid and nutrition as appropriate  - Assess for signs & symptoms of volume excess or deficit  Outcome: Progressing
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
## 2021-03-30 ENCOUNTER — TRANSITIONAL CARE MANAGEMENT (OUTPATIENT)
Dept: FAMILY MEDICINE CLINIC | Facility: OTHER | Age: 71
End: 2021-03-30

## 2021-03-30 PROBLEM — F41.9 ANXIETY: Status: ACTIVE | Noted: 2021-03-30

## 2021-03-30 PROBLEM — F32.9 MAJOR DEPRESSIVE DISORDER: Status: ACTIVE | Noted: 2021-03-30

## 2021-03-30 PROBLEM — F39 MOOD DISORDER WITH PSYCHOSIS (HCC): Status: ACTIVE | Noted: 2019-05-26

## 2021-03-30 PROBLEM — G31.84 MCI (MILD COGNITIVE IMPAIRMENT): Status: ACTIVE | Noted: 2021-03-30

## 2021-03-30 PROCEDURE — 99222 1ST HOSP IP/OBS MODERATE 55: CPT | Performed by: PSYCHIATRY & NEUROLOGY

## 2021-03-30 RX ADMIN — MELATONIN TAB 3 MG 3 MG: 3 TAB at 21:05

## 2021-03-30 RX ADMIN — IBUPROFEN 600 MG: 600 TABLET, FILM COATED ORAL at 11:06

## 2021-03-30 RX ADMIN — PANTOPRAZOLE SODIUM 20 MG: 20 TABLET, DELAYED RELEASE ORAL at 06:05

## 2021-03-30 RX ADMIN — IBUPROFEN 600 MG: 600 TABLET, FILM COATED ORAL at 23:27

## 2021-03-30 RX ADMIN — OLANZAPINE 5 MG: 5 TABLET, FILM COATED ORAL at 21:05

## 2021-03-30 NOTE — DISCHARGE INSTR - APPOINTMENTS
Staci Khan RN, our  [employer], will be calling you after your discharge, on the phone number that you provided  She will be available as an additional support, if needed  If you wish to speak with her, you may contact Jacque Bond at [phone]

## 2021-03-30 NOTE — PLAN OF CARE
Problem: COPING  Goal: Pt/Family able to verbalize concerns and demonstrate effective coping strategies  Description: INTERVENTIONS:  - Assist patient/family to identify coping skills, available support systems and cultural and spiritual values  - Provide emotional support, including active listening and acknowledgement of concerns of patient and caregivers  - Reduce environmental stimuli, as able  - Provide patient education  - Assess for spiritual pain/suffering and initiate spiritual care, including notification of Pastoral Care or edgar based community as needed  - Assess effectiveness of coping strategies  Outcome: Progressing  Goal: Will report anxiety at manageable levels  Description: INTERVENTIONS:  - Administer medication as ordered  - Teach and encourage coping skills  - Provide emotional support  - Assess patient/family for anxiety and ability to cope  Outcome: Progressing     Problem: DECISION MAKING  Goal: Pt/Family able to effectively weigh alternatives and participate in decision making related to treatment and care  Description: INTERVENTIONS:  - Identify decision maker  - Determine when there are differences among patient's view, family's view, and healthcare provider's view of patient condition and care goals  - Facilitate patient/family articulation of goals for care  - Help patient/family identify pros/cons of alternative solutions  - Provide information as requested by patient/family  - Respect patient/family rights related to privacy and sharing information   - Serve as a liaison between patient, family and health care team  - Initiate consults as appropriate (Ethics Team, Palliative Care, Family Care Conference, etc )  Outcome: Progressing     Problem: CONFUSION/THOUGHT DISTURBANCE  Goal: Thought disturbances (confusion, delirium, depression, dementia or psychosis) are managed to maintain or return to baseline mental status and functional level  Description: INTERVENTIONS:  - Assess for possible contributors to  thought disturbance, including but not limited to medications, infection, impaired vision or hearing, underlying metabolic abnormalities, dehydration, respiratory compromise,  psychiatric diagnoses and notify attending PHYSICAN/AP  - Monitor and intervene to maintain adequate nutrition, hydration, elimination, sleep and activity  - Decrease environmental stimuli, including noise as appropriate  - Provide frequent contacts to provide refocusing, direction and reassurance as needed  Approach patient calmly with eye contact and at their level  - [place] high risk fall precautions, aspiration precautions and other safety measures, as indicated  - If delirium suspected, notify physician/AP of change in condition and request immediate in-person evaluation  - Pursue consults as appropriate including Geriatric (campus dependent), OT for cognitive evaluation/activity planning, psychiatric, pastoral care, etc   Outcome: Progressing     Problem: BEHAVIOR  Goal: Pt/Family maintain appropriate behavior and adhere to behavioral management agreement, if implemented  Description: INTERVENTIONS:  - Assess the family dynamic   - Encourage verbalization of thoughts and concerns in a socially appropriate manner  - Assess patient/family's coping skills and non-compliant behavior (including use of illegal substances)  - Utilize positive, consistent limit setting strategies supporting safety of patient, staff and others  - Initiate consult with Case Management, Spiritual Care or other ancillary services as appropriate  - If a patient's/visitor's behavior jeopardizes the safety of the patient, staff, or others, refer to organization procedure     - Notify Security of behavior or suspected illegal substances which indicate the need for search of the patient and/or belongings  - Encourage participation in the decision making process about a behavioral management agreement; implement if patient meets criteria  Outcome: Progressing     Problem: SELF HARM  Goal: Effect of psychiatric condition will be minimized and patient will be protected from self harm  Description: INTERVENTIONS:  - Assess impact of patient's symptoms on level of functioning, self-care needs and offer support as indicated  - Assess patient/family knowledge of depression, impact on illness and need for teaching  - Provide emotional support, presence and reassurance  - Assess for possible suicidal thoughts, ideation or self-harm  If patient expresses suicidal thoughts or statements do not leave alone, notify physician/AP immediately, initiate suicide precautions, and determine need for continual observation  - initiate consults and referrals as appropriate (a mental health professional, Spiritual Care  Outcome: Progressing     Problem: BEHAVIOR  Goal: Pt/Family maintain appropriate behavior and adhere to behavioral management agreement, if implemented  Description: INTERVENTIONS:  - Assess the family dynamic   - Encourage verbalization of thoughts and concerns in a socially appropriate manner  - Assess patient/family's coping skills and non-compliant behavior (including use of illegal substances)  - Utilize positive, consistent limit setting strategies supporting safety of patient, staff and others  - Initiate consult with Case Management, Spiritual Care or other ancillary services as appropriate  - If a patient's/visitor's behavior jeopardizes the safety of the patient, staff, or others, refer to organization procedure     - Notify Security of behavior or suspected illegal substances which indicate the need for search of the patient and/or belongings  - Encourage participation in the decision making process about a behavioral management agreement; implement if patient meets criteria  Outcome: Progressing     Problem: ANXIETY  Goal: Will report anxiety at manageable levels  Description: INTERVENTIONS:  - Administer medication as ordered  - Teach and encourage coping skills  - Provide emotional support  - Assess patient/family for anxiety and ability to cope  Outcome: Progressing  Goal: By discharge: Patient will verbalize 2 strategies to deal with anxiety  Description: Interventions:  - Identify any obvious source/trigger to anxiety  - Staff will assist patient in applying identified coping technique/skills  - Encourage attendance of scheduled groups and activities  Outcome: Progressing     Problem: SLEEP DISTURBANCE  Goal: Will exhibit normal sleeping pattern  Description: Interventions:  -  Assess the patients sleep pattern, noting recent changes  - Administer medication as ordered  - Decrease environmental stimuli, including noise, as appropriate during the night  - Encourage the patient to actively participate in unit groups and or exercise during the day to enhance ability to achieve adequate sleep at night  - Assess the patient, in the morning, encouraging a description of sleep experience  Outcome: Progressing     Problem: INVOLUNTARY ADMIT  Goal: Will cooperate with staff recommendations and doctor's orders and will demonstrate appropriate behavior  Description: INTERVENTIONS:  - Treat underlying conditions and offer medication as ordered  - Educate regarding involuntary admission procedures and rules  - Utilize positive consistent limit setting strategies to support patient and staff safety  Outcome: Progressing     Problem: DISCHARGE PLANNING  Goal: Discharge to home or other facility with appropriate resources  Description: INTERVENTIONS:  - Identify barriers to discharge w/patient and caregiver  - Arrange for needed discharge resources and transportation as appropriate  - Identify discharge learning needs (meds, wound care, etc )  - Arrange for interpretive services to assist at discharge as needed  - Refer to Case Management Department for coordinating discharge planning if the patient needs post-hospital services based on physician/advanced practitioner order or complex needs related to functional status, cognitive ability, or social support system  Outcome: Progressing     Problem: Ineffective Coping  Goal: Participates in unit activities  Description: Interventions:  - Provide therapeutic environment   - Provide required programming   - Redirect inappropriate behaviors   Outcome: Progressing
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
## 2021-03-30 NOTE — NURSING NOTE
Pt is visible on unit with no interaction with peers  Pt is calm and cooperative with care  Pt is medication compliant and had good intake at dinner time  No symptoms reported , will continue to monitor
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
## 2021-03-30 NOTE — DISCHARGE INSTR - OTHER ORDERS
You are being discharged to: [address], [address] [facility]     Triggers you have identified during your hospitalization that led to your admission include: family conflict  Coping skills you have identified during your hospitalization include: reading and coloring  If you are unable to deal with your distressed mood alone, please contact your new outpatient provider at [facility]  If that is not effective and you continue to have a distressed mood or feel like you're in crisis, please contact 911 and go to the nearest emergency center  [facility] Crisis Intervention: [address] [facility] Suicide Prevention Lifeline:  [address] [place] [address] Rd , [address] on Mental Illness ([place]) HELPLINE: 638.462.6159/Website: www jessie org  *Substance Abuse and [address]) [employer], which is a confidential, free, 24-hour-a-day, 365-day-a-year, information service for individuals and family members facing mental health and/or substance use disorders  This service provides referrals to local treatment facilities, support groups, and community-based organizations  Callers can also order free publications and other information  Call 4-720.874.1115/Website: www [facility] gov  *[employer] 2-1-1: This is a toll free, confidential, 24-hour-a-day service which connects you to a community  in your area who can help you find services and resources that are available to you locally and provide critical services that can improve and save lives  Call: 80  /Website: https://Quill/    What you need to know aboutcoronavirus disease 2019 (COVID-19)     What is coronavirus disease 2019 (COVID-19)? Coronavirus disease 2019 (COVID-19) is a respiratory illness that can spread from person to person  The virus that causes COVID-19 is a novel coronavirus that was first identified during an investigation into an outbreak in [place], [place]    Can people in the [place]  get COVID-19? Yes  COVID-19 is spreading from person to person in parts of the [place]  Risk of infection with COVID-19 is higher for people who are close contacts of someone known to have COVID-19, for example healthcare workers, or household members  Other people at higher risk for infection are those who live in or have recently been in an area with ongoing spread of COVID-19  Learn more about places with ongoing spread at   [facility]  html#geographic  Have there been cases of COVID-19 in the [place] ?   Yes  The first case of COVID-19 in the [place] was reported on January 21, 2020  The current count of cases of COVID-19 in the [place] is available on [employer] at [facility]  How does COVID-19 spread? The virus that causes COVID-19 probably emerged from an animal source, but is now spreading from person to person  The virus is thought to spread mainly between people who are in close contact with one another (within about 6 feet) through respiratory droplets produced when an infected person coughs or sneezes  It also may be possible that a person can get COVID-19 by touching a surface or object that has the virus on it and then touching their own mouth, nose, or possibly their eyes, but this is not thought to be the main way the virus spreads  Learn what is known about the spread of newly emerged coronaviruses at [facility]  What are the symptoms of COVID-19? Patients with COVID-19 have had mild to severe respiratory illness with symptoms of   fever   cough   shortness of breath  What are severe complications from this virus? Some patients have pneumonia in both lungs, multi-organ failure and in some cases death  How can I help protect myself?    People can help protect themselves from respiratory illness with everyday preventive actions  Avoid close contact with people who are sick  Avoid touching your eyes, nose, and mouth withunwashed hands  Wash your hands often with soap and water for at least 20 seconds  Use an alcohol-based hand  that contains at least 60% alcohol if soap and water are not available  If you are sick, to keep from spreading respiratory illness to others, you should   Stay home when you are sick  Cover your cough or sneeze with a tissue, then throw the tissue in the trash  Clean and disinfect frequently touched objectsand surfaces  What should I do if I recently traveled from an area with ongoing spread of COVID-19? If you have traveled from an affected area, there may be restrictions on your movements for up to 2 weeks  If you develop symptoms during that period (fever, cough, trouble breathing), seek medical advice  Call the office of your health care provider before you go, and tell them about your travel and your symptoms  They will give you instructions on how to get care without exposing other people to your illness  While sick, avoid contact with people, don't go out and delay any travel to reduce the possibility of spreading illness to others  Is there a treatment? There is no specific antiviral treatment for COVID-19  People with COVID-19 can seek medical care to helprelieve symptoms  For more information: www cdc gov/RZBJD52SP 356500-Y 03/03/2020       What to do if you are sick withcoronavirus disease 2019 (COVID-19)     If you are sick with COVID-19 or suspect you are infected with the virus that causes COVID-19, follow the steps below to help prevent the disease from spreading to people in your home and community  Stay home except to get medical care   You should restrict activities outside your home, except for getting medical care  Do not go to work, school, or public areas  Avoid using public transportation, ride-sharing, or taxis    Separate yourself from other people and animals inyour home  People: As much as possible, you should stay in a specific room and away from other people in your home  Also, you should use a separate bathroom, if available  Animals: Do not handle pets or other animals while sick  See COVID-19 and Animals for more information  Call ahead before visiting your doctor   If you have a medical appointment, call the healthcare provider and tell them that you have or may have COVID-19  This will help the healthcare provider's office take steps to keep other people from getting infected or exposed  Wear a facemask  You should wear a facemask when you are around other people (e g , sharing a room or vehicle) or pets and before you enter a healthcare provider's office  If you are not able to wear a facemask (for example, because it causes trouble breathing), then people who live with you should not stay in the same room with you, or they should wear a facemask if they enteryour room  Cover your coughs and sneezes   Cover your mouth and nose with a tissue when you cough or sneeze  Throw used tissues in a lined trash can; immediately wash your hands with soap and water for at least 20 seconds or clean your hands with an alcohol-based hand  that contains at least 60 to 95% alcohol, covering all surfaces of your hands and rubbing them together until they feel dry  Soap and water should be used preferentially if hands are visibly dirty  Avoid sharing personal household items   You should not share dishes, drinking glasses, cups, eating utensils, towels, or bedding with other people or pets in your home  After using these items, they should be washed thoroughly with soap and water  Clean your hands often  Wash your hands often with soap and water for at least 20 seconds   If soap and water are not available, clean your hands with an alcohol-based hand  that contains at least 60% alcohol, covering all surfaces of your hands and rubbing them together until they feel dry  Soap and water should be used preferentially if hands are visibly dirty  Avoid touching your eyes, nose, and mouth with unwashed hands  Clean all "high-touch" surfaces every day  High touch surfaces include counters, tabletops, doorknobs, bathroom fixtures, toilets, phones, keyboards, tablets, and bedside tables  Also, clean any surfaces that may have blood, stool, or body fluids on them  Use a household cleaning spray or wipe, according to the label instructions  Labels contain instructions for safe and effective use of the cleaning product including precautions you should take when applying the product, such as wearing gloves and making sure you have good ventilation during use of the product  Monitor your symptoms  Seek prompt medical attention if your illness is worsening (e g , difficulty breathing)  Before seeking care, call your healthcare provider and tell them that you have, or are being evaluated for, COVID-19  Put on a facemask before you enter the facility  These steps will help the healthcare provider's office to keep other people in the office or waiting room from getting infectedor exposed  Ask your healthcare provider to call the local or [facility] health department  Persons who are placed under active monitoring or facilitated self-monitoring should follow instructions provided by their local health department or occupational health professionals, as appropriate  If you have a medical emergency and need to call 911, notify the dispatch personnel that you have, or are being evaluated for COVID-19  If possible, put on a facemask before emergency medical services arrive  Discontinuing home isolation  Patients with confirmed COVID-19 should remain under home isolation precautions until the risk of secondary transmission to others is thought to be low   The decision to discontinue home isolation precautions should be made on a case-by-case basis, in consultation with healthcare providers and state and local health departments  For more information: www cdc gov/ZKHPL03UA 666171-G 02/24/2020       Stay home when you are sick,except to get medical care  Wash your hands often with soap and water for at least 20 seconds  Cover your cough or sneeze with a tissue, then throw the tissue in the trash  Clean and disinfect frequently touched objects and surfaces  Avoid touching your eyes, nose, and mouth  STOP THE SPREAD OF GERMS  For more information: www cdc gov/COVID19 Avoid close contact with people who are sick  Help prevent the spread of respiratory diseases like COVID-19
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
## 2021-03-30 NOTE — NURSING NOTE
Patient anxious and cooperative on approach, denies s/s  Patient remains in her room out for meals or when prompted with not interaction with peers  Patient complaint with meals in the shift

## 2021-03-30 NOTE — CASE MANAGEMENT
03/30/21 0851   Team Meeting   Meeting Type Daily Rounds   Initial Conference Date 03/30/21   Team Members Present   Team Members Present Physician;Nurse;;; Occupational Therapist   Physician Team Member Dr Selina Tong; [facility] Team Member [facility] Management Team Member Wilber Bennett   Social Work Team Member Nii   OT Team Member Nico Hinkle   Patient/Family Present   Patient Present No   Patient's Family Present No     Not a 30-day readmission; 302 admission from [facility] for paranoia, delusions, agitation towards EMS  Pt was found wandering in her neighborhood  Past [facility] admission in May 2019 at [facility]  Lives with   Denies SI  Has some abdominal bruising

## 2021-03-30 NOTE — TREATMENT PLAN
TREATMENT PLAN REVIEW - [address] [place] [address] BELKYS Hurtado 78 y o  1950 female MRN: [id number]    [address] Room / Bed: Mynor Mims/-85 Encounter: [id number]          Admit Date/Time:  3/29/2021  4:28 PM    Treatment Team: Attending Provider: Hollie Arreola MD; Care Manager: Jhon Orellana RN; : Ranjit Maddox; Occupational Therapy Assistant: Osman De La Rosa; Nurse Manager: Cali Conn RN; Licensed Practical Nurse: Yu Issa LPN; Therapy Student: Alfa Serrano; Patient Care Technician: Brisa Silverman; Patient Care Technician: Kathleen Davey;  Licensed Practical Nurse: Brooke Sinclair LPN    Diagnosis: Principal Problem:    Brief psychotic disorder ([facility] [address] )  rule out mood disorder secondary to a general medical condition such as hyperthyroidism  Active Problems:    CKD (chronic kidney disease) stage 2, GFR 60-89 ml/min    Medical clearance for psychiatric admission    Major depressive disorder    Anxiety      Patient Strengths/Assets: average or above intelligence, capable of independent living, cooperative-good relationship with     Patient Barriers/Limitations: limited family ties and conflicts with children    Short Term Goals: decrease in depressive symptoms, decrease in anxiety symptoms, decrease in paranoid thoughts    Long Term Goals: improvement in depression, improvement in anxiety, stabilization of mood, resolution of psychotic symptoms    Progress Towards Goals: starting psychiatric medications as prescribed    Recommended Treatment: medication management, patient medication education, group therapy, milieu therapy, continued Behavioral Health psychiatric evaluation/assessment process    Treatment Frequency: daily medication monitoring, group and milieu therapy daily, monitoring through interdisciplinary rounds, monitoring through weekly patient care conferences    Expected Discharge Date:  12 days    Discharge Plan: return to previous living arrangement    Treatment Plan Created/Updated By: Kate Cohen MD

## 2021-03-30 NOTE — PROGRESS NOTES
03/30/21 1000 03/30/21 1100 03/30/21 1400   Activity/Group Checklist   Group Other (Comment) Wellness  (Relaxtion with watercolor painting) Admission/Discharge  (Completed self assesment 1-1)   Attendance Did not attend Attended Attended   Attendance Duration (min)  --  31-45 0-15   Interactions  --  Other (Comment)  (Spoke at length to staff about problems within her family ) Other (Comment)  (Preoccupied by wanting to be discharged, redirectable)   Affect/Mood  --  Wide  (Began group crying/reluctant to join, brightened at end  ) Other (Comment)  (Concerned/anxious about being here  )   Goals Achieved  --  Identified feelings; Able to engage in interactions; Able to experience relief/decrease in symptoms Identified feelings; Discussed self-esteem issues; Able to engage in interactions  (Pt lacks insight concerning reasons for hospitalization )

## 2021-03-31 ENCOUNTER — TELEPHONE (OUTPATIENT)
Dept: PSYCHIATRY | Facility: CLINIC | Age: 71
End: 2021-03-31

## 2021-03-31 PROCEDURE — 99232 SBSQ HOSP IP/OBS MODERATE 35: CPT | Performed by: PHYSICIAN ASSISTANT

## 2021-03-31 RX ORDER — OLANZAPINE 2.5 MG/1
2.5 TABLET ORAL
Status: COMPLETED | OUTPATIENT
Start: 2021-03-31 | End: 2021-04-01

## 2021-03-31 RX ORDER — LORAZEPAM 0.5 MG/1
0.5 TABLET ORAL 2 TIMES DAILY
Status: DISCONTINUED | OUTPATIENT
Start: 2021-03-31 | End: 2021-04-02

## 2021-03-31 RX ORDER — ARIPIPRAZOLE 2 MG/1
2 TABLET ORAL DAILY
Status: DISCONTINUED | OUTPATIENT
Start: 2021-03-31 | End: 2021-04-01

## 2021-03-31 RX ADMIN — LORAZEPAM 0.5 MG: 0.5 TABLET ORAL at 17:08

## 2021-03-31 RX ADMIN — OLANZAPINE 2.5 MG: 2.5 TABLET, FILM COATED ORAL at 21:11

## 2021-03-31 RX ADMIN — LORAZEPAM 0.5 MG: 0.5 TABLET ORAL at 12:37

## 2021-03-31 RX ADMIN — ARIPIPRAZOLE 2 MG: 2 TABLET ORAL at 12:37

## 2021-03-31 RX ADMIN — PANTOPRAZOLE SODIUM 20 MG: 20 TABLET, DELAYED RELEASE ORAL at 05:28

## 2021-03-31 RX ADMIN — MELATONIN TAB 3 MG 3 MG: 3 TAB at 21:10

## 2021-03-31 NOTE — NURSING NOTE
Pt is visible on unit with minimal interaction with peers  Pt was  anxious scheduled Ativan  was given with positive effect  Pt was medication compliant  and ate 75% of dinner  No any other symptoms reported  Will continue to monitor

## 2021-03-31 NOTE — NURSING NOTE
Patient anxious, tearful and cooperative on approach, denies s/s  Patient in and out of common areas with minimal interaction with peers  Patient complaint with medications and fair intake for meals in the shift
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
## 2021-03-31 NOTE — CASE MANAGEMENT
03/31/21 0911   Team Meeting   Meeting Type Daily Rounds   Initial Conference Date 03/31/21   Team Members Present   Team Members Present Physician;Nurse;;; Occupational Therapist   Physician Team Member Dr Ty Boyer Team Member [facility] Management Team Member [address] [place] [address] Work Team Member Nii   OT Team Member Gail Meza   Patient/Family Present   Patient Present No   Patient's Family Present No     Rambling, preoccupied with her dogs, delusional, restless, tearful, disorganized, slept, many phone calls yesterday, 303 hearing tomorrow morning with [employer]

## 2021-03-31 NOTE — PROGRESS NOTES
03/31/21 1306   Team Meeting   Meeting Type Tx Team Meeting   Initial Conference Date 03/31/21   Team Members Present   Team Members Present Physician;Nurse;   Physician Team Member Dr Shravan Cannon Team Member [facility] Management Team Member Kaur   Patient/Family Present   Patient Present Yes  (Pt did not wish to sign)   Patient's Family Present No     Treatment goals include: building effective coping skills, improve decision making, managing confusion, maintaining appropriate behavior, remaining safe on unit, decreasing anxiety, improving sleep

## 2021-03-31 NOTE — CASE MANAGEMENT
303 hearing with [employer] scheduled for 4/1 at 8:15-8:30am  CM contacted 302 Baldev wallace MD, to give hearing notification  He will be available to participate

## 2021-03-31 NOTE — PROGRESS NOTES
Pt  Needed some redirection away from guided peers to exercise in afternoon group and away from attempting to phone her spouse during group times  03/31/21 1000 03/31/21 1100 03/31/21 1330   Activity/Group Checklist   Group Community meeting  (positive news) Other (Comment)  (short term problem solving) Exercise  (seated light n lively ex program )   Attendance Did not attend Did not attend Attended   Attendance Duration (min)  --   --  31-45   Interactions  --   --  Interacted appropriately   Affect/Mood  --   --  Appropriate;Bright   Goals Achieved  --   --  Able to give feedback to another;Able to engage in interactions; Displayed empathy  (encouraged her peers to participate)

## 2021-03-31 NOTE — PLAN OF CARE
Pt  Did engage in on of three groups,superficial at times yet bright in demeanor    Problem: Ineffective Coping  Goal: Participates in unit activities  Description: Interventions:  - Provide therapeutic environment   - Provide required programming   - Redirect inappropriate behaviors   Outcome: Progressing

## 2021-04-01 LAB
ATRIAL RATE: 79 BPM
P AXIS: 83 DEGREES
PR INTERVAL: 158 MS
QRS AXIS: -76 DEGREES
QRSD INTERVAL: 133 MS
QT INTERVAL: 367 MS
QTC INTERVAL: 424 MS
T WAVE AXIS: -24 DEGREES
VENTRICULAR RATE: 80 BPM

## 2021-04-01 PROCEDURE — 93010 ELECTROCARDIOGRAM REPORT: CPT | Performed by: INTERNAL MEDICINE

## 2021-04-01 PROCEDURE — 99232 SBSQ HOSP IP/OBS MODERATE 35: CPT | Performed by: PHYSICIAN ASSISTANT

## 2021-04-01 RX ORDER — ARIPIPRAZOLE 5 MG/1
5 TABLET ORAL DAILY
Status: DISCONTINUED | OUTPATIENT
Start: 2021-04-02 | End: 2021-04-03

## 2021-04-01 RX ADMIN — OLANZAPINE 2.5 MG: 2.5 TABLET, FILM COATED ORAL at 21:09

## 2021-04-01 RX ADMIN — LORAZEPAM 0.5 MG: 0.5 TABLET ORAL at 08:27

## 2021-04-01 RX ADMIN — MELATONIN TAB 3 MG 3 MG: 3 TAB at 21:09

## 2021-04-01 RX ADMIN — ARIPIPRAZOLE 2 MG: 2 TABLET ORAL at 08:27

## 2021-04-01 RX ADMIN — LORAZEPAM 0.5 MG: 0.5 TABLET ORAL at 17:02

## 2021-04-01 RX ADMIN — PANTOPRAZOLE SODIUM 20 MG: 20 TABLET, DELAYED RELEASE ORAL at 05:25

## 2021-04-01 NOTE — PROGRESS NOTES
Progress Note - [address] 78 y o  female MRN: [id number]  Unit/Bed#Henrik Santiago 882-18 Encounter: [id number]    Jeovany Cooper was seen for follow-up, chart reviewed and discussed with treatment team   Nursing staff notes that she was tearful and anxious yesterday  Visible in the milieu but minimally interactive with peers  Remains preoccupied with her dog and family  Compliant with medications and treatment plan  Patient was very anxious and restless on approach  Difficult to redirect  Slightly improved from yesterday though  She has been compliant with medications and denies any adverse effect  Continues with tangential thought process and flight of ideas  Talking about her sister whom did not want to visit her because of her son  Also preoccupied with and accusatory towards her daughter and son  Stating that her son is a "Pedophile" and that her daughter stole her dogs  Jennifer Yadieladeloupe always tell me I am nuts and crazy" referring to her children  Physically she offers no complaints of pain or discomfort  She did sleep better last night  Appetite is adequate      Behavior over the last 24 hours:  unchanged  Sleep: normal, improved  Appetite: normal  Medication side effects: No  ROS: no complaints  /85   Pulse 80   Temp (!) 97 4 °F (36 3 °C) (Temporal)   Resp 18   Ht 5' 1" (1 549 m)   Wt 69 9 kg (154 lb 1 6 oz)   SpO2 94%   BMI 29 12 kg/m²   Medications:   Current Facility-Administered Medications   Medication Dose Route Frequency    aluminum-magnesium hydroxide-simethicone (MYLANTA) oral suspension 30 mL  30 mL Oral Q4H PRN    ARIPiprazole (ABILIFY) tablet 2 mg  2 mg Oral Daily    artificial tear (LUBRIFRESH P M ) ophthalmic ointment 1 application  1 application Both Eyes A4S PRN    benztropine (COGENTIN) tablet 0 5 mg  0 5 mg Oral Q4H PRN Max 6/day    bisacodyl (DULCOLAX) rectal suppository 10 mg  10 mg Rectal Daily PRN    hydrOXYzine HCL (ATARAX) tablet 25 mg  25 mg Oral Q6H PRN Max 4/day    ibuprofen (MOTRIN) tablet 200 mg  200 mg Oral Q6H PRN    ibuprofen (MOTRIN) tablet 400 mg  400 mg Oral Q6H PRN    ibuprofen (MOTRIN) tablet 600 mg  600 mg Oral Q8H PRN    LORazepam (ATIVAN) injection 1 mg  1 mg Intramuscular Q6H PRN Max 3/day    LORazepam (ATIVAN) tablet 0 5 mg  0 5 mg Oral Q6H PRN Max 4/day    LORazepam (ATIVAN) tablet 0 5 mg  0 5 mg Oral BID    LORazepam (ATIVAN) tablet 1 mg  1 mg Oral Q6H PRN Max 3/day    melatonin tablet 3 mg  3 mg Oral HS    nicotine polacrilex (NICORETTE) gum 4 mg  4 mg Oral Q2H PRN    OLANZapine (ZyPREXA) IM injection 5 mg  5 mg Intramuscular Q3H PRN Max 3/day    OLANZapine (ZyPREXA) tablet 2 5 mg  2 5 mg Oral Q4H PRN Max 6/day    OLANZapine (ZyPREXA) tablet 2 5 mg  2 5 mg Oral HS    OLANZapine (ZyPREXA) tablet 5 mg  5 mg Oral Q4H PRN Max 3/day    OLANZapine (ZyPREXA) tablet 5 mg  5 mg Oral Q3H PRN Max 3/day    pantoprazole (PROTONIX) EC tablet 20 mg  20 mg Oral Early Morning    polyethylene glycol (MIRALAX) packet 17 g  17 g Oral Daily PRN    senna-docusate sodium (SENOKOT S) 8 6-50 mg per tablet 1 tablet  1 tablet Oral Daily PRN    traZODone (DESYREL) tablet 50 mg  50 mg Oral HS PRN       Labs:   No visits with results within 1 Day(s) from this visit     Latest known visit with results is:   Admission on 03/27/2021, Discharged on 03/29/2021   Component Date Value Ref Range Status    WBC 03/27/2021 4 14* 4 31 - 10 16 Thousand/uL Final    RBC 03/27/2021 4 31  3 81 - 5 12 Million/uL Final    Hemoglobin 03/27/2021 13 4  11 5 - 15 4 g/dL Final    Hematocrit 03/27/2021 37 5  34 8 - 46 1 % Final    MCV 03/27/2021 87  82 - 98 fL Final    MCH 03/27/2021 31 1  26 8 - 34 3 pg Final    MCHC 03/27/2021 35 7  31 4 - 37 4 g/dL Final    RDW 03/27/2021 11 7  11 6 - 15 1 % Final    MPV 03/27/2021 8 7* 8 9 - 12 7 fL Final    Platelets 12/10/1070 248  149 - 390 Thousands/uL Final    Neutrophils Relative 03/27/2021 74  43 - 75 % Final    Immat GRANS % 03/27/2021 0  0 - 2 % Final    Lymphocytes Relative 03/27/2021 18  14 - 44 % Final    Monocytes Relative 03/27/2021 8  4 - 12 % Final    Eosinophils Relative 03/27/2021 0  0 - 6 % Final    Basophils Relative 03/27/2021 0  0 - 1 % Final    Neutrophils Absolute 03/27/2021 3 09  1 85 - 7 62 Thousands/µL Final    Immature Grans Absolute 03/27/2021 0 00  0 00 - 0 20 Thousand/uL Final    Lymphocytes Absolute 03/27/2021 0 73  0 60 - 4 47 Thousands/µL Final    Monocytes Absolute 03/27/2021 0 32  0 17 - 1 22 Thousand/µL Final    Eosinophils Absolute 03/27/2021 0 00  0 00 - 0 61 Thousand/µL Final    Basophils Absolute 03/27/2021 0 00  0 00 - 0 10 Thousands/µL Final    Ethanol Lvl 03/27/2021 <10  <10 mg/dL Final    Sodium 03/27/2021 125* 133 - 145 mmol/L Final    Potassium 03/27/2021 3 4* 3 5 - 5 0 mmol/L Final    Chloride 03/27/2021 92* 96 - 108 mmol/L Final    CO2 03/27/2021 22  22 - 33 mmol/L Final    ANION GAP 03/27/2021 11  4 - 13 mmol/L Final    BUN 03/27/2021 9  6 - 20 mg/dL Final    Creatinine 03/27/2021 0 75  0 40 - 1 10 mg/dL Final    Glucose 03/27/2021 112  65 - 140 mg/dL Final    Calcium 03/27/2021 9 0  8 4 - 10 2 mg/dL Final    AST 03/27/2021 47* 15 - 41 U/L Final    ALT 03/27/2021 34  5 - 54 U/L Final    Alkaline Phosphatase 03/27/2021 62 4  35 - 140 U/L Final    Total Protein 03/27/2021 7 0  6 4 - 8 3 g/dL Final    Albumin 03/27/2021 4 2  3 4 - 4 8 g/dL Final    Total Bilirubin 03/27/2021 0 62  0 30 - 1 20 mg/dL Final    eGFR 03/27/2021 82  ml/min/1 73sq m Final    Sodium 03/27/2021 130* 133 - 145 mmol/L Final    Potassium 03/27/2021 3 8  3 5 - 5 0 mmol/L Final    Chloride 03/27/2021 98  96 - 108 mmol/L Final    CO2 03/27/2021 25  22 - 33 mmol/L Final    ANION GAP 03/27/2021 7  4 - 13 mmol/L Final    BUN 03/27/2021 8  6 - 20 mg/dL Final    Creatinine 03/27/2021 0 75  0 40 - 1 10 mg/dL Final    Glucose 03/27/2021 88  65 - 140 mg/dL Final    Calcium 03/27/2021 8 7  8 4 - 10 2 mg/dL Final    eGFR 03/27/2021 82  ml/min/1 73sq m Final    Sodium 03/28/2021 136  133 - 145 mmol/L Final    Potassium 03/28/2021 3 7  3 5 - 5 0 mmol/L Final    Chloride 03/28/2021 101  96 - 108 mmol/L Final    CO2 03/28/2021 24  22 - 33 mmol/L Final    ANION GAP 03/28/2021 11  4 - 13 mmol/L Final    BUN 03/28/2021 9  6 - 20 mg/dL Final    Creatinine 03/28/2021 0 90  0 40 - 1 10 mg/dL Final    Glucose 03/28/2021 73  65 - 140 mg/dL Final    Calcium 03/28/2021 9 4  8 4 - 10 2 mg/dL Final    eGFR 03/28/2021 66  ml/min/1 73sq m Final    WBC 03/28/2021 2 93* 4 31 - 10 16 Thousand/uL Final    RBC 03/28/2021 4 31  3 81 - 5 12 Million/uL Final    Hemoglobin 03/28/2021 13 5  11 5 - 15 4 g/dL Final    Hematocrit 03/28/2021 38 3  34 8 - 46 1 % Final    MCV 03/28/2021 89  82 - 98 fL Final    MCH 03/28/2021 31 3  26 8 - 34 3 pg Final    MCHC 03/28/2021 35 2  31 4 - 37 4 g/dL Final    RDW 03/28/2021 12 0  11 6 - 15 1 % Final    Platelets 95/96/3318 259  149 - 390 Thousands/uL Final    MPV 03/28/2021 9 4  8 9 - 12 7 fL Final    Sodium 03/29/2021 139  133 - 145 mmol/L Final    Potassium 03/29/2021 3 7  3 5 - 5 0 mmol/L Final    Chloride 03/29/2021 105  96 - 108 mmol/L Final    CO2 03/29/2021 25  22 - 33 mmol/L Final    ANION GAP 03/29/2021 9  4 - 13 mmol/L Final    BUN 03/29/2021 12  6 - 20 mg/dL Final    Creatinine 03/29/2021 0 93  0 40 - 1 10 mg/dL Final    Glucose 03/29/2021 92  65 - 140 mg/dL Final    Calcium 03/29/2021 9 3  8 4 - 10 2 mg/dL Final    eGFR 03/29/2021 63  ml/min/1 73sq m Final       Mental Status Evaluation:  Appearance:  age appropriate and disheveled   Behavior:  restless and fidgety   Speech:  pressured and tangential   Mood:  anxious   Affect:  labile and mood-congruent   Thought Process:  flight of ideas and tangential   Thought Content:  delusions  persecutory   Perceptual Disturbances: None   Risk Potential: Suicidal Ideations none, Homicidal Ideations none and Potential for Aggression Yes Aggressive prior to admission   Sensorium:  person, place, month of year and year   Cognition:  recent memory impaired due to Mood disorder   Consciousness:  alert and awake    Attention: attention span appeared shorter than expected for age   Insight:  Poor   Judgment: Poor   Gait/Station: normal gait/station   Motor Activity: no abnormal movements     Progress Toward Goals:  No change    Assessment/Plan   Principal Problem:    Mood disorder with psychosis ([facility] [address] )  Active Problems:    CKD (chronic kidney disease) stage 2, GFR 60-89 ml/min    Medical clearance for psychiatric admission    Major depressive disorder    Anxiety    MCI (mild cognitive impairment)      Recommended Treatment:   Continue with plan of titrating Abilify and tapering olanzapine  Discontinue olanzapine and increase Abilify to 5 mg daily starting tomorrow  Continue lorazepam 0 5 mg b i d  Continue to encourage in milieu participation as tolerated    Continue with group therapy, milieu therapy and occupational therapy  Risks, benefits and possible side effects of Medications:   Risks, benefits, and possible side effects of medications explained to patient and patient verbalizes understanding  Counseling / Coordination of Care  Total floor / unit time spent today 30 minutes  Greater than 50% of total time was spent with the patient and / or family counseling and / or coordination of care   A description of the counseling / coordination of care:  Medication management, chart review, patient interview

## 2021-04-01 NOTE — CASE MANAGEMENT
04/01/21 1149   Team Meeting   Meeting Type Daily Rounds   Initial Conference Date 04/01/21   Team Members Present   Team Members Present Physician;Nurse;; ;Occupational Therapist   Physician Team Member Shree Tidwell   Nursing Team Member [facility] Management Team Member Regla Galicia   Social Work Team Member Nii   OT Team Member John Estevez   Patient/Family Present   Patient Present No   Patient's Family Present No     Anxious, preoccupied with d/c, pleasant, visible, cooperative, denies s/s, tearful at times, 303 hearing this AM, attended 1/3 groups yesterday

## 2021-04-01 NOTE — PLAN OF CARE
Pt  Cooperative and attempted to complete a relapse prevention plan yet it was not successful as her signs and symptoms to watch out for began with feelings that you are "set Up' by others  Pt  continues to state at God is  her living support system other than her spouse  Pt  prayed  for others in groups    Problem: Ineffective Coping  Goal: Participates in unit activities  Description: Interventions:  - Provide therapeutic environment   - Provide required programming   - Redirect inappropriate behaviors   Outcome: Progressing

## 2021-04-01 NOTE — PLAN OF CARE
Problem: COPING  Goal: Pt/Family able to verbalize concerns and demonstrate effective coping strategies  Description: INTERVENTIONS:  - Assist patient/family to identify coping skills, available support systems and cultural and spiritual values  - Provide emotional support, including active listening and acknowledgement of concerns of patient and caregivers  - Reduce environmental stimuli, as able  - Provide patient education  - Assess for spiritual pain/suffering and initiate spiritual care, including notification of Pastoral Care or edgar based community as needed  - Assess effectiveness of coping strategies  Outcome: Progressing  Goal: Will report anxiety at manageable levels  Description: INTERVENTIONS:  - Administer medication as ordered  - Teach and encourage coping skills  - Provide emotional support  - Assess patient/family for anxiety and ability to cope  Outcome: Progressing     Problem: CONFUSION/THOUGHT DISTURBANCE  Goal: Thought disturbances (confusion, delirium, depression, dementia or psychosis) are managed to maintain or return to baseline mental status and functional level  Description: INTERVENTIONS:  - Assess for possible contributors to  thought disturbance, including but not limited to medications, infection, impaired vision or hearing, underlying metabolic abnormalities, dehydration, respiratory compromise,  psychiatric diagnoses and notify attending PHYSICAN/AP  - Monitor and intervene to maintain adequate nutrition, hydration, elimination, sleep and activity  - Decrease environmental stimuli, including noise as appropriate  - Provide frequent contacts to provide refocusing, direction and reassurance as needed  Approach patient calmly with eye contact and at their level    - [place] high risk fall precautions, aspiration precautions and other safety measures, as indicated  - If delirium suspected, notify physician/AP of change in condition and request immediate in-person evaluation  - Pursue consults as appropriate including Geriatric (campus dependent), OT for cognitive evaluation/activity planning, psychiatric, pastoral care, etc   Outcome: Progressing     Problem: BEHAVIOR  Goal: Pt/Family maintain appropriate behavior and adhere to behavioral management agreement, if implemented  Description: INTERVENTIONS:  - Assess the family dynamic   - Encourage verbalization of thoughts and concerns in a socially appropriate manner  - Assess patient/family's coping skills and non-compliant behavior (including use of illegal substances)  - Utilize positive, consistent limit setting strategies supporting safety of patient, staff and others  - Initiate consult with Case Management, Spiritual Care or other ancillary services as appropriate  - If a patient's/visitor's behavior jeopardizes the safety of the patient, staff, or others, refer to organization procedure     - Notify Security of behavior or suspected illegal substances which indicate the need for search of the patient and/or belongings  - Encourage participation in the decision making process about a behavioral management agreement; implement if patient meets criteria  Outcome: Progressing     Problem: ANXIETY  Goal: Will report anxiety at manageable levels  Description: INTERVENTIONS:  - Administer medication as ordered  - Teach and encourage coping skills  - Provide emotional support  - Assess patient/family for anxiety and ability to cope  Outcome: Progressing  Goal: By discharge: Patient will verbalize 2 strategies to deal with anxiety  Description: Interventions:  - Identify any obvious source/trigger to anxiety  - Staff will assist patient in applying identified coping technique/skills  - Encourage attendance of scheduled groups and activities  Outcome: Progressing

## 2021-04-01 NOTE — CASE MANAGEMENT
Pt would like to participate in 303 hearing this morning    303 hearing completed with [facility] this AM with pt, Dr Genevieve Horan and Dr Stephenie Guan  Up to 20 additional days of IP psych tx approved  303 expires on 4/21

## 2021-04-01 NOTE — PLAN OF CARE
Problem: COPING  Goal: Pt/Family able to verbalize concerns and demonstrate effective coping strategies  Description: INTERVENTIONS:  - Assist patient/family to identify coping skills, available support systems and cultural and spiritual values  - Provide emotional support, including active listening and acknowledgement of concerns of patient and caregivers  - Reduce environmental stimuli, as able  - Provide patient education  - Assess for spiritual pain/suffering and initiate spiritual care, including notification of Pastoral Care or edgar based community as needed  - Assess effectiveness of coping strategies  Outcome: Progressing  Goal: Will report anxiety at manageable levels  Description: INTERVENTIONS:  - Administer medication as ordered  - Teach and encourage coping skills  - Provide emotional support  - Assess patient/family for anxiety and ability to cope  Outcome: Progressing     Problem: DECISION MAKING  Goal: Pt/Family able to effectively weigh alternatives and participate in decision making related to treatment and care  Description: INTERVENTIONS:  - Identify decision maker  - Determine when there are differences among patient's view, family's view, and healthcare provider's view of patient condition and care goals  - Facilitate patient/family articulation of goals for care  - Help patient/family identify pros/cons of alternative solutions  - Provide information as requested by patient/family  - Respect patient/family rights related to privacy and sharing information   - Serve as a liaison between patient, family and health care team  - Initiate consults as appropriate (Ethics Team, Palliative Care, Family Care Conference, etc )  Outcome: Progressing     Problem: CONFUSION/THOUGHT DISTURBANCE  Goal: Thought disturbances (confusion, delirium, depression, dementia or psychosis) are managed to maintain or return to baseline mental status and functional level  Description: INTERVENTIONS:  - Assess for possible contributors to  thought disturbance, including but not limited to medications, infection, impaired vision or hearing, underlying metabolic abnormalities, dehydration, respiratory compromise,  psychiatric diagnoses and notify attending PHYSICAN/AP  - Monitor and intervene to maintain adequate nutrition, hydration, elimination, sleep and activity  - Decrease environmental stimuli, including noise as appropriate  - Provide frequent contacts to provide refocusing, direction and reassurance as needed  Approach patient calmly with eye contact and at their level  - [place] high risk fall precautions, aspiration precautions and other safety measures, as indicated  - If delirium suspected, notify physician/AP of change in condition and request immediate in-person evaluation  - Pursue consults as appropriate including Geriatric (campus dependent), OT for cognitive evaluation/activity planning, psychiatric, pastoral care, etc   Outcome: Progressing     Problem: BEHAVIOR  Goal: Pt/Family maintain appropriate behavior and adhere to behavioral management agreement, if implemented  Description: INTERVENTIONS:  - Assess the family dynamic   - Encourage verbalization of thoughts and concerns in a socially appropriate manner  - Assess patient/family's coping skills and non-compliant behavior (including use of illegal substances)  - Utilize positive, consistent limit setting strategies supporting safety of patient, staff and others  - Initiate consult with Case Management, Spiritual Care or other ancillary services as appropriate  - If a patient's/visitor's behavior jeopardizes the safety of the patient, staff, or others, refer to organization procedure     - Notify Security of behavior or suspected illegal substances which indicate the need for search of the patient and/or belongings  - Encourage participation in the decision making process about a behavioral management agreement; implement if patient meets criteria  Outcome: Progressing     Problem: SELF HARM  Goal: Effect of psychiatric condition will be minimized and patient will be protected from self harm  Description: INTERVENTIONS:  - Assess impact of patient's symptoms on level of functioning, self-care needs and offer support as indicated  - Assess patient/family knowledge of depression, impact on illness and need for teaching  - Provide emotional support, presence and reassurance  - Assess for possible suicidal thoughts, ideation or self-harm  If patient expresses suicidal thoughts or statements do not leave alone, notify physician/AP immediately, initiate suicide precautions, and determine need for continual observation  - initiate consults and referrals as appropriate (a mental health professional, Spiritual Care  Outcome: Progressing     Problem: BEHAVIOR  Goal: Pt/Family maintain appropriate behavior and adhere to behavioral management agreement, if implemented  Description: INTERVENTIONS:  - Assess the family dynamic   - Encourage verbalization of thoughts and concerns in a socially appropriate manner  - Assess patient/family's coping skills and non-compliant behavior (including use of illegal substances)  - Utilize positive, consistent limit setting strategies supporting safety of patient, staff and others  - Initiate consult with Case Management, Spiritual Care or other ancillary services as appropriate  - If a patient's/visitor's behavior jeopardizes the safety of the patient, staff, or others, refer to organization procedure     - Notify Security of behavior or suspected illegal substances which indicate the need for search of the patient and/or belongings  - Encourage participation in the decision making process about a behavioral management agreement; implement if patient meets criteria  Outcome: Progressing     Problem: ANXIETY  Goal: Will report anxiety at manageable levels  Description: INTERVENTIONS:  - Administer medication as ordered  - Teach and encourage coping skills  - Provide emotional support  - Assess patient/family for anxiety and ability to cope  Outcome: Progressing  Goal: By discharge: Patient will verbalize 2 strategies to deal with anxiety  Description: Interventions:  - Identify any obvious source/trigger to anxiety  - Staff will assist patient in applying identified coping technique/skills  - Encourage attendance of scheduled groups and activities  Outcome: Progressing     Problem: SLEEP DISTURBANCE  Goal: Will exhibit normal sleeping pattern  Description: Interventions:  -  Assess the patients sleep pattern, noting recent changes  - Administer medication as ordered  - Decrease environmental stimuli, including noise, as appropriate during the night  - Encourage the patient to actively participate in unit groups and or exercise during the day to enhance ability to achieve adequate sleep at night  - Assess the patient, in the morning, encouraging a description of sleep experience  Outcome: Progressing     Problem: INVOLUNTARY ADMIT  Goal: Will cooperate with staff recommendations and doctor's orders and will demonstrate appropriate behavior  Description: INTERVENTIONS:  - Treat underlying conditions and offer medication as ordered  - Educate regarding involuntary admission procedures and rules  - Utilize positive consistent limit setting strategies to support patient and staff safety  Outcome: Progressing     Problem: DISCHARGE PLANNING  Goal: Discharge to home or other facility with appropriate resources  Description: INTERVENTIONS:  - Identify barriers to discharge w/patient and caregiver  - Arrange for needed discharge resources and transportation as appropriate  - Identify discharge learning needs (meds, wound care, etc )  - Arrange for interpretive services to assist at discharge as needed  - Refer to Case Management Department for coordinating discharge planning if the patient needs post-hospital services based on physician/advanced practitioner order or complex needs related to functional status, cognitive ability, or social support system  Outcome: Progressing     Problem: Ineffective Coping  Goal: Participates in unit activities  Description: Interventions:  - Provide therapeutic environment   - Provide required programming   - Redirect inappropriate behaviors   Outcome: Progressing

## 2021-04-01 NOTE — NURSING NOTE
Patient is anxious, but pleasant and cooperative, medication compliant  Pt denies depression and anxiety, though does report worrying about her dogs  Pt tearful , states " I am worried my dogs are dead, my daughters friend gave them pork so I know they are probably dead " Pt counseled  No other concerns verbalized

## 2021-04-02 PROCEDURE — 99232 SBSQ HOSP IP/OBS MODERATE 35: CPT | Performed by: PHYSICIAN ASSISTANT

## 2021-04-02 RX ORDER — LORAZEPAM 0.5 MG/1
0.5 TABLET ORAL 3 TIMES DAILY
Status: DISCONTINUED | OUTPATIENT
Start: 2021-04-02 | End: 2021-04-05

## 2021-04-02 RX ADMIN — PANTOPRAZOLE SODIUM 20 MG: 20 TABLET, DELAYED RELEASE ORAL at 05:44

## 2021-04-02 RX ADMIN — ARIPIPRAZOLE 5 MG: 5 TABLET ORAL at 08:21

## 2021-04-02 RX ADMIN — MELATONIN TAB 3 MG 3 MG: 3 TAB at 21:03

## 2021-04-02 RX ADMIN — LORAZEPAM 0.5 MG: 0.5 TABLET ORAL at 08:21

## 2021-04-02 RX ADMIN — LORAZEPAM 0.5 MG: 0.5 TABLET ORAL at 16:58

## 2021-04-02 RX ADMIN — LORAZEPAM 0.5 MG: 0.5 TABLET ORAL at 21:03

## 2021-04-02 NOTE — PLAN OF CARE
Problem: COPING  Goal: Pt/Family able to verbalize concerns and demonstrate effective coping strategies  Description: INTERVENTIONS:  - Assist patient/family to identify coping skills, available support systems and cultural and spiritual values  - Provide emotional support, including active listening and acknowledgement of concerns of patient and caregivers  - Reduce environmental stimuli, as able  - Provide patient education  - Assess for spiritual pain/suffering and initiate spiritual care, including notification of Pastoral Care or [place] based community as needed  - Assess effectiveness of coping strategies  Outcome: Progressing  Goal: Will report anxiety at manageable levels  Description: INTERVENTIONS:  - Administer medication as ordered  - Teach and encourage coping skills  - Provide emotional support  - Assess patient/family for anxiety and ability to cope  Outcome: Progressing     Problem: DECISION MAKING  Goal: Pt/Family able to effectively weigh alternatives and participate in decision making related to treatment and care  Description: INTERVENTIONS:  - Identify decision maker  - Determine when there are differences among patient's view, family's view, and healthcare provider's view of patient condition and care goals  - Facilitate patient/family articulation of goals for care  - Help patient/family identify pros/cons of alternative solutions  - Provide information as requested by patient/family  - Respect patient/family rights related to privacy and sharing information   - Serve as a liaison between patient, family and health care team  - Initiate consults as appropriate (Ethics Team, Palliative Care, Family Care Conference, etc )  Outcome: Progressing     Problem: CONFUSION/THOUGHT DISTURBANCE  Goal: Thought disturbances (confusion, delirium, depression, dementia or psychosis) are managed to maintain or return to baseline mental status and functional level  Description: INTERVENTIONS:  - Assess for possible contributors to  thought disturbance, including but not limited to medications, infection, impaired vision or hearing, underlying metabolic abnormalities, dehydration, respiratory compromise,  psychiatric diagnoses and notify attending PHYSICAN/AP  - Monitor and intervene to maintain adequate nutrition, hydration, elimination, sleep and activity  - Decrease environmental stimuli, including noise as appropriate  - Provide frequent contacts to provide refocusing, direction and reassurance as needed  Approach patient calmly with eye contact and at their level  - [place] high risk fall precautions, aspiration precautions and other safety measures, as indicated  - If delirium suspected, notify physician/AP of change in condition and request immediate in-person evaluation  - Pursue consults as appropriate including Geriatric (campus dependent), OT for cognitive evaluation/activity planning, psychiatric, pastoral care, etc   Outcome: Progressing     Problem: BEHAVIOR  Goal: Pt/Family maintain appropriate behavior and adhere to behavioral management agreement, if implemented  Description: INTERVENTIONS:  - Assess the family dynamic   - Encourage verbalization of thoughts and concerns in a socially appropriate manner  - Assess patient/family's coping skills and non-compliant behavior (including use of illegal substances)  - Utilize positive, consistent limit setting strategies supporting safety of patient, staff and others  - Initiate consult with Case Management, Spiritual Care or other ancillary services as appropriate  - If a patient's/visitor's behavior jeopardizes the safety of the patient, staff, or others, refer to organization procedure     - Notify Security of behavior or suspected illegal substances which indicate the need for search of the patient and/or belongings  - Encourage participation in the decision making process about a behavioral management agreement; implement if patient meets criteria  Outcome: Progressing     Problem: SELF HARM  Goal: Effect of psychiatric condition will be minimized and patient will be protected from self harm  Description: INTERVENTIONS:  - Assess impact of patient's symptoms on level of functioning, self-care needs and offer support as indicated  - Assess patient/family knowledge of depression, impact on illness and need for teaching  - Provide emotional support, presence and reassurance  - Assess for possible suicidal thoughts, ideation or self-harm  If patient expresses suicidal thoughts or statements do not leave alone, notify physician/AP immediately, initiate suicide precautions, and determine need for continual observation  - initiate consults and referrals as appropriate (a mental health professional, Spiritual Care  Outcome: Progressing     Problem: BEHAVIOR  Goal: Pt/Family maintain appropriate behavior and adhere to behavioral management agreement, if implemented  Description: INTERVENTIONS:  - Assess the family dynamic   - Encourage verbalization of thoughts and concerns in a socially appropriate manner  - Assess patient/family's coping skills and non-compliant behavior (including use of illegal substances)  - Utilize positive, consistent limit setting strategies supporting safety of patient, staff and others  - Initiate consult with Case Management, Spiritual Care or other ancillary services as appropriate  - If a patient's/visitor's behavior jeopardizes the safety of the patient, staff, or others, refer to organization procedure     - Notify Security of behavior or suspected illegal substances which indicate the need for search of the patient and/or belongings  - Encourage participation in the decision making process about a behavioral management agreement; implement if patient meets criteria  Outcome: Progressing     Problem: ANXIETY  Goal: Will report anxiety at manageable levels  Description: INTERVENTIONS:  - Administer medication as ordered  - Teach and encourage coping skills  - Provide emotional support  - Assess patient/family for anxiety and ability to cope  Outcome: Progressing  Goal: By discharge: Patient will verbalize 2 strategies to deal with anxiety  Description: Interventions:  - Identify any obvious source/trigger to anxiety  - Staff will assist patient in applying identified coping technique/skills  - Encourage attendance of scheduled groups and activities  Outcome: Progressing     Problem: SLEEP DISTURBANCE  Goal: Will exhibit normal sleeping pattern  Description: Interventions:  -  Assess the patients sleep pattern, noting recent changes  - Administer medication as ordered  - Decrease environmental stimuli, including noise, as appropriate during the night  - Encourage the patient to actively participate in unit groups and or exercise during the day to enhance ability to achieve adequate sleep at night  - Assess the patient, in the morning, encouraging a description of sleep experience  Outcome: Progressing     Problem: INVOLUNTARY ADMIT  Goal: Will cooperate with staff recommendations and doctor's orders and will demonstrate appropriate behavior  Description: INTERVENTIONS:  - Treat underlying conditions and offer medication as ordered  - Educate regarding involuntary admission procedures and rules  - Utilize positive consistent limit setting strategies to support patient and staff safety  Outcome: Progressing

## 2021-04-02 NOTE — PLAN OF CARE
Problem: COPING  Goal: Pt/Family able to verbalize concerns and demonstrate effective coping strategies  Description: INTERVENTIONS:  - Assist patient/family to identify coping skills, available support systems and cultural and spiritual values  - Provide emotional support, including active listening and acknowledgement of concerns of patient and caregivers  - Reduce environmental stimuli, as able  - Provide patient education  - Assess for spiritual pain/suffering and initiate spiritual care, including notification of Pastoral Care or [facility] based community as needed  - Assess effectiveness of coping strategies  Outcome: Progressing  Goal: Will report anxiety at manageable levels  Description: INTERVENTIONS:  - Administer medication as ordered  - Teach and encourage coping skills  - Provide emotional support  - Assess patient/family for anxiety and ability to cope  Outcome: Progressing     Problem: DECISION MAKING  Goal: Pt/Family able to effectively weigh alternatives and participate in decision making related to treatment and care  Description: INTERVENTIONS:  - Identify decision maker  - Determine when there are differences among patient's view, family's view, and healthcare provider's view of patient condition and care goals  - Facilitate patient/family articulation of goals for care  - Help patient/family identify pros/cons of alternative solutions  - Provide information as requested by patient/family  - Respect patient/family rights related to privacy and sharing information   - Serve as a liaison between patient, family and health care team  - Initiate consults as appropriate (Ethics Team, Palliative Care, Family Care Conference, etc )  Outcome: Progressing     Problem: CONFUSION/THOUGHT DISTURBANCE  Goal: Thought disturbances (confusion, delirium, depression, dementia or psychosis) are managed to maintain or return to baseline mental status and functional level  Description: INTERVENTIONS:  - Assess for possible contributors to  thought disturbance, including but not limited to medications, infection, impaired vision or hearing, underlying metabolic abnormalities, dehydration, respiratory compromise,  psychiatric diagnoses and notify attending PHYSICAN/AP  - Monitor and intervene to maintain adequate nutrition, hydration, elimination, sleep and activity  - Decrease environmental stimuli, including noise as appropriate  - Provide frequent contacts to provide refocusing, direction and reassurance as needed  Approach patient calmly with eye contact and at their level  - [place] high risk fall precautions, aspiration precautions and other safety measures, as indicated  - If delirium suspected, notify physician/AP of change in condition and request immediate in-person evaluation  - Pursue consults as appropriate including Geriatric (campus dependent), OT for cognitive evaluation/activity planning, psychiatric, pastoral care, etc   Outcome: Progressing     Problem: BEHAVIOR  Goal: Pt/Family maintain appropriate behavior and adhere to behavioral management agreement, if implemented  Description: INTERVENTIONS:  - Assess the family dynamic   - Encourage verbalization of thoughts and concerns in a socially appropriate manner  - Assess patient/family's coping skills and non-compliant behavior (including use of illegal substances)  - Utilize positive, consistent limit setting strategies supporting safety of patient, staff and others  - Initiate consult with Case Management, Spiritual Care or other ancillary services as appropriate  - If a patient's/visitor's behavior jeopardizes the safety of the patient, staff, or others, refer to organization procedure     - Notify Security of behavior or suspected illegal substances which indicate the need for search of the patient and/or belongings  - Encourage participation in the decision making process about a behavioral management agreement; implement if patient meets criteria  Outcome: Progressing     Problem: SELF HARM  Goal: Effect of psychiatric condition will be minimized and patient will be protected from self harm  Description: INTERVENTIONS:  - Assess impact of patient's symptoms on level of functioning, self-care needs and offer support as indicated  - Assess patient/family knowledge of depression, impact on illness and need for teaching  - Provide emotional support, presence and reassurance  - Assess for possible suicidal thoughts, ideation or self-harm  If patient expresses suicidal thoughts or statements do not leave alone, notify physician/AP immediately, initiate suicide precautions, and determine need for continual observation  - initiate consults and referrals as appropriate (a mental health professional, Spiritual Care  Outcome: Progressing     Problem: BEHAVIOR  Goal: Pt/Family maintain appropriate behavior and adhere to behavioral management agreement, if implemented  Description: INTERVENTIONS:  - Assess the family dynamic   - Encourage verbalization of thoughts and concerns in a socially appropriate manner  - Assess patient/family's coping skills and non-compliant behavior (including use of illegal substances)  - Utilize positive, consistent limit setting strategies supporting safety of patient, staff and others  - Initiate consult with Case Management, Spiritual Care or other ancillary services as appropriate  - If a patient's/visitor's behavior jeopardizes the safety of the patient, staff, or others, refer to organization procedure     - Notify Security of behavior or suspected illegal substances which indicate the need for search of the patient and/or belongings  - Encourage participation in the decision making process about a behavioral management agreement; implement if patient meets criteria  Outcome: Progressing     Problem: ANXIETY  Goal: Will report anxiety at manageable levels  Description: INTERVENTIONS:  - Administer medication as ordered  - Teach and encourage coping skills  - Provide emotional support  - Assess patient/family for anxiety and ability to cope  Outcome: Progressing  Goal: By discharge: Patient will verbalize 2 strategies to deal with anxiety  Description: Interventions:  - Identify any obvious source/trigger to anxiety  - Staff will assist patient in applying identified coping technique/skills  - Encourage attendance of scheduled groups and activities  Outcome: Progressing     Problem: SLEEP DISTURBANCE  Goal: Will exhibit normal sleeping pattern  Description: Interventions:  -  Assess the patients sleep pattern, noting recent changes  - Administer medication as ordered  - Decrease environmental stimuli, including noise, as appropriate during the night  - Encourage the patient to actively participate in unit groups and or exercise during the day to enhance ability to achieve adequate sleep at night  - Assess the patient, in the morning, encouraging a description of sleep experience  Outcome: Progressing     Problem: INVOLUNTARY ADMIT  Goal: Will cooperate with staff recommendations and doctor's orders and will demonstrate appropriate behavior  Description: INTERVENTIONS:  - Treat underlying conditions and offer medication as ordered  - Educate regarding involuntary admission procedures and rules  - Utilize positive consistent limit setting strategies to support patient and staff safety  Outcome: Progressing     Problem: Ineffective Coping  Goal: Participates in unit activities  Description: Interventions:  - Provide therapeutic environment   - Provide required programming   - Redirect inappropriate behaviors   Outcome: Progressing

## 2021-04-02 NOTE — CASE MANAGEMENT
04/02/21 0843   Team Meeting   Meeting Type Daily Rounds   Initial Conference Date 04/02/21   Team Members Present   Team Members Present Physician;Nurse;;; Occupational Therapist   Physician Team Member Shree Mack   Nursing Team Member [facility] Management Team Member Regla Galicia   Social Work Team Member Nano Mercado OT Team Member Linh Avery   Patient/Family Present   Patient Present No   Patient's Family Present No     Preoccupied with d/c, paranoid, suspicious, accusatory of family, med compliant, denies s/s, religiously preoccupied, Abilify increased yesterday - continue to titrate, tearful about her dogs, slept well, attends some groups
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
## 2021-04-02 NOTE — CASE MANAGEMENT
Left VM for pt's daughter, Santino Barney, to give update on progress     VM received from pt's , Marta Nobles, requesting call back to get update on pt's progress   CM will speak with pt about signed TRISHA for her  and will follow up     TRISHA signed for Marta Nobles -- CM placed call and left VM

## 2021-04-03 PROCEDURE — 99232 SBSQ HOSP IP/OBS MODERATE 35: CPT | Performed by: PHYSICIAN ASSISTANT

## 2021-04-03 RX ORDER — ARIPIPRAZOLE 10 MG/1
10 TABLET ORAL DAILY
Status: DISCONTINUED | OUTPATIENT
Start: 2021-04-04 | End: 2021-04-07

## 2021-04-03 RX ADMIN — LORAZEPAM 0.5 MG: 0.5 TABLET ORAL at 16:23

## 2021-04-03 RX ADMIN — MELATONIN TAB 3 MG 3 MG: 3 TAB at 21:13

## 2021-04-03 RX ADMIN — PANTOPRAZOLE SODIUM 20 MG: 20 TABLET, DELAYED RELEASE ORAL at 06:06

## 2021-04-03 RX ADMIN — LORAZEPAM 0.5 MG: 0.5 TABLET ORAL at 21:13

## 2021-04-03 RX ADMIN — LORAZEPAM 0.5 MG: 0.5 TABLET ORAL at 08:38

## 2021-04-03 RX ADMIN — ARIPIPRAZOLE 5 MG: 5 TABLET ORAL at 08:38
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
## 2021-04-03 NOTE — PROGRESS NOTES
Progress Note - [address] 78 y o  female MRN: [id number]  Unit/Bed#: [facility] 382-01 Encounter: [id number]    Assessment/Plan   Principal Problem:    Mood disorder with psychosis ([address] )  Active Problems:    CKD (chronic kidney disease) stage 2, GFR 60-89 ml/min    Medical clearance for psychiatric admission    Major depressive disorder    Anxiety    MCI (mild cognitive impairment)      Subjective: Patient was seen, chart reviewed and case discussed with team   Patient denied depression  States sleep, appetite, and energy are adequate  Denied any suicidal thoughts  Did appear mildly anxious and paranoid  Spoke about wanting to jayashree others that placed her in the hospital   Talked negatively about her family  Did appear somewhat disorganized  Required redirection  Denied hallucinations  Was not agitated with interviewer  Medication compliant  Appears to be tolerating medications well without serious side effects        Psychiatric Review of Systems:  Behavior over the last 24 hours:  unchanged  Sleep: normal  Appetite: normal  Medication side effects: No  ROS: no complaints, all others negative    Current Medications:  Current Facility-Administered Medications   Medication Dose Route Frequency    aluminum-magnesium hydroxide-simethicone (MYLANTA) oral suspension 30 mL  30 mL Oral Q4H PRN    ARIPiprazole (ABILIFY) tablet 5 mg  5 mg Oral Daily    artificial tear (LUBRIFRESH P M ) ophthalmic ointment 1 application  1 application Both Eyes J8Q PRN    benztropine (COGENTIN) tablet 0 5 mg  0 5 mg Oral Q4H PRN Max 6/day    bisacodyl (DULCOLAX) rectal suppository 10 mg  10 mg Rectal Daily PRN    hydrOXYzine HCL (ATARAX) tablet 25 mg  25 mg Oral Q6H PRN Max 4/day    ibuprofen (MOTRIN) tablet 200 mg  200 mg Oral Q6H PRN    ibuprofen (MOTRIN) tablet 400 mg  400 mg Oral Q6H PRN    ibuprofen (MOTRIN) tablet 600 mg  600 mg Oral Q8H PRN    LORazepam (ATIVAN) injection 1 mg  1 mg Intramuscular Q6H PRN Max 3/day    LORazepam (ATIVAN) tablet 0 5 mg  0 5 mg Oral Q6H PRN Max 4/day    LORazepam (ATIVAN) tablet 0 5 mg  0 5 mg Oral TID    LORazepam (ATIVAN) tablet 1 mg  1 mg Oral Q6H PRN Max 3/day    melatonin tablet 3 mg  3 mg Oral HS    nicotine polacrilex (NICORETTE) gum 4 mg  4 mg Oral Q2H PRN    OLANZapine (ZyPREXA) IM injection 5 mg  5 mg Intramuscular Q3H PRN Max 3/day    OLANZapine (ZyPREXA) tablet 2 5 mg  2 5 mg Oral Q4H PRN Max 6/day    OLANZapine (ZyPREXA) tablet 5 mg  5 mg Oral Q4H PRN Max 3/day    OLANZapine (ZyPREXA) tablet 5 mg  5 mg Oral Q3H PRN Max 3/day    pantoprazole (PROTONIX) EC tablet 20 mg  20 mg Oral Early Morning    polyethylene glycol (MIRALAX) packet 17 g  17 g Oral Daily PRN    senna-docusate sodium (SENOKOT S) 8 6-50 mg per tablet 1 tablet  1 tablet Oral Daily PRN    traZODone (DESYREL) tablet 50 mg  50 mg Oral HS PRN       Behavioral Health Medications: all current active meds have been reviewed and continue current psychiatric medications  Vitals:  Vitals:    04/03/21 0643   BP: 123/89   Pulse: 82   Resp: 16   Temp: (!) 96 5 °F (35 8 °C)   SpO2: 97%       Laboratory results:    I have personally reviewed all pertinent laboratory/tests results    Most Recent Labs:   Lab Results   Component Value Date    WBC 2 93 (L) 03/28/2021    RBC 4 31 03/28/2021    HGB 13 5 03/28/2021    HCT 38 3 03/28/2021     03/28/2021    RDW 12 0 03/28/2021    NEUTROABS 3 09 03/27/2021    SODIUM 139 03/29/2021    K 3 7 03/29/2021     03/29/2021    CO2 25 03/29/2021    BUN 12 03/29/2021    CREATININE 0 93 03/29/2021    GLUC 92 03/29/2021    GLUF 96 03/15/2021    CALCIUM 9 3 03/29/2021    AST 47 (H) 03/27/2021    ALT 34 03/27/2021    ALKPHOS 62 4 03/27/2021    TP 7 0 03/27/2021    ALB 4 2 03/27/2021    TBILI 0 62 03/27/2021    CHOLESTEROL 265 (H) 03/15/2021    HDL 79 03/15/2021    TRIG 50 03/15/2021    LDLCALC 176 (H) 03/15/2021    [id number] 1 995 03/26/2021    FREET4 0 95 07/01/2019    HGBA1C 5 4 02/28/2020     02/28/2020       Mental Status Evaluation:  Appearance:  casually dressed   Behavior:  guarded   Speech:  tangential   Mood:  angry and anxious   Affect:  increased in range   Language Appropriate   Thought Process:  illogical and tangential   Thought Content:  delusions  persecutory   Perceptual Disturbances: None   Risk Potential: Denied SI/HI  Potential for aggression: No   Sensorium:  person, place and time/date   Cognition:  recent and remote memory grossly intact   Consciousness:  alert and awake    Attention: attention span appeared shorter than expected for age   Insight:  limited   Judgment: limited   Gait/Station: normal gait/station and normal balance   Motor Activity: no abnormal movements     Progress Toward Goals: progressing    Recommended Treatment: Continue with pharmacotherapy, group therapy, milieu therapy and occupational therapy  1   Continue current medications  2  Consider increase in Abilify dosing in coming days    Risks, benefits and possible side effects of Medications:   Risks, benefits, and possible side effects of medications explained to patient and patient verbalizes understanding
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
## 2021-04-04 LAB
FLUAV RNA RESP QL NAA+PROBE: NEGATIVE
FLUBV RNA RESP QL NAA+PROBE: NEGATIVE
RSV RNA RESP QL NAA+PROBE: NEGATIVE
SARS-COV-2 RNA RESP QL NAA+PROBE: POSITIVE

## 2021-04-04 PROCEDURE — 0241U HB NFCT DS VIR RESP RNA 4 TRGT: CPT | Performed by: NURSE PRACTITIONER

## 2021-04-04 PROCEDURE — 99232 SBSQ HOSP IP/OBS MODERATE 35: CPT | Performed by: PHYSICIAN ASSISTANT

## 2021-04-04 RX ADMIN — LORAZEPAM 0.5 MG: 0.5 TABLET ORAL at 16:57

## 2021-04-04 RX ADMIN — ARIPIPRAZOLE 10 MG: 10 TABLET ORAL at 08:06

## 2021-04-04 RX ADMIN — MELATONIN TAB 3 MG 3 MG: 3 TAB at 21:03

## 2021-04-04 RX ADMIN — LORAZEPAM 0.5 MG: 0.5 TABLET ORAL at 21:03

## 2021-04-04 RX ADMIN — LORAZEPAM 0.5 MG: 0.5 TABLET ORAL at 08:06

## 2021-04-04 RX ADMIN — PANTOPRAZOLE SODIUM 20 MG: 20 TABLET, DELAYED RELEASE ORAL at 06:12
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
## 2021-04-04 NOTE — PROGRESS NOTES
Progress Note - [address] 78 y o  female MRN: [id number]  Unit/Bed#: [facility] 382-01 Encounter: [id number]    Assessment/Plan   Principal Problem:    Mood disorder with psychosis ([address] )  Active Problems:    CKD (chronic kidney disease) stage 2, GFR 60-89 ml/min    Medical clearance for psychiatric admission    Major depressive disorder    Anxiety    MCI (mild cognitive impairment)      Subjective: Patient was seen, chart reviewed and case discussed with team   Patient delusional and paranoid  Speaks negatively about her family  Appeared anxious and distressed  Was tangential in conversation  Required redirection  Denied hallucinations  Medication compliant  Appears to be tolerating medications well without serious side effects      Psychiatric Review of Systems:  Behavior over the last 24 hours:  Slight improvement  Sleep: normal  Appetite: normal  Medication side effects: No  ROS: no complaints, all others negative    Current Medications:  Current Facility-Administered Medications   Medication Dose Route Frequency    aluminum-magnesium hydroxide-simethicone (MYLANTA) oral suspension 30 mL  30 mL Oral Q4H PRN    ARIPiprazole (ABILIFY) tablet 10 mg  10 mg Oral Daily    artificial tear (LUBRIFRESH P M ) ophthalmic ointment 1 application  1 application Both Eyes A2W PRN    benztropine (COGENTIN) tablet 0 5 mg  0 5 mg Oral Q4H PRN Max 6/day    bisacodyl (DULCOLAX) rectal suppository 10 mg  10 mg Rectal Daily PRN    hydrOXYzine HCL (ATARAX) tablet 25 mg  25 mg Oral Q6H PRN Max 4/day    ibuprofen (MOTRIN) tablet 200 mg  200 mg Oral Q6H PRN    ibuprofen (MOTRIN) tablet 400 mg  400 mg Oral Q6H PRN    ibuprofen (MOTRIN) tablet 600 mg  600 mg Oral Q8H PRN    LORazepam (ATIVAN) injection 1 mg  1 mg Intramuscular Q6H PRN Max 3/day    LORazepam (ATIVAN) tablet 0 5 mg  0 5 mg Oral Q6H PRN Max 4/day    LORazepam (ATIVAN) tablet 0 5 mg  0 5 mg Oral TID    LORazepam (ATIVAN) tablet 1 mg  1 mg Oral Q6H PRN Max 3/day    melatonin tablet 3 mg  3 mg Oral HS    nicotine polacrilex (NICORETTE) gum 4 mg  4 mg Oral Q2H PRN    OLANZapine (ZyPREXA) IM injection 5 mg  5 mg Intramuscular Q3H PRN Max 3/day    OLANZapine (ZyPREXA) tablet 2 5 mg  2 5 mg Oral Q4H PRN Max 6/day    OLANZapine (ZyPREXA) tablet 5 mg  5 mg Oral Q4H PRN Max 3/day    OLANZapine (ZyPREXA) tablet 5 mg  5 mg Oral Q3H PRN Max 3/day    pantoprazole (PROTONIX) EC tablet 20 mg  20 mg Oral Early Morning    polyethylene glycol (MIRALAX) packet 17 g  17 g Oral Daily PRN    senna-docusate sodium (SENOKOT S) 8 6-50 mg per tablet 1 tablet  1 tablet Oral Daily PRN    traZODone (DESYREL) tablet 50 mg  50 mg Oral HS PRN       Behavioral Health Medications: all current active meds have been reviewed and continue current psychiatric medications  Vitals:  Vitals:    04/04/21 0642   BP: 111/65   Pulse: 84   Resp: 16   Temp: (!) 97 3 °F (36 3 °C)   SpO2: 98%       Laboratory results:    I have personally reviewed all pertinent laboratory/tests results    Most Recent Labs:   Lab Results   Component Value Date    WBC 2 93 (L) 03/28/2021    RBC 4 31 03/28/2021    HGB 13 5 03/28/2021    HCT 38 3 03/28/2021     03/28/2021    RDW 12 0 03/28/2021    NEUTROABS 3 09 03/27/2021    SODIUM 139 03/29/2021    K 3 7 03/29/2021     03/29/2021    CO2 25 03/29/2021    BUN 12 03/29/2021    CREATININE 0 93 03/29/2021    GLUC 92 03/29/2021    GLUF 96 03/15/2021    CALCIUM 9 3 03/29/2021    AST 47 (H) 03/27/2021    ALT 34 03/27/2021    ALKPHOS 62 4 03/27/2021    TP 7 0 03/27/2021    ALB 4 2 03/27/2021    TBILI 0 62 03/27/2021    CHOLESTEROL 265 (H) 03/15/2021    HDL 79 03/15/2021    TRIG 50 03/15/2021    LDLCALC 176 (H) 03/15/2021    [id number] 1 995 03/26/2021    FREET4 0 95 07/01/2019    HGBA1C 5 4 02/28/2020     02/28/2020       Mental Status Evaluation:  Appearance:  casually dressed   Behavior:  guarded and restless   Speech:  normal pitch and normal volume   Mood:  angry and anxious   Affect:  increased in range   Language Appropriate   Thought Process:  Illogical and tangential   Thought Content:  Paranoid delusions   Perceptual Disturbances: None   Risk Potential: Denied SI/HI  Potential for aggression: No   Sensorium:  person, place and time/date   Cognition:  recent and remote memory grossly intact   Consciousness:  alert and awake    Attention: attention span appeared shorter than expected for age   Insight:  limited   Judgment: limited   Gait/Station: normal gait/station and normal balance   Motor Activity: no abnormal movements     Progress Toward Goals: progressing    Recommended Treatment: Continue with pharmacotherapy, group therapy, milieu therapy and occupational therapy  1   Continue current medications  2  Abilify increased yesterday    Risks, benefits and possible side effects of Medications:   Risks, benefits, and possible side effects of medications explained to patient and patient verbalizes understanding

## 2021-04-04 NOTE — PLAN OF CARE
Problem: COPING  Goal: Pt/Family able to verbalize concerns and demonstrate effective coping strategies  Description: INTERVENTIONS:  - Assist patient/family to identify coping skills, available support systems and cultural and spiritual values  - Provide emotional support, including active listening and acknowledgement of concerns of patient and caregivers  - Reduce environmental stimuli, as able  - Provide patient education  - Assess for spiritual pain/suffering and initiate spiritual care, including notification of Pastoral Care or edgar based community as needed  - Assess effectiveness of coping strategies  Outcome: Progressing  Goal: Will report anxiety at manageable levels  Description: INTERVENTIONS:  - Administer medication as ordered  - Teach and encourage coping skills  - Provide emotional support  - Assess patient/family for anxiety and ability to cope  Outcome: Progressing     Problem: DECISION MAKING  Goal: Pt/Family able to effectively weigh alternatives and participate in decision making related to treatment and care  Description: INTERVENTIONS:  - Identify decision maker  - Determine when there are differences among patient's view, family's view, and healthcare provider's view of patient condition and care goals  - Facilitate patient/family articulation of goals for care  - Help patient/family identify pros/cons of alternative solutions  - Provide information as requested by patient/family  - Respect patient/family rights related to privacy and sharing information   - Serve as a liaison between patient, family and health care team  - Initiate consults as appropriate (Ethics Team, Palliative Care, Family Care Conference, etc )  Outcome: Not Progressing     Problem: CONFUSION/THOUGHT DISTURBANCE  Goal: Thought disturbances (confusion, delirium, depression, dementia or psychosis) are managed to maintain or return to baseline mental status and functional level  Description: INTERVENTIONS:  - Assess for possible contributors to  thought disturbance, including but not limited to medications, infection, impaired vision or hearing, underlying metabolic abnormalities, dehydration, respiratory compromise,  psychiatric diagnoses and notify attending PHYSICAN/AP  - Monitor and intervene to maintain adequate nutrition, hydration, elimination, sleep and activity  - Decrease environmental stimuli, including noise as appropriate  - Provide frequent contacts to provide refocusing, direction and reassurance as needed  Approach patient calmly with eye contact and at their level  - [place] high risk fall precautions, aspiration precautions and other safety measures, as indicated  - If delirium suspected, notify physician/AP of change in condition and request immediate in-person evaluation  - Pursue consults as appropriate including Geriatric (campus dependent), OT for cognitive evaluation/activity planning, psychiatric, pastoral care, etc   Outcome: Not Progressing     Problem: BEHAVIOR  Goal: Pt/Family maintain appropriate behavior and adhere to behavioral management agreement, if implemented  Description: INTERVENTIONS:  - Assess the family dynamic   - Encourage verbalization of thoughts and concerns in a socially appropriate manner  - Assess patient/family's coping skills and non-compliant behavior (including use of illegal substances)  - Utilize positive, consistent limit setting strategies supporting safety of patient, staff and others  - Initiate consult with Case Management, Spiritual Care or other ancillary services as appropriate  - If a patient's/visitor's behavior jeopardizes the safety of the patient, staff, or others, refer to organization procedure     - Notify Security of behavior or suspected illegal substances which indicate the need for search of the patient and/or belongings  - Encourage participation in the decision making process about a behavioral management agreement; implement if patient meets criteria  Outcome: Progressing     Problem: SELF HARM  Goal: Effect of psychiatric condition will be minimized and patient will be protected from self harm  Description: INTERVENTIONS:  - Assess impact of patient's symptoms on level of functioning, self-care needs and offer support as indicated  - Assess patient/family knowledge of depression, impact on illness and need for teaching  - Provide emotional support, presence and reassurance  - Assess for possible suicidal thoughts, ideation or self-harm  If patient expresses suicidal thoughts or statements do not leave alone, notify physician/AP immediately, initiate suicide precautions, and determine need for continual observation  - initiate consults and referrals as appropriate (a mental health professional, Spiritual Care  Outcome: Progressing     Problem: BEHAVIOR  Goal: Pt/Family maintain appropriate behavior and adhere to behavioral management agreement, if implemented  Description: INTERVENTIONS:  - Assess the family dynamic   - Encourage verbalization of thoughts and concerns in a socially appropriate manner  - Assess patient/family's coping skills and non-compliant behavior (including use of illegal substances)  - Utilize positive, consistent limit setting strategies supporting safety of patient, staff and others  - Initiate consult with Case Management, Spiritual Care or other ancillary services as appropriate  - If a patient's/visitor's behavior jeopardizes the safety of the patient, staff, or others, refer to organization procedure     - Notify Security of behavior or suspected illegal substances which indicate the need for search of the patient and/or belongings  - Encourage participation in the decision making process about a behavioral management agreement; implement if patient meets criteria  Outcome: Progressing     Problem: ANXIETY  Goal: Will report anxiety at manageable levels  Description: INTERVENTIONS:  - Administer medication as ordered  - Teach and encourage coping skills  - Provide emotional support  - Assess patient/family for anxiety and ability to cope  Outcome: Progressing  Goal: By discharge: Patient will verbalize 2 strategies to deal with anxiety  Description: Interventions:  - Identify any obvious source/trigger to anxiety  - Staff will assist patient in applying identified coping technique/skills  - Encourage attendance of scheduled groups and activities  Outcome: Progressing
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
## 2021-04-05 PROCEDURE — 99233 SBSQ HOSP IP/OBS HIGH 50: CPT | Performed by: PSYCHIATRY & NEUROLOGY

## 2021-04-05 RX ORDER — LORAZEPAM 0.5 MG/1
0.5 TABLET ORAL EVERY 12 HOURS SCHEDULED
Status: DISCONTINUED | OUTPATIENT
Start: 2021-04-06 | End: 2021-04-09

## 2021-04-05 RX ADMIN — PANTOPRAZOLE SODIUM 20 MG: 20 TABLET, DELAYED RELEASE ORAL at 06:01

## 2021-04-05 RX ADMIN — ARIPIPRAZOLE 10 MG: 10 TABLET ORAL at 08:33

## 2021-04-05 RX ADMIN — LORAZEPAM 0.5 MG: 0.5 TABLET ORAL at 08:33

## 2021-04-05 RX ADMIN — LORAZEPAM 0.5 MG: 0.5 TABLET ORAL at 17:08

## 2021-04-05 RX ADMIN — MELATONIN TAB 3 MG 3 MG: 3 TAB at 21:32
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
## 2021-04-05 NOTE — CASE MANAGEMENT
04/05/21 0902   Team Meeting   Meeting Type Daily Rounds   Initial Conference Date 04/05/21   Team Members Present   Team Members Present Physician;Nurse;;; Occupational Therapist   Physician Team Member Dr Mian Mondragon Team Member MARLON [facility] Management Team Member [address] [place] [address] Work Team Member Nii   OT Team Member Hermenia Goldberg   Patient/Family Present   Patient Present No   Patient's Family Present No     Covid+ as of 4/4, calm, pleasant, cooperative, denies s/s, visible, anxious at times, med compliant, good appetite, delusional and suspicious about family
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
## 2021-04-05 NOTE — PLAN OF CARE
Problem: COPING  Goal: Pt/Family able to verbalize concerns and demonstrate effective coping strategies  Description: INTERVENTIONS:  - Assist patient/family to identify coping skills, available support systems and cultural and spiritual values  - Provide emotional support, including active listening and acknowledgement of concerns of patient and caregivers  - Reduce environmental stimuli, as able  - Provide patient education  - Assess for spiritual pain/suffering and initiate spiritual care, including notification of Pastoral Care or edgar based community as needed  - Assess effectiveness of coping strategies  Outcome: Progressing  Goal: Will report anxiety at manageable levels  Description: INTERVENTIONS:  - Administer medication as ordered  - Teach and encourage coping skills  - Provide emotional support  - Assess patient/family for anxiety and ability to cope  Outcome: Progressing     Problem: DECISION MAKING  Goal: Pt/Family able to effectively weigh alternatives and participate in decision making related to treatment and care  Description: INTERVENTIONS:  - Identify decision maker  - Determine when there are differences among patient's view, family's view, and healthcare provider's view of patient condition and care goals  - Facilitate patient/family articulation of goals for care  - Help patient/family identify pros/cons of alternative solutions  - Provide information as requested by patient/family  - Respect patient/family rights related to privacy and sharing information   - Serve as a liaison between patient, family and health care team  - Initiate consults as appropriate (Ethics Team, Palliative Care, Family Care Conference, etc )  Outcome: Progressing     Problem: CONFUSION/THOUGHT DISTURBANCE  Goal: Thought disturbances (confusion, delirium, depression, dementia or psychosis) are managed to maintain or return to baseline mental status and functional level  Description: INTERVENTIONS:  - Assess for possible contributors to  thought disturbance, including but not limited to medications, infection, impaired vision or hearing, underlying metabolic abnormalities, dehydration, respiratory compromise,  psychiatric diagnoses and notify attending PHYSICAN/AP  - Monitor and intervene to maintain adequate nutrition, hydration, elimination, sleep and activity  - Decrease environmental stimuli, including noise as appropriate  - Provide frequent contacts to provide refocusing, direction and reassurance as needed  Approach patient calmly with eye contact and at their level  - [place] high risk fall precautions, aspiration precautions and other safety measures, as indicated  - If delirium suspected, notify physician/AP of change in condition and request immediate in-person evaluation  - Pursue consults as appropriate including Geriatric (campus dependent), OT for cognitive evaluation/activity planning, psychiatric, pastoral care, etc   Outcome: Progressing     Problem: BEHAVIOR  Goal: Pt/Family maintain appropriate behavior and adhere to behavioral management agreement, if implemented  Description: INTERVENTIONS:  - Assess the family dynamic   - Encourage verbalization of thoughts and concerns in a socially appropriate manner  - Assess patient/family's coping skills and non-compliant behavior (including use of illegal substances)  - Utilize positive, consistent limit setting strategies supporting safety of patient, staff and others  - Initiate consult with Case Management, Spiritual Care or other ancillary services as appropriate  - If a patient's/visitor's behavior jeopardizes the safety of the patient, staff, or others, refer to organization procedure     - Notify Security of behavior or suspected illegal substances which indicate the need for search of the patient and/or belongings  - Encourage participation in the decision making process about a behavioral management agreement; implement if patient meets criteria  Outcome: Progressing     Problem: SELF HARM  Goal: Effect of psychiatric condition will be minimized and patient will be protected from self harm  Description: INTERVENTIONS:  - Assess impact of patient's symptoms on level of functioning, self-care needs and offer support as indicated  - Assess patient/family knowledge of depression, impact on illness and need for teaching  - Provide emotional support, presence and reassurance  - Assess for possible suicidal thoughts, ideation or self-harm  If patient expresses suicidal thoughts or statements do not leave alone, notify physician/AP immediately, initiate suicide precautions, and determine need for continual observation  - initiate consults and referrals as appropriate (a mental health professional, Spiritual Care  Outcome: Progressing     Problem: BEHAVIOR  Goal: Pt/Family maintain appropriate behavior and adhere to behavioral management agreement, if implemented  Description: INTERVENTIONS:  - Assess the family dynamic   - Encourage verbalization of thoughts and concerns in a socially appropriate manner  - Assess patient/family's coping skills and non-compliant behavior (including use of illegal substances)  - Utilize positive, consistent limit setting strategies supporting safety of patient, staff and others  - Initiate consult with Case Management, Spiritual Care or other ancillary services as appropriate  - If a patient's/visitor's behavior jeopardizes the safety of the patient, staff, or others, refer to organization procedure     - Notify Security of behavior or suspected illegal substances which indicate the need for search of the patient and/or belongings  - Encourage participation in the decision making process about a behavioral management agreement; implement if patient meets criteria  Outcome: Progressing     Problem: ANXIETY  Goal: Will report anxiety at manageable levels  Description: INTERVENTIONS:  - Administer medication as ordered  - Teach and encourage coping skills  - Provide emotional support  - Assess patient/family for anxiety and ability to cope  Outcome: Progressing  Goal: By discharge: Patient will verbalize 2 strategies to deal with anxiety  Description: Interventions:  - Identify any obvious source/trigger to anxiety  - Staff will assist patient in applying identified coping technique/skills  - Encourage attendance of scheduled groups and activities  Outcome: Progressing     Problem: SLEEP DISTURBANCE  Goal: Will exhibit normal sleeping pattern  Description: Interventions:  -  Assess the patients sleep pattern, noting recent changes  - Administer medication as ordered  - Decrease environmental stimuli, including noise, as appropriate during the night  - Encourage the patient to actively participate in unit groups and or exercise during the day to enhance ability to achieve adequate sleep at night  - Assess the patient, in the morning, encouraging a description of sleep experience  Outcome: Progressing     Problem: INVOLUNTARY ADMIT  Goal: Will cooperate with staff recommendations and doctor's orders and will demonstrate appropriate behavior  Description: INTERVENTIONS:  - Treat underlying conditions and offer medication as ordered  - Educate regarding involuntary admission procedures and rules  - Utilize positive consistent limit setting strategies to support patient and staff safety  Outcome: Progressing     Problem: DISCHARGE PLANNING  Goal: Discharge to home or other facility with appropriate resources  Description: INTERVENTIONS:  - Identify barriers to discharge w/patient and caregiver  - Arrange for needed discharge resources and transportation as appropriate  - Identify discharge learning needs (meds, wound care, etc )  - Arrange for interpretive services to assist at discharge as needed  - Refer to Case Management Department for coordinating discharge planning if the patient needs post-hospital services based on physician/advanced practitioner order or complex needs related to functional status, cognitive ability, or social support system  Outcome: Progressing     Problem: Ineffective Coping  Goal: Participates in unit activities  Description: Interventions:  - Provide therapeutic environment   - Provide required programming   - Redirect inappropriate behaviors   Outcome: Progressing

## 2021-04-05 NOTE — PROGRESS NOTES
Psychiatrist Progress Note - [address] 78 y o  female MRN: [id number]  Unit/Bed#Laura [facility] 558-31 Encounter: [id number]    The patient was seen for continuing care and reviewed with treatment team  Per staff, patient has been calm and cooperative but paranoid  She describes suspicious behavior on the part of her family members in great detail  There is overt paranoia  Her thought process is difficult to follow at times       Mental Status Evaluation:  Appearance: fair grooming, intact hygiene, no abnormal involuntary movements noted  Behavior: calm, cooperative  Speech: wnl  Mood: denies complaints  Affect: congruent, stable, reactive  Thought process: illogical, tangential  Thought content: paranoid delusions remain; denies SI  Perceptual disturbances: No appearance of internal preoccupation  Cognition: poorly oriented    Insight: poor  Judgement: poor    Vitals:    04/04/21 2044 04/05/21 0500 04/05/21 0900 04/05/21 1605   BP: 126/74  152/94 116/63   BP Location: Right arm  Right arm Left arm   Pulse: 84 81 77 90   Resp: 18 18 18 18   Temp: 98 °F (36 7 °C)  (!) 97 3 °F (36 3 °C) 97 9 °F (36 6 °C)   TempSrc: Tympanic  Tympanic Tympanic   SpO2: 93% 95% 98% 95%   Weight:       Height:           Current Facility-Administered Medications   Medication Dose Route Frequency Provider Last Rate    aluminum-magnesium hydroxide-simethicone  30 mL Oral Q4H PRN Aleksandar Davenport MD      ARIPiprazole  10 mg Oral Daily Casimiro Cat MD      artificial tear  1 application Both Eyes W4J PRN Aleksandar Davenport MD      benztropine  0 5 mg Oral Q4H PRN Max 6/day Aleksandar Davenport MD      bisacodyl  10 mg Rectal Daily PRN Aleksandar Davenport MD      hydrOXYzine HCL  25 mg Oral Q6H PRN Max 4/day Aleksandar Davenport MD      ibuprofen  200 mg Oral Q6H PRN Aleksandar Davenport MD      ibuprofen  400 mg Oral Q6H PRN Aleksandar Davenport MD      ibuprofen  600 mg Oral Q8H PRN Aleksandar Davenport MD      LORazepam  1 mg Intramuscular Q6H PRN Max 3/day Fauzia Franco MD      LORazepam  0 5 mg Oral Q6H PRN Max 4/day Fauzia Franco MD      LORazepam  0 5 mg Oral TID Breann Madera PA-C      LORazepam  1 mg Oral Q6H PRN Max 3/day Fauzia Franco MD      melatonin  3 mg Oral HS Fauzia Franco MD      nicotine polacrilex  4 mg Oral Q2H PRN Fauzia Franco MD      OLANZapine  5 mg Intramuscular Q3H PRN Max 3/day Fauzia Franco MD      OLANZapine  2 5 mg Oral Q4H PRN Max 6/day Fauzia Franco MD      OLANZapine  5 mg Oral Q4H PRN Max 3/day Fauzia Franco MD      OLANZapine  5 mg Oral Q3H PRN Max 3/day Fauzia Franco MD      pantoprazole  20 mg Oral Early Morning Fauzia Franco MD      polyethylene glycol  17 g Oral Daily PRN Fauzia Franco MD      senna-docusate sodium  1 tablet Oral Daily PRN Fauzia Franco MD      traZODone  50 mg Oral HS PRN Fauzia Franco MD             Assessment/Plan    Principal Problem:    Mood disorder with psychosis ([facility] [address] )  Active Problems:    CKD (chronic kidney disease) stage 2, GFR 60-89 ml/min    Medical clearance for psychiatric admission    Major depressive disorder    Anxiety    MCI (mild cognitive impairment)      Progress Toward Goals: limited    Recommended Treatment:   - continue abilify 10mg po daily  - reduce ativan to 0 5mg po bid as anxiety has been well maintained  - continue melatonin 3mg po qhs  - Continue with pharmacotherapy, group therapy, milieu therapy and occupational therapy  Risks, benefits and possible side effects of Medications:   Patient does not verbalize understanding at this time and will require further explanation

## 2021-04-05 NOTE — CMS CERTIFICATION NOTE
Recertification: Based upon physical, mental and social evaluations, I certify that inpatient psychiatric services continue to be medically necessary for this patient for a duration of 12 midnights for the treatment of  Mood disorder with psychosis ([facility] [address] )   Available alternative community resources still do not meet the patient's mental health care needs  I further attest that an established written individualized plan of care has been updated and is outlined in the patient's medical records
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
## 2021-04-06 PROCEDURE — 99233 SBSQ HOSP IP/OBS HIGH 50: CPT | Performed by: PSYCHIATRY & NEUROLOGY

## 2021-04-06 RX ADMIN — PANTOPRAZOLE SODIUM 20 MG: 20 TABLET, DELAYED RELEASE ORAL at 05:11

## 2021-04-06 RX ADMIN — LORAZEPAM 0.5 MG: 0.5 TABLET ORAL at 21:33

## 2021-04-06 RX ADMIN — LORAZEPAM 0.5 MG: 0.5 TABLET ORAL at 08:39

## 2021-04-06 RX ADMIN — MELATONIN TAB 3 MG 3 MG: 3 TAB at 21:33

## 2021-04-06 RX ADMIN — TRAZODONE HYDROCHLORIDE 50 MG: 50 TABLET ORAL at 22:41

## 2021-04-06 RX ADMIN — IBUPROFEN 600 MG: 600 TABLET, FILM COATED ORAL at 22:41

## 2021-04-06 RX ADMIN — ARIPIPRAZOLE 10 MG: 10 TABLET ORAL at 08:39
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
## 2021-04-06 NOTE — PLAN OF CARE
Problem: COPING  Goal: Pt/Family able to verbalize concerns and demonstrate effective coping strategies  Description: INTERVENTIONS:  - Assist patient/family to identify coping skills, available support systems and cultural and spiritual values  - Provide emotional support, including active listening and acknowledgement of concerns of patient and caregivers  - Reduce environmental stimuli, as able  - Provide patient education  - Assess for spiritual pain/suffering and initiate spiritual care, including notification of Pastoral Care or edgar based community as needed  - Assess effectiveness of coping strategies  Outcome: Progressing  Goal: Will report anxiety at manageable levels  Description: INTERVENTIONS:  - Administer medication as ordered  - Teach and encourage coping skills  - Provide emotional support  - Assess patient/family for anxiety and ability to cope  Outcome: Progressing     Problem: CONFUSION/THOUGHT DISTURBANCE  Goal: Thought disturbances (confusion, delirium, depression, dementia or psychosis) are managed to maintain or return to baseline mental status and functional level  Description: INTERVENTIONS:  - Assess for possible contributors to  thought disturbance, including but not limited to medications, infection, impaired vision or hearing, underlying metabolic abnormalities, dehydration, respiratory compromise,  psychiatric diagnoses and notify attending PHYSICAN/AP  - Monitor and intervene to maintain adequate nutrition, hydration, elimination, sleep and activity  - Decrease environmental stimuli, including noise as appropriate  - Provide frequent contacts to provide refocusing, direction and reassurance as needed  Approach patient calmly with eye contact and at their level    - [place] high risk fall precautions, aspiration precautions and other safety measures, as indicated  - If delirium suspected, notify physician/AP of change in condition and request immediate in-person evaluation  - Pursue consults as appropriate including Geriatric (campus dependent), OT for cognitive evaluation/activity planning, psychiatric, pastoral care, etc   Outcome: Progressing     Problem: SELF HARM  Goal: Effect of psychiatric condition will be minimized and patient will be protected from self harm  Description: INTERVENTIONS:  - Assess impact of patient's symptoms on level of functioning, self-care needs and offer support as indicated  - Assess patient/family knowledge of depression, impact on illness and need for teaching  - Provide emotional support, presence and reassurance  - Assess for possible suicidal thoughts, ideation or self-harm  If patient expresses suicidal thoughts or statements do not leave alone, notify physician/AP immediately, initiate suicide precautions, and determine need for continual observation    - initiate consults and referrals as appropriate (a mental health professional, Spiritual Care  Outcome: Progressing     Problem: ANXIETY  Goal: Will report anxiety at manageable levels  Description: INTERVENTIONS:  - Administer medication as ordered  - Teach and encourage coping skills  - Provide emotional support  - Assess patient/family for anxiety and ability to cope  Outcome: Progressing  Goal: By discharge: Patient will verbalize 2 strategies to deal with anxiety  Description: Interventions:  - Identify any obvious source/trigger to anxiety  - Staff will assist patient in applying identified coping technique/skills  - Encourage attendance of scheduled groups and activities  Outcome: Progressing     Problem: SLEEP DISTURBANCE  Goal: Will exhibit normal sleeping pattern  Description: Interventions:  -  Assess the patients sleep pattern, noting recent changes  - Administer medication as ordered  - Decrease environmental stimuli, including noise, as appropriate during the night  - Encourage the patient to actively participate in unit groups and or exercise during the day to enhance ability to achieve adequate sleep at night  - Assess the patient, in the morning, encouraging a description of sleep experience  Outcome: Progressing
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
## 2021-04-07 PROCEDURE — 99232 SBSQ HOSP IP/OBS MODERATE 35: CPT | Performed by: PSYCHIATRY & NEUROLOGY

## 2021-04-07 RX ORDER — ARIPIPRAZOLE 15 MG/1
15 TABLET ORAL DAILY
Status: DISCONTINUED | OUTPATIENT
Start: 2021-04-08 | End: 2021-04-09

## 2021-04-07 RX ADMIN — PANTOPRAZOLE SODIUM 20 MG: 20 TABLET, DELAYED RELEASE ORAL at 06:31

## 2021-04-07 RX ADMIN — ARIPIPRAZOLE 10 MG: 10 TABLET ORAL at 08:07

## 2021-04-07 RX ADMIN — LORAZEPAM 0.5 MG: 0.5 TABLET ORAL at 08:07

## 2021-04-07 RX ADMIN — MELATONIN TAB 3 MG 3 MG: 3 TAB at 21:00

## 2021-04-07 RX ADMIN — LORAZEPAM 0.5 MG: 0.5 TABLET ORAL at 20:33

## 2021-04-07 NOTE — NURSING NOTE
Patient was isolative to her room but will come to the nurse office to make her needs known  VSS  Denies all s/s at this time but c/o back pain 8 on 10  Motrin 600 mg given at 2241  C/o of insomnia despite taken melatonin earlier, trazodone was given at 2241  Had snack  Patient was cooperative and compliant with HS medications  Safety checks ongoing  Will monitor

## 2021-04-07 NOTE — NURSING NOTE
Pt calm and cooperative, expresses readiness to return home  Tangential at times, rambling speech  Identifies her  as positive support  Spoke to  utilizing unit phone  Appreciative towards staff  Ate 100% dinner  Denies symptoms
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
## 2021-04-07 NOTE — NURSING NOTE
Patient calm and cooperative on approach, denies s/s  Patient  Remains in her room out when prompted or to make her necessities known  Patient compliant with medication and meals in the shift  Patient is afebrile, no episodes of SOB noted or reported

## 2021-04-07 NOTE — PLAN OF CARE
Problem: COPING  Goal: Pt/Family able to verbalize concerns and demonstrate effective coping strategies  Description: INTERVENTIONS:  - Assist patient/family to identify coping skills, available support systems and cultural and spiritual values  - Provide emotional support, including active listening and acknowledgement of concerns of patient and caregivers  - Reduce environmental stimuli, as able  - Provide patient education  - Assess for spiritual pain/suffering and initiate spiritual care, including notification of Pastoral Care or edgar based community as needed  - Assess effectiveness of coping strategies  Outcome: Progressing  Goal: Will report anxiety at manageable levels  Description: INTERVENTIONS:  - Administer medication as ordered  - Teach and encourage coping skills  - Provide emotional support  - Assess patient/family for anxiety and ability to cope  Outcome: Progressing     Problem: CONFUSION/THOUGHT DISTURBANCE  Goal: Thought disturbances (confusion, delirium, depression, dementia or psychosis) are managed to maintain or return to baseline mental status and functional level  Description: INTERVENTIONS:  - Assess for possible contributors to  thought disturbance, including but not limited to medications, infection, impaired vision or hearing, underlying metabolic abnormalities, dehydration, respiratory compromise,  psychiatric diagnoses and notify attending PHYSICAN/AP  - Monitor and intervene to maintain adequate nutrition, hydration, elimination, sleep and activity  - Decrease environmental stimuli, including noise as appropriate  - Provide frequent contacts to provide refocusing, direction and reassurance as needed  Approach patient calmly with eye contact and at their level    - [place] high risk fall precautions, aspiration precautions and other safety measures, as indicated  - If delirium suspected, notify physician/AP of change in condition and request immediate in-person evaluation  - Pursue consults as appropriate including Geriatric (campus dependent), OT for cognitive evaluation/activity planning, psychiatric, pastoral care, etc   Outcome: Progressing     Problem: BEHAVIOR  Goal: Pt/Family maintain appropriate behavior and adhere to behavioral management agreement, if implemented  Description: INTERVENTIONS:  - Assess the family dynamic   - Encourage verbalization of thoughts and concerns in a socially appropriate manner  - Assess patient/family's coping skills and non-compliant behavior (including use of illegal substances)  - Utilize positive, consistent limit setting strategies supporting safety of patient, staff and others  - Initiate consult with Case Management, Spiritual Care or other ancillary services as appropriate  - If a patient's/visitor's behavior jeopardizes the safety of the patient, staff, or others, refer to organization procedure  - Notify Security of behavior or suspected illegal substances which indicate the need for search of the patient and/or belongings  - Encourage participation in the decision making process about a behavioral management agreement; implement if patient meets criteria  Outcome: Progressing     Problem: BEHAVIOR  Goal: Pt/Family maintain appropriate behavior and adhere to behavioral management agreement, if implemented  Description: INTERVENTIONS:  - Assess the family dynamic   - Encourage verbalization of thoughts and concerns in a socially appropriate manner  - Assess patient/family's coping skills and non-compliant behavior (including use of illegal substances)  - Utilize positive, consistent limit setting strategies supporting safety of patient, staff and others  - Initiate consult with Case Management, Spiritual Care or other ancillary services as appropriate  - If a patient's/visitor's behavior jeopardizes the safety of the patient, staff, or others, refer to organization procedure     - Notify Security of behavior or suspected illegal substances which indicate the need for search of the patient and/or belongings  - Encourage participation in the decision making process about a behavioral management agreement; implement if patient meets criteria  Outcome: Progressing     Problem: ANXIETY  Goal: Will report anxiety at manageable levels  Description: INTERVENTIONS:  - Administer medication as ordered  - Teach and encourage coping skills  - Provide emotional support  - Assess patient/family for anxiety and ability to cope  Outcome: Progressing  Goal: By discharge: Patient will verbalize 2 strategies to deal with anxiety  Description: Interventions:  - Identify any obvious source/trigger to anxiety  - Staff will assist patient in applying identified coping technique/skills  - Encourage attendance of scheduled groups and activities  Outcome: Progressing

## 2021-04-07 NOTE — CASE MANAGEMENT
04/07/21 0900   Team Meeting   Meeting Type Daily Rounds   Initial Conference Date 04/07/21   Team Members Present   Team Members Present Physician;Nurse;;Occupational Therapist   Physician Team Member Dr Iban Lopez Team Member [facility] Management Team Member Serenity Dickinson   OT Team Member Shakira Schlutz   Patient/Family Present   Patient Present No   Patient's Family Present No     Isolative, denies s/s, paranoid and suspicious about family, preoccupied with d/c, redirectable, Trazadone PRN last night -- slept on and off, cognitive testing today

## 2021-04-08 PROCEDURE — 99232 SBSQ HOSP IP/OBS MODERATE 35: CPT | Performed by: PSYCHIATRY & NEUROLOGY

## 2021-04-08 RX ADMIN — LORAZEPAM 0.5 MG: 0.5 TABLET ORAL at 21:54

## 2021-04-08 RX ADMIN — PANTOPRAZOLE SODIUM 20 MG: 20 TABLET, DELAYED RELEASE ORAL at 06:18

## 2021-04-08 RX ADMIN — MELATONIN TAB 3 MG 3 MG: 3 TAB at 21:54

## 2021-04-08 RX ADMIN — LORAZEPAM 0.5 MG: 0.5 TABLET ORAL at 08:14

## 2021-04-08 RX ADMIN — ARIPIPRAZOLE 15 MG: 15 TABLET ORAL at 08:14

## 2021-04-08 NOTE — CASE MANAGEMENT
Spoke with pt via phone -- she stated that she was very upset following her conversation with MD  She was under the impression that she was going to be d/c'ed yesterday or today and was told that she will be staying now  Pt feels that she will continue to be committed every time she leaves the hospital because she believes her family is out to get her  Pt was very repetitive, relaying her entire admission story to  again  CM redirected pt stating that this information was already given  Pt continues to express paranoia about her daughter  She thinks that her daughter is speaking with MD and telling lies to keep her here  She believes that her daughter wants the house and is trying to prevent pt from coming home  Pt reported that when she was admitted, a RN named Merlinda Rock told her that this was all happening because her daughter wants her house  Pt reports that she's been speaking with her  every day on the phone and her  is mad that she's still hospitalized  She reports that her  wants to get a  to get her out of here and she told him to call Maritza Bella  Pt was tearful, stating that MD wants to keep her here for weeks or months  She feels that MD twists her words and she would like a new MD  She's also upset that she can't have her cell phone when other pts are allowed to   provided encouragement and redirection
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
## 2021-04-09 PROCEDURE — 99233 SBSQ HOSP IP/OBS HIGH 50: CPT | Performed by: PSYCHIATRY & NEUROLOGY

## 2021-04-09 RX ORDER — ARIPIPRAZOLE 10 MG/1
20 TABLET ORAL DAILY
Status: DISCONTINUED | OUTPATIENT
Start: 2021-04-10 | End: 2021-04-12

## 2021-04-09 RX ADMIN — LORAZEPAM 0.5 MG: 0.5 TABLET ORAL at 08:38

## 2021-04-09 RX ADMIN — PANTOPRAZOLE SODIUM 20 MG: 20 TABLET, DELAYED RELEASE ORAL at 06:04

## 2021-04-09 RX ADMIN — IBUPROFEN 400 MG: 400 TABLET ORAL at 21:43

## 2021-04-09 RX ADMIN — TRAZODONE HYDROCHLORIDE 50 MG: 50 TABLET ORAL at 21:43

## 2021-04-09 RX ADMIN — MELATONIN TAB 3 MG 3 MG: 3 TAB at 21:14

## 2021-04-09 RX ADMIN — ARIPIPRAZOLE 15 MG: 15 TABLET ORAL at 08:38

## 2021-04-09 NOTE — PLAN OF CARE
Problem: COPING  Goal: Pt/Family able to verbalize concerns and demonstrate effective coping strategies  Description: INTERVENTIONS:  - Assist patient/family to identify coping skills, available support systems and cultural and spiritual values  - Provide emotional support, including active listening and acknowledgement of concerns of patient and caregivers  - Reduce environmental stimuli, as able  - Provide patient education  - Assess for spiritual pain/suffering and initiate spiritual care, including notification of Pastoral Care or edgar based community as needed  - Assess effectiveness of coping strategies  Outcome: Progressing  Goal: Will report anxiety at manageable levels  Description: INTERVENTIONS:  - Administer medication as ordered  - Teach and encourage coping skills  - Provide emotional support  - Assess patient/family for anxiety and ability to cope  Outcome: Progressing     Problem: DECISION MAKING  Goal: Pt/Family able to effectively weigh alternatives and participate in decision making related to treatment and care  Description: INTERVENTIONS:  - Identify decision maker  - Determine when there are differences among patient's view, family's view, and healthcare provider's view of patient condition and care goals  - Facilitate patient/family articulation of goals for care  - Help patient/family identify pros/cons of alternative solutions  - Provide information as requested by patient/family  - Respect patient/family rights related to privacy and sharing information   - Serve as a liaison between patient, family and health care team  - Initiate consults as appropriate (Ethics Team, Palliative Care, Family Care Conference, etc )  Outcome: Progressing     Problem: BEHAVIOR  Goal: Pt/Family maintain appropriate behavior and adhere to behavioral management agreement, if implemented  Description: INTERVENTIONS:  - Assess the family dynamic   - Encourage verbalization of thoughts and concerns in a socially appropriate manner  - Assess patient/family's coping skills and non-compliant behavior (including use of illegal substances)  - Utilize positive, consistent limit setting strategies supporting safety of patient, staff and others  - Initiate consult with Case Management, Spiritual Care or other ancillary services as appropriate  - If a patient's/visitor's behavior jeopardizes the safety of the patient, staff, or others, refer to organization procedure  - Notify Security of behavior or suspected illegal substances which indicate the need for search of the patient and/or belongings  - Encourage participation in the decision making process about a behavioral management agreement; implement if patient meets criteria  Outcome: Progressing     Problem: SELF HARM  Goal: Effect of psychiatric condition will be minimized and patient will be protected from self harm  Description: INTERVENTIONS:  - Assess impact of patient's symptoms on level of functioning, self-care needs and offer support as indicated  - Assess patient/family knowledge of depression, impact on illness and need for teaching  - Provide emotional support, presence and reassurance  - Assess for possible suicidal thoughts, ideation or self-harm  If patient expresses suicidal thoughts or statements do not leave alone, notify physician/AP immediately, initiate suicide precautions, and determine need for continual observation  - initiate consults and referrals as appropriate (a mental health professional, Spiritual Care  Outcome: Progressing     Problem: BEHAVIOR  Goal: Pt/Family maintain appropriate behavior and adhere to behavioral management agreement, if implemented  Description: INTERVENTIONS:  - Assess the family dynamic   - Encourage verbalization of thoughts and concerns in a socially appropriate manner  - Assess patient/family's coping skills and non-compliant behavior (including use of illegal substances)    - Utilize positive, consistent limit setting strategies supporting safety of patient, staff and others  - Initiate consult with Case Management, Spiritual Care or other ancillary services as appropriate  - If a patient's/visitor's behavior jeopardizes the safety of the patient, staff, or others, refer to organization procedure     - Notify Security of behavior or suspected illegal substances which indicate the need for search of the patient and/or belongings  - Encourage participation in the decision making process about a behavioral management agreement; implement if patient meets criteria  Outcome: Progressing     Problem: ANXIETY  Goal: Will report anxiety at manageable levels  Description: INTERVENTIONS:  - Administer medication as ordered  - Teach and encourage coping skills  - Provide emotional support  - Assess patient/family for anxiety and ability to cope  Outcome: Progressing  Goal: By discharge: Patient will verbalize 2 strategies to deal with anxiety  Description: Interventions:  - Identify any obvious source/trigger to anxiety  - Staff will assist patient in applying identified coping technique/skills  - Encourage attendance of scheduled groups and activities  Outcome: Progressing     Problem: SLEEP DISTURBANCE  Goal: Will exhibit normal sleeping pattern  Description: Interventions:  -  Assess the patients sleep pattern, noting recent changes  - Administer medication as ordered  - Decrease environmental stimuli, including noise, as appropriate during the night  - Encourage the patient to actively participate in unit groups and or exercise during the day to enhance ability to achieve adequate sleep at night  - Assess the patient, in the morning, encouraging a description of sleep experience  Outcome: Progressing     Problem: Ineffective Coping  Goal: Participates in unit activities  Description: Interventions:  - Provide therapeutic environment   - Provide required programming   - Redirect inappropriate behaviors   Outcome: Progressing

## 2021-04-09 NOTE — NURSING NOTE
Patient is present in milieu; she wants to avoid her room because she believes she does not have covid  She is argumentative and insistent  She has no insight into her disorganized thoughts  Patient is preservative on leaving and has shifting ideas and explanations for why she should be able to leave; "You can't be contagious if you don't have a fever", "My  and I don't sleep in the same bed", "It has already been ten days"  Nurse attempted to educate patient that a positive covid test is not the reason she remains in the hospital with no evidence of learning  Overall patient is cooperative and irritability has not risen to the level of agitation

## 2021-04-09 NOTE — PLAN OF CARE
Problem: COPING  Goal: Pt/Family able to verbalize concerns and demonstrate effective coping strategies  Description: INTERVENTIONS:  - Assist patient/family to identify coping skills, available support systems and cultural and spiritual values  - Provide emotional support, including active listening and acknowledgement of concerns of patient and caregivers  - Reduce environmental stimuli, as able  - Provide patient education  - Assess for spiritual pain/suffering and initiate spiritual care, including notification of Pastoral Care or edgar based community as needed  - Assess effectiveness of coping strategies  Outcome: Progressing  Goal: Will report anxiety at manageable levels  Description: INTERVENTIONS:  - Administer medication as ordered  - Teach and encourage coping skills  - Provide emotional support  - Assess patient/family for anxiety and ability to cope  Outcome: Progressing     Problem: DECISION MAKING  Goal: Pt/Family able to effectively weigh alternatives and participate in decision making related to treatment and care  Description: INTERVENTIONS:  - Identify decision maker  - Determine when there are differences among patient's view, family's view, and healthcare provider's view of patient condition and care goals  - Facilitate patient/family articulation of goals for care  - Help patient/family identify pros/cons of alternative solutions  - Provide information as requested by patient/family  - Respect patient/family rights related to privacy and sharing information   - Serve as a liaison between patient, family and health care team  - Initiate consults as appropriate (Ethics Team, Palliative Care, Family Care Conference, etc )  Outcome: Progressing     Problem: CONFUSION/THOUGHT DISTURBANCE  Goal: Thought disturbances (confusion, delirium, depression, dementia or psychosis) are managed to maintain or return to baseline mental status and functional level  Description: INTERVENTIONS:  - Assess for possible contributors to  thought disturbance, including but not limited to medications, infection, impaired vision or hearing, underlying metabolic abnormalities, dehydration, respiratory compromise,  psychiatric diagnoses and notify attending PHYSICAN/AP  - Monitor and intervene to maintain adequate nutrition, hydration, elimination, sleep and activity  - Decrease environmental stimuli, including noise as appropriate  - Provide frequent contacts to provide refocusing, direction and reassurance as needed  Approach patient calmly with eye contact and at their level  - [place] high risk fall precautions, aspiration precautions and other safety measures, as indicated  - If delirium suspected, notify physician/AP of change in condition and request immediate in-person evaluation  - Pursue consults as appropriate including Geriatric (campus dependent), OT for cognitive evaluation/activity planning, psychiatric, pastoral care, etc   Outcome: Progressing     Problem: BEHAVIOR  Goal: Pt/Family maintain appropriate behavior and adhere to behavioral management agreement, if implemented  Description: INTERVENTIONS:  - Assess the family dynamic   - Encourage verbalization of thoughts and concerns in a socially appropriate manner  - Assess patient/family's coping skills and non-compliant behavior (including use of illegal substances)  - Utilize positive, consistent limit setting strategies supporting safety of patient, staff and others  - Initiate consult with Case Management, Spiritual Care or other ancillary services as appropriate  - If a patient's/visitor's behavior jeopardizes the safety of the patient, staff, or others, refer to organization procedure     - Notify Security of behavior or suspected illegal substances which indicate the need for search of the patient and/or belongings  - Encourage participation in the decision making process about a behavioral management agreement; implement if patient meets criteria  Outcome: Progressing     Problem: SELF HARM  Goal: Effect of psychiatric condition will be minimized and patient will be protected from self harm  Description: INTERVENTIONS:  - Assess impact of patient's symptoms on level of functioning, self-care needs and offer support as indicated  - Assess patient/family knowledge of depression, impact on illness and need for teaching  - Provide emotional support, presence and reassurance  - Assess for possible suicidal thoughts, ideation or self-harm  If patient expresses suicidal thoughts or statements do not leave alone, notify physician/AP immediately, initiate suicide precautions, and determine need for continual observation  - initiate consults and referrals as appropriate (a mental health professional, Spiritual Care  Outcome: Progressing     Problem: BEHAVIOR  Goal: Pt/Family maintain appropriate behavior and adhere to behavioral management agreement, if implemented  Description: INTERVENTIONS:  - Assess the family dynamic   - Encourage verbalization of thoughts and concerns in a socially appropriate manner  - Assess patient/family's coping skills and non-compliant behavior (including use of illegal substances)  - Utilize positive, consistent limit setting strategies supporting safety of patient, staff and others  - Initiate consult with Case Management, Spiritual Care or other ancillary services as appropriate  - If a patient's/visitor's behavior jeopardizes the safety of the patient, staff, or others, refer to organization procedure     - Notify Security of behavior or suspected illegal substances which indicate the need for search of the patient and/or belongings  - Encourage participation in the decision making process about a behavioral management agreement; implement if patient meets criteria  Outcome: Progressing     Problem: ANXIETY  Goal: Will report anxiety at manageable levels  Description: INTERVENTIONS:  - Administer medication as ordered  - Teach and encourage coping skills  - Provide emotional support  - Assess patient/family for anxiety and ability to cope  Outcome: Progressing  Goal: By discharge: Patient will verbalize 2 strategies to deal with anxiety  Description: Interventions:  - Identify any obvious source/trigger to anxiety  - Staff will assist patient in applying identified coping technique/skills  - Encourage attendance of scheduled groups and activities  Outcome: Progressing     Problem: SLEEP DISTURBANCE  Goal: Will exhibit normal sleeping pattern  Description: Interventions:  -  Assess the patients sleep pattern, noting recent changes  - Administer medication as ordered  - Decrease environmental stimuli, including noise, as appropriate during the night  - Encourage the patient to actively participate in unit groups and or exercise during the day to enhance ability to achieve adequate sleep at night  - Assess the patient, in the morning, encouraging a description of sleep experience  Outcome: Progressing     Problem: INVOLUNTARY ADMIT  Goal: Will cooperate with staff recommendations and doctor's orders and will demonstrate appropriate behavior  Description: INTERVENTIONS:  - Treat underlying conditions and offer medication as ordered  - Educate regarding involuntary admission procedures and rules  - Utilize positive consistent limit setting strategies to support patient and staff safety  Outcome: Progressing

## 2021-04-09 NOTE — CASE MANAGEMENT
04/09/21 0849   Team Meeting   Meeting Type Daily Rounds   Initial Conference Date 04/09/21   Team Members Present   Team Members Present Physician;Occupational Therapist;Nurse;;   Physician Team Member Dr Lucas Sequeira; [id number] [facility] Team Member [address]  Management Team Member Ronn Sarah   Social Work Team Member Nii   OT Team Member Landen Amador   Patient/Family Present   Patient Present No   Patient's Family Present No     Visible, denies s/s, paranoid about daughter and son, med compliant, believes she doesn't have Covid, delusional, slept well, pleasant, cooperative   Increase Abilify today
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
## 2021-04-09 NOTE — NURSING NOTE
Patient was visible in the unit  VSS  Denies all s/s at this time  Patient was perseverating about her daughter and son  That her daughter called and told CM that she should not have her phone that she will call the  and that her son is in alf  Patient refused to sleep in her room that she is going to be exposed to covid due to having a roommate who is positive that she does not have covid  Patient stated she wants to go back to [facility] that she does not know why she was brought up here in the first place because she does not have covid  Patient was counseled  Had snack  Patient was compliant with  medications  Safety checks ongoing

## 2021-04-10 PROCEDURE — 99232 SBSQ HOSP IP/OBS MODERATE 35: CPT | Performed by: PSYCHIATRY & NEUROLOGY

## 2021-04-10 RX ADMIN — PANTOPRAZOLE SODIUM 20 MG: 20 TABLET, DELAYED RELEASE ORAL at 06:30

## 2021-04-10 RX ADMIN — LORAZEPAM 0.5 MG: 0.5 TABLET ORAL at 23:32

## 2021-04-10 RX ADMIN — IBUPROFEN 600 MG: 600 TABLET, FILM COATED ORAL at 23:32

## 2021-04-10 RX ADMIN — TRAZODONE HYDROCHLORIDE 50 MG: 50 TABLET ORAL at 21:08

## 2021-04-10 RX ADMIN — ARIPIPRAZOLE 20 MG: 10 TABLET ORAL at 08:09

## 2021-04-10 RX ADMIN — MELATONIN TAB 3 MG 3 MG: 3 TAB at 21:08
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
## 2021-04-10 NOTE — PLAN OF CARE
Problem: COPING  Goal: Pt/Family able to verbalize concerns and demonstrate effective coping strategies  Description: INTERVENTIONS:  - Assist patient/family to identify coping skills, available support systems and cultural and spiritual values  - Provide emotional support, including active listening and acknowledgement of concerns of patient and caregivers  - Reduce environmental stimuli, as able  - Provide patient education  - Assess for spiritual pain/suffering and initiate spiritual care, including notification of Pastoral Care or [facility] based community as needed  - Assess effectiveness of coping strategies  Outcome: Progressing  Goal: Will report anxiety at manageable levels  Description: INTERVENTIONS:  - Administer medication as ordered  - Teach and encourage coping skills  - Provide emotional support  - Assess patient/family for anxiety and ability to cope  Outcome: Progressing     Problem: DECISION MAKING  Goal: Pt/Family able to effectively weigh alternatives and participate in decision making related to treatment and care  Description: INTERVENTIONS:  - Identify decision maker  - Determine when there are differences among patient's view, family's view, and healthcare provider's view of patient condition and care goals  - Facilitate patient/family articulation of goals for care  - Help patient/family identify pros/cons of alternative solutions  - Provide information as requested by patient/family  - Respect patient/family rights related to privacy and sharing information   - Serve as a liaison between patient, family and health care team  - Initiate consults as appropriate (Ethics Team, Palliative Care, Family Care Conference, etc )  Outcome: Progressing     Problem: CONFUSION/THOUGHT DISTURBANCE  Goal: Thought disturbances (confusion, delirium, depression, dementia or psychosis) are managed to maintain or return to baseline mental status and functional level  Description: INTERVENTIONS:  - Assess for possible contributors to  thought disturbance, including but not limited to medications, infection, impaired vision or hearing, underlying metabolic abnormalities, dehydration, respiratory compromise,  psychiatric diagnoses and notify attending PHYSICAN/AP  - Monitor and intervene to maintain adequate nutrition, hydration, elimination, sleep and activity  - Decrease environmental stimuli, including noise as appropriate  - Provide frequent contacts to provide refocusing, direction and reassurance as needed  Approach patient calmly with eye contact and at their level  - [employer] high risk fall precautions, aspiration precautions and other safety measures, as indicated  - If delirium suspected, notify physician/AP of change in condition and request immediate in-person evaluation  - Pursue consults as appropriate including Geriatric (campus dependent), OT for cognitive evaluation/activity planning, psychiatric, pastoral care, etc   Outcome: Progressing     Problem: BEHAVIOR  Goal: Pt/Family maintain appropriate behavior and adhere to behavioral management agreement, if implemented  Description: INTERVENTIONS:  - Assess the family dynamic   - Encourage verbalization of thoughts and concerns in a socially appropriate manner  - Assess patient/family's coping skills and non-compliant behavior (including use of illegal substances)  - Utilize positive, consistent limit setting strategies supporting safety of patient, staff and others  - Initiate consult with Case Management, Spiritual Care or other ancillary services as appropriate  - If a patient's/visitor's behavior jeopardizes the safety of the patient, staff, or others, refer to organization procedure     - Notify Security of behavior or suspected illegal substances which indicate the need for search of the patient and/or belongings  - Encourage participation in the decision making process about a behavioral management agreement; implement if patient meets criteria  Outcome: Progressing     Problem: SELF HARM  Goal: Effect of psychiatric condition will be minimized and patient will be protected from self harm  Description: INTERVENTIONS:  - Assess impact of patient's symptoms on level of functioning, self-care needs and offer support as indicated  - Assess patient/family knowledge of depression, impact on illness and need for teaching  - Provide emotional support, presence and reassurance  - Assess for possible suicidal thoughts, ideation or self-harm  If patient expresses suicidal thoughts or statements do not leave alone, notify physician/AP immediately, initiate suicide precautions, and determine need for continual observation  - initiate consults and referrals as appropriate (a mental health professional, Spiritual Care  Outcome: Progressing     Problem: BEHAVIOR  Goal: Pt/Family maintain appropriate behavior and adhere to behavioral management agreement, if implemented  Description: INTERVENTIONS:  - Assess the family dynamic   - Encourage verbalization of thoughts and concerns in a socially appropriate manner  - Assess patient/family's coping skills and non-compliant behavior (including use of illegal substances)  - Utilize positive, consistent limit setting strategies supporting safety of patient, staff and others  - Initiate consult with Case Management, Spiritual Care or other ancillary services as appropriate  - If a patient's/visitor's behavior jeopardizes the safety of the patient, staff, or others, refer to organization procedure     - Notify Security of behavior or suspected illegal substances which indicate the need for search of the patient and/or belongings  - Encourage participation in the decision making process about a behavioral management agreement; implement if patient meets criteria  Outcome: Progressing     Problem: ANXIETY  Goal: Will report anxiety at manageable levels  Description: INTERVENTIONS:  - Administer medication as ordered  - Teach and encourage coping skills  - Provide emotional support  - Assess patient/family for anxiety and ability to cope  Outcome: Progressing  Goal: By discharge: Patient will verbalize 2 strategies to deal with anxiety  Description: Interventions:  - Identify any obvious source/trigger to anxiety  - Staff will assist patient in applying identified coping technique/skills  - Encourage attendance of scheduled groups and activities  Outcome: Progressing     Problem: SLEEP DISTURBANCE  Goal: Will exhibit normal sleeping pattern  Description: Interventions:  -  Assess the patients sleep pattern, noting recent changes  - Administer medication as ordered  - Decrease environmental stimuli, including noise, as appropriate during the night  - Encourage the patient to actively participate in unit groups and or exercise during the day to enhance ability to achieve adequate sleep at night  - Assess the patient, in the morning, encouraging a description of sleep experience  Outcome: Progressing     Problem: INVOLUNTARY ADMIT  Goal: Will cooperate with staff recommendations and doctor's orders and will demonstrate appropriate behavior  Description: INTERVENTIONS:  - Treat underlying conditions and offer medication as ordered  - Educate regarding involuntary admission procedures and rules  - Utilize positive consistent limit setting strategies to support patient and staff safety  Outcome: Progressing

## 2021-04-10 NOTE — NURSING NOTE
Patient is preoccupied with her children, stating they are on drugs, her son is a pedophile and her daughter is a mean boss  She continues in this theme  She wants to prove that she is "not crazy" she expresses that her children may be conspiring against her to avoid her son going to intermediate  Her mood has been labile shifting between suspicious, irritable, pleasant and bright  She denies having any psychiatric symptoms  She is cooperative with medication

## 2021-04-10 NOTE — PROGRESS NOTES
78 y o  was seen today, on unit  According to the patient and nursing staff, the following is reported: nursing is reporting that she has been preoccupied with her children and also is delusional about their intentions and about her psychiatrist   She is mostly denying depression and anxiety  Sleep and appetite are fair  Has been complaint with medications and no agitation witnessed  Medications reviewed, denies SI/HI, denies A/V [place]        Mental Status Evaluation:  Appearance:  Adequate hygiene and grooming and Good eye contact   Behavior:  cooperative and restless and fidgety   Mood:  anxious   Affect: constricted   Speech: Normal rate and Normal volume   Thought Process:  Goal directed and coherent   Thought Content:  Paranoid and mistrustful   Perceptual Disturbances: Denies hallucinations and does not appear to be responding to internal stimuli   Risk Potential: No suicidal or homicidal ideation   Orientation:   Oriented x 3       Current Facility-Administered Medications   Medication Dose Route Frequency Provider Last Rate    aluminum-magnesium hydroxide-simethicone  30 mL Oral Q4H PRN Laz Christian MD      ARIPiprazole  20 mg Oral Daily Dorethia Burkitt, MD      artificial tear  1 application Both Eyes C5M PRN Laz Christian MD      benztropine  0 5 mg Oral Q4H PRN Max 6/day Laz Christian MD      bisacodyl  10 mg Rectal Daily PRN Laz Christian MD      hydrOXYzine HCL  25 mg Oral Q6H PRN Max 4/day Laz Christian MD      ibuprofen  200 mg Oral Q6H PRN Laz Christian MD      ibuprofen  400 mg Oral Q6H PRN Laz Christian MD      ibuprofen  600 mg Oral Q8H PRN Laz Christian MD      LORazepam  1 mg Intramuscular Q6H PRN Max 3/day Laz Christian MD      LORazepam  0 5 mg Oral Q6H PRN Max 4/day Laz Christian MD      LORazepam  1 mg Oral Q6H PRN Max 3/day Laz Christian MD      melatonin  3 mg Oral HS Laz Christian MD      nicotine polacrilex  4 mg Oral Q2H PRN Laz Christian MD      OLANZapine  5 mg Intramuscular Q3H PRN Max 3/day Ines Taveras MD      OLANZapine  2 5 mg Oral Q4H PRN Max 6/day Ines Taveras MD      OLANZapine  5 mg Oral Q4H PRN Max 3/day Ines Taveras MD      OLANZapine  5 mg Oral Q3H PRN Max 3/day Ines Taveras MD      pantoprazole  20 mg Oral Early Morning Ines Taveras MD      polyethylene glycol  17 g Oral Daily PRN Ines Taveras MD      senna-docusate sodium  1 tablet Oral Daily PRN Ines Taveras MD      traZODone  50 mg Oral HS PRN Ines Taveras MD        Patient Active Problem List    Diagnosis Date Noted    Major depressive disorder 03/30/2021    Anxiety 03/30/2021    MCI (mild cognitive impairment) 03/30/2021    CKD (chronic kidney disease) stage 2, GFR 60-89 ml/min 03/29/2021    Medical clearance for psychiatric admission 03/29/2021    Agitation 03/27/2021    Hyponatremia 03/27/2021    Elevated blood pressure reading 02/10/2021    Mood disorder with psychosis ([facility] [address] ) 05/26/2019    Acute diarrhea 05/26/2019    Subclinical hypothyroidism 12/14/2018    History of gastroesophageal reflux (GERD) 11/08/2018    Hypercholesterolemia 11/08/2018    Seasonal allergies 11/08/2018    Osteoporosis screening 11/08/2018    Mild persistent asthma with acute exacerbation 10/24/2018    Deformity of toe of left foot 10/23/2017    Bunion, right foot 05/02/2017    Foot pain, bilateral 05/02/2017    Cervical post-laminectomy syndrome 12/18/2015    Thoracic neuralgia 12/18/2015    Osteopenia 11/20/2015    Back pain, chronic 08/27/2015    DJD (degenerative joint disease) of thoracic spine 08/27/2015    Leukopenia 05/18/2015    Vitiligo 04/08/2015    Thoracic spondylosis without myelopathy 05/31/2013    Myofascial pain syndrome 05/09/2013    Pain syndrome, chronic 05/09/2013        Plan: Medications reviewed, benefits/risks discussed with patient, condition reviewed with treatment team  Routine monitoring will occur on unit, evaluation of effectiveness and side effects of medications  Will continue w current meds

## 2021-04-11 PROCEDURE — 99232 SBSQ HOSP IP/OBS MODERATE 35: CPT | Performed by: PSYCHIATRY & NEUROLOGY

## 2021-04-11 RX ORDER — MIRTAZAPINE 15 MG/1
15 TABLET, FILM COATED ORAL
Status: DISCONTINUED | OUTPATIENT
Start: 2021-04-11 | End: 2021-04-22

## 2021-04-11 RX ADMIN — ARIPIPRAZOLE 20 MG: 10 TABLET ORAL at 08:06

## 2021-04-11 RX ADMIN — MELATONIN TAB 3 MG 3 MG: 3 TAB at 21:10

## 2021-04-11 RX ADMIN — PANTOPRAZOLE SODIUM 20 MG: 20 TABLET, DELAYED RELEASE ORAL at 06:38

## 2021-04-11 RX ADMIN — MIRTAZAPINE 15 MG: 15 TABLET, FILM COATED ORAL at 21:10

## 2021-04-11 NOTE — PROGRESS NOTES
78 y o  was seen today, on unit  According to the patient and nursing staff, the following is reported: nursing reports that she has continued to be anxious and delusional, also she slept poorly last night, even with trazodone, per nursing and patient reports this as well  Patient reporting improving mood and anxiety  Sleep intermittent, but appetite is fair  Has been complaint with medications and no agitation witnessed  Medications reviewed, denies SI/HI, denies A/V [place]  Mental Status Evaluation:  Appearance:  Marginal/poor hygiene and Good eye contact   Behavior:  cooperative, friendly and restless and fidgety   Mood:  anxious   Affect: constricted   Speech: Normal rate and Normal volume   Thought Process:   Tangential   Thought Content:  Paranoid and mistrustful   Perceptual Disturbances: Denies hallucinations and does not appear to be responding to internal stimuli   Risk Potential: No suicidal or homicidal ideation   Orientation:   Oriented x 3       Current Facility-Administered Medications   Medication Dose Route Frequency Provider Last Rate    aluminum-magnesium hydroxide-simethicone  30 mL Oral Q4H PRN Tonja Reeys MD      ARIPiprazole  20 mg Oral Daily Harvinder Ann MD      artificial tear  1 application Both Eyes R3Q PRN Tonja Reyes MD      benztropine  0 5 mg Oral Q4H PRN Max 6/day Tonja Reyes MD      bisacodyl  10 mg Rectal Daily PRN Tonja Reyes MD      hydrOXYzine HCL  25 mg Oral Q6H PRN Max 4/day Tonja Reyes MD      ibuprofen  200 mg Oral Q6H PRN Tonja Reyes MD      ibuprofen  400 mg Oral Q6H PRN Tonja Reyes MD      ibuprofen  600 mg Oral Q8H PRN Tonja Reyes MD      LORazepam  1 mg Intramuscular Q6H PRN Max 3/day Tonja Reyes MD      LORazepam  0 5 mg Oral Q6H PRN Max 4/day Tonja Reyes MD      LORazepam  1 mg Oral Q6H PRN Max 3/day Tonja Reyes MD      melatonin  3 mg Oral HS Tonja Reyes MD      mirtazapine  15 mg Oral HS Angelito Monaco MD      nicotine polacrilex  4 mg Oral Q2H PRN Kenny Hall MD      OLANZapine  5 mg Intramuscular Q3H PRN Max 3/day Kenny Hall MD      OLANZapine  2 5 mg Oral Q4H PRN Max 6/day Kenny Hall MD      OLANZapine  5 mg Oral Q4H PRN Max 3/day Kenny Hall MD      OLANZapine  5 mg Oral Q3H PRN Max 3/day Kenny Hall MD      pantoprazole  20 mg Oral Early Morning Kenny Hall MD      polyethylene glycol  17 g Oral Daily PRN Kenny Hall MD      senna-docusate sodium  1 tablet Oral Daily PRN Kenny Hall MD      traZODone  50 mg Oral HS PRN Kenny Hall MD        Patient Active Problem List    Diagnosis Date Noted    Major depressive disorder 03/30/2021    Anxiety 03/30/2021    MCI (mild cognitive impairment) 03/30/2021    CKD (chronic kidney disease) stage 2, GFR 60-89 ml/min 03/29/2021    Medical clearance for psychiatric admission 03/29/2021    Agitation 03/27/2021    Hyponatremia 03/27/2021    Elevated blood pressure reading 02/10/2021    Mood disorder with psychosis ([facility] [address] ) 05/26/2019    Acute diarrhea 05/26/2019    Subclinical hypothyroidism 12/14/2018    History of gastroesophageal reflux (GERD) 11/08/2018    Hypercholesterolemia 11/08/2018    Seasonal allergies 11/08/2018    Osteoporosis screening 11/08/2018    Mild persistent asthma with acute exacerbation 10/24/2018    Deformity of toe of left foot 10/23/2017    Bunion, right foot 05/02/2017    Foot pain, bilateral 05/02/2017    Cervical post-laminectomy syndrome 12/18/2015    Thoracic neuralgia 12/18/2015    Osteopenia 11/20/2015    Back pain, chronic 08/27/2015    DJD (degenerative joint disease) of thoracic spine 08/27/2015    Leukopenia 05/18/2015    Vitiligo 04/08/2015    Thoracic spondylosis without myelopathy 05/31/2013    Myofascial pain syndrome 05/09/2013    Pain syndrome, chronic 05/09/2013        Plan: Medications reviewed, benefits/risks discussed with patient, condition reviewed with treatment team  Routine monitoring will occur on unit, evaluation of effectiveness and side effects of medications  Will add Remeron 15 mg hs for anxiety and sleep

## 2021-04-11 NOTE — PLAN OF CARE
Problem: COPING  Goal: Pt/Family able to verbalize concerns and demonstrate effective coping strategies  Description: INTERVENTIONS:  - Assist patient/family to identify coping skills, available support systems and cultural and spiritual values  - Provide emotional support, including active listening and acknowledgement of concerns of patient and caregivers  - Reduce environmental stimuli, as able  - Provide patient education  - Assess for spiritual pain/suffering and initiate spiritual care, including notification of Pastoral Care or [facility] based community as needed  - Assess effectiveness of coping strategies  Outcome: Progressing  Goal: Will report anxiety at manageable levels  Description: INTERVENTIONS:  - Administer medication as ordered  - Teach and encourage coping skills  - Provide emotional support  - Assess patient/family for anxiety and ability to cope  Outcome: Progressing     Problem: DECISION MAKING  Goal: Pt/Family able to effectively weigh alternatives and participate in decision making related to treatment and care  Description: INTERVENTIONS:  - Identify decision maker  - Determine when there are differences among patient's view, family's view, and healthcare provider's view of patient condition and care goals  - Facilitate patient/family articulation of goals for care  - Help patient/family identify pros/cons of alternative solutions  - Provide information as requested by patient/family  - Respect patient/family rights related to privacy and sharing information   - Serve as a liaison between patient, family and health care team  - Initiate consults as appropriate (Ethics Team, Palliative Care, Family Care Conference, etc )  Outcome: Progressing     Problem: CONFUSION/THOUGHT DISTURBANCE  Goal: Thought disturbances (confusion, delirium, depression, dementia or psychosis) are managed to maintain or return to baseline mental status and functional level  Description: INTERVENTIONS:  - Assess for possible contributors to  thought disturbance, including but not limited to medications, infection, impaired vision or hearing, underlying metabolic abnormalities, dehydration, respiratory compromise,  psychiatric diagnoses and notify attending PHYSICAN/AP  - Monitor and intervene to maintain adequate nutrition, hydration, elimination, sleep and activity  - Decrease environmental stimuli, including noise as appropriate  - Provide frequent contacts to provide refocusing, direction and reassurance as needed  Approach patient calmly with eye contact and at their level  - [place] high risk fall precautions, aspiration precautions and other safety measures, as indicated  - If delirium suspected, notify physician/AP of change in condition and request immediate in-person evaluation  - Pursue consults as appropriate including Geriatric (campus dependent), OT for cognitive evaluation/activity planning, psychiatric, pastoral care, etc   Outcome: Progressing     Problem: BEHAVIOR  Goal: Pt/Family maintain appropriate behavior and adhere to behavioral management agreement, if implemented  Description: INTERVENTIONS:  - Assess the family dynamic   - Encourage verbalization of thoughts and concerns in a socially appropriate manner  - Assess patient/family's coping skills and non-compliant behavior (including use of illegal substances)  - Utilize positive, consistent limit setting strategies supporting safety of patient, staff and others  - Initiate consult with Case Management, Spiritual Care or other ancillary services as appropriate  - If a patient's/visitor's behavior jeopardizes the safety of the patient, staff, or others, refer to organization procedure     - Notify Security of behavior or suspected illegal substances which indicate the need for search of the patient and/or belongings  - Encourage participation in the decision making process about a behavioral management agreement; implement if patient meets criteria  Outcome: Progressing     Problem: SELF HARM  Goal: Effect of psychiatric condition will be minimized and patient will be protected from self harm  Description: INTERVENTIONS:  - Assess impact of patient's symptoms on level of functioning, self-care needs and offer support as indicated  - Assess patient/family knowledge of depression, impact on illness and need for teaching  - Provide emotional support, presence and reassurance  - Assess for possible suicidal thoughts, ideation or self-harm  If patient expresses suicidal thoughts or statements do not leave alone, notify physician/AP immediately, initiate suicide precautions, and determine need for continual observation  - initiate consults and referrals as appropriate (a mental health professional, Spiritual Care  Outcome: Progressing     Problem: BEHAVIOR  Goal: Pt/Family maintain appropriate behavior and adhere to behavioral management agreement, if implemented  Description: INTERVENTIONS:  - Assess the family dynamic   - Encourage verbalization of thoughts and concerns in a socially appropriate manner  - Assess patient/family's coping skills and non-compliant behavior (including use of illegal substances)  - Utilize positive, consistent limit setting strategies supporting safety of patient, staff and others  - Initiate consult with Case Management, Spiritual Care or other ancillary services as appropriate  - If a patient's/visitor's behavior jeopardizes the safety of the patient, staff, or others, refer to organization procedure     - Notify Security of behavior or suspected illegal substances which indicate the need for search of the patient and/or belongings  - Encourage participation in the decision making process about a behavioral management agreement; implement if patient meets criteria  Outcome: Progressing     Problem: ANXIETY  Goal: Will report anxiety at manageable levels  Description: INTERVENTIONS:  - Administer medication as ordered  - Teach and encourage coping skills  - Provide emotional support  - Assess patient/family for anxiety and ability to cope  Outcome: Progressing  Goal: By discharge: Patient will verbalize 2 strategies to deal with anxiety  Description: Interventions:  - Identify any obvious source/trigger to anxiety  - Staff will assist patient in applying identified coping technique/skills  - Encourage attendance of scheduled groups and activities  Outcome: Progressing     Problem: SLEEP DISTURBANCE  Goal: Will exhibit normal sleeping pattern  Description: Interventions:  -  Assess the patients sleep pattern, noting recent changes  - Administer medication as ordered  - Decrease environmental stimuli, including noise, as appropriate during the night  - Encourage the patient to actively participate in unit groups and or exercise during the day to enhance ability to achieve adequate sleep at night  - Assess the patient, in the morning, encouraging a description of sleep experience  Outcome: Progressing     Problem: INVOLUNTARY ADMIT  Goal: Will cooperate with staff recommendations and doctor's orders and will demonstrate appropriate behavior  Description: INTERVENTIONS:  - Treat underlying conditions and offer medication as ordered  - Educate regarding involuntary admission procedures and rules  - Utilize positive consistent limit setting strategies to support patient and staff safety  Outcome: Progressing

## 2021-04-11 NOTE — NURSING NOTE
Patient is bright in affect states, "Can you tell I am in a good mood today"  She is talkative and rambling  She reports having slept poorly but does not appear tired  Cooperative with care

## 2021-04-11 NOTE — NURSING NOTE
Pt remains delusional and preoccupied with discharge  She is cooperative and compliant on the unit, denies symptoms  Pt had difficulty falling asleep, received prn trazodone with no effect  Received prn Motrin for back pain and Ativan for insomnia which was effective

## 2021-04-12 PROCEDURE — 99233 SBSQ HOSP IP/OBS HIGH 50: CPT | Performed by: PSYCHIATRY & NEUROLOGY

## 2021-04-12 RX ADMIN — MIRTAZAPINE 15 MG: 15 TABLET, FILM COATED ORAL at 21:04

## 2021-04-12 RX ADMIN — IBUPROFEN 200 MG: 200 TABLET, FILM COATED ORAL at 21:04

## 2021-04-12 RX ADMIN — PANTOPRAZOLE SODIUM 20 MG: 20 TABLET, DELAYED RELEASE ORAL at 05:49

## 2021-04-12 RX ADMIN — MELATONIN TAB 3 MG 3 MG: 3 TAB at 21:04

## 2021-04-12 RX ADMIN — ARIPIPRAZOLE 20 MG: 10 TABLET ORAL at 08:06

## 2021-04-12 NOTE — NURSING NOTE
Patient is visible, out of room to make calls to  and talk with staff  No S/S or other concern verbalized  Pt has good appetite and is calm and cooperative with care, medication compliant  Will continue to monitor

## 2021-04-12 NOTE — PROGRESS NOTES
Psychiatrist Progress Note - [address] 78 y o  female MRN: [id number]  Unit/Bed#Hollie Pritchett 166-67 Encounter: [id number]    The patient was seen for continuing care and reviewed with treatment team  Per staff, patient has continued to make paranoid statements  She is less spontaneous about the statements during today's conversation though this has not been consistent  She is friendly and pleasant in conversation but per covering psychiatrist over the weekend, she had made paranoid statements about this writer as well  Denies adverse effects  Writer spoke with patient's  to provide update  He was difficult to understand due to poor articulation  He does confirm patient's report that he was recently admitted to our inpatient psychiatry unit  He does not have any concerns about her returning home  He denies that his son is incarcerated as reported by patient and though they have not been on good terms, he does not believe he is capable of assaulting family  He states they are not very close to their daughter Estephania Dill who has been in contact with the team to provide collateral information  He denies any concerns about his daughter being a threat in any way       Mental Status Evaluation:  Appearance: fair grooming, intact hygiene, no abnormal involuntary movements noted  Behavior: superficially pleasant, no psychomotor agitation or retardation  Speech: wnl  Mood: denies complaints  Affect: neutral, stable, superficial  Thought process: somewhat illogical, tangential  Thought content: paranoid delusions remain; denies SI  Perceptual disturbances: No appearance of internal preoccupation  Cognition: loosely oriented    Insight: poor  Judgement: poor    Vitals:    04/11/21 1500 04/11/21 2124 04/12/21 0400 04/12/21 0800   BP: 143/80 140/90  132/81   BP Location: Right arm Right arm  Right arm   Pulse: 78 72  86   Resp:  16 16 18   Temp: 98 °F (36 7 °C) 97 6 °F (36 4 °C)  98 °F (36 7 °C) TempSrc: Tympanic Tympanic  Tympanic   SpO2: 97% 96% 97% 94%   Weight:       Height:           Current Facility-Administered Medications   Medication Dose Route Frequency Provider Last Rate    aluminum-magnesium hydroxide-simethicone  30 mL Oral Q4H PRN Elijah Aj MD      [START ON 4/13/2021] ARIPiprazole  25 mg Oral Daily Doretha Matthew MD      artificial tear  1 application Both Eyes G4K PRN Elijah Aj MD      benztropine  0 5 mg Oral Q4H PRN Max 6/day Elijah Aj MD      bisacodyl  10 mg Rectal Daily PRN Elijah Aj MD      hydrOXYzine HCL  25 mg Oral Q6H PRN Max 4/day Elijah Aj MD      ibuprofen  200 mg Oral Q6H PRN Elijah Aj MD      ibuprofen  400 mg Oral Q6H PRN Elijah Aj MD      ibuprofen  600 mg Oral Q8H PRN Elijah Aj MD      LORazepam  1 mg Intramuscular Q6H PRN Max 3/day Elijah Aj MD      LORazepam  0 5 mg Oral Q6H PRN Max 4/day Elijah Aj MD      LORazepam  1 mg Oral Q6H PRN Max 3/day Elijah Aj MD      melatonin  3 mg Oral HS Elijah Aj MD      mirtazapine  15 mg Oral HS Anabel Stone MD      nicotine polacrilex  4 mg Oral Q2H PRN Elijah Aj MD      OLANZapine  5 mg Intramuscular Q3H PRN Max 3/day Elijah Aj MD      OLANZapine  2 5 mg Oral Q4H PRN Max 6/day Elijah Aj MD      OLANZapine  5 mg Oral Q4H PRN Max 3/day Elijah Aj MD      OLANZapine  5 mg Oral Q3H PRN Max 3/day Elijah Aj MD      pantoprazole  20 mg Oral Early Morning Elijah Aj MD      polyethylene glycol  17 g Oral Daily PRN Elijah Aj MD      senna-docusate sodium  1 tablet Oral Daily PRN Elijah Aj MD      traZODone  50 mg Oral HS PRN Elijah Aj MD             Assessment/Plan    Principal Problem:    Mood disorder with psychosis ([facility] [address] )  Active Problems:    CKD (chronic kidney disease) stage 2, GFR 60-89 ml/min    Medical clearance for psychiatric admission    Major depressive disorder Anxiety    MCI (mild cognitive impairment)      Progress Toward Goals: limited    Recommended Treatment:   - increase abilify to 25mg po daily  - will consider switching to zyprexa if inadequate improvement  - attempted to reach out to daughter also today to discuss care but her mailbox was full  - Continue with pharmacotherapy, group therapy, milieu therapy and occupational therapy  Risks, benefits and possible side effects of Medications:   Patient does not verbalize understanding at this time and will require further explanation

## 2021-04-12 NOTE — PLAN OF CARE
Problem: COPING  Goal: Pt/Family able to verbalize concerns and demonstrate effective coping strategies  Description: INTERVENTIONS:  - Assist patient/family to identify coping skills, available support systems and cultural and spiritual values  - Provide emotional support, including active listening and acknowledgement of concerns of patient and caregivers  - Reduce environmental stimuli, as able  - Provide patient education  - Assess for spiritual pain/suffering and initiate spiritual care, including notification of Pastoral Care or edgar based community as needed  - Assess effectiveness of coping strategies  Outcome: Progressing     Problem: BEHAVIOR  Goal: Pt/Family maintain appropriate behavior and adhere to behavioral management agreement, if implemented  Description: INTERVENTIONS:  - Assess the family dynamic   - Encourage verbalization of thoughts and concerns in a socially appropriate manner  - Assess patient/family's coping skills and non-compliant behavior (including use of illegal substances)  - Utilize positive, consistent limit setting strategies supporting safety of patient, staff and others  - Initiate consult with Case Management, Spiritual Care or other ancillary services as appropriate  - If a patient's/visitor's behavior jeopardizes the safety of the patient, staff, or others, refer to organization procedure     - Notify Security of behavior or suspected illegal substances which indicate the need for search of the patient and/or belongings  - Encourage participation in the decision making process about a behavioral management agreement; implement if patient meets criteria  Outcome: Progressing     Problem: SELF HARM  Goal: Effect of psychiatric condition will be minimized and patient will be protected from self harm  Description: INTERVENTIONS:  - Assess impact of patient's symptoms on level of functioning, self-care needs and offer support as indicated  - Assess patient/family knowledge of depression, impact on illness and need for teaching  - Provide emotional support, presence and reassurance  - Assess for possible suicidal thoughts, ideation or self-harm  If patient expresses suicidal thoughts or statements do not leave alone, notify physician/AP immediately, initiate suicide precautions, and determine need for continual observation  - initiate consults and referrals as appropriate (a mental health professional, Spiritual Care  Outcome: Progressing     Problem: DECISION MAKING  Goal: Pt/Family able to effectively weigh alternatives and participate in decision making related to treatment and care  Description: INTERVENTIONS:  - Identify decision maker  - Determine when there are differences among patient's view, family's view, and healthcare provider's view of patient condition and care goals  - Facilitate patient/family articulation of goals for care  - Help patient/family identify pros/cons of alternative solutions  - Provide information as requested by patient/family  - Respect patient/family rights related to privacy and sharing information   - Serve as a liaison between patient, family and health care team  - Initiate consults as appropriate (Ethics Team, Palliative Care, Family Care Conference, etc )  Outcome: Not Progressing     Problem: CONFUSION/THOUGHT DISTURBANCE  Goal: Thought disturbances (confusion, delirium, depression, dementia or psychosis) are managed to maintain or return to baseline mental status and functional level  Description: INTERVENTIONS:  - Assess for possible contributors to  thought disturbance, including but not limited to medications, infection, impaired vision or hearing, underlying metabolic abnormalities, dehydration, respiratory compromise,  psychiatric diagnoses and notify attending PHYSICAN/AP  - Monitor and intervene to maintain adequate nutrition, hydration, elimination, sleep and activity  - Decrease environmental stimuli, including noise as appropriate    - Provide frequent contacts to provide refocusing, direction and reassurance as needed  Approach patient calmly with eye contact and at their level    - [place] high risk fall precautions, aspiration precautions and other safety measures, as indicated  - If delirium suspected, notify physician/AP of change in condition and request immediate in-person evaluation  - Pursue consults as appropriate including Geriatric (campus dependent), OT for cognitive evaluation/activity planning, psychiatric, pastoral care, etc   Outcome: Not Progressing

## 2021-04-12 NOTE — NURSING NOTE
Pt remains delusional and rambling about how she was set up and committed to the hospital  Cooperative and pleasant on the unit  Denies symptoms  Pt slept all night

## 2021-04-12 NOTE — CASE MANAGEMENT
04/12/21 0847   Team Meeting   Meeting Type Daily Rounds   Initial Conference Date 04/12/21   Team Members Present   Team Members Present Physician;Nurse;;; Occupational Therapist   Physician Team Member Dr Charissa Santana; [address]  Team Member MARLON [facility] Management Team Member Dennise Mcleod   Social Work Team Member Julia Wilkinson   OT Team Member Phylicia Lorenzo   Patient/Family Present   Patient Present No   Patient's Family Present No     Delusional, fixated on family, med compliant, pleasant, cooperative, slept well, calm

## 2021-04-13 LAB
ALBUMIN SERPL BCP-MCNC: 4.1 G/DL (ref 3–5.2)
ALP SERPL-CCNC: 67 U/L (ref 43–122)
ALT SERPL W P-5'-P-CCNC: 21 U/L
ANION GAP SERPL CALCULATED.3IONS-SCNC: 5 MMOL/L (ref 5–14)
AST SERPL W P-5'-P-CCNC: 29 U/L (ref 14–36)
BASOPHILS # BLD AUTO: 0 THOUSANDS/ΜL (ref 0–0.1)
BASOPHILS NFR BLD AUTO: 0 % (ref 0–1)
BILIRUB SERPL-MCNC: 0.43 MG/DL
BUN SERPL-MCNC: 17 MG/DL (ref 5–25)
CALCIUM SERPL-MCNC: 9.7 MG/DL (ref 8.4–10.2)
CHLORIDE SERPL-SCNC: 99 MMOL/L (ref 97–108)
CO2 SERPL-SCNC: 32 MMOL/L (ref 22–30)
CREAT SERPL-MCNC: 0.91 MG/DL (ref 0.6–1.2)
EOSINOPHIL # BLD AUTO: 0 THOUSAND/ΜL (ref 0–0.4)
EOSINOPHIL NFR BLD AUTO: 0 % (ref 0–6)
ERYTHROCYTE [DISTWIDTH] IN BLOOD BY AUTOMATED COUNT: 13.1 %
GFR SERPL CREATININE-BSD FRML MDRD: 65 ML/MIN/1.73SQ M
GLUCOSE P FAST SERPL-MCNC: 97 MG/DL (ref 70–99)
GLUCOSE SERPL-MCNC: 97 MG/DL (ref 70–99)
HCT VFR BLD AUTO: 40.2 % (ref 36–46)
HGB BLD-MCNC: 13.7 G/DL (ref 12–16)
LYMPHOCYTES # BLD AUTO: 0.9 THOUSANDS/ΜL (ref 0.5–4)
LYMPHOCYTES NFR BLD AUTO: 30 % (ref 25–45)
MCH RBC QN AUTO: 31.8 PG (ref 26–34)
MCHC RBC AUTO-ENTMCNC: 34 G/DL (ref 31–36)
MCV RBC AUTO: 93 FL (ref 80–100)
MONOCYTES # BLD AUTO: 0.2 THOUSAND/ΜL (ref 0.2–0.9)
MONOCYTES NFR BLD AUTO: 8 % (ref 1–10)
NEUTROPHILS # BLD AUTO: 1.8 THOUSANDS/ΜL (ref 1.8–7.8)
NEUTS SEG NFR BLD AUTO: 62 % (ref 45–65)
PLATELET # BLD AUTO: 290 THOUSANDS/UL (ref 150–450)
PMV BLD AUTO: 6.9 FL (ref 8.9–12.7)
POTASSIUM SERPL-SCNC: 3.8 MMOL/L (ref 3.6–5)
PROT SERPL-MCNC: 7.4 G/DL (ref 5.9–8.4)
RBC # BLD AUTO: 4.31 MILLION/UL (ref 4–5.2)
SODIUM SERPL-SCNC: 136 MMOL/L (ref 137–147)
WBC # BLD AUTO: 2.9 THOUSAND/UL (ref 4.5–11)

## 2021-04-13 PROCEDURE — 80053 COMPREHEN METABOLIC PANEL: CPT | Performed by: PSYCHIATRY & NEUROLOGY

## 2021-04-13 PROCEDURE — 99232 SBSQ HOSP IP/OBS MODERATE 35: CPT | Performed by: PSYCHIATRY & NEUROLOGY

## 2021-04-13 PROCEDURE — 85025 COMPLETE CBC W/AUTO DIFF WBC: CPT | Performed by: PSYCHIATRY & NEUROLOGY

## 2021-04-13 RX ADMIN — MELATONIN TAB 3 MG 3 MG: 3 TAB at 21:00

## 2021-04-13 RX ADMIN — PANTOPRAZOLE SODIUM 20 MG: 20 TABLET, DELAYED RELEASE ORAL at 05:46

## 2021-04-13 RX ADMIN — LORAZEPAM 0.5 MG: 0.5 TABLET ORAL at 21:00

## 2021-04-13 RX ADMIN — MIRTAZAPINE 15 MG: 15 TABLET, FILM COATED ORAL at 21:00

## 2021-04-13 RX ADMIN — ARIPIPRAZOLE 25 MG: 10 TABLET ORAL at 08:18

## 2021-04-13 NOTE — NURSING NOTE
Pt calm and cooperative, visible on unit  Continues to briefly voice that she was brought to hospital unfairly  Expresses readiness for discharge, wanting to return home with   Watching movie with peers  Appropriate behaviors, polite towards staff and peers  Displays pleasant mood, no symptoms reported

## 2021-04-13 NOTE — NURSING NOTE
Patient calm and cooperative on approach, denies s/s  Patient in and out of common areas with good interaction with peers  Patient compliant with medications and meals in the shift  Patient is afebrile, no episodes of SOB noted or reported

## 2021-04-13 NOTE — PLAN OF CARE
Problem: DECISION MAKING  Goal: Pt/Family able to effectively weigh alternatives and participate in decision making related to treatment and care  Description: INTERVENTIONS:  - Identify decision maker  - Determine when there are differences among patient's view, family's view, and healthcare provider's view of patient condition and care goals  - Facilitate patient/family articulation of goals for care  - Help patient/family identify pros/cons of alternative solutions  - Provide information as requested by patient/family  - Respect patient/family rights related to privacy and sharing information   - Serve as a liaison between patient, family and health care team  - Initiate consults as appropriate (Ethics Team, Palliative Care, [address] [place] [address] Street, etc )  4/13/2021 1616 by Oswald Maciel RN  Outcome: Progressing  4/13/2021 1615 by Oswald Maciel RN  Outcome: Progressing  4/13/2021 1615 by Oswald Maciel RN  Outcome: Progressing     Problem: CONFUSION/THOUGHT DISTURBANCE  Goal: Thought disturbances (confusion, delirium, depression, dementia or psychosis) are managed to maintain or return to baseline mental status and functional level  Description: INTERVENTIONS:  - Assess for possible contributors to  thought disturbance, including but not limited to medications, infection, impaired vision or hearing, underlying metabolic abnormalities, dehydration, respiratory compromise,  psychiatric diagnoses and notify attending PHYSICAN/AP  - Monitor and intervene to maintain adequate nutrition, hydration, elimination, sleep and activity  - Decrease environmental stimuli, including noise as appropriate  - Provide frequent contacts to provide refocusing, direction and reassurance as needed  Approach patient calmly with eye contact and at their level    - [place] high risk fall precautions, aspiration precautions and other safety measures, as indicated  - If delirium suspected, notify physician/AP of change in condition and request immediate in-person evaluation  - Pursue consults as appropriate including Geriatric (campus dependent), OT for cognitive evaluation/activity planning, psychiatric, pastoral care, etc   4/13/2021 1616 by Kavon Hood RN  Outcome: Progressing  4/13/2021 1615 by Kavon Hood RN  Outcome: Progressing  4/13/2021 1615 by Kavon Hood RN  Outcome: Progressing     Problem: BEHAVIOR  Goal: Pt/Family maintain appropriate behavior and adhere to behavioral management agreement, if implemented  Description: INTERVENTIONS:  - Assess the family dynamic   - Encourage verbalization of thoughts and concerns in a socially appropriate manner  - Assess patient/family's coping skills and non-compliant behavior (including use of illegal substances)  - Utilize positive, consistent limit setting strategies supporting safety of patient, staff and others  - Initiate consult with Case Management, Spiritual Care or other ancillary services as appropriate  - If a patient's/visitor's behavior jeopardizes the safety of the patient, staff, or others, refer to organization procedure     - Notify Security of behavior or suspected illegal substances which indicate the need for search of the patient and/or belongings  - Encourage participation in the decision making process about a behavioral management agreement; implement if patient meets criteria  4/13/2021 1616 by Kavon Hood RN  Outcome: Progressing  4/13/2021 1615 by Kavon Hood RN  Outcome: Progressing  4/13/2021 1615 by Kavon Hood RN  Outcome: Progressing     Problem: SELF HARM  Goal: Effect of psychiatric condition will be minimized and patient will be protected from self harm  Description: INTERVENTIONS:  - Assess impact of patient's symptoms on level of functioning, self-care needs and offer support as indicated  - Assess patient/family knowledge of depression, impact on illness and need for teaching  - Provide emotional support, presence and reassurance  - Assess for possible suicidal thoughts, ideation or self-harm  If patient expresses suicidal thoughts or statements do not leave alone, notify physician/AP immediately, initiate suicide precautions, and determine need for continual observation    - initiate consults and referrals as appropriate (a mental health professional, Spiritual Care  4/13/2021 1616 by Obie Prader, RN  Outcome: Progressing  4/13/2021 1615 by Obie Prader, RN  Outcome: Progressing  4/13/2021 1615 by Obie Prader, RN  Outcome: Progressing     Problem: ANXIETY  Goal: Will report anxiety at manageable levels  Description: INTERVENTIONS:  - Administer medication as ordered  - Teach and encourage coping skills  - Provide emotional support  - Assess patient/family for anxiety and ability to cope  4/13/2021 1616 by Obie Prader, RN  Outcome: Progressing  4/13/2021 1615 by Obie Prader, RN  Outcome: Progressing  4/13/2021 1615 by Obie Prader, RN  Outcome: Progressing  Goal: By discharge: Patient will verbalize 2 strategies to deal with anxiety  Description: Interventions:  - Identify any obvious source/trigger to anxiety  - Staff will assist patient in applying identified coping technique/skills  - Encourage attendance of scheduled groups and activities  4/13/2021 1616 by Obie Prader, RN  Outcome: Progressing  4/13/2021 1615 by Obie Prader, RN  Outcome: Progressing  4/13/2021 1615 by Obie Prader, RN  Outcome: Progressing     Problem: SLEEP DISTURBANCE  Goal: Will exhibit normal sleeping pattern  Description: Interventions:  -  Assess the patients sleep pattern, noting recent changes  - Administer medication as ordered  - Decrease environmental stimuli, including noise, as appropriate during the night  - Encourage the patient to actively participate in unit groups and or exercise during the day to enhance ability to achieve adequate sleep at night  - Assess the patient, in the morning, encouraging a description of sleep experience  4/13/2021 1616 by Karina Pereyra RN  Outcome: Progressing  4/13/2021 1615 by Karina Pereyra RN  Outcome: Progressing  4/13/2021 1615 by Karina Pereyra RN  Outcome: Progressing

## 2021-04-13 NOTE — PROGRESS NOTES
04/13/21 0906   Team Meeting   Meeting Type Daily Rounds   Initial Conference Date 04/13/21   Team Members Present   Team Members Present Physician;Nurse;;; Occupational Therapist   Physician Team Member Dr Go Plasencia Team Member [facility] Management Team Member [address] [place] [address] Work Team Member Nii   OT Team Member Erna Cruz   Patient/Family Present   Patient Present No   Patient's Family Present No     Calmer, more redirectable, social, med compliant, slept well, slowly improving, visible

## 2021-04-14 PROCEDURE — 99232 SBSQ HOSP IP/OBS MODERATE 35: CPT | Performed by: PSYCHIATRY & NEUROLOGY

## 2021-04-14 RX ADMIN — MELATONIN TAB 3 MG 3 MG: 3 TAB at 21:23

## 2021-04-14 RX ADMIN — PANTOPRAZOLE SODIUM 20 MG: 20 TABLET, DELAYED RELEASE ORAL at 06:02

## 2021-04-14 RX ADMIN — MIRTAZAPINE 15 MG: 15 TABLET, FILM COATED ORAL at 21:23

## 2021-04-14 RX ADMIN — ARIPIPRAZOLE 25 MG: 10 TABLET ORAL at 08:05

## 2021-04-14 NOTE — PROGRESS NOTES
Psychiatrist Progress Note - Bobby [address] 78 y o  female MRN: [id number]  Unit/Bed#Sally Adams 816-60 Encounter: [id number]    The patient was seen for continuing care and reviewed with treatment team  Per staff, patient has been calm and cooperative but continues to be paranoid about her daughter  This is concerning as patient and her  require daughter's help  Conversation is superficial due to insight being poor  Denies adverse effects of medications  Mental Status Evaluation:  Appearance: casual grooming, intact hygiene, no abnormal involuntary movements noted  Behavior: calm, cooperative, superficial - can become irritable quickly when delusions are challenged  Speech: pressured, well articulated  Mood: denies complaints     Affect: neutral, less labile  Thought process: tangential, illogical  Thought content: paranoid delusions remain, denies SI  Perceptual disturbances: denies AH  Cognition: loosely oriented    Insight: poor  Judgement: poor    Vitals:    04/13/21 1008 04/13/21 1200 04/13/21 1533 04/13/21 2002   BP:  150/79 135/81 153/85   BP Location:  Right arm Left arm Right arm   Pulse:  71 80 78   Resp:  17 18 18   Temp:  97 5 °F (36 4 °C) 97 6 °F (36 4 °C) 98 1 °F (36 7 °C)   TempSrc:  Tympanic Tympanic Tympanic   SpO2:  97% 97% 97%   Weight: 71 kg (156 lb 8 4 oz)      Height:           Current Facility-Administered Medications   Medication Dose Route Frequency Provider Last Rate    aluminum-magnesium hydroxide-simethicone  30 mL Oral Q4H PRN Ines Taveras MD      ARIPiprazole  25 mg Oral Daily Luis Felipe Chanel MD      artificial tear  1 application Both Eyes P5S PRN Ines Taveras MD      benztropine  0 5 mg Oral Q4H PRN Max 6/day Ines Taveras MD      bisacodyl  10 mg Rectal Daily PRN Ines Taveras MD      hydrOXYzine HCL  25 mg Oral Q6H PRN Max 4/day Ines Taveras MD      ibuprofen  200 mg Oral Q6H PRN Ines Taveras MD      ibuprofen  400 mg Oral Q6H PRN Ly Abdul MD      ibuprofen  600 mg Oral Q8H PRN Ly Abdul MD      LORazepam  1 mg Intramuscular Q6H PRN Max 3/day yL Abdul MD      LORazepam  0 5 mg Oral Q6H PRN Max 4/day Ly Abdul MD      LORazepam  1 mg Oral Q6H PRN Max 3/day Ly Abdul MD      melatonin  3 mg Oral HS Ly Abdul MD      mirtazapine  15 mg Oral HS Jagdeep Beal MD      nicotine polacrilex  4 mg Oral Q2H PRN Ly Abdul MD      OLANZapine  5 mg Intramuscular Q3H PRN Max 3/day Ly Abdul MD      OLANZapine  2 5 mg Oral Q4H PRN Max 6/day Ly Abdul MD      OLANZapine  5 mg Oral Q4H PRN Max 3/day Ly Abdul MD      OLANZapine  5 mg Oral Q3H PRN Max 3/day Ly Abdul MD      pantoprazole  20 mg Oral Early Morning Ly Abdul MD      polyethylene glycol  17 g Oral Daily PRN Ly Abdul MD      senna-docusate sodium  1 tablet Oral Daily PRN Ly Abdul MD      traZODone  50 mg Oral HS PRN yL Abdul MD             Assessment/Plan    Principal Problem:    Mood disorder with psychosis ([facility] [address] )  Active Problems:    CKD (chronic kidney disease) stage 2, GFR 60-89 ml/min    Medical clearance for psychiatric admission    Major depressive disorder    Anxiety    MCI (mild cognitive impairment)      Progress Toward Goals: limited    Recommended Treatment:   - continue abilify 25mg po daily  - continue mirtazapine 15mg po qhs  - continue melatonin 3mg po qhs  - Continue with pharmacotherapy, group therapy, milieu therapy and occupational therapy  Risks, benefits and possible side effects of Medications:   Patient does not verbalize understanding at this time and will require further explanation

## 2021-04-14 NOTE — PLAN OF CARE
Problem: DECISION MAKING  Goal: Pt/Family able to effectively weigh alternatives and participate in decision making related to treatment and care  Description: INTERVENTIONS:  - Identify decision maker  - Determine when there are differences among patient's view, family's view, and healthcare provider's view of patient condition and care goals  - Facilitate patient/family articulation of goals for care  - Help patient/family identify pros/cons of alternative solutions  - Provide information as requested by patient/family  - Respect patient/family rights related to privacy and sharing information   - Serve as a liaison between patient, family and health care team  - Initiate consults as appropriate (Ethics Team, Palliative Care, Family Care Conference, etc )  Outcome: Progressing     Problem: CONFUSION/THOUGHT DISTURBANCE  Goal: Thought disturbances (confusion, delirium, depression, dementia or psychosis) are managed to maintain or return to baseline mental status and functional level  Description: INTERVENTIONS:  - Assess for possible contributors to  thought disturbance, including but not limited to medications, infection, impaired vision or hearing, underlying metabolic abnormalities, dehydration, respiratory compromise,  psychiatric diagnoses and notify attending PHYSICAN/AP  - Monitor and intervene to maintain adequate nutrition, hydration, elimination, sleep and activity  - Decrease environmental stimuli, including noise as appropriate  - Provide frequent contacts to provide refocusing, direction and reassurance as needed  Approach patient calmly with eye contact and at their level    - [place] high risk fall precautions, aspiration precautions and other safety measures, as indicated  - If delirium suspected, notify physician/AP of change in condition and request immediate in-person evaluation  - Pursue consults as appropriate including Geriatric (campus dependent), OT for cognitive evaluation/activity planning, psychiatric, pastoral care, etc   Outcome: Progressing     Problem: BEHAVIOR  Goal: Pt/Family maintain appropriate behavior and adhere to behavioral management agreement, if implemented  Description: INTERVENTIONS:  - Assess the family dynamic   - Encourage verbalization of thoughts and concerns in a socially appropriate manner  - Assess patient/family's coping skills and non-compliant behavior (including use of illegal substances)  - Utilize positive, consistent limit setting strategies supporting safety of patient, staff and others  - Initiate consult with Case Management, Spiritual Care or other ancillary services as appropriate  - If a patient's/visitor's behavior jeopardizes the safety of the patient, staff, or others, refer to organization procedure  - Notify Security of behavior or suspected illegal substances which indicate the need for search of the patient and/or belongings  - Encourage participation in the decision making process about a behavioral management agreement; implement if patient meets criteria  Outcome: Progressing     Problem: SELF HARM  Goal: Effect of psychiatric condition will be minimized and patient will be protected from self harm  Description: INTERVENTIONS:  - Assess impact of patient's symptoms on level of functioning, self-care needs and offer support as indicated  - Assess patient/family knowledge of depression, impact on illness and need for teaching  - Provide emotional support, presence and reassurance  - Assess for possible suicidal thoughts, ideation or self-harm  If patient expresses suicidal thoughts or statements do not leave alone, notify physician/AP immediately, initiate suicide precautions, and determine need for continual observation    - initiate consults and referrals as appropriate (a mental health professional, Spiritual Care  Outcome: Progressing     Problem: BEHAVIOR  Goal: Pt/Family maintain appropriate behavior and adhere to behavioral management agreement, if implemented  Description: INTERVENTIONS:  - Assess the family dynamic   - Encourage verbalization of thoughts and concerns in a socially appropriate manner  - Assess patient/family's coping skills and non-compliant behavior (including use of illegal substances)  - Utilize positive, consistent limit setting strategies supporting safety of patient, staff and others  - Initiate consult with Case Management, Spiritual Care or other ancillary services as appropriate  - If a patient's/visitor's behavior jeopardizes the safety of the patient, staff, or others, refer to organization procedure     - Notify Security of behavior or suspected illegal substances which indicate the need for search of the patient and/or belongings  - Encourage participation in the decision making process about a behavioral management agreement; implement if patient meets criteria  Outcome: Progressing     Problem: ANXIETY  Goal: Will report anxiety at manageable levels  Description: INTERVENTIONS:  - Administer medication as ordered  - Teach and encourage coping skills  - Provide emotional support  - Assess patient/family for anxiety and ability to cope  Outcome: Progressing  Goal: By discharge: Patient will verbalize 2 strategies to deal with anxiety  Description: Interventions:  - Identify any obvious source/trigger to anxiety  - Staff will assist patient in applying identified coping technique/skills  - Encourage attendance of scheduled groups and activities  Outcome: Progressing

## 2021-04-14 NOTE — CASE MANAGEMENT
Pt agreeable to signing TRISHA for [employer]  Pt stated that she spoke with her  again and the court hearing is on Friday, not Thurs  Pt stated that her  might call the court and ask for extension since pt is still hospitalized  CM reviewed 304 rights with pt and explained that if there will be a hearing on 4/20 unless she is d/c'ed prior to then  Pt stated that she understood and is hoping that she's discharged prior to then  Pt continues to state that she's fine and can go home with OP services  304 paperwork completed and faxed to [facility]

## 2021-04-14 NOTE — NURSING NOTE
Pt visible, social on unit  Paranoid, continues perseverating on issues with son and daughter, disorganized and rambling at times  Pt asked  to deliver another phone to unit, pt informed that phone would not be able to be utilized per treatment team  Pt voiced feeling that this was unfair, stated she wanted to watch shows and play games on phone, encouraged to focus on treatment  Poor insight regarding hospitalization  Pt able to watch movie, read newspaper  Spoke to  several times utilizing unit phone  Calm and cooperative behaviors  Denies symptoms, expresses readiness for discharge

## 2021-04-14 NOTE — CASE MANAGEMENT
04/14/21 0841   Team Meeting   Meeting Type Daily Rounds   Initial Conference Date 04/14/21   Team Members Present   Team Members Present Physician;Nurse;;; Occupational Therapist   Physician Team Member Dr Ty Boyer Team Member [facility] Management Team Member [address] [place] [address] Work Team Member Nii   OT Team Member Gail Meza   Patient/Family Present   Patient Present No   Patient's Family Present No     Calm, pleasant, cooperative, med compliant, visible, more appropriate, denies s/s

## 2021-04-14 NOTE — CASE MANAGEMENT
Pt called CM stating that her  called her and told her that she's being subpoenaed to appear in court tomorrow at 1pm  She doesn't understand why she can't be d/c'ed to OP care as she feels fine and is ready to go home  When asked about the court hearing, she states that her son is "out free" and she needs to testify in order to prove his sexual misconduct and firearm possession  CM to follow up with pt's family  Spoke with pt's daughter, Erwin Franco, to get update  She is not aware of pt being subpoenaed but she's guessing that a subpoena was sent to pt's home since she failed to appear in court on 4/5 to testify about PFA against her son  [place] was told by her brother that the court would be reaching out to pt to get a statement since they need to hear from the plaintiff in order to have a case against pt's son  Erwin Franco thinks that the case will be dropped if the court is not able to get a statement from pt and Erwin Franco believes that the case should be dropped since the allegations are not true  Erwin Franco reports feeing bad for her brother -- she knows that he can be a disrespectful person but he would never harm the family or own illegal firearms  Erwin Franco does not understand why pt has paranoid delusions about her -- she wasn't even involved in the incident that occurred prior to pt's admission and had no part in pt's commitment  Erwin Franco reports that she's currently out of town on vacation   CM will continue providing updates

## 2021-04-15 PROCEDURE — 99233 SBSQ HOSP IP/OBS HIGH 50: CPT | Performed by: PSYCHIATRY & NEUROLOGY

## 2021-04-15 RX ORDER — RISPERIDONE 0.5 MG/1
0.5 TABLET, FILM COATED ORAL EVERY 12 HOURS SCHEDULED
Status: DISCONTINUED | OUTPATIENT
Start: 2021-04-15 | End: 2021-04-16

## 2021-04-15 RX ORDER — ARIPIPRAZOLE 10 MG/1
10 TABLET ORAL DAILY
Status: DISCONTINUED | OUTPATIENT
Start: 2021-04-16 | End: 2021-04-16

## 2021-04-15 RX ADMIN — ARIPIPRAZOLE 25 MG: 10 TABLET ORAL at 08:18

## 2021-04-15 RX ADMIN — RISPERIDONE 0.5 MG: 0.5 TABLET ORAL at 21:01

## 2021-04-15 RX ADMIN — PANTOPRAZOLE SODIUM 20 MG: 20 TABLET, DELAYED RELEASE ORAL at 05:50

## 2021-04-15 RX ADMIN — MELATONIN TAB 3 MG 3 MG: 3 TAB at 21:01

## 2021-04-15 RX ADMIN — MIRTAZAPINE 15 MG: 15 TABLET, FILM COATED ORAL at 21:01

## 2021-04-15 NOTE — NURSING NOTE
Pt pleasant and cooperative, visible on unit  Good intake at dinner  Social with peers and staff  No delusions voiced  Pt expressed concern for  who attempted to drop off pt's stimulator but was unable to find hospital  Pt's  then  returned home, pt understanding  No symptoms reported  Visible, watching movie with staff and peers  Understanding of scheduled medication changes  Medication compliant

## 2021-04-15 NOTE — PLAN OF CARE
Problem: DECISION MAKING  Goal: Pt/Family able to effectively weigh alternatives and participate in decision making related to treatment and care  Description: INTERVENTIONS:  - Identify decision maker  - Determine when there are differences among patient's view, family's view, and healthcare provider's view of patient condition and care goals  - Facilitate patient/family articulation of goals for care  - Help patient/family identify pros/cons of alternative solutions  - Provide information as requested by patient/family  - Respect patient/family rights related to privacy and sharing information   - Serve as a liaison between patient, family and health care team  - Initiate consults as appropriate (Ethics Team, Palliative Care, Family Care Conference, etc )  Outcome: Progressing     Problem: CONFUSION/THOUGHT DISTURBANCE  Goal: Thought disturbances (confusion, delirium, depression, dementia or psychosis) are managed to maintain or return to baseline mental status and functional level  Description: INTERVENTIONS:  - Assess for possible contributors to  thought disturbance, including but not limited to medications, infection, impaired vision or hearing, underlying metabolic abnormalities, dehydration, respiratory compromise,  psychiatric diagnoses and notify attending PHYSICAN/AP  - Monitor and intervene to maintain adequate nutrition, hydration, elimination, sleep and activity  - Decrease environmental stimuli, including noise as appropriate  - Provide frequent contacts to provide refocusing, direction and reassurance as needed  Approach patient calmly with eye contact and at their level    - [place] high risk fall precautions, aspiration precautions and other safety measures, as indicated  - If delirium suspected, notify physician/AP of change in condition and request immediate in-person evaluation  - Pursue consults as appropriate including Geriatric (campus dependent), OT for cognitive evaluation/activity planning, psychiatric, pastoral care, etc   Outcome: Progressing     Problem: SELF HARM  Goal: Effect of psychiatric condition will be minimized and patient will be protected from self harm  Description: INTERVENTIONS:  - Assess impact of patient's symptoms on level of functioning, self-care needs and offer support as indicated  - Assess patient/family knowledge of depression, impact on illness and need for teaching  - Provide emotional support, presence and reassurance  - Assess for possible suicidal thoughts, ideation or self-harm  If patient expresses suicidal thoughts or statements do not leave alone, notify physician/AP immediately, initiate suicide precautions, and determine need for continual observation    - initiate consults and referrals as appropriate (a mental health professional, Spiritual Care  Outcome: Progressing     Problem: ANXIETY  Goal: Will report anxiety at manageable levels  Description: INTERVENTIONS:  - Administer medication as ordered  - Teach and encourage coping skills  - Provide emotional support  - Assess patient/family for anxiety and ability to cope  Outcome: Progressing  Goal: By discharge: Patient will verbalize 2 strategies to deal with anxiety  Description: Interventions:  - Identify any obvious source/trigger to anxiety  - Staff will assist patient in applying identified coping technique/skills  - Encourage attendance of scheduled groups and activities  Outcome: Progressing     Problem: SLEEP DISTURBANCE  Goal: Will exhibit normal sleeping pattern  Description: Interventions:  -  Assess the patients sleep pattern, noting recent changes  - Administer medication as ordered  - Decrease environmental stimuli, including noise, as appropriate during the night  - Encourage the patient to actively participate in unit groups and or exercise during the day to enhance ability to achieve adequate sleep at night  - Assess the patient, in the morning, encouraging a description of sleep experience  Outcome: Progressing     Problem: INVOLUNTARY ADMIT  Goal: Will cooperate with staff recommendations and doctor's orders and will demonstrate appropriate behavior  Description: INTERVENTIONS:  - Treat underlying conditions and offer medication as ordered  - Educate regarding involuntary admission procedures and rules  - Utilize positive consistent limit setting strategies to support patient and staff safety  Outcome: Progressing

## 2021-04-15 NOTE — NURSING NOTE
Patient is visible, out of room several times to talk with  via unit phone  Pt still perseverating with wanting cell phone  Pt is cooperative with care, pleasant and medication compliant  Pt denies all S/S  No distress noted

## 2021-04-15 NOTE — PROGRESS NOTES
Psychiatrist Progress Note - [address] 78 y o  female MRN: [id number]  Unit/Bed#Lorrie Rhodes [id number] Encounter: [id number]    The patient was seen for continuing care and reviewed with treatment team  Per staff, patient has been calm and cooperative  She is less agitated today after hearing from her  that her hearing for the PFA has been rescheduled  She now states "maybe I was meant to be here " She remains paranoid and fears for her safety and plans on continuing to purse the PFA against her son  She still believes he was incarcerated though this is not true  She believes he is now out of group home and thinks that his drug dealer acquaintances paid for his bail  In regards to her daughter, she states her daughter is not answering her calls but she intends on mending their relationship  She is still mistrustful  Patient is illogical and disorganized       Mental Status Evaluation:  Appearance: adequate grooming/hygiene, no abnormal involuntary movements noted  Behavior: calm, cooperative  Speech: pressured  Mood: frustrated  Affect: congruent, stable, appropriate  Thought process: tangential, illogical, vague, inconsistent  Thought content: paranoid delusions remain; denies SI  Perceptual disturbances: denies AH  Cognition: adequately oriented    Insight: poor  Judgement: poor    Vitals:    04/14/21 1624 04/14/21 1959 04/15/21 0735 04/15/21 1200   BP: 101/61 130/85 138/83 122/74   BP Location: Right arm Left arm Right arm Right arm   Pulse: 92 78 76 81   Resp:  18 18 18   Temp: 97 6 °F (36 4 °C) 97 5 °F (36 4 °C) 97 6 °F (36 4 °C) 97 5 °F (36 4 °C)   TempSrc: Tympanic Tympanic Tympanic Tympanic   SpO2: 96% 98% 97% 95%   Weight:       Height:           Current Facility-Administered Medications   Medication Dose Route Frequency Provider Last Rate    aluminum-magnesium hydroxide-simethicone  30 mL Oral Q4H PRN Fauzia Franco MD      ARIPiprazole  25 mg Oral Daily MD Alex Keithis Abt artificial tear  1 application Both Eyes D2C PRN Mc Amador MD      benztropine  0 5 mg Oral Q4H PRN Max 6/day Mc Amador MD      bisacodyl  10 mg Rectal Daily PRN Mc Amador MD      hydrOXYzine HCL  25 mg Oral Q6H PRN Max 4/day Mc Amador MD      ibuprofen  200 mg Oral Q6H PRN Mc Amador MD      ibuprofen  400 mg Oral Q6H PRN Mc Amador MD      ibuprofen  600 mg Oral Q8H PRN Mc Amador MD      LORazepam  1 mg Intramuscular Q6H PRN Max 3/day Mc Amador MD      LORazepam  0 5 mg Oral Q6H PRN Max 4/day Mc Amador MD      LORazepam  1 mg Oral Q6H PRN Max 3/day Mc Amador MD      melatonin  3 mg Oral HS Mc Amador MD      mirtazapine  15 mg Oral HS Sean Henderson MD      nicotine polacrilex  4 mg Oral Q2H PRN Mc Amador MD      OLANZapine  5 mg Intramuscular Q3H PRN Max 3/day Mc Amador MD      OLANZapine  2 5 mg Oral Q4H PRN Max 6/day Mc Amador MD      OLANZapine  5 mg Oral Q4H PRN Max 3/day Mc Amador MD      OLANZapine  5 mg Oral Q3H PRN Max 3/day Mc Amador MD      pantoprazole  20 mg Oral Early Morning Mc Amador MD      polyethylene glycol  17 g Oral Daily PRN Mc Amador MD      senna-docusate sodium  1 tablet Oral Daily PRN Mc Amador MD      traZODone  50 mg Oral HS PRN Mc Amador MD             Assessment/Plan    Principal Problem:    Mood disorder with psychosis ([facility] [address] )  Active Problems:    CKD (chronic kidney disease) stage 2, GFR 60-89 ml/min    Medical clearance for psychiatric admission    Major depressive disorder    Anxiety    MCI (mild cognitive impairment)      Progress Toward Goals: limited    Recommended Treatment:   - switch to risperidone - start 0 5mg po bid and titrate rapidly if tolerated  - decrease abilify to 10mg po daily with plan for eventual discontinuation  - continue remeron 15mg po qhs and melatonin 3mg po qhs for insomnia  - Continue with pharmacotherapy, group therapy, milieu therapy and occupational therapy  Risks, benefits and possible side effects of Medications:   Risks, benefits, and possible side effects of medications explained to patient and patient verbalizes understanding

## 2021-04-15 NOTE — CASE MANAGEMENT
04/15/21 0900   Team Meeting   Meeting Type Tx Team Meeting   Initial Conference Date 04/15/21   Team Members Present   Team Members Present Physician;Occupational Therapist;;    Physician Team Member Dr Rina Braswell Team Member Yazan Ledbetter, RN   Care Management Team Member Yolanda Dominguez and Al Palafox   Social Work Team Member Nii   OT Team Member Kasey Campbell   Patient/Family Present   Patient Present No   Patient's Family Present No     Visible, social, perseverating on issues with children, slept, denied s/s, calm

## 2021-04-15 NOTE — PROGRESS NOTES
Psychiatrist Progress Note - [address] 78 y o  female MRN: [id number]  Unit/Bed#Ronald Neri 998-51 Encounter: [id number]    The patient was seen for continuing care and reviewed with treatment team      Per staff, patient has been calm but increasingly anxious about reported court date tomorrow  Patient states that she needs to be discharged in order to show up in court tomorrow to testify against her son  She claims she has been subpoenaed  Reviewed with staff and patient's family; patient has never been served any legal papers  Patient became intrusive and demanding when writer declined to tatianna her discharge  Attempts to reassure her that court hearings can be rescheduled were not effective  Patient continues to be paranoid about her son and daughter  She has been compliant with medications and she denies side effects  Mental Status Evaluation:  Appearance: adequate grooming and hygiene, stable gait, no abnormal involuntary movements  Behavior: intrusive, demanding, increased psychomotor activity  Speech: loud and pressured due to anxiety, well articulated and fluent  Mood: angry, anxious   Affect: congruent, labile, increase intensity  Thought process: illogical but linear  Thought content: paranoid delusions ongoing, no report of suicidality      Perceptual disturbances: No appearance of internal preoccupation  Cognition: adequately oriented, minor memory deficits  Insight: poor   Judgement: poor    Vitals:    04/14/21 0755 04/14/21 1153 04/14/21 1624 04/14/21 1959   BP: 157/81 125/81 101/61 130/85   BP Location: Right arm Left arm Right arm Left arm   Pulse: 68 84 92 78   Resp: 18 16  18   Temp: 97 5 °F (36 4 °C) 97 9 °F (36 6 °C) 97 6 °F (36 4 °C) 97 5 °F (36 4 °C)   TempSrc: Tympanic Skin Tympanic Tympanic   SpO2: 97% 96% 96% 98%   Weight:       Height:           Current Facility-Administered Medications   Medication Dose Route Frequency Provider Last Rate    aluminum-magnesium hydroxide-simethicone  30 mL Oral Q4H PRN Joie Miriam, MD      ARIPiprazole  25 mg Oral Daily Krissy Rangel MD      artificial tear  1 application Both Eyes E5Y PRN Joie Miriam, MD      benztropine  0 5 mg Oral Q4H PRN Max 6/day Joie Miriam, MD      bisacodyl  10 mg Rectal Daily PRN Joie Miriam, MD      hydrOXYzine HCL  25 mg Oral Q6H PRN Max 4/day Joie Miriam, MD      ibuprofen  200 mg Oral Q6H PRN Joie Miriam, MD      ibuprofen  400 mg Oral Q6H PRN Joie Miriam, MD      ibuprofen  600 mg Oral Q8H PRN Joie Miriam, MD      LORazepam  1 mg Intramuscular Q6H PRN Max 3/day Joie Miriam, MD      LORazepam  0 5 mg Oral Q6H PRN Max 4/day Joie Miriam, MD      LORazepam  1 mg Oral Q6H PRN Max 3/day Joie Miriam, MD      melatonin  3 mg Oral HS Joie Miriam, MD      mirtazapine  15 mg Oral HS Joana Silverman, MD      nicotine polacrilex  4 mg Oral Q2H PRN Joie Miriam, MD      OLANZapine  5 mg Intramuscular Q3H PRN Max 3/day Joie Miriam, MD      OLANZapine  2 5 mg Oral Q4H PRN Max 6/day Joie Miriam, MD      OLANZapine  5 mg Oral Q4H PRN Max 3/day Joie Mirima, MD      OLANZapine  5 mg Oral Q3H PRN Max 3/day Joie Miriam, MD      pantoprazole  20 mg Oral Early Morning Joie Miriam, MD      polyethylene glycol  17 g Oral Daily PRN Joie Miriam, MD      senna-docusate sodium  1 tablet Oral Daily PRN Joie Miriam, MD      traZODone  50 mg Oral HS PRN Joie Miriam, MD             Assessment/Plan    Principal Problem:    Mood disorder with psychosis ([address] )  Active Problems:    CKD (chronic kidney disease) stage 2, GFR 60-89 ml/min    Medical clearance for psychiatric admission    Major depressive disorder    Anxiety    MCI (mild cognitive impairment)      Progress Toward Goals: limited improvement    Recommended Treatment:   - continue abilify 25mg PO daily  - will obtain TRISHA from patient to discuss continuance of court hearing date, patient has capacity to file a PFA but she does not have adequate capacity to testify regarding the behavior of her children  - Continue with pharmacotherapy, group therapy, milieu therapy and occupational therapy  Risks, benefits and possible side effects of Medications:   Patient does not verbalize understanding at this time and will require further explanation

## 2021-04-16 PROCEDURE — 99232 SBSQ HOSP IP/OBS MODERATE 35: CPT | Performed by: PSYCHIATRY & NEUROLOGY

## 2021-04-16 RX ORDER — ARIPIPRAZOLE 15 MG/1
30 TABLET ORAL DAILY
Status: DISCONTINUED | OUTPATIENT
Start: 2021-04-17 | End: 2021-04-21

## 2021-04-16 RX ADMIN — ARIPIPRAZOLE 10 MG: 10 TABLET ORAL at 08:14

## 2021-04-16 RX ADMIN — RISPERIDONE 0.5 MG: 0.5 TABLET ORAL at 08:14

## 2021-04-16 RX ADMIN — PANTOPRAZOLE SODIUM 20 MG: 20 TABLET, DELAYED RELEASE ORAL at 05:45

## 2021-04-16 NOTE — PROGRESS NOTES
Psychiatrist Progress Note - [address] 78 y o  female MRN: [id number]  Unit/Bed#Umer Chinchilla 579-19 Encounter: [id number]    The patient was seen for continuing care and reviewed with treatment team  Per staff patient has been calm and cooperative but remains paranoid about her son  Patient complains new medication, risperidone, has caused side effects  She reports feeling tingling on her face and hands  Patient would like to go back to taking abilify saying it helped with depression and anxiety  Writer discussed care with daughter, Miguel Fry, due to patient's ongoing mistrustfulness of her  Miguel Lisandra states that patient has developed very discrete paranoid delusions but very infrequently over the years including believing patient's sister is a witch  She also reportedly was very thin ("anorexic") in her teens but patient still tells her family she used to be overweight  Miguel Fry believes patient is able to live on her own with her   Miguel Fry states she helps out with their taxes or to help clarify documents they might not understand  She understands patient is still mistrusful of her but does not believe it would impair her mother's ability to live safely with her father  Writer provided update that patient has not developed any new delusions and severity of psychosis has decreased overall       Mental Status Evaluation:  Appearance: fair grooming, intact hygiene, no abnormal involuntary movements noted  Behavior: calm, cooperative  Speech: wnl  Mood: denies complaints  Affect: neutral, stable, reactive  Thought process: linear, periods of illogical thoughts  Thought content: paranoid about son, mistrustful of daughter though willing to reconcile with her; denies SI  Perceptual disturbances: denies AH  Cognition: adequately oriented    Insight: poor  Judgement: limited    Vitals:    04/16/21 0910 04/16/21 1000 04/16/21 1233 04/16/21 1557   BP: 136/88  130/85 125/74   BP Location: Right arm  Right arm Right arm   Pulse: 86 76 89 82   Resp: 16 18 16 18   Temp: 97 6 °F (36 4 °C)  97 6 °F (36 4 °C) (!) 97 1 °F (36 2 °C)   TempSrc: Tympanic  Tympanic Tympanic   SpO2: 94%  96% 95%   Weight:       Height:           Current Facility-Administered Medications   Medication Dose Route Frequency Provider Last Rate    aluminum-magnesium hydroxide-simethicone  30 mL Oral Q4H PRN Tara Lindsay MD      [START ON 4/17/2021] ARIPiprazole  30 mg Oral Daily Julia Carbajal MD      artificial tear  1 application Both Eyes I6N PRN Tara Lindsay MD      benztropine  0 5 mg Oral Q4H PRN Max 6/day Tara Lindsay MD      bisacodyl  10 mg Rectal Daily PRN Tara Lindsay MD      hydrOXYzine HCL  25 mg Oral Q6H PRN Max 4/day Tara Lindsay MD      ibuprofen  200 mg Oral Q6H PRN Tara Lindsay MD      ibuprofen  400 mg Oral Q6H PRN Tara Lindsay MD      ibuprofen  600 mg Oral Q8H PRN Tara Lindsay MD      LORazepam  1 mg Intramuscular Q6H PRN Max 3/day Tara Lindsay MD      LORazepam  0 5 mg Oral Q6H PRN Max 4/day Tara Lindsay MD      LORazepam  1 mg Oral Q6H PRN Max 3/day Tara Lindsay MD      melatonin  3 mg Oral HS Tara Lindsay MD      mirtazapine  15 mg Oral HS Tran Ritchie MD      nicotine polacrilex  4 mg Oral Q2H PRN Tara Lindsay MD      OLANZapine  5 mg Intramuscular Q3H PRN Max 3/day Tara Lindsay MD      OLANZapine  2 5 mg Oral Q4H PRN Max 6/day Tara Lindsay MD      OLANZapine  5 mg Oral Q4H PRN Max 3/day Tara Lindsay MD      OLANZapine  5 mg Oral Q3H PRN Max 3/day Tara Lindsay MD      pantoprazole  20 mg Oral Early Morning Tara Lindsay MD      polyethylene glycol  17 g Oral Daily PRN Tara Lindsay MD      senna-docusate sodium  1 tablet Oral Daily PRN Tara Lindsay MD      traZODone  50 mg Oral HS PRN Tara Lindsay MD             Assessment/Plan    Principal Problem:    Mood disorder with psychosis ([facility] [address] )  Active Problems:    CKD (chronic kidney disease) stage 2, GFR 60-89 ml/min    Medical clearance for psychiatric admission    Major depressive disorder    Anxiety    MCI (mild cognitive impairment)      Progress Toward Goals: limited improvement    Recommended Treatment:   - will switch back to abilify and titrate to 30mg po daily and d/c risperidone due to paresthesia  - 304 petition was submitted but based on conversation with daughter and past conversation with , patient may be ok to discharge early next week and continue treatment as an outpatient; she has ongoing paranoia about her son but he has already left the home   - continue mirtazapine 15mg po qhs and melatonin 3mg po qhs for insomnia  - Continue with pharmacotherapy, group therapy, milieu therapy and occupational therapy  Risks, benefits and possible side effects of Medications:   Risks, benefits, and possible side effects of medications explained to patient and patient verbalizes understanding

## 2021-04-16 NOTE — NURSING NOTE
Patient is calm and cooperative with care, Pt denies S/S  Pt is visible out of room, social with staff and peers  Pt is medication compliant but states "I don't like what the doctor is prescribing me, what I took this morning, I don't think I am going to take again, she wants to make me a split personality, she's a split personality!" No distress noted, will monitor

## 2021-04-16 NOTE — CASE MANAGEMENT
04/16/21 0830   Team Meeting   Meeting Type Daily Rounds   Initial Conference Date 04/16/21   Team Members Present   Team Members Present Physician;Nurse;;; Occupational Therapist   Physician Team Member Dr Genevieve Horan; [address]  Team Member [facility] - [facility] Management Team Member Duncan Wan   Social Work Team Member Nii   OT Team Member Loree Alva - OT student   Patient/Family Present   Patient Present No   Patient's Family Present No     Less paranoid, pleasant, calm, cooperative, off of isolation today, med changes yesterday, social, visible, med compliant

## 2021-04-17 PROCEDURE — 99232 SBSQ HOSP IP/OBS MODERATE 35: CPT | Performed by: PHYSICIAN ASSISTANT

## 2021-04-17 RX ADMIN — ARIPIPRAZOLE 30 MG: 15 TABLET ORAL at 08:21

## 2021-04-17 NOTE — NURSING NOTE
Patient is present on the unit and social with peers  Patient denies all symptoms  Patient is oriented but remains paranoid about her son  Patient questioned morning dose of Abilify stating, "I am supposed to be on 10 MG, why am I getting 30 MG?" Patient did take the 30 MG dose  Appetite is good  Ambulating with steady gait  Will continue to monitor and provide support as needed

## 2021-04-17 NOTE — NURSING NOTE
Pt refused HS medication  Pt lying in bed stated "I want to try to sleep on my own, If I need them I will come out"  Pt educated on medication, continued to refuse at this time

## 2021-04-17 NOTE — PROGRESS NOTES
Progress Note - Behavioral Health   Gayle Bean 78 y o  female MRN: [id number]  Unit/Bed#: Sandrita Perez 462-51 Encounter: [id number]    Richard Sinclair was seen for follow-up, chart review and discussed with nursing staff  Nursing staff notes she remains delusional and refused remeron and melatonin last night stating she "wanted her body to learn to sleep on its own"  She was compliant with morning Abilify although questioning increased dose  Patient continues with anxiety and paranoia regarding her family/son  Continue continues to maintain that her son was incarcerated and states that she plans to pursue a PFA against him  Reports that her relationship with her daughter has not been good either but states that she just needs some time to chill" and then she would like to have their relationship back  Overall she appears slightly less anxious than when initially admitted  She remains tangential in her thought process but less disorganized  Reports that she slept well last night  Appetite has been adequate  Physically she offers no complaints of pain or discomfort      Behavior over the last 24 hours:  unchanged  Sleep: normal  Appetite: normal  Medication side effects: No  ROS: no complaints  /80 (BP Location: Left arm)   Pulse 81   Temp 97 5 °F (36 4 °C) (Skin)   Resp 16   Ht 5' 1" (1 549 m)   Wt 71 kg (156 lb 8 4 oz)   SpO2 96%   BMI 29 58 kg/m²     Medications:   Current Facility-Administered Medications   Medication Dose Route Frequency    aluminum-magnesium hydroxide-simethicone (MYLANTA) oral suspension 30 mL  30 mL Oral Q4H PRN    ARIPiprazole (ABILIFY) tablet 30 mg  30 mg Oral Daily    artificial tear (LUBRIFRESH P M ) ophthalmic ointment 1 application  1 application Both Eyes L6K PRN    benztropine (COGENTIN) tablet 0 5 mg  0 5 mg Oral Q4H PRN Max 6/day    bisacodyl (DULCOLAX) rectal suppository 10 mg  10 mg Rectal Daily PRN    hydrOXYzine HCL (ATARAX) tablet 25 mg  25 mg Oral Q6H PRN Max 4/day    ibuprofen (MOTRIN) tablet 200 mg  200 mg Oral Q6H PRN    ibuprofen (MOTRIN) tablet 400 mg  400 mg Oral Q6H PRN    ibuprofen (MOTRIN) tablet 600 mg  600 mg Oral Q8H PRN    LORazepam (ATIVAN) injection 1 mg  1 mg Intramuscular Q6H PRN Max 3/day    LORazepam (ATIVAN) tablet 0 5 mg  0 5 mg Oral Q6H PRN Max 4/day    LORazepam (ATIVAN) tablet 1 mg  1 mg Oral Q6H PRN Max 3/day    melatonin tablet 3 mg  3 mg Oral HS    mirtazapine (REMERON) tablet 15 mg  15 mg Oral HS    nicotine polacrilex (NICORETTE) gum 4 mg  4 mg Oral Q2H PRN    OLANZapine (ZyPREXA) IM injection 5 mg  5 mg Intramuscular Q3H PRN Max 3/day    OLANZapine (ZyPREXA) tablet 2 5 mg  2 5 mg Oral Q4H PRN Max 6/day    OLANZapine (ZyPREXA) tablet 5 mg  5 mg Oral Q4H PRN Max 3/day    OLANZapine (ZyPREXA) tablet 5 mg  5 mg Oral Q3H PRN Max 3/day    pantoprazole (PROTONIX) EC tablet 20 mg  20 mg Oral Early Morning    polyethylene glycol (MIRALAX) packet 17 g  17 g Oral Daily PRN    senna-docusate sodium (SENOKOT S) 8 6-50 mg per tablet 1 tablet  1 tablet Oral Daily PRN    traZODone (DESYREL) tablet 50 mg  50 mg Oral HS PRN       Labs:   Admission on 03/29/2021   Component Date Value Ref Range Status    SARS-CoV-2 04/04/2021 Positive* Negative Final    INFLUENZA A PCR 04/04/2021 Negative  Negative Final    INFLUENZA B PCR 04/04/2021 Negative  Negative Final    RSV PCR 04/04/2021 Negative  Negative Final    WBC 04/13/2021 2 90* 4 50 - 11 00 Thousand/uL Final    RBC 04/13/2021 4 31  4 00 - 5 20 Million/uL Final    Hemoglobin 04/13/2021 13 7  12 0 - 16 0 g/dL Final    Hematocrit 04/13/2021 40 2  36 0 - 46 0 % Final    MCV 04/13/2021 93  80 - 100 fL Final    MCH 04/13/2021 31 8  26 0 - 34 0 pg Final    MCHC 04/13/2021 34 0  31 0 - 36 0 g/dL Final    RDW 04/13/2021 13 1  <15 3 % Final    MPV 04/13/2021 6 9* 8 9 - 12 7 fL Final    Platelets 88/37/2850 290  150 - 450 Thousands/uL Final    Neutrophils Relative 04/13/2021 62  45 - 65 % Final    Lymphocytes Relative 04/13/2021 30  25 - 45 % Final    Monocytes Relative 04/13/2021 8  1 - 10 % Final    Eosinophils Relative 04/13/2021 0  0 - 6 % Final    Basophils Relative 04/13/2021 0  0 - 1 % Final    Neutrophils Absolute 04/13/2021 1 80  1 80 - 7 80 Thousands/µL Final    Lymphocytes Absolute 04/13/2021 0 90  0 50 - 4 00 Thousands/µL Final    Monocytes Absolute 04/13/2021 0 20  0 20 - 0 90 Thousand/µL Final    Eosinophils Absolute 04/13/2021 0 00  0 00 - 0 40 Thousand/µL Final    Basophils Absolute 04/13/2021 0 00  0 00 - 0 10 Thousands/µL Final    Sodium 04/13/2021 136* 137 - 147 mmol/L Final    Potassium 04/13/2021 3 8  3 6 - 5 0 mmol/L Final    Chloride 04/13/2021 99  97 - 108 mmol/L Final    CO2 04/13/2021 32* 22 - 30 mmol/L Final    ANION GAP 04/13/2021 5  5 - 14 mmol/L Final    BUN 04/13/2021 17  5 - 25 mg/dL Final    Creatinine 04/13/2021 0 91  0 60 - 1 20 mg/dL Final    Glucose 04/13/2021 97  70 - 99 mg/dL Final    Glucose, Fasting 04/13/2021 97  70 - 99 mg/dL Final    Calcium 04/13/2021 9 7  8 4 - 10 2 mg/dL Final    AST 04/13/2021 29  14 - 36 U/L Final    ALT 04/13/2021 21  <35 U/L Final    Alkaline Phosphatase 04/13/2021 67  43 - 122 U/L Final    Total Protein 04/13/2021 7 4  5 9 - 8 4 g/dL Final    Albumin 04/13/2021 4 1  3 0 - 5 2 g/dL Final    Total Bilirubin 04/13/2021 0 43  <1 30 mg/dL Final    eGFR 04/13/2021 65  >60 ml/min/1 73sq m Final       Mental Status Evaluation:  Appearance:  age appropriate and casually dressed   Behavior:  Cooperative, good eye contact   Speech:  pressured and tangential   Mood:  anxious   Affect:  labile   Thought Process:  tangential   Thought Content:  delusions  persecutory   Perceptual Disturbances: Denies auditory or visual hallucinations   Risk Potential: Suicidal Ideations none, Homicidal Ideations none and Potential for Aggression No   Sensorium:  person, place and time/date   Cognition:  recent and remote memory grossly intact   Consciousness:  alert and awake    Attention: attention span appeared shorter than expected for age   Insight:  limited   Judgment: limited   Gait/Station: normal gait/station   Motor Activity: no abnormal movements     Progress Toward Goals:  Minimal change    Assessment/Plan   Principal Problem:    Mood disorder with psychosis ([facility] [address] )  Active Problems:    CKD (chronic kidney disease) stage 2, GFR 60-89 ml/min    Medical clearance for psychiatric admission    Major depressive disorder    Anxiety    MCI (mild cognitive impairment)      Recommended Treatment:  Continue with medications and treatment plan per attending team  Abilify 30 milligrams daily  Mirtazapine 15 milligrams HS  Melatonin 3 milligrams HS  Continue to encourage in milieu participation as tolerated     Continue with group therapy, milieu therapy and occupational therapy  Risks, benefits and possible side effects of Medications:   Risks, benefits, and possible side effects of medications explained to patient and patient verbalizes understanding  Counseling / Coordination of Care  Total floor / unit time spent today 30 minutes  Greater than 50% of total time was spent with the patient and / or family counseling and / or coordination of care   A description of the counseling / coordination of care:  Medication management, chart review, patient interview

## 2021-04-17 NOTE — NURSING NOTE
Patient transferred from [facility] section of the unit  Pt visible in milieu, social with peers and staff  On first interaction, pt stating she does not want to take her medications at HS, stating "I want my body to learn how to sleep without the medications "  Pt counseled and educated on medication  Pt currently denying all s/s at this time  Pt talking about gardening, no signs of distress noted  Will continue to monitor frequently

## 2021-04-17 NOTE — PLAN OF CARE
Problem: COPING  Goal: Pt/Family able to verbalize concerns and demonstrate effective coping strategies  Description: INTERVENTIONS:  - Assist patient/family to identify coping skills, available support systems and cultural and spiritual values  - Provide emotional support, including active listening and acknowledgement of concerns of patient and caregivers  - Reduce environmental stimuli, as able  - Provide patient education  - Assess for spiritual pain/suffering and initiate spiritual care, including notification of Pastoral Care or [place] based community as needed  - Assess effectiveness of coping strategies  Outcome: Progressing  Goal: Will report anxiety at manageable levels  Description: INTERVENTIONS:  - Administer medication as ordered  - Teach and encourage coping skills  - Provide emotional support  - Assess patient/family for anxiety and ability to cope  Outcome: Progressing     Problem: DECISION MAKING  Goal: Pt/Family able to effectively weigh alternatives and participate in decision making related to treatment and care  Description: INTERVENTIONS:  - Identify decision maker  - Determine when there are differences among patient's view, family's view, and healthcare provider's view of patient condition and care goals  - Facilitate patient/family articulation of goals for care  - Help patient/family identify pros/cons of alternative solutions  - Provide information as requested by patient/family  - Respect patient/family rights related to privacy and sharing information   - Serve as a liaison between patient, family and health care team  - Initiate consults as appropriate (Ethics Team, Palliative Care, Family Care Conference, etc )  Outcome: Progressing     Problem: CONFUSION/THOUGHT DISTURBANCE  Goal: Thought disturbances (confusion, delirium, depression, dementia or psychosis) are managed to maintain or return to baseline mental status and functional level  Description: INTERVENTIONS:  - Assess for possible contributors to  thought disturbance, including but not limited to medications, infection, impaired vision or hearing, underlying metabolic abnormalities, dehydration, respiratory compromise,  psychiatric diagnoses and notify attending PHYSICAN/AP  - Monitor and intervene to maintain adequate nutrition, hydration, elimination, sleep and activity  - Decrease environmental stimuli, including noise as appropriate  - Provide frequent contacts to provide refocusing, direction and reassurance as needed  Approach patient calmly with eye contact and at their level  - [place] high risk fall precautions, aspiration precautions and other safety measures, as indicated  - If delirium suspected, notify physician/AP of change in condition and request immediate in-person evaluation  - Pursue consults as appropriate including Geriatric (campus dependent), OT for cognitive evaluation/activity planning, psychiatric, pastoral care, etc   Outcome: Progressing     Problem: BEHAVIOR  Goal: Pt/Family maintain appropriate behavior and adhere to behavioral management agreement, if implemented  Description: INTERVENTIONS:  - Assess the family dynamic   - Encourage verbalization of thoughts and concerns in a socially appropriate manner  - Assess patient/family's coping skills and non-compliant behavior (including use of illegal substances)  - Utilize positive, consistent limit setting strategies supporting safety of patient, staff and others  - Initiate consult with Case Management, Spiritual Care or other ancillary services as appropriate  - If a patient's/visitor's behavior jeopardizes the safety of the patient, staff, or others, refer to organization procedure     - Notify Security of behavior or suspected illegal substances which indicate the need for search of the patient and/or belongings  - Encourage participation in the decision making process about a behavioral management agreement; implement if patient meets criteria  Outcome: Progressing     Problem: SELF HARM  Goal: Effect of psychiatric condition will be minimized and patient will be protected from self harm  Description: INTERVENTIONS:  - Assess impact of patient's symptoms on level of functioning, self-care needs and offer support as indicated  - Assess patient/family knowledge of depression, impact on illness and need for teaching  - Provide emotional support, presence and reassurance  - Assess for possible suicidal thoughts, ideation or self-harm  If patient expresses suicidal thoughts or statements do not leave alone, notify physician/AP immediately, initiate suicide precautions, and determine need for continual observation  - initiate consults and referrals as appropriate (a mental health professional, Spiritual Care  Outcome: Progressing     Problem: BEHAVIOR  Goal: Pt/Family maintain appropriate behavior and adhere to behavioral management agreement, if implemented  Description: INTERVENTIONS:  - Assess the family dynamic   - Encourage verbalization of thoughts and concerns in a socially appropriate manner  - Assess patient/family's coping skills and non-compliant behavior (including use of illegal substances)  - Utilize positive, consistent limit setting strategies supporting safety of patient, staff and others  - Initiate consult with Case Management, Spiritual Care or other ancillary services as appropriate  - If a patient's/visitor's behavior jeopardizes the safety of the patient, staff, or others, refer to organization procedure     - Notify Security of behavior or suspected illegal substances which indicate the need for search of the patient and/or belongings  - Encourage participation in the decision making process about a behavioral management agreement; implement if patient meets criteria  Outcome: Progressing     Problem: ANXIETY  Goal: Will report anxiety at manageable levels  Description: INTERVENTIONS:  - Administer medication as ordered  - Teach and encourage coping skills  - Provide emotional support  - Assess patient/family for anxiety and ability to cope  Outcome: Progressing  Goal: By discharge: Patient will verbalize 2 strategies to deal with anxiety  Description: Interventions:  - Identify any obvious source/trigger to anxiety  - Staff will assist patient in applying identified coping technique/skills  - Encourage attendance of scheduled groups and activities  Outcome: Progressing     Problem: SLEEP DISTURBANCE  Goal: Will exhibit normal sleeping pattern  Description: Interventions:  -  Assess the patients sleep pattern, noting recent changes  - Administer medication as ordered  - Decrease environmental stimuli, including noise, as appropriate during the night  - Encourage the patient to actively participate in unit groups and or exercise during the day to enhance ability to achieve adequate sleep at night  - Assess the patient, in the morning, encouraging a description of sleep experience  Outcome: Progressing     Problem: INVOLUNTARY ADMIT  Goal: Will cooperate with staff recommendations and doctor's orders and will demonstrate appropriate behavior  Description: INTERVENTIONS:  - Treat underlying conditions and offer medication as ordered  - Educate regarding involuntary admission procedures and rules  - Utilize positive consistent limit setting strategies to support patient and staff safety  Outcome: Progressing     Problem: Ineffective Coping  Goal: Participates in unit activities  Description: Interventions:  - Provide therapeutic environment   - Provide required programming   - Redirect inappropriate behaviors   Outcome: Progressing

## 2021-04-17 NOTE — PLAN OF CARE
Problem: COPING  Goal: Pt/Family able to verbalize concerns and demonstrate effective coping strategies  Description: INTERVENTIONS:  - Assist patient/family to identify coping skills, available support systems and cultural and spiritual values  - Provide emotional support, including active listening and acknowledgement of concerns of patient and caregivers  - Reduce environmental stimuli, as able  - Provide patient education  - Assess for spiritual pain/suffering and initiate spiritual care, including notification of Pastoral Care or [place] based community as needed  - Assess effectiveness of coping strategies  Outcome: Progressing  Goal: Will report anxiety at manageable levels  Description: INTERVENTIONS:  - Administer medication as ordered  - Teach and encourage coping skills  - Provide emotional support  - Assess patient/family for anxiety and ability to cope  Outcome: Progressing     Problem: DECISION MAKING  Goal: Pt/Family able to effectively weigh alternatives and participate in decision making related to treatment and care  Description: INTERVENTIONS:  - Identify decision maker  - Determine when there are differences among patient's view, family's view, and healthcare provider's view of patient condition and care goals  - Facilitate patient/family articulation of goals for care  - Help patient/family identify pros/cons of alternative solutions  - Provide information as requested by patient/family  - Respect patient/family rights related to privacy and sharing information   - Serve as a liaison between patient, family and health care team  - Initiate consults as appropriate (Ethics Team, Palliative Care, Family Care Conference, etc )  Outcome: Progressing     Problem: CONFUSION/THOUGHT DISTURBANCE  Goal: Thought disturbances (confusion, delirium, depression, dementia or psychosis) are managed to maintain or return to baseline mental status and functional level  Description: INTERVENTIONS:  - Assess for possible contributors to  thought disturbance, including but not limited to medications, infection, impaired vision or hearing, underlying metabolic abnormalities, dehydration, respiratory compromise,  psychiatric diagnoses and notify attending PHYSICAN/AP  - Monitor and intervene to maintain adequate nutrition, hydration, elimination, sleep and activity  - Decrease environmental stimuli, including noise as appropriate  - Provide frequent contacts to provide refocusing, direction and reassurance as needed  Approach patient calmly with eye contact and at their level  - [place] high risk fall precautions, aspiration precautions and other safety measures, as indicated  - If delirium suspected, notify physician/AP of change in condition and request immediate in-person evaluation  - Pursue consults as appropriate including Geriatric (campus dependent), OT for cognitive evaluation/activity planning, psychiatric, pastoral care, etc   Outcome: Progressing     Problem: BEHAVIOR  Goal: Pt/Family maintain appropriate behavior and adhere to behavioral management agreement, if implemented  Description: INTERVENTIONS:  - Assess the family dynamic   - Encourage verbalization of thoughts and concerns in a socially appropriate manner  - Assess patient/family's coping skills and non-compliant behavior (including use of illegal substances)  - Utilize positive, consistent limit setting strategies supporting safety of patient, staff and others  - Initiate consult with Case Management, Spiritual Care or other ancillary services as appropriate  - If a patient's/visitor's behavior jeopardizes the safety of the patient, staff, or others, refer to organization procedure     - Notify Security of behavior or suspected illegal substances which indicate the need for search of the patient and/or belongings  - Encourage participation in the decision making process about a behavioral management agreement; implement if patient meets criteria  Outcome: Progressing     Problem: SELF HARM  Goal: Effect of psychiatric condition will be minimized and patient will be protected from self harm  Description: INTERVENTIONS:  - Assess impact of patient's symptoms on level of functioning, self-care needs and offer support as indicated  - Assess patient/family knowledge of depression, impact on illness and need for teaching  - Provide emotional support, presence and reassurance  - Assess for possible suicidal thoughts, ideation or self-harm  If patient expresses suicidal thoughts or statements do not leave alone, notify physician/AP immediately, initiate suicide precautions, and determine need for continual observation  - initiate consults and referrals as appropriate (a mental health professional, Spiritual Care  Outcome: Progressing     Problem: BEHAVIOR  Goal: Pt/Family maintain appropriate behavior and adhere to behavioral management agreement, if implemented  Description: INTERVENTIONS:  - Assess the family dynamic   - Encourage verbalization of thoughts and concerns in a socially appropriate manner  - Assess patient/family's coping skills and non-compliant behavior (including use of illegal substances)  - Utilize positive, consistent limit setting strategies supporting safety of patient, staff and others  - Initiate consult with Case Management, Spiritual Care or other ancillary services as appropriate  - If a patient's/visitor's behavior jeopardizes the safety of the patient, staff, or others, refer to organization procedure     - Notify Security of behavior or suspected illegal substances which indicate the need for search of the patient and/or belongings  - Encourage participation in the decision making process about a behavioral management agreement; implement if patient meets criteria  Outcome: Progressing     Problem: ANXIETY  Goal: Will report anxiety at manageable levels  Description: INTERVENTIONS:  - Administer medication as ordered  - Teach and encourage coping skills  - Provide emotional support  - Assess patient/family for anxiety and ability to cope  Outcome: Progressing  Goal: By discharge: Patient will verbalize 2 strategies to deal with anxiety  Description: Interventions:  - Identify any obvious source/trigger to anxiety  - Staff will assist patient in applying identified coping technique/skills  - Encourage attendance of scheduled groups and activities  Outcome: Progressing     Problem: SLEEP DISTURBANCE  Goal: Will exhibit normal sleeping pattern  Description: Interventions:  -  Assess the patients sleep pattern, noting recent changes  - Administer medication as ordered  - Decrease environmental stimuli, including noise, as appropriate during the night  - Encourage the patient to actively participate in unit groups and or exercise during the day to enhance ability to achieve adequate sleep at night  - Assess the patient, in the morning, encouraging a description of sleep experience  Outcome: Progressing     Problem: INVOLUNTARY ADMIT  Goal: Will cooperate with staff recommendations and doctor's orders and will demonstrate appropriate behavior  Description: INTERVENTIONS:  - Treat underlying conditions and offer medication as ordered  - Educate regarding involuntary admission procedures and rules  - Utilize positive consistent limit setting strategies to support patient and staff safety  Outcome: Progressing     Problem: Ineffective Coping  Goal: Participates in unit activities  Description: Interventions:  - Provide therapeutic environment   - Provide required programming   - Redirect inappropriate behaviors   Outcome: Progressing

## 2021-04-18 PROCEDURE — 99232 SBSQ HOSP IP/OBS MODERATE 35: CPT | Performed by: PHYSICIAN ASSISTANT

## 2021-04-18 RX ADMIN — ARIPIPRAZOLE 30 MG: 15 TABLET ORAL at 08:17

## 2021-04-18 NOTE — NURSING NOTE
Patient was calm, pleasant, social and visible in a day room last night  She denied symptoms and refused her HS scheduled med for sleep saying she only takes Abilify in the morning

## 2021-04-18 NOTE — PROGRESS NOTES
Progress Note - Behavioral Health   Manolo Lopez 78 y o  female MRN: [id number]  Unit/Bed#: [facility] 769-81 Encounter: [id number]    Hermelindo De La Garza was seen chart reviewed and discussed with nursing staff  Staff notes she continues to refuse HS medications including melatonin and mirtazapine stating I only take Abilify in the morning"  No aggressive or agitated behavior though  Typically she has been more visible in the milieu  Sleeping and eating adequately  Continues with residual anxiety but is cooperative on approach  Reports that overall she is feeling better  She is hopeful for her relationship with her daughter to improve and looking for to going home with her spouse  She does continue to voice some bizarre and delusional statements though  Tells me about a worker at  [facility] who instructed her that if she eats the stem and roots of red beets that is the same as taking Abilify  She is insistent that this is what she was told and provided me with great detail the encounter including description of the appearance of the person who told her this  She is agreeable to continue to take Abilify after discharge although she has concerns about the cost   Also states that she plans follow-up with psychiatrist that her spouse is also following with  Currently she denies any depression  Denies any suicidal or homicidal ideation  Physically reports that she is feeling well  No complaints of pain or discomfort      Behavior over the last 24 hours:  unchanged  Sleep: normal  Appetite: normal  Medication side effects: No  ROS: no complaints  /75 (BP Location: Right arm)   Pulse 70   Temp 97 8 °F (36 6 °C) (Temporal)   Resp 16   Ht 5' 1" (1 549 m)   Wt 71 kg (156 lb 8 4 oz)   SpO2 94%   BMI 29 58 kg/m²     Medications:   Current Facility-Administered Medications   Medication Dose Route Frequency    aluminum-magnesium hydroxide-simethicone (MYLANTA) oral suspension 30 mL  30 mL Oral Q4H PRN    ARIPiprazole (ABILIFY) tablet 30 mg  30 mg Oral Daily    artificial tear (LUBRIFRESH P M ) ophthalmic ointment 1 application  1 application Both Eyes Q7F PRN    benztropine (COGENTIN) tablet 0 5 mg  0 5 mg Oral Q4H PRN Max 6/day    bisacodyl (DULCOLAX) rectal suppository 10 mg  10 mg Rectal Daily PRN    hydrOXYzine HCL (ATARAX) tablet 25 mg  25 mg Oral Q6H PRN Max 4/day    ibuprofen (MOTRIN) tablet 200 mg  200 mg Oral Q6H PRN    ibuprofen (MOTRIN) tablet 400 mg  400 mg Oral Q6H PRN    ibuprofen (MOTRIN) tablet 600 mg  600 mg Oral Q8H PRN    LORazepam (ATIVAN) injection 1 mg  1 mg Intramuscular Q6H PRN Max 3/day    LORazepam (ATIVAN) tablet 0 5 mg  0 5 mg Oral Q6H PRN Max 4/day    LORazepam (ATIVAN) tablet 1 mg  1 mg Oral Q6H PRN Max 3/day    melatonin tablet 3 mg  3 mg Oral HS    mirtazapine (REMERON) tablet 15 mg  15 mg Oral HS    nicotine polacrilex (NICORETTE) gum 4 mg  4 mg Oral Q2H PRN    OLANZapine (ZyPREXA) IM injection 5 mg  5 mg Intramuscular Q3H PRN Max 3/day    OLANZapine (ZyPREXA) tablet 2 5 mg  2 5 mg Oral Q4H PRN Max 6/day    OLANZapine (ZyPREXA) tablet 5 mg  5 mg Oral Q4H PRN Max 3/day    OLANZapine (ZyPREXA) tablet 5 mg  5 mg Oral Q3H PRN Max 3/day    pantoprazole (PROTONIX) EC tablet 20 mg  20 mg Oral Early Morning    polyethylene glycol (MIRALAX) packet 17 g  17 g Oral Daily PRN    senna-docusate sodium (SENOKOT S) 8 6-50 mg per tablet 1 tablet  1 tablet Oral Daily PRN    traZODone (DESYREL) tablet 50 mg  50 mg Oral HS PRN       Labs:   Admission on 03/29/2021   Component Date Value Ref Range Status    SARS-CoV-2 04/04/2021 Positive* Negative Final    INFLUENZA A PCR 04/04/2021 Negative  Negative Final    INFLUENZA B PCR 04/04/2021 Negative  Negative Final    RSV PCR 04/04/2021 Negative  Negative Final    WBC 04/13/2021 2 90* 4 50 - 11 00 Thousand/uL Final    RBC 04/13/2021 4 31  4 00 - 5 20 Million/uL Final    Hemoglobin 04/13/2021 13 7  12 0 - 16 0 g/dL Final    Hematocrit 04/13/2021 40 2  36 0 - 46 0 % Final    MCV 04/13/2021 93  80 - 100 fL Final    MCH 04/13/2021 31 8  26 0 - 34 0 pg Final    MCHC 04/13/2021 34 0  31 0 - 36 0 g/dL Final    RDW 04/13/2021 13 1  <15 3 % Final    MPV 04/13/2021 6 9* 8 9 - 12 7 fL Final    Platelets 73/46/3100 290  150 - 450 Thousands/uL Final    Neutrophils Relative 04/13/2021 62  45 - 65 % Final    Lymphocytes Relative 04/13/2021 30  25 - 45 % Final    Monocytes Relative 04/13/2021 8  1 - 10 % Final    Eosinophils Relative 04/13/2021 0  0 - 6 % Final    Basophils Relative 04/13/2021 0  0 - 1 % Final    Neutrophils Absolute 04/13/2021 1 80  1 80 - 7 80 Thousands/µL Final    Lymphocytes Absolute 04/13/2021 0 90  0 50 - 4 00 Thousands/µL Final    Monocytes Absolute 04/13/2021 0 20  0 20 - 0 90 Thousand/µL Final    Eosinophils Absolute 04/13/2021 0 00  0 00 - 0 40 Thousand/µL Final    Basophils Absolute 04/13/2021 0 00  0 00 - 0 10 Thousands/µL Final    Sodium 04/13/2021 136* 137 - 147 mmol/L Final    Potassium 04/13/2021 3 8  3 6 - 5 0 mmol/L Final    Chloride 04/13/2021 99  97 - 108 mmol/L Final    CO2 04/13/2021 32* 22 - 30 mmol/L Final    ANION GAP 04/13/2021 5  5 - 14 mmol/L Final    BUN 04/13/2021 17  5 - 25 mg/dL Final    Creatinine 04/13/2021 0 91  0 60 - 1 20 mg/dL Final    Glucose 04/13/2021 97  70 - 99 mg/dL Final    Glucose, Fasting 04/13/2021 97  70 - 99 mg/dL Final    Calcium 04/13/2021 9 7  8 4 - 10 2 mg/dL Final    AST 04/13/2021 29  14 - 36 U/L Final    ALT 04/13/2021 21  <35 U/L Final    Alkaline Phosphatase 04/13/2021 67  43 - 122 U/L Final    Total Protein 04/13/2021 7 4  5 9 - 8 4 g/dL Final    Albumin 04/13/2021 4 1  3 0 - 5 2 g/dL Final    Total Bilirubin 04/13/2021 0 43  <1 30 mg/dL Final    eGFR 04/13/2021 65  >60 ml/min/1 73sq m Final       Mental Status Evaluation:  Appearance:  age appropriate and casually dressed   Behavior:  Calmer, cooperative, good eye contact   Speech:  Less pressured   Mood:  Less anxious, less depressed   Affect:  normal and Brighter, mood congruent   Thought Process:  Less tangential   Thought Content:  delusions  persecutory   Perceptual Disturbances: Denies auditory or visual hallucination   Risk Potential: Suicidal Ideations none, Homicidal Ideations none and Potential for Aggression No   Sensorium:  person, place and time/date   Cognition:  recent and remote memory grossly intact   Consciousness:  alert and awake    Attention: attention span appeared shorter than expected for age   Insight:  limited   Judgment: limited   Gait/Station: normal gait/station   Motor Activity: no abnormal movements     Progress Toward Goals:  Minimal change    Assessment/Plan   Principal Problem:    Mood disorder with psychosis ([facility] [address] )  Active Problems:    CKD (chronic kidney disease) stage 2, GFR 60-89 ml/min    Medical clearance for psychiatric admission    Major depressive disorder    Anxiety    MCI (mild cognitive impairment)      Recommended Treatment:  Continue with medications and treatment plan as follows  Abilify 30 mg daily  She has refused mirtazapine and melatonin on two nights, will consider discontinuation of these if she continues to refuse     Continue with group therapy, milieu therapy and occupational therapy  Risks, benefits and possible side effects of Medications:   Risks, benefits, and possible side effects of medications explained to patient and patient verbalizes understanding  Counseling / Coordination of Care  Total floor / unit time spent today 25 minutes  Greater than 50% of total time was spent with the patient and / or family counseling and / or coordination of care   A description of the counseling / coordination of care:  Medication management, chart review, patient interview

## 2021-04-18 NOTE — NURSING NOTE
Patient is present on the unit and social with staff and peers  Patient denies all symptoms  Patient is oriented  Patient is compliant with medications and cooperative with care  Appetite is good  Ambulating with steady gait  Will continue to monitor and provide support as needed

## 2021-04-19 PROCEDURE — 99232 SBSQ HOSP IP/OBS MODERATE 35: CPT | Performed by: NURSE PRACTITIONER

## 2021-04-19 RX ADMIN — ARIPIPRAZOLE 30 MG: 15 TABLET ORAL at 08:15

## 2021-04-19 RX ADMIN — MIRTAZAPINE 15 MG: 15 TABLET, FILM COATED ORAL at 21:11

## 2021-04-19 RX ADMIN — MELATONIN TAB 3 MG 3 MG: 3 TAB at 21:11

## 2021-04-19 RX ADMIN — IBUPROFEN 600 MG: 600 TABLET, FILM COATED ORAL at 20:31
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
## 2021-04-19 NOTE — TREATMENT TEAM
04/19/21 0900   Team Meeting   Meeting Type Daily Rounds   Team Members Present   Team Members Present Physician;Nurse;;Occupational Therapist   Physician Team Member Madonna    Nursing Team Member [address] [place] [address] Management Team Member Keesha Edwards    OT Team Member Sukhjinder Espinoza dicussed at treatment team today, denies all s/s, paranoia and delusions improve

## 2021-04-19 NOTE — PLAN OF CARE
Pt engaged spontaneously in two of two groups with appropriate social interaction yet poor insight into reasons for admission    Problem: Ineffective Coping  Goal: Participates in unit activities  Description: Interventions:  - Provide therapeutic environment   - Provide required programming   - Redirect inappropriate behaviors   Outcome: Progressing

## 2021-04-19 NOTE — PROGRESS NOTES
Attended two out of two groups  Was bright and appropriate during both, yet reluctant to exam challenges in her life  Reflected on the progress she has made during hospitalization with staff prompting, yet lacks insight into what led to hospitalization  04/19/21 1000 04/19/21 1330   Activity/Group Checklist   Group [place] meeting  (Food combinations trivia) Life Skills  ([place] task/discussion)   Attendance Attended Attended   Attendance Duration (min) 16-30 46-60   Interactions Interacted appropriately Interacted appropriately   Affect/Mood Appropriate;Bright;Calm Appropriate;Normal range   Goals Achieved Able to listen to others; Able to engage in interactions Able to listen to others; Able to engage in interactions; Identified feelings

## 2021-04-19 NOTE — NURSING NOTE
Patient was calm, pleasant, social with peers at day room  She denied symptoms and refused HS med for sleep  Will continue to monitor

## 2021-04-19 NOTE — NURSING NOTE
Patient is visible on the unit and social with staff and peers  Patient denies all symptoms  Patient is calm  No paranoid behaviors noted  Pleasant and cooperative  Compliant with medications  Appetite is good  Ambulating with a steady gait  Anticipates getting discharged soon and is happy about that  Will continue to monitor and provide support as needed

## 2021-04-20 PROCEDURE — 99232 SBSQ HOSP IP/OBS MODERATE 35: CPT | Performed by: NURSE PRACTITIONER

## 2021-04-20 RX ADMIN — MIRTAZAPINE 15 MG: 15 TABLET, FILM COATED ORAL at 21:22

## 2021-04-20 RX ADMIN — MELATONIN TAB 3 MG 3 MG: 3 TAB at 21:22

## 2021-04-20 RX ADMIN — ARIPIPRAZOLE 30 MG: 15 TABLET ORAL at 08:23

## 2021-04-20 NOTE — CASE MANAGEMENT
Pt would like to participate in her 304 hearing this AM  CM received VM from , [place] Eben [phone] -- CM called back and left VM asking for call back prior to the hearing so he could speak with pt     304 hearing completed this AM with [employer]  Pt and Dr Geni Ortez participated and testified  Pt was in agreement with recommendation  Up to 90 days of IP psych tx approved   304 expires on 7/19

## 2021-04-20 NOTE — CASE MANAGEMENT
04/20/21 0911   Team Meeting   Meeting Type Daily Rounds   Initial Conference Date 04/20/21   Team Members Present   Team Members Present Physician;Nurse;;; Occupational Therapist   Physician Team Member [address]  Team Member [facility] Management Team Member Regla Galicia   Social Work Team Member Nii   OT Team Member Emiliana Galeana   Patient/Family Present   Patient Present No   Patient's Family Present No     2/3 groups yesterday, visible, social, pleasant, paranoid/delusional about son, slept well, med compliant, 304 hearing completed this AM

## 2021-04-20 NOTE — PLAN OF CARE
Problem: ANXIETY  Goal: Will report anxiety at manageable levels  Description: INTERVENTIONS:  - Administer medication as ordered  - Teach and encourage coping skills  - Provide emotional support  - Assess patient/family for anxiety and ability to cope  Outcome: Progressing  Goal: By discharge: Patient will verbalize 2 strategies to deal with anxiety  Description: Interventions:  - Identify any obvious source/trigger to anxiety  - Staff will assist patient in applying identified coping technique/skills  - Encourage attendance of scheduled groups and activities  Outcome: Progressing     Problem: Ineffective Coping  Goal: Participates in unit activities  Description: Interventions:  - Provide therapeutic environment   - Provide required programming   - Redirect inappropriate behaviors   Outcome: Progressing     Problem: COPING  Goal: Pt/Family able to verbalize concerns and demonstrate effective coping strategies  Description: INTERVENTIONS:  - Assist patient/family to identify coping skills, available support systems and cultural and spiritual values  - Provide emotional support, including active listening and acknowledgement of concerns of patient and caregivers  - Reduce environmental stimuli, as able  - Provide patient education  - Assess for spiritual pain/suffering and initiate spiritual care, including notification of Pastoral Care or edgar based community as needed  - Assess effectiveness of coping strategies  Outcome: Progressing  Goal: Will report anxiety at manageable levels  Description: INTERVENTIONS:  - Administer medication as ordered  - Teach and encourage coping skills  - Provide emotional support  - Assess patient/family for anxiety and ability to cope  Outcome: Progressing

## 2021-04-20 NOTE — PROGRESS NOTES
Pt attended [address] recovery: feelings group  Pt was able to self express  Pt note that she felt "relaxed"  Pt stated she would feel happy if she attended a family punic  Pt discussed how COVID impacted her mh and anxiety  Pt noted that speaking to someone on the phone is helpful and that she was tired last night  Pt able to make needs known  04/20/21 1000   Activity/Group Checklist   Group Other (Comment)  (Mh recovery: feelings)   Attendance Attended   Attendance Duration (min) 31-45   Interactions Interacted appropriately   Affect/Mood Appropriate;Calm   Goals Achieved Identified feelings; Discussed coping strategies; Able to listen to others; Able to engage in interactions

## 2021-04-20 NOTE — PROGRESS NOTES
Progress Note - [address] 78 y o  female MRN: [id number]  Unit/Bed#Satya Christopher 278-78 Encounter: [id number]    The patient was seen for continuing care and reviewed with treatment team   Staff reports the patient has been visible and social   She has been attending groups and interacting appropriately  She had her 304 commitment hearing this morning which was up held  On approach she is pleasant, calm and cooperative  She denies any mood symptoms and is hopeful for discharge soon  She said she had restless sleep last night due to noise on the unit  Reports she has been eating too much    She was less tangential today and did not spontaneously voice delusional material     Mood disorder with psychosis ([facility] [address] )    Vital signs in last 24 hours:  Temp:  [97 4 °F (36 3 °C)-98 2 °F (36 8 °C)] 97 4 °F (36 3 °C)  HR:  [70-77] 70  Resp:  [16-18] 18  BP: (121-131)/(60-76) 121/60    Mental Status Evaluation:    Appearance Adequate hygiene and grooming   Behavior cooperative   Mood euthymic   Speech Normal rate and volume   Affect mood-congruent   Thought Processes Goal directed and coherent   Thought Content Does not verbalize delusional material   Perceptual Disturbances Denies hallucinations and does not appear to be responding to internal stimuli   Risk Potential Suicidal/Homicidal Ideation - No evidence of suicidal or homicidal ideation and Patient does not verbalize suicidal or homicidal ideation  Risk of Violence - No evidence of risk for violence found on assessment  Risk of Self Mutilation - No evidence of risk for self mutilation found on assessment   Sensorium oriented to person, place and time/date   Cognition/Memory recent and remote memory grossly intact   Consciousness alert and awake   Attention/Concentration attention span and concentration appear shorter than expected for age   Insight limited   Judgement limited   Muscle Strength and Gait/Station normal muscle strength and normal muscle tone, normal gait/station and normal balance   Motor Activity no abnormal movements       Progress Toward Goals:  Progressing      Recommended Treatment: Continue with pharmacotherapy, group therapy, milieu therapy and occupational therapy    The patient will be maintained on the following medications:  Current Facility-Administered Medications   Medication Dose Route Frequency Provider Last Rate    aluminum-magnesium hydroxide-simethicone  30 mL Oral Q4H PRN Jessica Mclean MD      ARIPiprazole  30 mg Oral Daily Vangie Boyle MD      artificial tear  1 application Both Eyes B4M PRN Jessica Mclean MD      benztropine  0 5 mg Oral Q4H PRN Max 6/day Jessica Mclean MD      bisacodyl  10 mg Rectal Daily PRN Jessica Mclean MD      hydrOXYzine HCL  25 mg Oral Q6H PRN Max 4/day Jessica Mclean MD      ibuprofen  200 mg Oral Q6H PRN Jessica Mclean MD      ibuprofen  400 mg Oral Q6H PRN Jessica Mclean MD      ibuprofen  600 mg Oral Q8H PRN Jessica Mclean MD      LORazepam  1 mg Intramuscular Q6H PRN Max 3/day Jessica Mclean MD      LORazepam  0 5 mg Oral Q6H PRN Max 4/day Jessica Mclean MD      LORazepam  1 mg Oral Q6H PRN Max 3/day Jessica Mclean MD      melatonin  3 mg Oral HS Jessica Mclean MD      mirtazapine  15 mg Oral HS Aniya Kirk MD      nicotine polacrilex  4 mg Oral Q2H PRN Jessica Mclean MD      OLANZapine  5 mg Intramuscular Q3H PRN Max 3/day Jessica Mclean MD      OLANZapine  2 5 mg Oral Q4H PRN Max 6/day Jessica Mclean MD      OLANZapine  5 mg Oral Q4H PRN Max 3/day Jessica Mclean MD      OLANZapine  5 mg Oral Q3H PRN Max 3/day Jessica Mclean MD      pantoprazole  20 mg Oral Early Morning Jessica Mclean MD      polyethylene glycol  17 g Oral Daily PRN Jessica Mclean MD      senna-docusate sodium  1 tablet Oral Daily PRN Jessica Mclean MD      traZODone  50 mg Oral HS PRN Jessica Mclean MD         Mood disorder with psychosis ([facility] [address] )

## 2021-04-20 NOTE — PROGRESS NOTES
Attended two of two groups  Appropriate, calm, empathetic towards peers, and focused to task in both groups  Discussed concerns about her son being released from retirement with peer and self reported that she felt it was part of God's plan to keep her hospitalized for a little longer, but is not preoccupied by family issues or discharge  04/20/21 1100 04/20/21 1330   Activity/Group Checklist   Group Wellness  (Positive affirmation water color relaxation) Spiritual resources  (Prayer chain)   Attendance Attended Did not attend   Attendance Duration (min) 31-45 31-45   Interactions Interacted appropriately Interacted appropriately   Affect/Mood Appropriate;Calm Appropriate;Calm   Goals Achieved Able to listen to others; Able to engage in interactions Displayed empathy;Able to listen to others; Able to engage in interactions

## 2021-04-20 NOTE — PLAN OF CARE
Problem: COPING  Goal: Pt/Family able to verbalize concerns and demonstrate effective coping strategies  Description: INTERVENTIONS:  - Assist patient/family to identify coping skills, available support systems and cultural and spiritual values  - Provide emotional support, including active listening and acknowledgement of concerns of patient and caregivers  - Reduce environmental stimuli, as able  - Provide patient education  - Assess for spiritual pain/suffering and initiate spiritual care, including notification of Pastoral Care or edgar based community as needed  - Assess effectiveness of coping strategies  Outcome: Progressing  Goal: Will report anxiety at manageable levels  Description: INTERVENTIONS:  - Administer medication as ordered  - Teach and encourage coping skills  - Provide emotional support  - Assess patient/family for anxiety and ability to cope  Outcome: Progressing     Problem: DECISION MAKING  Goal: Pt/Family able to effectively weigh alternatives and participate in decision making related to treatment and care  Description: INTERVENTIONS:  - Identify decision maker  - Determine when there are differences among patient's view, family's view, and healthcare provider's view of patient condition and care goals  - Facilitate patient/family articulation of goals for care  - Help patient/family identify pros/cons of alternative solutions  - Provide information as requested by patient/family  - Respect patient/family rights related to privacy and sharing information   - Serve as a liaison between patient, family and health care team  - Initiate consults as appropriate (Ethics Team, Palliative Care, Family Care Conference, etc )  Outcome: Progressing     Problem: CONFUSION/THOUGHT DISTURBANCE  Goal: Thought disturbances (confusion, delirium, depression, dementia or psychosis) are managed to maintain or return to baseline mental status and functional level  Description: INTERVENTIONS:  - Assess for possible contributors to  thought disturbance, including but not limited to medications, infection, impaired vision or hearing, underlying metabolic abnormalities, dehydration, respiratory compromise,  psychiatric diagnoses and notify attending PHYSICAN/AP  - Monitor and intervene to maintain adequate nutrition, hydration, elimination, sleep and activity  - Decrease environmental stimuli, including noise as appropriate  - Provide frequent contacts to provide refocusing, direction and reassurance as needed  Approach patient calmly with eye contact and at their level  - [place] high risk fall precautions, aspiration precautions and other safety measures, as indicated  - If delirium suspected, notify physician/AP of change in condition and request immediate in-person evaluation  - Pursue consults as appropriate including Geriatric (campus dependent), OT for cognitive evaluation/activity planning, psychiatric, pastoral care, etc   Outcome: Progressing     Problem: BEHAVIOR  Goal: Pt/Family maintain appropriate behavior and adhere to behavioral management agreement, if implemented  Description: INTERVENTIONS:  - Assess the family dynamic   - Encourage verbalization of thoughts and concerns in a socially appropriate manner  - Assess patient/family's coping skills and non-compliant behavior (including use of illegal substances)  - Utilize positive, consistent limit setting strategies supporting safety of patient, staff and others  - Initiate consult with Case Management, Spiritual Care or other ancillary services as appropriate  - If a patient's/visitor's behavior jeopardizes the safety of the patient, staff, or others, refer to organization procedure     - Notify Security of behavior or suspected illegal substances which indicate the need for search of the patient and/or belongings  - Encourage participation in the decision making process about a behavioral management agreement; implement if patient meets criteria  Outcome: Progressing     Problem: SELF HARM  Goal: Effect of psychiatric condition will be minimized and patient will be protected from self harm  Description: INTERVENTIONS:  - Assess impact of patient's symptoms on level of functioning, self-care needs and offer support as indicated  - Assess patient/family knowledge of depression, impact on illness and need for teaching  - Provide emotional support, presence and reassurance  - Assess for possible suicidal thoughts, ideation or self-harm  If patient expresses suicidal thoughts or statements do not leave alone, notify physician/AP immediately, initiate suicide precautions, and determine need for continual observation    - initiate consults and referrals as appropriate (a mental health professional, Spiritual Care  Outcome: Progressing     Problem: ANXIETY  Goal: Will report anxiety at manageable levels  Description: INTERVENTIONS:  - Administer medication as ordered  - Teach and encourage coping skills  - Provide emotional support  - Assess patient/family for anxiety and ability to cope  Outcome: Progressing  Goal: By discharge: Patient will verbalize 2 strategies to deal with anxiety  Description: Interventions:  - Identify any obvious source/trigger to anxiety  - Staff will assist patient in applying identified coping technique/skills  - Encourage attendance of scheduled groups and activities  Outcome: Progressing

## 2021-04-20 NOTE — NURSING NOTE
Patient is visible on unit, attending groups and is social with staff and peers  Pt denies all S/S, states "I think they are going to keep me here until Friday because somehow my son got out of nursing home " Pt is cooperative with care and medication compliant

## 2021-04-20 NOTE — NURSING NOTE
Slept well through the night  Refused protonix  States " I don't have any stomach problems  " Denies depression, anxiety or SI's  No c/o pain   " I'm happy, hoping to be leaving soon "

## 2021-04-20 NOTE — CMS CERTIFICATION NOTE
Certification: Based upon physical, mental and social evaluations, I certify that inpatient psychiatric services are medically necessary for this patient for a duration of 12 midnights for the treatment of mood disorder with psychosis  Available alternative community resources do not meet the patient's mental health care needs  I further attest that an established written individualized plan of care has been implemented and is outlined in the patient's medical records

## 2021-04-20 NOTE — NURSING NOTE
Pt pleasant and cooperative, appropriate interactions  No delusions voiced  Pt feels hopeful that physician is working with her and that she will be discharged soon  Social, watching tv with peers  No symptoms reported, reports mood is "good" and she is tolerating medications well  Talkative and displays positive mood  Expresses looking forward to discharge  Showered in pm  Compliant with scheduled medications

## 2021-04-20 NOTE — NURSING NOTE
Patient was visible in the milieu socializing with select peers while watching tv  Denies all s/s at this time but c/o left shoulder pain 8 on 10, motrin 600 mg was given at 2031 and it was moderately effective as her pain level went down to 4 on 10  Had snack  Patient was cooperative and compliant with HS medications  Safety checks ongoing

## 2021-04-20 NOTE — PLAN OF CARE
Pt is attending all groups as scheduled and able to focus to topics in a structured setting    Problem: Ineffective Coping  Goal: Participates in unit activities  Description: Interventions:  - Provide therapeutic environment   - Provide required programming   - Redirect inappropriate behaviors   Outcome: Progressing

## 2021-04-21 PROCEDURE — 99232 SBSQ HOSP IP/OBS MODERATE 35: CPT | Performed by: PSYCHIATRY & NEUROLOGY

## 2021-04-21 RX ORDER — ARIPIPRAZOLE 10 MG/1
20 TABLET ORAL DAILY
Status: DISCONTINUED | OUTPATIENT
Start: 2021-04-22 | End: 2021-04-22

## 2021-04-21 RX ADMIN — MELATONIN TAB 3 MG 3 MG: 3 TAB at 21:21

## 2021-04-21 RX ADMIN — PANTOPRAZOLE SODIUM 20 MG: 20 TABLET, DELAYED RELEASE ORAL at 05:23

## 2021-04-21 RX ADMIN — MIRTAZAPINE 15 MG: 15 TABLET, FILM COATED ORAL at 21:21

## 2021-04-21 RX ADMIN — ARIPIPRAZOLE 30 MG: 15 TABLET ORAL at 08:21

## 2021-04-21 NOTE — PROGRESS NOTES
Pt attended short term problem solving group  Pt was cooperative and calm  Pt expressed hope  and "having light at the end of the tunnel"  Pt noted her LOS and that she has been her a long time  Pt expressed improvement and positive self talk  04/21/21 1100   Activity/Group Checklist   Group Other (Comment)  (short term problem solving)   Attendance Attended   Attendance Duration (min) 31-45   Interactions Interacted appropriately   Affect/Mood Appropriate   Goals Achieved Identified feelings; Discussed coping strategies; Able to listen to others; Able to engage in interactions; Able to reflect/comment on own behavior;Able to self-disclose; Able to recieve feedback

## 2021-04-21 NOTE — PLAN OF CARE
Pt actively engaged in all three groups today  Pt  expressed the joys of being playful and was socially appropriate in structured groups  Pt  did not specifically discuss anticipated challenges of returning home    Problem: Ineffective Coping  Goal: Participates in unit activities  Description: Interventions:  - Provide therapeutic environment   - Provide required programming   - Redirect inappropriate behaviors   Outcome: Progressing

## 2021-04-21 NOTE — CASE MANAGEMENT
04/21/21 0851   Team Meeting   Meeting Type Daily Rounds   Initial Conference Date 04/21/21   Team Members Present   Team Members Present Physician;Nurse;;; Occupational Therapist   Physician Team Member [address]  Team Member [facility] Management Team Member Regla Galicia   Social Work Team Member Nii   OT Team Member Marilin Iraheta   Patient/Family Present   Patient Present No   Patient's Family Present No     Denying s/s, social, visible, slow improvement, slept well, attending groups, wants discharge, med compliant
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
## 2021-04-21 NOTE — CASE MANAGEMENT
Spoke with pt's daughter, Ashanti Denson, to give update  She's glad to hear that pt has made some good progress yet she's aware that pt is still having delusions related to her son  Ashanti Denson is aware that discharge is approaching and is comfortable with pt returning home  Ashanti Denson reported that pt's  has been doing well and is looking forward to pt's return home   CM will follow up tomorrow morning to give another update
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
## 2021-04-22 PROCEDURE — 99232 SBSQ HOSP IP/OBS MODERATE 35: CPT | Performed by: PSYCHIATRY & NEUROLOGY

## 2021-04-22 RX ORDER — RISPERIDONE 0.5 MG/1
0.5 TABLET, FILM COATED ORAL 2 TIMES DAILY
Status: DISCONTINUED | OUTPATIENT
Start: 2021-04-22 | End: 2021-04-23

## 2021-04-22 RX ORDER — ARIPIPRAZOLE 10 MG/1
10 TABLET ORAL DAILY
Status: DISCONTINUED | OUTPATIENT
Start: 2021-04-23 | End: 2021-04-23

## 2021-04-22 RX ADMIN — MELATONIN TAB 3 MG 3 MG: 3 TAB at 21:01

## 2021-04-22 RX ADMIN — ARIPIPRAZOLE 20 MG: 10 TABLET ORAL at 08:35

## 2021-04-22 RX ADMIN — PANTOPRAZOLE SODIUM 20 MG: 20 TABLET, DELAYED RELEASE ORAL at 06:05

## 2021-04-22 RX ADMIN — RISPERIDONE 0.5 MG: 0.5 TABLET ORAL at 17:14

## 2021-04-22 RX ADMIN — RISPERIDONE 0.5 MG: 0.5 TABLET ORAL at 11:52

## 2021-04-22 NOTE — TREATMENT TEAM
Pt attended positive reflection: reminiscence and music group  Pt was spontaneous, bright and focused on group  Pt displayed positive cognitive process and recollection  Pt was able to stay for duration and no delusional thoughts mentioned or behaviors noted  04/22/21 1330   Activity/Group Checklist   Group Other (Comment)  (positive reflection: reminiscence and music)   Attendance Attended   Attendance Duration (min) 31-45   Interactions Interacted appropriately   Affect/Mood Bright   Goals Achieved Identified feelings; Discussed coping strategies; Able to listen to others; Able to engage in interactions

## 2021-04-22 NOTE — NURSING NOTE
Patient confuse, calm and cooperative on approach, denies s/s  Patient in and out of common areas with good interaction with peers  Patient compliant with medications and meals in the shift

## 2021-04-22 NOTE — PLAN OF CARE
Problem: COPING  Goal: Pt/Family able to verbalize concerns and demonstrate effective coping strategies  Description: INTERVENTIONS:  - Assist patient/family to identify coping skills, available support systems and cultural and spiritual values  - Provide emotional support, including active listening and acknowledgement of concerns of patient and caregivers  - Reduce environmental stimuli, as able  - Provide patient education  - Assess for spiritual pain/suffering and initiate spiritual care, including notification of Pastoral Care or edgar based community as needed  - Assess effectiveness of coping strategies  Outcome: Progressing  Goal: Will report anxiety at manageable levels  Description: INTERVENTIONS:  - Administer medication as ordered  - Teach and encourage coping skills  - Provide emotional support  - Assess patient/family for anxiety and ability to cope  Outcome: Progressing     Problem: DECISION MAKING  Goal: Pt/Family able to effectively weigh alternatives and participate in decision making related to treatment and care  Description: INTERVENTIONS:  - Identify decision maker  - Determine when there are differences among patient's view, family's view, and healthcare provider's view of patient condition and care goals  - Facilitate patient/family articulation of goals for care  - Help patient/family identify pros/cons of alternative solutions  - Provide information as requested by patient/family  - Respect patient/family rights related to privacy and sharing information   - Serve as a liaison between patient, family and health care team  - Initiate consults as appropriate (Ethics Team, Palliative Care, Family Care Conference, etc )  Outcome: Progressing     Problem: BEHAVIOR  Goal: Pt/Family maintain appropriate behavior and adhere to behavioral management agreement, if implemented  Description: INTERVENTIONS:  - Assess the family dynamic   - Encourage verbalization of thoughts and concerns in a socially appropriate manner  - Assess patient/family's coping skills and non-compliant behavior (including use of illegal substances)  - Utilize positive, consistent limit setting strategies supporting safety of patient, staff and others  - Initiate consult with Case Management, Spiritual Care or other ancillary services as appropriate  - If a patient's/visitor's behavior jeopardizes the safety of the patient, staff, or others, refer to organization procedure  - Notify Security of behavior or suspected illegal substances which indicate the need for search of the patient and/or belongings  - Encourage participation in the decision making process about a behavioral management agreement; implement if patient meets criteria  Outcome: Progressing     Problem: SELF HARM  Goal: Effect of psychiatric condition will be minimized and patient will be protected from self harm  Description: INTERVENTIONS:  - Assess impact of patient's symptoms on level of functioning, self-care needs and offer support as indicated  - Assess patient/family knowledge of depression, impact on illness and need for teaching  - Provide emotional support, presence and reassurance  - Assess for possible suicidal thoughts, ideation or self-harm  If patient expresses suicidal thoughts or statements do not leave alone, notify physician/AP immediately, initiate suicide precautions, and determine need for continual observation    - initiate consults and referrals as appropriate (a mental health professional, Spiritual Care  Outcome: Progressing     Problem: SLEEP DISTURBANCE  Goal: Will exhibit normal sleeping pattern  Description: Interventions:  -  Assess the patients sleep pattern, noting recent changes  - Administer medication as ordered  - Decrease environmental stimuli, including noise, as appropriate during the night  - Encourage the patient to actively participate in unit groups and or exercise during the day to enhance ability to achieve adequate sleep at night  - Assess the patient, in the morning, encouraging a description of sleep experience  Outcome: Progressing     Problem: INVOLUNTARY ADMIT  Goal: Will cooperate with staff recommendations and doctor's orders and will demonstrate appropriate behavior  Description: INTERVENTIONS:  - Treat underlying conditions and offer medication as ordered  - Educate regarding involuntary admission procedures and rules  - Utilize positive consistent limit setting strategies to support patient and staff safety  Outcome: Progressing

## 2021-04-22 NOTE — NURSING NOTE
Pt visible in dayroom most of shift  Pt denies all s/s at this time  Pt calm and pleasant  Pt compliant with medications and meals  Will continue to monitor

## 2021-04-22 NOTE — PROGRESS NOTES
Pt social and well focused in group this morning  Pt  was able to state several signs of mental health issues to watch out for in the future  As group discussed past childhood messages,pt stated that her mother never wanted her,the 2nd child and she felt unwanted most of her life  Pt did not discuss any current stressors to watch out for if returning home  04/22/21 1000   Activity/Group Checklist   Group Community meeting   Attendance Attended   Attendance Duration (min) 16-30   Interactions Interacted appropriately  (recalled her mother not loving her as a child )   Affect/Mood Calm   Goals Achieved Able to engage in interactions; Identified feelings; Identified triggers; Discussed self-esteem issues

## 2021-04-22 NOTE — PLAN OF CARE
Problem: COPING  Goal: Pt/Family able to verbalize concerns and demonstrate effective coping strategies  Description: INTERVENTIONS:  - Assist patient/family to identify coping skills, available support systems and cultural and spiritual values  - Provide emotional support, including active listening and acknowledgement of concerns of patient and caregivers  - Reduce environmental stimuli, as able  - Provide patient education  - Assess for spiritual pain/suffering and initiate spiritual care, including notification of Pastoral Care or edgar based community as needed  - Assess effectiveness of coping strategies  Outcome: Progressing  Goal: Will report anxiety at manageable levels  Description: INTERVENTIONS:  - Administer medication as ordered  - Teach and encourage coping skills  - Provide emotional support  - Assess patient/family for anxiety and ability to cope  Outcome: Progressing     Problem: DECISION MAKING  Goal: Pt/Family able to effectively weigh alternatives and participate in decision making related to treatment and care  Description: INTERVENTIONS:  - Identify decision maker  - Determine when there are differences among patient's view, family's view, and healthcare provider's view of patient condition and care goals  - Facilitate patient/family articulation of goals for care  - Help patient/family identify pros/cons of alternative solutions  - Provide information as requested by patient/family  - Respect patient/family rights related to privacy and sharing information   - Serve as a liaison between patient, family and health care team  - Initiate consults as appropriate (Ethics Team, Palliative Care, Family Care Conference, etc )  Outcome: Progressing     Problem: CONFUSION/THOUGHT DISTURBANCE  Goal: Thought disturbances (confusion, delirium, depression, dementia or psychosis) are managed to maintain or return to baseline mental status and functional level  Description: INTERVENTIONS:  - Assess for possible contributors to  thought disturbance, including but not limited to medications, infection, impaired vision or hearing, underlying metabolic abnormalities, dehydration, respiratory compromise,  psychiatric diagnoses and notify attending PHYSICAN/AP  - Monitor and intervene to maintain adequate nutrition, hydration, elimination, sleep and activity  - Decrease environmental stimuli, including noise as appropriate  - Provide frequent contacts to provide refocusing, direction and reassurance as needed  Approach patient calmly with eye contact and at their level  - [place] high risk fall precautions, aspiration precautions and other safety measures, as indicated  - If delirium suspected, notify physician/AP of change in condition and request immediate in-person evaluation  - Pursue consults as appropriate including Geriatric (campus dependent), OT for cognitive evaluation/activity planning, psychiatric, pastoral care, etc   Outcome: Progressing     Problem: BEHAVIOR  Goal: Pt/Family maintain appropriate behavior and adhere to behavioral management agreement, if implemented  Description: INTERVENTIONS:  - Assess the family dynamic   - Encourage verbalization of thoughts and concerns in a socially appropriate manner  - Assess patient/family's coping skills and non-compliant behavior (including use of illegal substances)  - Utilize positive, consistent limit setting strategies supporting safety of patient, staff and others  - Initiate consult with Case Management, Spiritual Care or other ancillary services as appropriate  - If a patient's/visitor's behavior jeopardizes the safety of the patient, staff, or others, refer to organization procedure     - Notify Security of behavior or suspected illegal substances which indicate the need for search of the patient and/or belongings  - Encourage participation in the decision making process about a behavioral management agreement; implement if patient meets criteria  Outcome: Progressing     Problem: SELF HARM  Goal: Effect of psychiatric condition will be minimized and patient will be protected from self harm  Description: INTERVENTIONS:  - Assess impact of patient's symptoms on level of functioning, self-care needs and offer support as indicated  - Assess patient/family knowledge of depression, impact on illness and need for teaching  - Provide emotional support, presence and reassurance  - Assess for possible suicidal thoughts, ideation or self-harm  If patient expresses suicidal thoughts or statements do not leave alone, notify physician/AP immediately, initiate suicide precautions, and determine need for continual observation  - initiate consults and referrals as appropriate (a mental health professional, Spiritual Care  Outcome: Progressing     Problem: BEHAVIOR  Goal: Pt/Family maintain appropriate behavior and adhere to behavioral management agreement, if implemented  Description: INTERVENTIONS:  - Assess the family dynamic   - Encourage verbalization of thoughts and concerns in a socially appropriate manner  - Assess patient/family's coping skills and non-compliant behavior (including use of illegal substances)  - Utilize positive, consistent limit setting strategies supporting safety of patient, staff and others  - Initiate consult with Case Management, Spiritual Care or other ancillary services as appropriate  - If a patient's/visitor's behavior jeopardizes the safety of the patient, staff, or others, refer to organization procedure     - Notify Security of behavior or suspected illegal substances which indicate the need for search of the patient and/or belongings  - Encourage participation in the decision making process about a behavioral management agreement; implement if patient meets criteria  Outcome: Progressing     Problem: ANXIETY  Goal: Will report anxiety at manageable levels  Description: INTERVENTIONS:  - Administer medication as ordered  - Teach and encourage coping skills  - Provide emotional support  - Assess patient/family for anxiety and ability to cope  Outcome: Progressing  Goal: By discharge: Patient will verbalize 2 strategies to deal with anxiety  Description: Interventions:  - Identify any obvious source/trigger to anxiety  - Staff will assist patient in applying identified coping technique/skills  - Encourage attendance of scheduled groups and activities  Outcome: Progressing     Problem: SLEEP DISTURBANCE  Goal: Will exhibit normal sleeping pattern  Description: Interventions:  -  Assess the patients sleep pattern, noting recent changes  - Administer medication as ordered  - Decrease environmental stimuli, including noise, as appropriate during the night  - Encourage the patient to actively participate in unit groups and or exercise during the day to enhance ability to achieve adequate sleep at night  - Assess the patient, in the morning, encouraging a description of sleep experience  Outcome: Progressing     Problem: INVOLUNTARY ADMIT  Goal: Will cooperate with staff recommendations and doctor's orders and will demonstrate appropriate behavior  Description: INTERVENTIONS:  - Treat underlying conditions and offer medication as ordered  - Educate regarding involuntary admission procedures and rules  - Utilize positive consistent limit setting strategies to support patient and staff safety  Outcome: Progressing

## 2021-04-22 NOTE — PROGRESS NOTES
Progress Note - [address] 78 y o  female MRN: [id number]  Unit/Bed#: [facility] [address] Encounter: [id number]    Assessment/Plan   Principal Problem:    Mood disorder with psychosis ([facility] [address] )  Active Problems:    CKD (chronic kidney disease) stage 2, GFR 60-89 ml/min    Medical clearance for psychiatric admission    Major depressive disorder    Anxiety    MCI (mild cognitive impairment)    Recommended Treatment:   - Taper down Abilify to 10 mg QD given limited response (plan to continue to further titrate down)  - Start Risperidone 0 5 mg BID for delusional thoughts (plan to increase to 1 mg BID tomm)  - Discontinue Remeron as patient reports it is making her fatigued throughout the day  - Continue Melatonin 3 mg QHS for sleep  - Continue with group therapy, milieu therapy and occupational therapy      Case discussed with treatment team   Risks, benefits and possible side effects of Medications: Risks, benefits, and possible side effects of medications have been explained to the patient, who verbalizes understanding  Progress Toward Goals: some improvement, still having delusional thoughts but states that she does not want to focus on her son or daughter  ------------------------------------------------------------    Subjective: Rani Orta has been compliant with medications without acute side effects  She reports good mood today  She reports that she feels tired throughout the day due to the "sleeping pill" (Remeron) that she takes at night  She reports good sleep and good appetite  She is seen in milieu interacting appropriately with her peers and staff  She states that she "is not thinking about her son or her daughter " She reports that she trusts her daughter and is agreeable to give her daughter her guns  When prompted, she tells us that her son is having a sexual relationship with his daughter Taz Arnold   She states that she took photos of them in bed together which her son made her delete  Margarito Miller reports that her  was with her when she caught them in bed and that he saw the pictures on her phone before they were deleted  We spoke to her  Carman Dandy on the phone  He reports that their son and granddaughter were fully clothed when Margarito Miller and he saw them in bed together  He doubts that there is any sexual relationship going on  He states that he never saw the picture on Wilcox's photo of the son and granddaughter without clothing  Margarito Miller is consenting for safety on the unit  Psychiatric Review of Systems:  Behavior over the last 24 hours: improved  Sleep: normal  Appetite: good  Medication side effects: fatigue  ROS: Complete review of systems is negative except as noted above      Vital signs in last 24 hours:  Temp:  [97 2 °F (36 2 °C)-97 6 °F (36 4 °C)] 97 6 °F (36 4 °C)  HR:  [71-76] 76  Resp:  [16-18] 18  BP: (105-137)/(74-75) 105/75    Mental Status Evaluation:  Appearance:  alert, good eye contact and appropriate grooming and hygiene   Behavior:  calm and cooperative   Speech:  spontaneous and coherent   Mood:  "good"   Affect:  mood-congruent   Thought Process:  organized, perseverative   Thought Content: intrusive thoughts, delusional thoughts of her son having a sexual relationship with his daughter   Perceptual disturbances: no reported hallucinations and does not appear to be responding to internal stimuli at this time   Risk Potential: No active or passive suicidal or homicidal ideation was verbalized during interview   Cognition: oriented to self and situation, appears to be of average intelligence and cognition not formally tested   Insight:  Limited   Judgment: Limited       Current Medications:  Current Facility-Administered Medications   Medication Dose Route Frequency Provider Last Rate    aluminum-magnesium hydroxide-simethicone  30 mL Oral Q4H PRN Rosibel Pitts MD      ARIPiprazole  20 mg Oral Daily Cherrie Deng MD      artificial tear  1 application Both Eyes S2U PRN Jesús Boudreaux MD      benztropine  0 5 mg Oral Q4H PRN Max 6/day Jesús Boudreaux MD      bisacodyl  10 mg Rectal Daily PRN Jesús Boudreaux MD      hydrOXYzine HCL  25 mg Oral Q6H PRN Max 4/day Jesús Boudreaux MD      ibuprofen  200 mg Oral Q6H PRN Jesús Boudreaux MD      ibuprofen  400 mg Oral Q6H PRN Jesús Boudreaux MD      ibuprofen  600 mg Oral Q8H PRN Jesús Boudreaux MD      LORazepam  1 mg Intramuscular Q6H PRN Max 3/day Jesús Boudreaux MD      LORazepam  0 5 mg Oral Q6H PRN Max 4/day Jesús Boudreaux MD      LORazepam  1 mg Oral Q6H PRN Max 3/day Jesús Boudreaux MD      melatonin  3 mg Oral HS Jesús Boudreaux MD      mirtazapine  15 mg Oral HS Trent Lynn MD      nicotine polacrilex  4 mg Oral Q2H PRN Jesús Boudreaux MD      OLANZapine  5 mg Intramuscular Q3H PRN Max 3/day Jesús Boudreaux MD      OLANZapine  2 5 mg Oral Q4H PRN Max 6/day Jesús Boudreaux MD      OLANZapine  5 mg Oral Q4H PRN Max 3/day Jesús Boudreaux MD      OLANZapine  5 mg Oral Q3H PRN Max 3/day Jesús Boudreaux MD      pantoprazole  20 mg Oral Early Morning Jesús Boudreaux MD      polyethylene glycol  17 g Oral Daily PRN Jesús Boudreaux MD      senna-docusate sodium  1 tablet Oral Daily PRN Jesús Boudreaux MD      traZODone  50 mg Oral HS PRN Jesús Boudreaux MD         Behavioral Health Medications: all current active meds have been reviewed  Changes as above  Laboratory results:  I have personally reviewed all pertinent laboratory/tests results  No results found for this or any previous visit (from the past 48 hour(s))  This note has been constructed using a voice recognition system  There may be translation, syntax, or grammatical errors  If you have any questions, please contact the dictating provider

## 2021-04-22 NOTE — CASE MANAGEMENT
04/22/21 1039   Team Meeting   Meeting Type Daily Rounds   Initial Conference Date 04/22/21   Team Members Present   Team Members Present Physician;Nurse;;; Occupational Therapist   Physician Team Member Nahomy Gale   Nursing Team Member [facility] Management Team Member Ronn Sarah   Social Work Team Member Nii   OT Team Member Landen Amador   Patient/Family Present   Patient Present No   Patient's Family Present No     Denies s/s, med compliant, visible, pleasant, social, slept well, delusions persist regarding pt's son, Abilify taper, start Risperdal

## 2021-04-23 PROCEDURE — 99232 SBSQ HOSP IP/OBS MODERATE 35: CPT | Performed by: PSYCHIATRY & NEUROLOGY

## 2021-04-23 RX ORDER — RISPERIDONE 1 MG/1
1 TABLET, FILM COATED ORAL 2 TIMES DAILY
Status: DISCONTINUED | OUTPATIENT
Start: 2021-04-23 | End: 2021-04-28 | Stop reason: HOSPADM

## 2021-04-23 RX ORDER — ARIPIPRAZOLE 5 MG/1
5 TABLET ORAL DAILY
Status: DISCONTINUED | OUTPATIENT
Start: 2021-04-24 | End: 2021-04-24

## 2021-04-23 RX ADMIN — MELATONIN TAB 3 MG 3 MG: 3 TAB at 21:34

## 2021-04-23 RX ADMIN — DOCUSATE SODIUM 50 MG AND SENNOSIDES 8.6 MG 1 TABLET: 8.6; 5 TABLET, FILM COATED ORAL at 18:16

## 2021-04-23 RX ADMIN — RISPERIDONE 1 MG: 1 TABLET ORAL at 18:15

## 2021-04-23 RX ADMIN — RISPERIDONE 0.5 MG: 0.5 TABLET ORAL at 08:51

## 2021-04-23 RX ADMIN — ARIPIPRAZOLE 10 MG: 10 TABLET ORAL at 08:51

## 2021-04-23 RX ADMIN — PANTOPRAZOLE SODIUM 20 MG: 20 TABLET, DELAYED RELEASE ORAL at 06:15

## 2021-04-23 NOTE — NURSING NOTE
Pt pleasant and cooperative, visible on unit  Cooperative with medications, no side effect reported  Social with peers  No delusions noted  No symptoms reported

## 2021-04-23 NOTE — NURSING NOTE
Patient is A+Ox3, VSS, denies pain  Patient is calm, cooperative, and interactive with peers and staff  Patient denies anxiety or depression, stated, "It feels like a good day today, I'm good "  Patient stated she was happy to see the sun shining  Attended group and is presently sitting in day room awaiting lunch  Will continue to monitor closely

## 2021-04-23 NOTE — PLAN OF CARE
Pt  Actively and appropriately joined three of three groups and demonstrated appropriate focus and social interaction  In spite of several peers being discharged today,pt  Did not reflect on her thoughts of eventual discharge to home    Problem: Ineffective Coping  Goal: Participates in unit activities  Description: Interventions:  - Provide therapeutic environment   - Provide required programming   - Redirect inappropriate behaviors   Outcome: Progressing

## 2021-04-23 NOTE — PROGRESS NOTES
Pt attended [address] recovery principles group  Pt noted being a a new medication and trying to have an open mind  Pt did acknowledge at first she was reisistent  Pt more positive thinking patterns  04/23/21 1100   Activity/Group Checklist   Group Other (Comment)  ( recovery principles)   Attendance Attended   Attendance Duration (min) 31-45   Interactions Interacted appropriately   Affect/Mood Calm   Goals Achieved Identified feelings; Identified relapse prevention strategies; Discussed coping strategies; Able to listen to others; Able to engage in interactions; Able to reflect/comment on own behavior;Able to manage/cope with feelings;Verbalized increased hopefulness

## 2021-04-23 NOTE — PROGRESS NOTES
Progress Note - Melonieg [address] 78 y o  female MRN: [id number]  Unit/Bed#: Jaquelin Mercedes 831-07 Encounter: [id number]    Assessment/Plan   Principal Problem:    Mood disorder with psychosis ([address] )  Active Problems:    CKD (chronic kidney disease) stage 2, GFR 60-89 ml/min    Medical clearance for psychiatric admission    Major depressive disorder    Anxiety    MCI (mild cognitive impairment)    Recommended Treatment:   - Continue to cross taper Abilify and Risperdal  Abilify decreased to 5 mg QD given limited response and Risperdal increased to 1 mg BID for continued delusional thoughts   - Remeron discontinued yesterday as patient reports it is making her fatigued throughout the day  - Continue Melatonin 3 mg QHS for sleep  - Continue with group therapy, milieu therapy and occupational therapy     Case discussed with treatment team   Risks, benefits and possible side effects of Medications: Risks, benefits, and possible side effects of medications have been explained to the patient, who verbalizes understanding  Progress Toward Goals: slow improvement    ------------------------------------------------------------    Subjective: Lizy العلي has been compliant with medications without acute side effects  When prompted, she continues to report delusions of her son having inappropriate relations with his daughter, but is less focused on it today  Patient is alert and oriented and states that she will continue to not focus as much on her family issues  She is attending group sessions and is seen in milieu interacting appropriately with peers and staff  She reports good sleep, energy, appetite and mood  Lizy العلي is consenting for safety on the unit  Psychiatric Review of Systems:  Behavior over the last 24 hours: unchanged  Sleep: normal  Appetite: good  Medication side effects: none verbalized  ROS: Complete review of systems is negative except as noted above      Vital signs in last 24 hours:   Temp:  [96 8 °F (36 °C)-97 5 °F (36 4 °C)] 97 5 °F (36 4 °C)  HR:  [71-91] 91  Resp:  [16-18] 16  BP: (125-133)/(70-77) 125/74    Mental Status Evaluation:  Appearance:  alert, good eye contact and appropriate grooming and hygiene   Behavior:  calm and cooperative   Speech:  spontaneous, normal rate, normal volume and coherent   Mood:  "Great"   Affect:  mood-congruent and appropriate range   Thought Process:  organized, goal directed   Thought Content: Continued delusional thoughts of her son having sexual relations with his daughter   Perceptual disturbances: no reported hallucinations and does not appear to be responding to internal stimuli at this time   Risk Potential: No active or passive suicidal or homicidal ideation was verbalized during interview   Cognition: oriented to self and situation, appears to be of average intelligence and cognition not formally tested   Insight:  Limited   Judgment: Limited       Current Medications:  Current Facility-Administered Medications   Medication Dose Route Frequency Provider Last Rate    aluminum-magnesium hydroxide-simethicone  30 mL Oral Q4H PRN Laz Christian MD      ARIPiprazole  10 mg Oral Daily Kalen Galvez MD      artificial tear  1 application Both Eyes M6F PRN Laz Christian MD      benztropine  0 5 mg Oral Q4H PRN Max 6/day Laz Christian MD      bisacodyl  10 mg Rectal Daily PRN Laz Christian MD      hydrOXYzine HCL  25 mg Oral Q6H PRN Max 4/day Laz Christian MD      ibuprofen  200 mg Oral Q6H PRN Laz Christian MD      ibuprofen  400 mg Oral Q6H PRN Laz Christian MD      ibuprofen  600 mg Oral Q8H PRN Laz Christian MD      LORazepam  1 mg Intramuscular Q6H PRN Max 3/day Laz Christian MD      LORazepam  0 5 mg Oral Q6H PRN Max 4/day Laz Christian MD      LORazepam  1 mg Oral Q6H PRN Max 3/day Laz Christian MD      melatonin  3 mg Oral HS Laz Christian MD      nicotine polacrilex  4 mg Oral Q2H PRN Ade Bedolla MD      OLANZapine  5 mg Intramuscular Q3H PRN Max 3/day Ade Bedolla MD      OLANZapine  2 5 mg Oral Q4H PRN Max 6/day Ade Bedolla MD      OLANZapine  5 mg Oral Q4H PRN Max 3/day Ade Bedolla MD      OLANZapine  5 mg Oral Q3H PRN Max 3/day Ade Bedolla MD      pantoprazole  20 mg Oral Early Morning Ade Bedolla MD      polyethylene glycol  17 g Oral Daily PRN Ade Bedolla MD      risperiDONE  0 5 mg Oral BID Compa Sandoval MD      senna-docusate sodium  1 tablet Oral Daily PRN Ade Bedolla MD      traZODone  50 mg Oral HS PRN Ade Bedolla MD         Behavioral Health Medications: all current active meds have been reviewed  Changes as above  Laboratory results:  I have personally reviewed all pertinent laboratory/tests results  No results found for this or any previous visit (from the past 48 hour(s))  This note has been constructed using a voice recognition system  There may be translation, syntax, or grammatical errors  If you have any questions, please contact the dictating provider

## 2021-04-23 NOTE — CASE MANAGEMENT
04/23/21 0852   Team Meeting   Meeting Type Daily Rounds   Initial Conference Date 04/23/21   Team Members Present   Team Members Present Physician;Nurse;;; Occupational Therapist   Physician Team Member Dr Zev Momin Team Member [facility] Management Team Member [address] [place] [address] Work Team Member Nii   OT Team Member Sonya Love   Patient/Family Present   Patient Present No   Patient's Family Present No     Visible, social, 3/3 groups yesterday, preoccupied with family issues, slept, med compliant, possible d/c next week

## 2021-04-24 LAB
BASOPHILS # BLD AUTO: 0 THOUSANDS/ΜL (ref 0–0.1)
BASOPHILS NFR BLD AUTO: 0 % (ref 0–1)
EOSINOPHIL # BLD AUTO: 0 THOUSAND/ΜL (ref 0–0.4)
EOSINOPHIL NFR BLD AUTO: 0 % (ref 0–6)
ERYTHROCYTE [DISTWIDTH] IN BLOOD BY AUTOMATED COUNT: 13.1 %
HCT VFR BLD AUTO: 36.7 % (ref 36–46)
HGB BLD-MCNC: 12.5 G/DL (ref 12–16)
LYMPHOCYTES # BLD AUTO: 1 THOUSANDS/ΜL (ref 0.5–4)
LYMPHOCYTES NFR BLD AUTO: 28 % (ref 25–45)
MCH RBC QN AUTO: 31.9 PG (ref 26–34)
MCHC RBC AUTO-ENTMCNC: 34.1 G/DL (ref 31–36)
MCV RBC AUTO: 94 FL (ref 80–100)
MONOCYTES # BLD AUTO: 0.4 THOUSAND/ΜL (ref 0.2–0.9)
MONOCYTES NFR BLD AUTO: 10 % (ref 1–10)
NEUTROPHILS # BLD AUTO: 2.1 THOUSANDS/ΜL (ref 1.8–7.8)
NEUTS SEG NFR BLD AUTO: 62 % (ref 45–65)
PLATELET # BLD AUTO: 267 THOUSANDS/UL (ref 150–450)
PMV BLD AUTO: 7.4 FL (ref 8.9–12.7)
RBC # BLD AUTO: 3.92 MILLION/UL (ref 4–5.2)
WBC # BLD AUTO: 3.4 THOUSAND/UL (ref 4.5–11)

## 2021-04-24 PROCEDURE — 85025 COMPLETE CBC W/AUTO DIFF WBC: CPT | Performed by: NURSE PRACTITIONER

## 2021-04-24 PROCEDURE — 99232 SBSQ HOSP IP/OBS MODERATE 35: CPT | Performed by: NURSE PRACTITIONER

## 2021-04-24 RX ADMIN — RISPERIDONE 1 MG: 1 TABLET ORAL at 17:20

## 2021-04-24 RX ADMIN — TRAZODONE HYDROCHLORIDE 50 MG: 50 TABLET ORAL at 00:03

## 2021-04-24 RX ADMIN — RISPERIDONE 1 MG: 1 TABLET ORAL at 08:21

## 2021-04-24 RX ADMIN — ARIPIPRAZOLE 5 MG: 5 TABLET ORAL at 08:21

## 2021-04-24 RX ADMIN — MELATONIN TAB 3 MG 3 MG: 3 TAB at 21:27

## 2021-04-24 RX ADMIN — IBUPROFEN 400 MG: 400 TABLET ORAL at 00:03

## 2021-04-24 NOTE — NURSING NOTE
Pt was calm, pleasant with staff and peers at day room last night  She requested and was given Motrin PRN for moderate pain in left hip at 0003 and Trazodone PRN at the same time  Pt fall asleep after taking med  Will continue to monitor

## 2021-04-24 NOTE — PROGRESS NOTES
Progress Note - Bobby [address] 78 y o  female MRN: [id number]  Unit/Bed#Areli Wiggins 612-47 Encounter: [id number]    The patient was seen for continuing care and reviewed with treatment team   Patient denies depression or anxiety and says her mood is okay and just a little bit tired  She said she did not sleep well last night due to arthritic pain in her hip, knee, and shoulder  Appetite has been good  She remains preoccupied with discharge and says she has to go to court on the 5th because she was subpoened  She was not spontaneous with any delusional material today      Mood disorder with psychosis ([facility] [address] )    Vital signs in last 24 hours:  Temp:  [97 5 °F (36 4 °C)-97 6 °F (36 4 °C)] 97 6 °F (36 4 °C)  HR:  [86-91] 87  Resp:  [16] 16  BP: (125-147)/(70-79) 127/70    Mental Status Evaluation:    Appearance Adequate hygiene and grooming   Behavior calm and cooperative   Mood euthymic   Speech Normal rate and volume   Affect appropriate   Thought Processes Goal directed and coherent   Thought Content Less spontaneous with delusional material   Perceptual Disturbances Denies hallucinations and does not appear to be responding to internal stimuli   Risk Potential Suicidal/Homicidal Ideation - No evidence of suicidal or homicidal ideation and Patient does not verbalize suicidal or homicidal ideation  Risk of Violence - No evidence of risk for violence found on assessment  Risk of Self Mutilation - No evidence of risk for self mutilation found on assessment   Sensorium oriented to person, place and time/date   Cognition/Memory recent and remote memory grossly intact   Consciousness alert and awake   Attention/Concentration attention span and concentration appear shorter than expected for age   Insight limited   Judgement limited   Muscle Strength and Gait/Station normal muscle strength and normal muscle tone, normal gait/station and normal balance   Motor Activity no abnormal movements       Progress Toward Goals:  Progressing      Recommended Treatment:  Continue Risperdal 1 mg b i d   Discontinue Abilify 5 mg  Continue with pharmacotherapy, group therapy, milieu therapy and occupational therapy    The patient will be maintained on the following medications:  Current Facility-Administered Medications   Medication Dose Route Frequency Provider Last Rate    aluminum-magnesium hydroxide-simethicone  30 mL Oral Q4H PRN Chavez Cutting, MD      ARIPiprazole  5 mg Oral Daily Naresh Longoria MD      artificial tear  1 application Both Eyes H1A PRN Chavez Cutting, MD      benztropine  0 5 mg Oral Q4H PRN Max 6/day Chavez Cutting, MD      bisacodyl  10 mg Rectal Daily PRN Chavez Cutting, MD      hydrOXYzine HCL  25 mg Oral Q6H PRN Max 4/day Chavez Cutting, MD      ibuprofen  200 mg Oral Q6H PRN Chavez Cutting, MD      ibuprofen  400 mg Oral Q6H PRN Chavez Cutting, MD      ibuprofen  600 mg Oral Q8H PRN Chavez Cutting, MD      LORazepam  1 mg Intramuscular Q6H PRN Max 3/day Chavez Cutting, MD      LORazepam  0 5 mg Oral Q6H PRN Max 4/day Chavez Cutting, MD      LORazepam  1 mg Oral Q6H PRN Max 3/day Chavez Cutting, MD      melatonin  3 mg Oral HS Chavez Cutting, MD      nicotine polacrilex  4 mg Oral Q2H PRN Chavez Cutting, MD      OLANZapine  5 mg Intramuscular Q3H PRN Max 3/day Chavez Cutting, MD      OLANZapine  2 5 mg Oral Q4H PRN Max 6/day Chavez Cutting, MD      OLANZapine  5 mg Oral Q4H PRN Max 3/day Chavez Cutting, MD      OLANZapine  5 mg Oral Q3H PRN Max 3/day Chavez Cutting, MD      pantoprazole  20 mg Oral Early Morning Chavez Cutting, MD      polyethylene glycol  17 g Oral Daily PRN Chavez Cutting, MD      risperiDONE  1 mg Oral BID Naresh Longoria MD      senna-docusate sodium  1 tablet Oral Daily PRN Chavez Cutting, MD      traZODone  50 mg Oral HS PRN Chavez Cutting, MD         Mood disorder with psychosis ([facility] [address] )

## 2021-04-24 NOTE — NURSING NOTE
Patient is calm and cooperative  She has been present and social in the milieu for most of the day  She continues to perservate on issues with her family

## 2021-04-24 NOTE — PLAN OF CARE
Problem: COPING  Goal: Pt/Family able to verbalize concerns and demonstrate effective coping strategies  Description: INTERVENTIONS:  - Assist patient/family to identify coping skills, available support systems and cultural and spiritual values  - Provide emotional support, including active listening and acknowledgement of concerns of patient and caregivers  - Reduce environmental stimuli, as able  - Provide patient education  - Assess for spiritual pain/suffering and initiate spiritual care, including notification of Pastoral Care or [place]gar based community as needed  - Assess effectiveness of coping strategies  Outcome: Progressing  Goal: Will report anxiety at manageable levels  Description: INTERVENTIONS:  - Administer medication as ordered  - Teach and encourage coping skills  - Provide emotional support  - Assess patient/family for anxiety and ability to cope  Outcome: Progressing     Problem: DECISION MAKING  Goal: Pt/Family able to effectively weigh alternatives and participate in decision making related to treatment and care  Description: INTERVENTIONS:  - Identify decision maker  - Determine when there are differences among patient's view, family's view, and healthcare provider's view of patient condition and care goals  - Facilitate patient/family articulation of goals for care  - Help patient/family identify pros/cons of alternative solutions  - Provide information as requested by patient/family  - Respect patient/family rights related to privacy and sharing information   - Serve as a liaison between patient, family and health care team  - Initiate consults as appropriate (Ethics Team, Palliative Care, Family Care Conference, etc )  Outcome: Progressing     Problem: CONFUSION/THOUGHT DISTURBANCE  Goal: Thought disturbances (confusion, delirium, depression, dementia or psychosis) are managed to maintain or return to baseline mental status and functional level  Description: INTERVENTIONS:  - Assess for possible contributors to  thought disturbance, including but not limited to medications, infection, impaired vision or hearing, underlying metabolic abnormalities, dehydration, respiratory compromise,  psychiatric diagnoses and notify attending PHYSICAN/AP  - Monitor and intervene to maintain adequate nutrition, hydration, elimination, sleep and activity  - Decrease environmental stimuli, including noise as appropriate  - Provide frequent contacts to provide refocusing, direction and reassurance as needed  Approach patient calmly with eye contact and at their level  - [place] high risk fall precautions, aspiration precautions and other safety measures, as indicated  - If delirium suspected, notify physician/AP of change in condition and request immediate in-person evaluation  - Pursue consults as appropriate including Geriatric (campus dependent), OT for cognitive evaluation/activity planning, psychiatric, pastoral care, etc   Outcome: Progressing     Problem: BEHAVIOR  Goal: Pt/Family maintain appropriate behavior and adhere to behavioral management agreement, if implemented  Description: INTERVENTIONS:  - Assess the family dynamic   - Encourage verbalization of thoughts and concerns in a socially appropriate manner  - Assess patient/family's coping skills and non-compliant behavior (including use of illegal substances)  - Utilize positive, consistent limit setting strategies supporting safety of patient, staff and others  - Initiate consult with Case Management, Spiritual Care or other ancillary services as appropriate  - If a patient's/visitor's behavior jeopardizes the safety of the patient, staff, or others, refer to organization procedure     - Notify Security of behavior or suspected illegal substances which indicate the need for search of the patient and/or belongings  - Encourage participation in the decision making process about a behavioral management agreement; implement if patient meets criteria  Outcome: Progressing     Problem: SELF HARM  Goal: Effect of psychiatric condition will be minimized and patient will be protected from self harm  Description: INTERVENTIONS:  - Assess impact of patient's symptoms on level of functioning, self-care needs and offer support as indicated  - Assess patient/family knowledge of depression, impact on illness and need for teaching  - Provide emotional support, presence and reassurance  - Assess for possible suicidal thoughts, ideation or self-harm  If patient expresses suicidal thoughts or statements do not leave alone, notify physician/AP immediately, initiate suicide precautions, and determine need for continual observation  - initiate consults and referrals as appropriate (a mental health professional, Spiritual Care  Outcome: Progressing     Problem: BEHAVIOR  Goal: Pt/Family maintain appropriate behavior and adhere to behavioral management agreement, if implemented  Description: INTERVENTIONS:  - Assess the family dynamic   - Encourage verbalization of thoughts and concerns in a socially appropriate manner  - Assess patient/family's coping skills and non-compliant behavior (including use of illegal substances)  - Utilize positive, consistent limit setting strategies supporting safety of patient, staff and others  - Initiate consult with Case Management, Spiritual Care or other ancillary services as appropriate  - If a patient's/visitor's behavior jeopardizes the safety of the patient, staff, or others, refer to organization procedure     - Notify Security of behavior or suspected illegal substances which indicate the need for search of the patient and/or belongings  - Encourage participation in the decision making process about a behavioral management agreement; implement if patient meets criteria  Outcome: Progressing     Problem: ANXIETY  Goal: Will report anxiety at manageable levels  Description: INTERVENTIONS:  - Administer medication as ordered  - Teach and encourage coping skills  - Provide emotional support  - Assess patient/family for anxiety and ability to cope  Outcome: Progressing  Goal: By discharge: Patient will verbalize 2 strategies to deal with anxiety  Description: Interventions:  - Identify any obvious source/trigger to anxiety  - Staff will assist patient in applying identified coping technique/skills  - Encourage attendance of scheduled groups and activities  Outcome: Progressing     Problem: SLEEP DISTURBANCE  Goal: Will exhibit normal sleeping pattern  Description: Interventions:  -  Assess the patients sleep pattern, noting recent changes  - Administer medication as ordered  - Decrease environmental stimuli, including noise, as appropriate during the night  - Encourage the patient to actively participate in unit groups and or exercise during the day to enhance ability to achieve adequate sleep at night  - Assess the patient, in the morning, encouraging a description of sleep experience  Outcome: Progressing     Problem: INVOLUNTARY ADMIT  Goal: Will cooperate with staff recommendations and doctor's orders and will demonstrate appropriate behavior  Description: INTERVENTIONS:  - Treat underlying conditions and offer medication as ordered  - Educate regarding involuntary admission procedures and rules  - Utilize positive consistent limit setting strategies to support patient and staff safety  Outcome: Progressing

## 2021-04-25 PROCEDURE — 99232 SBSQ HOSP IP/OBS MODERATE 35: CPT | Performed by: NURSE PRACTITIONER

## 2021-04-25 RX ADMIN — RISPERIDONE 1 MG: 1 TABLET ORAL at 08:51

## 2021-04-25 RX ADMIN — RISPERIDONE 1 MG: 1 TABLET ORAL at 17:08

## 2021-04-25 RX ADMIN — MELATONIN TAB 3 MG 3 MG: 3 TAB at 21:17

## 2021-04-25 NOTE — PLAN OF CARE
Problem: COPING  Goal: Pt/Family able to verbalize concerns and demonstrate effective coping strategies  Description: INTERVENTIONS:  - Assist patient/family to identify coping skills, available support systems and cultural and spiritual values  - Provide emotional support, including active listening and acknowledgement of concerns of patient and caregivers  - Reduce environmental stimuli, as able  - Provide patient education  - Assess for spiritual pain/suffering and initiate spiritual care, including notification of Pastoral Care or edgar based community as needed  - Assess effectiveness of coping strategies  Outcome: Progressing  Goal: Will report anxiety at manageable levels  Description: INTERVENTIONS:  - Administer medication as ordered  - Teach and encourage coping skills  - Provide emotional support  - Assess patient/family for anxiety and ability to cope  Outcome: Progressing     Problem: DECISION MAKING  Goal: Pt/Family able to effectively weigh alternatives and participate in decision making related to treatment and care  Description: INTERVENTIONS:  - Identify decision maker  - Determine when there are differences among patient's view, family's view, and healthcare provider's view of patient condition and care goals  - Facilitate patient/family articulation of goals for care  - Help patient/family identify pros/cons of alternative solutions  - Provide information as requested by patient/family  - Respect patient/family rights related to privacy and sharing information   - Serve as a liaison between patient, family and health care team  - Initiate consults as appropriate (Ethics Team, Palliative Care, Family Care Conference, etc )  Outcome: Progressing     Problem: CONFUSION/THOUGHT DISTURBANCE  Goal: Thought disturbances (confusion, delirium, depression, dementia or psychosis) are managed to maintain or return to baseline mental status and functional level  Description: INTERVENTIONS:  - Assess for possible contributors to  thought disturbance, including but not limited to medications, infection, impaired vision or hearing, underlying metabolic abnormalities, dehydration, respiratory compromise,  psychiatric diagnoses and notify attending PHYSICAN/AP  - Monitor and intervene to maintain adequate nutrition, hydration, elimination, sleep and activity  - Decrease environmental stimuli, including noise as appropriate  - Provide frequent contacts to provide refocusing, direction and reassurance as needed  Approach patient calmly with eye contact and at their level  - [place] high risk fall precautions, aspiration precautions and other safety measures, as indicated  - If delirium suspected, notify physician/AP of change in condition and request immediate in-person evaluation  - Pursue consults as appropriate including Geriatric (campus dependent), OT for cognitive evaluation/activity planning, psychiatric, pastoral care, etc   Outcome: Progressing     Problem: BEHAVIOR  Goal: Pt/Family maintain appropriate behavior and adhere to behavioral management agreement, if implemented  Description: INTERVENTIONS:  - Assess the family dynamic   - Encourage verbalization of thoughts and concerns in a socially appropriate manner  - Assess patient/family's coping skills and non-compliant behavior (including use of illegal substances)  - Utilize positive, consistent limit setting strategies supporting safety of patient, staff and others  - Initiate consult with Case Management, Spiritual Care or other ancillary services as appropriate  - If a patient's/visitor's behavior jeopardizes the safety of the patient, staff, or others, refer to organization procedure     - Notify Security of behavior or suspected illegal substances which indicate the need for search of the patient and/or belongings  - Encourage participation in the decision making process about a behavioral management agreement; implement if patient meets criteria  Outcome: Progressing     Problem: SELF HARM  Goal: Effect of psychiatric condition will be minimized and patient will be protected from self harm  Description: INTERVENTIONS:  - Assess impact of patient's symptoms on level of functioning, self-care needs and offer support as indicated  - Assess patient/family knowledge of depression, impact on illness and need for teaching  - Provide emotional support, presence and reassurance  - Assess for possible suicidal thoughts, ideation or self-harm  If patient expresses suicidal thoughts or statements do not leave alone, notify physician/AP immediately, initiate suicide precautions, and determine need for continual observation  - initiate consults and referrals as appropriate (a mental health professional, Spiritual Care  Outcome: Progressing     Problem: BEHAVIOR  Goal: Pt/Family maintain appropriate behavior and adhere to behavioral management agreement, if implemented  Description: INTERVENTIONS:  - Assess the family dynamic   - Encourage verbalization of thoughts and concerns in a socially appropriate manner  - Assess patient/family's coping skills and non-compliant behavior (including use of illegal substances)  - Utilize positive, consistent limit setting strategies supporting safety of patient, staff and others  - Initiate consult with Case Management, Spiritual Care or other ancillary services as appropriate  - If a patient's/visitor's behavior jeopardizes the safety of the patient, staff, or others, refer to organization procedure     - Notify Security of behavior or suspected illegal substances which indicate the need for search of the patient and/or belongings  - Encourage participation in the decision making process about a behavioral management agreement; implement if patient meets criteria  Outcome: Progressing     Problem: ANXIETY  Goal: Will report anxiety at manageable levels  Description: INTERVENTIONS:  - Administer medication as ordered  - Teach and encourage coping skills  - Provide emotional support  - Assess patient/family for anxiety and ability to cope  Outcome: Progressing  Goal: By discharge: Patient will verbalize 2 strategies to deal with anxiety  Description: Interventions:  - Identify any obvious source/trigger to anxiety  - Staff will assist patient in applying identified coping technique/skills  - Encourage attendance of scheduled groups and activities  Outcome: Progressing     Problem: SLEEP DISTURBANCE  Goal: Will exhibit normal sleeping pattern  Description: Interventions:  -  Assess the patients sleep pattern, noting recent changes  - Administer medication as ordered  - Decrease environmental stimuli, including noise, as appropriate during the night  - Encourage the patient to actively participate in unit groups and or exercise during the day to enhance ability to achieve adequate sleep at night  - Assess the patient, in the morning, encouraging a description of sleep experience  Outcome: Progressing     Problem: INVOLUNTARY ADMIT  Goal: Will cooperate with staff recommendations and doctor's orders and will demonstrate appropriate behavior  Description: INTERVENTIONS:  - Treat underlying conditions and offer medication as ordered  - Educate regarding involuntary admission procedures and rules  - Utilize positive consistent limit setting strategies to support patient and staff safety  Outcome: Progressing

## 2021-04-25 NOTE — NURSING NOTE
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

## 2021-04-25 NOTE — PROGRESS NOTES
Progress Note - [address] 78 y o  female MRN: [id number]  Unit/Bed#: [facility] 929-78 Encounter: [id number]    Assessment/Plan   Principal Problem:    Mood disorder with psychosis ([address] )  Active Problems:    CKD (chronic kidney disease) stage 2, GFR 60-89 ml/min    Medical clearance for psychiatric admission    Major depressive disorder    Anxiety    MCI (mild cognitive impairment)      Behavior over the last 24 hours:  unchanged  Sleep: normal  Appetite: normal  Medication side effects: No  ROS: no complaints, no reports of acute changes    Pat seen on the inpatient unit for follow up today, and case reviewed with treatment team this am   She reports that she is doing very well and is hopeful for discharge soon  She is pleasant and cooperative in conversation, is OOB and ambulating the unit  She denies any depression, denies anxiety  She has no suicidal or homicidal thoughts  She denies auditory and visual hallucinations  States that she "misses her " as they have never been away from each other for such a long time  She reports that she has to go to court tomorrow, and needs to be discharged, but is not clear on what she needs to go to court for  Provider asked about her children, to which she states that she "does not want to talk about them, they have their lives and she has hers"  No outwardly delusional thoughts  Will continue medications as ordered      Mental Status Evaluation:  Appearance:  casually dressed   Behavior:  normal   Speech:  normal pitch, normal volume and talkative   Mood:  normal and bright   Affect:  mood-congruent   Thought Process:  goal directed, but focused on discharge   Thought Content:  normal and no outwardly delusional statements made   Perceptual Disturbances: None and denies   Risk Potential: Suicidal Ideations none  Homicidal Ideations none  Potential for Aggression No   Sensorium:  person, place, time/date and situation   Memory:  recent and remote memory grossly intact   Consciousness:  alert and awake    Attention: attention span and concentration were age appropriate   Insight:  limited   Judgment: limited   Gait/Station: normal gait/station and normal balance   Motor Activity: no abnormal movements     Progress Toward Goals: Progressing    Recommended Treatment: Continue with group therapy, milieu therapy and occupational therapy  Risks, benefits and possible side effects of Medications:   Risks, benefits, and possible side effects of medications explained to patient and patient verbalizes understanding  Medications: all current active meds have been reviewed and continue current psychiatric medications  Labs: I have personally reviewed all pertinent laboratory/tests results     Most Recent Labs:   Lab Results   Component Value Date    WBC 3 40 (L) 04/24/2021    RBC 3 92 (L) 04/24/2021    HGB 12 5 04/24/2021    HCT 36 7 04/24/2021     04/24/2021    RDW 13 1 04/24/2021    NEUTROABS 2 10 04/24/2021    SODIUM 136 (L) 04/13/2021    K 3 8 04/13/2021    CL 99 04/13/2021    CO2 32 (H) 04/13/2021    BUN 17 04/13/2021    CREATININE 0 91 04/13/2021    GLUC 97 04/13/2021    GLUF 97 04/13/2021    CALCIUM 9 7 04/13/2021    AST 29 04/13/2021    ALT 21 04/13/2021    ALKPHOS 67 04/13/2021    TP 7 4 04/13/2021    ALB 4 1 04/13/2021    TBILI 0 43 04/13/2021    CHOLESTEROL 265 (H) 03/15/2021    HDL 79 03/15/2021    TRIG 50 03/15/2021    LDLCALC 176 (H) 03/15/2021    AJK4SCMUEOYG 1 995 03/26/2021    FREET4 0 95 07/01/2019    HGBA1C 5 4 02/28/2020     02/28/2020

## 2021-04-26 PROCEDURE — 99232 SBSQ HOSP IP/OBS MODERATE 35: CPT | Performed by: PSYCHIATRY & NEUROLOGY

## 2021-04-26 RX ADMIN — MELATONIN TAB 3 MG 3 MG: 3 TAB at 21:00

## 2021-04-26 RX ADMIN — RISPERIDONE 1 MG: 1 TABLET ORAL at 17:15

## 2021-04-26 RX ADMIN — RISPERIDONE 1 MG: 1 TABLET ORAL at 08:29

## 2021-04-26 NOTE — PROGRESS NOTES
Progress Note - [address] 78 y o  female MRN: [id number]  Unit/Bed#: [facility] 910-30 Encounter: [id number]    Assessment/Plan   Principal Problem:    Mood disorder with psychosis ([address] )  Active Problems:    CKD (chronic kidney disease) stage 2, GFR 60-89 ml/min    Medical clearance for psychiatric admission    Major depressive disorder    Anxiety    MCI (mild cognitive impairment)    Recommended Treatment:   - Plan for discharge on Wedenesday  - Continue risperidone 1 mg BID for chronic delusions  - Continue melatonin 3 mg HS for sleep  - Continue with group therapy, milieu therapy and occupational therapy  Case discussed with treatment team   Risks, benefits and possible side effects of Medications: Risks, benefits, and possible side effects of medications have been explained to the patient, who verbalizes understanding  Progress Toward Goals: improved, no intrusive thoughts or chronic delusions about her son today     ------------------------------------------------------------    Subjective: Parth Head has been compliant with medications without acute side effects  She reports she is doing well and is focusing on her own needs and self improvement and is anxious to get home  She is slightly tired due to several night time awakenings because the patient sharing her room with her has a cough that kept waking her up  She is feeling energized and states she has good concentration and it thinking about contacting the local [employer] about pursuing an LPN  She lives at home with her  and two dogs, feels safe at home, and denies weapons in the home  Patient has never been to a psychiatrist before and would like to establish care with the same psychiatrist or psychiatric group her  goes to [address] [place], [place]  Parth Head is consenting for safety on the unit      Psychiatric Review of Systems:  Behavior over the last 24 hours: improved  Sleep: normal and 10 hours of sleep with multiple night time awakenings due to other patient coughing episodes waking her; easily able to fall back asleepl  Appetite: good  Medication side effects: denies  ROS: complete ROS is negative    Vital signs in last 24 hours:  Temp:  [97 4 °F (36 3 °C)-97 8 °F (36 6 °C)] 97 4 °F (36 3 °C)  HR:  [79-98] 79  Resp:  [16] 16  BP: (117-142)/(63-85) 142/85    Mental Status Evaluation:  Appearance:  alert, good eye contact, appears stated age, appropriate grooming and hygiene and smiling   Behavior:  calm, cooperative, sitting comfortably and coloring in group activity room   Speech:  spontaneous, clear, normal rate, normal volume and coherent   Mood:  euthymic   Affect:  mood-congruent, appropriate range and euthymic   Thought Process:  organized, goal directed, normal rate of thoughts   Thought Content: no verbalized delusions or overt paranoia, no obsessive thinking, denies delusional thinking at this time   Perceptual disturbances: no reported hallucinations and does not appear to be responding to internal stimuli at this time   Risk Potential: No active or passive suicidal or homicidal ideation was verbalized during interview   Cognition: oriented to person, place, time, and situation, memory grossly intact, appears to be of average intelligence, normal abstract reasoning, age-appropriate attention span and concentration and cognition not formally tested   Insight:  Improving   Judgment: Improving       Current Medications:  Current Facility-Administered Medications   Medication Dose Route Frequency Provider Last Rate    aluminum-magnesium hydroxide-simethicone  30 mL Oral Q4H PRN Joie Pineda MD      artificial tear  1 application Both Eyes E9H PRN Joie Pineda MD      benztropine  0 5 mg Oral Q4H PRN Max 6/day Joie Pineda MD      bisacodyl  10 mg Rectal Daily PRN Joie Pineda MD      hydrOXYzine HCL  25 mg Oral Q6H PRN Max 4/day Joie Pineda MD      ibuprofen  200 mg Oral Q6H PRN Karime Caballero MD      ibuprofen  400 mg Oral Q6H PRN Karime Caballero MD      ibuprofen  600 mg Oral Q8H PRN Karime Caballero MD      LORazepam  1 mg Intramuscular Q6H PRN Max 3/day Karime Caballero MD      LORazepam  0 5 mg Oral Q6H PRN Max 4/day Karime Caballero MD      LORazepam  1 mg Oral Q6H PRN Max 3/day Karime Caballero MD      melatonin  3 mg Oral HS Karime Caballero MD      nicotine polacrilex  4 mg Oral Q2H PRN Karime Caballero MD      OLANZapine  5 mg Intramuscular Q3H PRN Max 3/day Karime Caballero MD      OLANZapine  2 5 mg Oral Q4H PRN Max 6/day Karime Caballero MD      OLANZapine  5 mg Oral Q4H PRN Max 3/day Karime Caballero MD      OLANZapine  5 mg Oral Q3H PRN Max 3/day Karime Caballero MD      pantoprazole  20 mg Oral Early Morning Karime Caballero MD      polyethylene glycol  17 g Oral Daily PRN Karime Caballero MD      risperiDONE  1 mg Oral BID Katie Collins MD      senna-docusate sodium  1 tablet Oral Daily PRN Karime Caballero MD      traZODone  50 mg Oral HS PRN Karime Caballero MD         Behavioral Health Medications: all current active meds have been reviewed  Changes as above  Laboratory results:  I have personally reviewed all pertinent laboratory/tests results  No results found for this or any previous visit (from the past 48 hour(s))  This note has been constructed using a voice recognition system  There may be translation, syntax, or grammatical errors  If you have any questions, please contact the dictating provider

## 2021-04-26 NOTE — NURSING NOTE
Pt very pleasant and med-compliant with good appetite and steady gait  VSS  No paranoia or aggressive behavior noted  Denied SI  Attended group  Constricted affect, brightened with interaction  Monitored for safety and support

## 2021-04-26 NOTE — CASE MANAGEMENT
04/26/21 0846   Team Meeting   Meeting Type Daily Rounds   Initial Conference Date 04/26/21   Team Members Present   Team Members Present Physician;Nurse;; ;Occupational Therapist   Physician Team Member Dr Ronal Zhao; [address]  Team Member [facility] Management Team Member Ronn Sarah   Social Work Team Member Nii   OT Team Member Landen Amador   Patient/Family Present   Patient Present No   Patient's Family Present No     Denies s/s, med compliant, calm, pleasant, cooperative, misses , wants d/c

## 2021-04-26 NOTE — CASE MANAGEMENT
Spoke with pt's daughter, Anabell Marinelli, to give update on pt's progress and d/c plan  She is aware of d/c on Wed and is in agreement  She can  pt around 2pm that day if pt wants her to provide transport  If not, Anabell Marinelli thinks that pt will be okay with [facility] transport home    to follow up

## 2021-04-26 NOTE — CASE MANAGEMENT
Met with pt to review d/c plan for Wed, 4/28  Pt would like [facility] transportation home instead of her daughter picking her up  Pt would like her med scripts sent to [facility]ZI ORELLANA  Aftercare appts reviewed      [facility] transport request submitted -- transport scheduled for 11am on Wed

## 2021-04-26 NOTE — NURSING NOTE
Pt is frustrated that she was not discharged yet  Hopeful for d/c on Monday  Otherwise is pleasant and cooperative on the unit  No delusions noted  Compliant with hs medication

## 2021-04-26 NOTE — PLAN OF CARE
Problem: COPING  Goal: Pt/Family able to verbalize concerns and demonstrate effective coping strategies  Description: INTERVENTIONS:  - Assist patient/family to identify coping skills, available support systems and cultural and spiritual values  - Provide emotional support, including active listening and acknowledgement of concerns of patient and caregivers  - Reduce environmental stimuli, as able  - Provide patient education  - Assess for spiritual pain/suffering and initiate spiritual care, including notification of Pastoral Care or edgar based community as needed  - Assess effectiveness of coping strategies  Outcome: Progressing  Goal: Will report anxiety at manageable levels  Description: INTERVENTIONS:  - Administer medication as ordered  - Teach and encourage coping skills  - Provide emotional support  - Assess patient/family for anxiety and ability to cope  Outcome: Progressing     Problem: DECISION MAKING  Goal: Pt/Family able to effectively weigh alternatives and participate in decision making related to treatment and care  Description: INTERVENTIONS:  - Identify decision maker  - Determine when there are differences among patient's view, family's view, and healthcare provider's view of patient condition and care goals  - Facilitate patient/family articulation of goals for care  - Help patient/family identify pros/cons of alternative solutions  - Provide information as requested by patient/family  - Respect patient/family rights related to privacy and sharing information   - Serve as a liaison between patient, family and health care team  - Initiate consults as appropriate (Ethics Team, Palliative Care, Family Care Conference, etc )  Outcome: Progressing     Problem: CONFUSION/THOUGHT DISTURBANCE  Goal: Thought disturbances (confusion, delirium, depression, dementia or psychosis) are managed to maintain or return to baseline mental status and functional level  Description: INTERVENTIONS:  - Assess for possible contributors to  thought disturbance, including but not limited to medications, infection, impaired vision or hearing, underlying metabolic abnormalities, dehydration, respiratory compromise,  psychiatric diagnoses and notify attending PHYSICAN/AP  - Monitor and intervene to maintain adequate nutrition, hydration, elimination, sleep and activity  - Decrease environmental stimuli, including noise as appropriate  - Provide frequent contacts to provide refocusing, direction and reassurance as needed  Approach patient calmly with eye contact and at their level  - [place] high risk fall precautions, aspiration precautions and other safety measures, as indicated  - If delirium suspected, notify physician/AP of change in condition and request immediate in-person evaluation  - Pursue consults as appropriate including Geriatric (campus dependent), OT for cognitive evaluation/activity planning, psychiatric, pastoral care, etc   Outcome: Progressing     Problem: BEHAVIOR  Goal: Pt/Family maintain appropriate behavior and adhere to behavioral management agreement, if implemented  Description: INTERVENTIONS:  - Assess the family dynamic   - Encourage verbalization of thoughts and concerns in a socially appropriate manner  - Assess patient/family's coping skills and non-compliant behavior (including use of illegal substances)  - Utilize positive, consistent limit setting strategies supporting safety of patient, staff and others  - Initiate consult with Case Management, Spiritual Care or other ancillary services as appropriate  - If a patient's/visitor's behavior jeopardizes the safety of the patient, staff, or others, refer to organization procedure     - Notify Security of behavior or suspected illegal substances which indicate the need for search of the patient and/or belongings  - Encourage participation in the decision making process about a behavioral management agreement; implement if patient meets criteria  Outcome: Progressing

## 2021-04-27 PROCEDURE — 99232 SBSQ HOSP IP/OBS MODERATE 35: CPT | Performed by: PSYCHIATRY & NEUROLOGY

## 2021-04-27 RX ADMIN — DOCUSATE SODIUM 50 MG AND SENNOSIDES 8.6 MG 1 TABLET: 8.6; 5 TABLET, FILM COATED ORAL at 12:41

## 2021-04-27 RX ADMIN — PANTOPRAZOLE SODIUM 20 MG: 20 TABLET, DELAYED RELEASE ORAL at 06:10

## 2021-04-27 RX ADMIN — RISPERIDONE 1 MG: 1 TABLET ORAL at 17:14

## 2021-04-27 RX ADMIN — RISPERIDONE 1 MG: 1 TABLET ORAL at 08:33

## 2021-04-27 RX ADMIN — MELATONIN TAB 3 MG 3 MG: 3 TAB at 21:22

## 2021-04-27 NOTE — PLAN OF CARE
Problem: COPING  Goal: Pt/Family able to verbalize concerns and demonstrate effective coping strategies  Description: INTERVENTIONS:  - Assist patient/family to identify coping skills, available support systems and cultural and spiritual values  - Provide emotional support, including active listening and acknowledgement of concerns of patient and caregivers  - Reduce environmental stimuli, as able  - Provide patient education  - Assess for spiritual pain/suffering and initiate spiritual care, including notification of Pastoral Care or edgar based community as needed  - Assess effectiveness of coping strategies  Outcome: Progressing  Goal: Will report anxiety at manageable levels  Description: INTERVENTIONS:  - Administer medication as ordered  - Teach and encourage coping skills  - Provide emotional support  - Assess patient/family for anxiety and ability to cope  Outcome: Progressing     Problem: DECISION MAKING  Goal: Pt/Family able to effectively weigh alternatives and participate in decision making related to treatment and care  Description: INTERVENTIONS:  - Identify decision maker  - Determine when there are differences among patient's view, family's view, and healthcare provider's view of patient condition and care goals  - Facilitate patient/family articulation of goals for care  - Help patient/family identify pros/cons of alternative solutions  - Provide information as requested by patient/family  - Respect patient/family rights related to privacy and sharing information   - Serve as a liaison between patient, family and health care team  - Initiate consults as appropriate (Ethics Team, Palliative Care, Family Care Conference, etc )  Outcome: Progressing     Problem: CONFUSION/THOUGHT DISTURBANCE  Goal: Thought disturbances (confusion, delirium, depression, dementia or psychosis) are managed to maintain or return to baseline mental status and functional level  Description: INTERVENTIONS:  - Assess for possible contributors to  thought disturbance, including but not limited to medications, infection, impaired vision or hearing, underlying metabolic abnormalities, dehydration, respiratory compromise,  psychiatric diagnoses and notify attending PHYSICAN/AP  - Monitor and intervene to maintain adequate nutrition, hydration, elimination, sleep and activity  - Decrease environmental stimuli, including noise as appropriate  - Provide frequent contacts to provide refocusing, direction and reassurance as needed  Approach patient calmly with eye contact and at their level  - [place] high risk fall precautions, aspiration precautions and other safety measures, as indicated  - If delirium suspected, notify physician/AP of change in condition and request immediate in-person evaluation  - Pursue consults as appropriate including Geriatric (campus dependent), OT for cognitive evaluation/activity planning, psychiatric, pastoral care, etc   Outcome: Progressing     Problem: BEHAVIOR  Goal: Pt/Family maintain appropriate behavior and adhere to behavioral management agreement, if implemented  Description: INTERVENTIONS:  - Assess the family dynamic   - Encourage verbalization of thoughts and concerns in a socially appropriate manner  - Assess patient/family's coping skills and non-compliant behavior (including use of illegal substances)  - Utilize positive, consistent limit setting strategies supporting safety of patient, staff and others  - Initiate consult with Case Management, Spiritual Care or other ancillary services as appropriate  - If a patient's/visitor's behavior jeopardizes the safety of the patient, staff, or others, refer to organization procedure     - Notify Security of behavior or suspected illegal substances which indicate the need for search of the patient and/or belongings  - Encourage participation in the decision making process about a behavioral management agreement; implement if patient meets criteria  Outcome: Progressing     Problem: SELF HARM  Goal: Effect of psychiatric condition will be minimized and patient will be protected from self harm  Description: INTERVENTIONS:  - Assess impact of patient's symptoms on level of functioning, self-care needs and offer support as indicated  - Assess patient/family knowledge of depression, impact on illness and need for teaching  - Provide emotional support, presence and reassurance  - Assess for possible suicidal thoughts, ideation or self-harm  If patient expresses suicidal thoughts or statements do not leave alone, notify physician/AP immediately, initiate suicide precautions, and determine need for continual observation  - initiate consults and referrals as appropriate (a mental health professional, Spiritual Care  Outcome: Progressing     Problem: BEHAVIOR  Goal: Pt/Family maintain appropriate behavior and adhere to behavioral management agreement, if implemented  Description: INTERVENTIONS:  - Assess the family dynamic   - Encourage verbalization of thoughts and concerns in a socially appropriate manner  - Assess patient/family's coping skills and non-compliant behavior (including use of illegal substances)  - Utilize positive, consistent limit setting strategies supporting safety of patient, staff and others  - Initiate consult with Case Management, Spiritual Care or other ancillary services as appropriate  - If a patient's/visitor's behavior jeopardizes the safety of the patient, staff, or others, refer to organization procedure     - Notify Security of behavior or suspected illegal substances which indicate the need for search of the patient and/or belongings  - Encourage participation in the decision making process about a behavioral management agreement; implement if patient meets criteria  Outcome: Progressing     Problem: ANXIETY  Goal: Will report anxiety at manageable levels  Description: INTERVENTIONS:  - Administer medication as ordered  - Teach and encourage coping skills  - Provide emotional support  - Assess patient/family for anxiety and ability to cope  Outcome: Progressing  Goal: By discharge: Patient will verbalize 2 strategies to deal with anxiety  Description: Interventions:  - Identify any obvious source/trigger to anxiety  - Staff will assist patient in applying identified coping technique/skills  - Encourage attendance of scheduled groups and activities  Outcome: Progressing     Problem: SLEEP DISTURBANCE  Goal: Will exhibit normal sleeping pattern  Description: Interventions:  -  Assess the patients sleep pattern, noting recent changes  - Administer medication as ordered  - Decrease environmental stimuli, including noise, as appropriate during the night  - Encourage the patient to actively participate in unit groups and or exercise during the day to enhance ability to achieve adequate sleep at night  - Assess the patient, in the morning, encouraging a description of sleep experience  Outcome: Progressing     Problem: INVOLUNTARY ADMIT  Goal: Will cooperate with staff recommendations and doctor's orders and will demonstrate appropriate behavior  Description: INTERVENTIONS:  - Treat underlying conditions and offer medication as ordered  - Educate regarding involuntary admission procedures and rules  - Utilize positive consistent limit setting strategies to support patient and staff safety  Outcome: Progressing     Problem: DISCHARGE PLANNING  Goal: Discharge to home or other facility with appropriate resources  Description: INTERVENTIONS:  - Identify barriers to discharge w/patient and caregiver  - Arrange for needed discharge resources and transportation as appropriate  - Identify discharge learning needs (meds, wound care, etc )  - Arrange for interpretive services to assist at discharge as needed  - Refer to Case Management Department for coordinating discharge planning if the patient needs post-hospital services based on physician/advanced practitioner order or complex needs related to functional status, cognitive ability, or social support system  Outcome: Progressing     Problem: Ineffective Coping  Goal: Participates in unit activities  Description: Interventions:  - Provide therapeutic environment   - Provide required programming   - Redirect inappropriate behaviors   Outcome: Progressing

## 2021-04-27 NOTE — NURSING NOTE
Patient is alert, oriented per her baseline  She is happy and anxious about going home on Wednesday  Med and meal compliant  Denies depression , anxiety, suicidal ideation  She is able to make her  needs known

## 2021-04-27 NOTE — PROGRESS NOTES
Pt attended [address] recovery group  Pt was calm and cooperative  Pt was able to recognize, reflect and discuss progress she has made in her [facility] recovery  Pt did note she felt sleepy on meds this morning  Pt hopeful of d/c  Pt respectful of peers  04/27/21 1000   Activity/Group Checklist   Group Other (Comment)  ( recovery)   Attendance Attended   Attendance Duration (min) 16-30   Interactions Interacted appropriately   Affect/Mood Calm   Goals Achieved Identified feelings; Identified relapse prevention strategies; Discussed coping strategies; Able to listen to others; Able to engage in interactions
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
## 2021-04-27 NOTE — PLAN OF CARE
Pt displayed bright affect and willingness to fully engage in all three groups and is looking forward to going home tomorrow without concern for negative family matters  Will attempt one final relapse prevention plan with pt tomorrow before discharge    Problem: Ineffective Coping  Goal: Participates in unit activities  Description: Interventions:  - Provide therapeutic environment   - Provide required programming   - Redirect inappropriate behaviors   Outcome: Progressing

## 2021-04-27 NOTE — CASE MANAGEMENT
04/27/21 0838   Team Meeting   Meeting Type Daily Rounds   Initial Conference Date 04/27/21   Team Members Present   Team Members Present Physician;Nurse;;   Physician Team Member Dr Babar Kirby; [address]  Team Member [facility] Management Team Member Belkis Abebe   Social Work Team Member Lance Shipman   Patient/Family Present   Patient Present No   Patient's Family Present No     Discharge tomorrow at [facility], visible, social, calm, cooperative, pleasant, denying s/s, med compliant, slept well

## 2021-04-27 NOTE — PROGRESS NOTES
Progress Note - Melonieg [address] 78 y o  female MRN: [id number]  Unit/Bed#: [facility] 997-78 Encounter: [id number]    Assessment/Plan   Principal Problem:    Mood disorder with psychosis ([address] )  Active Problems:    CKD (chronic kidney disease) stage 2, GFR 60-89 ml/min    Medical clearance for psychiatric admission    Major depressive disorder    Anxiety    MCI (mild cognitive impairment)    Recommended Treatment:   - Plan for discharge on Wedenesday  - Continue risperidone 1 mg BID for chronic delusions  - Continue melatonin 3 mg HS for sleep  - Continue with group therapy, milieu therapy and occupational therapy  Case discussed with treatment team   Risks, benefits and possible side effects of Medications: Risks, benefits, and possible side effects of medications have been explained to the patient, who verbalizes understanding  Progress Toward Goals: good improvement    ------------------------------------------------------------    Subjective: Danica Josue has been compliant with medications without acute side effects  She reports good sleep, energy, appetite and mood  She states she is really looking forward to discharge tomorrow and cannot wait to see her   She did not voice any chronic delusions about her son or daughter  She denies any hallucinations, paranoia or suicidal/homicidal ideations  Danica Josue is consenting for safety on the unit  Psychiatric Review of Systems:  Behavior over the last 24 hours: unchanged  Sleep: normal  Appetite: good  Medication side effects: none verbalized  ROS: Complete review of systems is negative except as noted above      Vital signs in last 24 hours:  Temp:  [97 °F (36 1 °C)-98 1 °F (36 7 °C)] 97 °F (36 1 °C)  HR:  [84-95] 84  Resp:  [16] 16  BP: (103-148)/(61-81) 148/81    Mental Status Evaluation:  Appearance:  alert, good eye contact, appears stated age, appropriate grooming and hygiene and smiling   Behavior:  calm, cooperative and sitting comfortably   Speech:  spontaneous, normal rate, normal volume and coherent   Mood:  euthymic   Affect:  mood-congruent   Thought Process:  organized, goal directed   Thought Content: no verbalized delusions or overt paranoia   Perceptual disturbances: no reported hallucinations and does not appear to be responding to internal stimuli at this time   Risk Potential: No active or passive suicidal or homicidal ideation was verbalized during interview   Cognition: oriented to person, place, time, and situation, memory grossly intact, appears to be of average intelligence, age-appropriate attention span and concentration and cognition not formally tested   Insight:  Improving   Judgment: Fair       Current Medications:  Current Facility-Administered Medications   Medication Dose Route Frequency Provider Last Rate    aluminum-magnesium hydroxide-simethicone  30 mL Oral Q4H PRN Ade Bedolla MD      artificial tear  1 application Both Eyes E8P PRN Ade Bedolla MD      benztropine  0 5 mg Oral Q4H PRN Max 6/day Ade Bedolla MD      bisacodyl  10 mg Rectal Daily PRN Ade Bedolla MD      hydrOXYzine HCL  25 mg Oral Q6H PRN Max 4/day Ade Bedolla MD      ibuprofen  200 mg Oral Q6H PRN Ade Bedolla MD      ibuprofen  400 mg Oral Q6H PRN Ade Bedolla MD      ibuprofen  600 mg Oral Q8H PRN Ade Bedolla MD      LORazepam  1 mg Intramuscular Q6H PRN Max 3/day Ade Bedolla MD      LORazepam  0 5 mg Oral Q6H PRN Max 4/day Ade Bedolla MD      LORazepam  1 mg Oral Q6H PRN Max 3/day Ade Bedolla MD      melatonin  3 mg Oral HS Ade Bedolla MD      nicotine polacrilex  4 mg Oral Q2H PRN Ade Bedolla MD      OLANZapine  5 mg Intramuscular Q3H PRN Max 3/day Ade Bedolla MD      OLANZapine  2 5 mg Oral Q4H PRN Max 6/day Ade Bedolla MD      OLANZapine  5 mg Oral Q4H PRN Max 3/day Ade Bedolla MD      OLANZapine  5 mg Oral Q3H PRN Max 3/day Ade Bedolla MD  pantoprazole  20 mg Oral Early Morning Oneil Ward MD      polyethylene glycol  17 g Oral Daily PRN Oneil Ward MD      risperiDONE  1 mg Oral BID Qing Sorensen MD      senna-docusate sodium  1 tablet Oral Daily PRN Oneil Ward MD      traZODone  50 mg Oral HS PRN Oneil Ward MD         Behavioral Health Medications: all current active meds have been reviewed  Changes as above  Laboratory results:  I have personally reviewed all pertinent laboratory/tests results  No results found for this or any previous visit (from the past 48 hour(s))  This note has been constructed using a voice recognition system  There may be translation, syntax, or grammatical errors  If you have any questions, please contact the dictating provider

## 2021-04-28 VITALS
SYSTOLIC BLOOD PRESSURE: 133 MMHG | DIASTOLIC BLOOD PRESSURE: 77 MMHG | TEMPERATURE: 97.9 F | WEIGHT: 156.09 LBS | BODY MASS INDEX: 29.47 KG/M2 | OXYGEN SATURATION: 97 % | HEART RATE: 99 BPM | RESPIRATION RATE: 16 BRPM | HEIGHT: 61 IN

## 2021-04-28 PROCEDURE — 99238 HOSP IP/OBS DSCHRG MGMT 30/<: CPT | Performed by: PSYCHIATRY & NEUROLOGY

## 2021-04-28 RX ORDER — LANOLIN ALCOHOL/MO/W.PET/CERES
3 CREAM (GRAM) TOPICAL
Qty: 30 TABLET | Refills: 0 | Status: SHIPPED | OUTPATIENT
Start: 2021-04-28 | End: 2021-04-28 | Stop reason: SDUPTHER

## 2021-04-28 RX ORDER — RISPERIDONE 1 MG/1
1 TABLET, FILM COATED ORAL 2 TIMES DAILY
Qty: 60 TABLET | Refills: 0 | Status: SHIPPED | OUTPATIENT
Start: 2021-04-28 | End: 2021-05-12

## 2021-04-28 RX ORDER — LANOLIN ALCOHOL/MO/W.PET/CERES
3 CREAM (GRAM) TOPICAL
Qty: 30 TABLET | Refills: 0 | Status: SHIPPED | OUTPATIENT
Start: 2021-04-28 | End: 2021-05-28

## 2021-04-28 RX ADMIN — RISPERIDONE 1 MG: 1 TABLET ORAL at 08:16

## 2021-04-28 NOTE — BH TRANSITION RECORD
Contact Information: If you have any questions, concerns, pended studies, tests and/or procedures, or emergencies regarding your inpatient behavioral health visit  Please contact [address] [place] [address] older adult behavioral health unit 6T [phone] and ask to speak to a , nurse or physician  A contact is available 24 hours/ 7 days a week at this number  Summary of Procedures Performed During your Stay:  Below is a list of major procedures performed during your hospital stay and a summary of results:  - No major procedures performed  If studies are pending at discharge, follow up with your PCP and/or referring provider

## 2021-04-28 NOTE — PLAN OF CARE
Problem: COPING  Goal: Pt/Family able to verbalize concerns and demonstrate effective coping strategies  Description: INTERVENTIONS:  - Assist patient/family to identify coping skills, available support systems and cultural and spiritual values  - Provide emotional support, including active listening and acknowledgement of concerns of patient and caregivers  - Reduce environmental stimuli, as able  - Provide patient education  - Assess for spiritual pain/suffering and initiate spiritual care, including notification of Pastoral Care or edgar based community as needed  - Assess effectiveness of coping strategies  Outcome: Adequate for Discharge  Goal: Will report anxiety at manageable levels  Description: INTERVENTIONS:  - Administer medication as ordered  - Teach and encourage coping skills  - Provide emotional support  - Assess patient/family for anxiety and ability to cope  Outcome: Adequate for Discharge     Problem: DECISION MAKING  Goal: Pt/Family able to effectively weigh alternatives and participate in decision making related to treatment and care  Description: INTERVENTIONS:  - Identify decision maker  - Determine when there are differences among patient's view, family's view, and healthcare provider's view of patient condition and care goals  - Facilitate patient/family articulation of goals for care  - Help patient/family identify pros/cons of alternative solutions  - Provide information as requested by patient/family  - Respect patient/family rights related to privacy and sharing information   - Serve as a liaison between patient, family and health care team  - Initiate consults as appropriate (Ethics Team, Palliative Care, Family Care Conference, etc )  Outcome: Adequate for Discharge     Problem: CONFUSION/THOUGHT DISTURBANCE  Goal: Thought disturbances (confusion, delirium, depression, dementia or psychosis) are managed to maintain or return to baseline mental status and functional level  Description: INTERVENTIONS:  - Assess for possible contributors to  thought disturbance, including but not limited to medications, infection, impaired vision or hearing, underlying metabolic abnormalities, dehydration, respiratory compromise,  psychiatric diagnoses and notify attending PHYSICAN/AP  - Monitor and intervene to maintain adequate nutrition, hydration, elimination, sleep and activity  - Decrease environmental stimuli, including noise as appropriate  - Provide frequent contacts to provide refocusing, direction and reassurance as needed  Approach patient calmly with eye contact and at their level  - [place] high risk fall precautions, aspiration precautions and other safety measures, as indicated  - If delirium suspected, notify physician/AP of change in condition and request immediate in-person evaluation  - Pursue consults as appropriate including Geriatric (campus dependent), OT for cognitive evaluation/activity planning, psychiatric, pastoral care, etc   Outcome: Adequate for Discharge     Problem: BEHAVIOR  Goal: Pt/Family maintain appropriate behavior and adhere to behavioral management agreement, if implemented  Description: INTERVENTIONS:  - Assess the family dynamic   - Encourage verbalization of thoughts and concerns in a socially appropriate manner  - Assess patient/family's coping skills and non-compliant behavior (including use of illegal substances)  - Utilize positive, consistent limit setting strategies supporting safety of patient, staff and others  - Initiate consult with Case Management, Spiritual Care or other ancillary services as appropriate  - If a patient's/visitor's behavior jeopardizes the safety of the patient, staff, or others, refer to organization procedure     - Notify Security of behavior or suspected illegal substances which indicate the need for search of the patient and/or belongings  - Encourage participation in the decision making process about a behavioral management agreement; implement if patient meets criteria  Outcome: Adequate for Discharge     Problem: SELF HARM  Goal: Effect of psychiatric condition will be minimized and patient will be protected from self harm  Description: INTERVENTIONS:  - Assess impact of patient's symptoms on level of functioning, self-care needs and offer support as indicated  - Assess patient/family knowledge of depression, impact on illness and need for teaching  - Provide emotional support, presence and reassurance  - Assess for possible suicidal thoughts, ideation or self-harm  If patient expresses suicidal thoughts or statements do not leave alone, notify physician/AP immediately, initiate suicide precautions, and determine need for continual observation  - initiate consults and referrals as appropriate (a mental health professional, Spiritual Care  Outcome: Adequate for Discharge     Problem: BEHAVIOR  Goal: Pt/Family maintain appropriate behavior and adhere to behavioral management agreement, if implemented  Description: INTERVENTIONS:  - Assess the family dynamic   - Encourage verbalization of thoughts and concerns in a socially appropriate manner  - Assess patient/family's coping skills and non-compliant behavior (including use of illegal substances)  - Utilize positive, consistent limit setting strategies supporting safety of patient, staff and others  - Initiate consult with Case Management, Spiritual Care or other ancillary services as appropriate  - If a patient's/visitor's behavior jeopardizes the safety of the patient, staff, or others, refer to organization procedure     - Notify Security of behavior or suspected illegal substances which indicate the need for search of the patient and/or belongings  - Encourage participation in the decision making process about a behavioral management agreement; implement if patient meets criteria  Outcome: Adequate for Discharge     Problem: ANXIETY  Goal: Will report anxiety at manageable levels  Description: INTERVENTIONS:  - Administer medication as ordered  - Teach and encourage coping skills  - Provide emotional support  - Assess patient/family for anxiety and ability to cope  Outcome: Adequate for Discharge  Goal: By discharge: Patient will verbalize 2 strategies to deal with anxiety  Description: Interventions:  - Identify any obvious source/trigger to anxiety  - Staff will assist patient in applying identified coping technique/skills  - Encourage attendance of scheduled groups and activities  Outcome: Adequate for Discharge     Problem: SLEEP DISTURBANCE  Goal: Will exhibit normal sleeping pattern  Description: Interventions:  -  Assess the patients sleep pattern, noting recent changes  - Administer medication as ordered  - Decrease environmental stimuli, including noise, as appropriate during the night  - Encourage the patient to actively participate in unit groups and or exercise during the day to enhance ability to achieve adequate sleep at night  - Assess the patient, in the morning, encouraging a description of sleep experience  Outcome: Adequate for Discharge     Problem: INVOLUNTARY ADMIT  Goal: Will cooperate with staff recommendations and doctor's orders and will demonstrate appropriate behavior  Description: INTERVENTIONS:  - Treat underlying conditions and offer medication as ordered  - Educate regarding involuntary admission procedures and rules  - Utilize positive consistent limit setting strategies to support patient and staff safety  Outcome: Adequate for Discharge     Problem: Ineffective Coping  Goal: Participates in unit activities  Description: Interventions:  - Provide therapeutic environment   - Provide required programming   - Redirect inappropriate behaviors   Outcome: Adequate for Discharge

## 2021-04-28 NOTE — NURSING NOTE
Patient was visible in the milieu socializing with select peers while doing puzzle  VSS  Denies all s/s at this time  No complaints offered  Happy to be going home tomorrow  Had snack  Patient was cooperative and compliant with HS medications   Safety checks ongoing,

## 2021-04-28 NOTE — DISCHARGE SUMMARY
Discharge Summary - Bobby [address] BELKYS Hurtado 78 y o  female MRN: [id number]  Unit/Bed#: Jonathan Stacy 645-95 Encounter: [id number]     Admission Date:   Admission Orders (From admission, onward)     Ordered        03/29/21 1651  ED TO DIFFERENT CAMPUS [address] UNIT or INPATIENT MEDICAL UNIT to [address] UNIT (using Discharge Readmit Navigator) - Admit Patient to [address] [place] [address]  Once                         Discharge Date: 04/28/21     Attending Psychiatrist: Alexis Poe MD    Reason for Admission:   Danica Josue is a 78 y o  female with a past medical history of anxiety disorder who presented with acute psychosis and severe agitation  Danica Josue initially reported to [facility] on 3/26/2021 by EMS for evaluation for anxiety and persecutory delusions in which she believed her son and daughter were out to get her  She was determined to be stable for discharge with outpatient support at the time  However, on 3/27/2021, the patient was again brought to [facility] by EMS, as she was found wandering the streets delusional, confused and with agitation that required Ativan IM  She was later given a second dose of Ativan IM and Zyprexa IM in the ED due to combative behaviors  She had a brief stay on medicine floor for correction of her hyponatremia and hypokalemia 2/2 to poor PO intake, and once stable was transferred to [facility] on 3/29/21, involuntarily for mood disorder with psychosis  Please see initial H&P for full details  Hospital Course: On admission, Danica Josue was started on olanzapine, lorazepam, melatonin, Abilify, Remeron  Remeron was discontinued due to increased daytime fatigue and lorazepam was discontinued due to reported decrease in her anxiety  Zyprexa and Abilify were also discontinued due to ineffectiveness, as the patient continued to have delusional thoughts and paranoia   She was then started on Risperidone for her continuing psychosis, with noted improvement  Her  medications were titrated as appropriate,and she  tolerated these medications with no acute side effects  The patient's mood brightened over the course of treatment, and she  was seen in [facility] interacting appropriately with peers  Sierra Velazco did not demonstrate dangerous behavior to self or others during her inpatient stay  Sierra Velazco denied any auditory/visual hallucinations, delusions, paranoia or suicidal/homicidal ideations at the time of her discharge  She expressed her interest in starting to exercise and was looking forward to seeing her  and walking her dogs  She did not express any current obsessive thoughts regarding her son having inappropriate relations with his daughter  She stated her thoughts were focused on her well being and improvement of her health  She reports feels safe with returning home  She had a conversation with her  and daughter who are aware she is being discharged today, and denies weapons in the home  The patient also states her neighbor frequently stops by the house to check in on her and her   She was agreeable with the plan to follow up out-patient at [facility] with Dr Herminio Lewis on May 10, 2021 at 11:15 am  Her medications were called in the [address] [facility] in [place], [place]  I reviewed with Pat importance of compliance with medications and outpatient treatment after discharge  I discussed the medication regimen and possible side effects of the medications with Pat prior to discharge  At the time of discharge she was tolerating psychiatric medications  I discussed outpatient follow up with Sierra Velazco  I reviewed with Pat crisis plan and safety plan upon discharge  Labs/Imaging:   I have personally reviewed all pertinent laboratory/tests results    Most Recent Labs:   Lab Results   Component Value Date    WBC 3 40 (L) 04/24/2021    RBC 3 92 (L) 04/24/2021    HGB 12 5 04/24/2021    HCT 36 7 04/24/2021     04/24/2021    RDW 13 1 04/24/2021    NEUTROABS 2 10 04/24/2021    SODIUM 136 (L) 04/13/2021    K 3 8 04/13/2021    CL 99 04/13/2021    CO2 32 (H) 04/13/2021    BUN 17 04/13/2021    CREATININE 0 91 04/13/2021    GLUC 97 04/13/2021    GLUF 97 04/13/2021    CALCIUM 9 7 04/13/2021    AST 29 04/13/2021    ALT 21 04/13/2021    ALKPHOS 67 04/13/2021    TP 7 4 04/13/2021    ALB 4 1 04/13/2021    TBILI 0 43 04/13/2021    CHOLESTEROL 265 (H) 03/15/2021    HDL 79 03/15/2021    TRIG 50 03/15/2021    LDLCALC 176 (H) 03/15/2021    [id number] 1 995 03/26/2021    FREET4 0 95 07/01/2019    HGBA1C 5 4 02/28/2020     02/28/2020     Liver Enzymes:   Lab Results   Component Value Date    AST 29 04/13/2021    ALT 21 04/13/2021    ALKPHOS 67 04/13/2021    TP 7 4 04/13/2021    ALB 4 1 04/13/2021    TBILI 0 43 04/13/2021     Thyroid Studies:   Lab Results   Component Value Date    KKX7NWJQYHPT 1 995 03/26/2021    FREET4 0 95 07/01/2019     Hemoglobin A1C/EST AVG Glucose   Lab Results   Component Value Date    HGBA1C 5 4 02/28/2020     02/28/2020     Drug Screen:   Lab Results   Component Value Date    AMPMETHUR Negative 03/26/2021    BARBTUR Negative 03/26/2021    BDZUR Negative 03/26/2021    THCUR Negative 03/26/2021    COCAINEUR Negative 03/26/2021    METHADONEUR Negative 03/26/2021    OPIATEUR Negative 03/26/2021    PCPUR Negative 03/26/2021     Urinalysis   Lab Results   Component Value Date    COLORU Straw (A) 03/26/2021    CLARITYU Clear 03/26/2021    SPECGRAV <=1 005 03/26/2021    PHUR 6 0 03/26/2021    LEUKOCYTESUR Negative 03/26/2021    NITRITE Negative 03/26/2021    PROTEIN UA Negative 03/26/2021    GLUCOSEU Negative 03/26/2021    KETONESU Negative 03/26/2021    UROBILINOGEN 0 2 03/26/2021    BILIRUBINUR Negative 03/26/2021    BLOODU Trace-Intact (A) 03/26/2021    RBCUA None Seen 03/26/2021    WBCUA None Seen 03/26/2021    EPIS Occasional 03/26/2021    BACTERIA None Seen 03/26/2021     Recent Imaging Studies: No results found  Mental Status Evaluation:  Appearance:  alert, good eye contact, appears stated age and appropriate grooming and hygiene   Behavior:  calm, cooperative and sitting comfortably   Speech:  spontaneous, clear, normal rate, normal volume and coherent   Mood:  euthymic and "happy to be going home"   Affect:  mood-congruent, appropriate range and euthymic   Thought Process:  organized, goal directed, normal rate of thoughts   Thought Content: no verbalized delusions or overt paranoia, no obsessive thinking   Perceptual disturbances: no reported hallucinations and does not appear to be responding to internal stimuli at this time   Risk Potential: No active or passive suicidal or homicidal ideation was verbalized during interview, Low potential for aggression based on previous behavior   Cognition: oriented to person, place, time, and situation, memory grossly intact, appears to be of average intelligence, normal abstract reasoning, age-appropriate attention span and concentration and cognition not formally tested   Insight:  Fair   Judgment: Fair     Discharge Diagnosis:   Principal Problem:    Mood disorder with psychosis ([facility] [address] )  Active Problems:    CKD (chronic kidney disease) stage 2, GFR 60-89 ml/min    Medical clearance for psychiatric admission    Major depressive disorder    Anxiety    MCI (mild cognitive impairment)      Discharge Medications:  See list below, as well as the after visit summary containing reconciled discharge medications provided to patient and family       med  Current Facility-Administered Medications   Medication Dose Route Frequency Provider Last Rate    aluminum-magnesium hydroxide-simethicone  30 mL Oral Q4H PRN Joie Pineda MD      artificial tear  1 application Both Eyes B3V PRN Joie Pineda MD      benztropine  0 5 mg Oral Q4H PRN Max 6/day Joie Pineda MD      bisacodyl  10 mg Rectal Daily PRN Joie Pineda MD      hydrOXYzine HCL  25 mg Oral Q6H PRN Max 4/day [place] Ed, MD      ibuprofen  200 mg Oral Q6H PRN [place] Newer, MD      ibuprofen  400 mg Oral Q6H PRN [place] Newer, MD      ibuprofen  600 mg Oral Q8H PRN Oneil Newer, MD      LORazepam  1 mg Intramuscular Q6H PRN Max 3/day [place] Newer, MD      LORazepam  0 5 mg Oral Q6H PRN Max 4/day Oneil Newer, MD      LORazepam  1 mg Oral Q6H PRN Max 3/day [place] Newer, MD      melatonin  3 mg Oral HS [place] Newer, MD      nicotine polacrilex  4 mg Oral Q2H PRN Oneil Newer, MD      OLANZapine  5 mg Intramuscular Q3H PRN Max 3/day [place] Newer, MD      OLANZapine  2 5 mg Oral Q4H PRN Max 6/day [place] Newer, MD      OLANZapine  5 mg Oral Q4H PRN Max 3/day Oneil Newer, MD      OLANZapine  5 mg Oral Q3H PRN Max 3/day Oneil Newer, MD      pantoprazole  20 mg Oral Early Morning Oneil Newer, MD      polyethylene glycol  17 g Oral Daily PRN Oneil Newer, MD      risperiDONE  1 mg Oral BID Qing Sorensen MD      senna-docusate sodium  1 tablet Oral Daily PRN Oneil Newer, MD      traZODone  50 mg Oral HS PRN [place] Newer, MD          Current Discharge Medication List         Current Discharge Medication List      CONTINUE these medications which have NOT CHANGED    Details   azelastine (ASTELIN) 0 1 % nasal spray 2 sprays into each nostril 2 (two) times a day Use in each nostril as directed  Qty: 1 Bottle, Refills: 1    Associated Diagnoses:  Allergic rhinitis, unspecified seasonality, unspecified trigger      cyanocobalamin (VITAMIN B-12) 1,000 mcg tablet Take 1 tablet (1,000 mcg total) by mouth daily  Qty: 30 tablet, Refills: 0    Associated Diagnoses: Takes dietary supplements      ibuprofen (MOTRIN) 200 mg tablet 3 tabs qd      melatonin 3 mg Take 1 tablet (3 mg total) by mouth daily at bedtime  Qty: 30 tablet, Refills: 0    Associated Diagnoses: Brief psychotic disorder (HCC)      omeprazole (PriLOSEC) 20 mg delayed release capsule Take 1 capsule (20 mg total) by mouth daily  Qty: 90 capsule, Refills: 1    Associated Diagnoses: Gastroesophageal reflux disease, esophagitis presence not specified            Current Discharge Medication List         Current Discharge Medication List           Discharge instructions/Information to patient and family:   See after visit summary for information provided to patient and family  Provisions for Follow-Up Care:  See after visit summary for information related to follow-up care and any pertinent home health orders  This note has been constructed using a voice recognition system  There may be translation, syntax, or grammatical errors  If you have any questions, please contact the dictating provider

## 2021-04-28 NOTE — NURSING NOTE
Pt is discharged today to home  Pt verbalized agreement to discharge  Pt denied all SI, HI, depression and anxiety  Aftercare and discharge instructions were gone over with patient  All belongings returned to patient  Patient left by [facility]  Affect bright

## 2021-04-28 NOTE — PLAN OF CARE
Problem: DISCHARGE PLANNING  Goal: Discharge to home or other facility with appropriate resources  Description: INTERVENTIONS:  - Identify barriers to discharge w/patient and caregiver  - Arrange for needed discharge resources and transportation as appropriate  - Identify discharge learning needs (meds, wound care, etc )  - Arrange for interpretive services to assist at discharge as needed  - Refer to Case Management Department for coordinating discharge planning if the patient needs post-hospital services based on physician/advanced practitioner order or complex needs related to functional status, cognitive ability, or social support system  Outcome: Completed     Discharge planning discussed with pt and pt's daughter  OP psych intake appt scheduled  IMM letter signed  Oswald Valenzuela transportation arranged -- 11am  in [facility]  Med scripts should be sent to preferred pharmacy ([address])

## 2021-04-28 NOTE — CASE MANAGEMENT
04/28/21 0848   Team Meeting   Meeting Type Daily Rounds   Initial Conference Date 04/28/21   Team Members Present   Team Members Present Physician;Nurse;;; Occupational Therapist   Physician Team Member 50 Shandra Woodard   Nursing Team Member [facility] Management Team Member Regla Pal 62   Social Work Team Member Nii   OT Team Member Alfonso Postal   Patient/Family Present   Patient Present No   Patient's Family Present No     Discharge today at 11am, denies s/s, pleasant, cooperative, calm, visible, social, med compliant

## 2021-05-12 ENCOUNTER — OFFICE VISIT (OUTPATIENT)
Dept: FAMILY MEDICINE CLINIC | Facility: OTHER | Age: 71
End: 2021-05-12
Payer: MEDICARE

## 2021-05-12 VITALS
HEIGHT: 61 IN | TEMPERATURE: 97.8 F | SYSTOLIC BLOOD PRESSURE: 124 MMHG | WEIGHT: 155.8 LBS | HEART RATE: 88 BPM | DIASTOLIC BLOOD PRESSURE: 84 MMHG | OXYGEN SATURATION: 98 % | RESPIRATION RATE: 18 BRPM | BODY MASS INDEX: 29.42 KG/M2

## 2021-05-12 DIAGNOSIS — R06.02 SOB (SHORTNESS OF BREATH): Primary | ICD-10-CM

## 2021-05-12 DIAGNOSIS — R03.0 ELEVATED BLOOD PRESSURE READING: ICD-10-CM

## 2021-05-12 DIAGNOSIS — F39 MOOD DISORDER WITH PSYCHOSIS (HCC): ICD-10-CM

## 2021-05-12 PROCEDURE — 99213 OFFICE O/P EST LOW 20 MIN: CPT | Performed by: FAMILY MEDICINE

## 2021-05-12 RX ORDER — RISPERIDONE 1 MG/1
2 TABLET, FILM COATED ORAL DAILY
Qty: 60 TABLET | Refills: 0
Start: 2021-05-12 | End: 2022-03-15

## 2021-05-12 NOTE — ASSESSMENT & PLAN NOTE
No hx of smoking, asthma, HTN, or heart disease  No signs of heart failure on physical exam  PFTs were ordered previously but never completed    · Reorder PFTs, pt is willing to have this done  · Will consider echo if PFTs do not explain SOB

## 2021-05-12 NOTE — PROGRESS NOTES
Assessment/Plan:    SOB (shortness of breath)  No hx of smoking, asthma, HTN, or heart disease  No signs of heart failure on physical exam  PFTs were ordered previously but never completed  · Reorder PFTs, pt is willing to have this done  · Will consider echo if PFTs do not explain SOB    Elevated blood pressure reading  BP in prehypertension range today in office  Pt reports normal BP at home  Will continue to monitor  Diagnoses and all orders for this visit:    SOB (shortness of breath)  -     Complete PFT with post bronchodilator; Future    Elevated blood pressure reading    Mood disorder with psychosis ([facility] [address] )  -     risperiDONE (RisperDAL) 1 mg tablet; Take 2 tablets (2 mg total) by mouth daily          Subjective:      Patient ID: Leandro Andrews is a 78 y o  female  Pt presents for BP recheck  She reports she has decreased her red meat intake as well as the amount of bread and pasta she has been eating  She says her BP at home has been normal  She denies headache and chest pain but endorses constant blurry vision for the last 2 months  She says she has an eye appointment scheduled  She also endorses SOB with exertion which she says has been present for several years but she has never had evaluated  Review of Systems   Constitutional: Negative for chills, fever and unexpected weight change  HENT: Positive for congestion (at night)  Negative for rhinorrhea and sore throat  Eyes: Positive for visual disturbance (blurry last 2 months)  Respiratory: Positive for shortness of breath ([place])  Negative for cough and wheezing  Cardiovascular: Negative for chest pain, palpitations and leg swelling  Gastrointestinal: Positive for constipation (Since Risperdal started, taking milk of magnesia)  Negative for abdominal pain, blood in stool, diarrhea, nausea and vomiting  Genitourinary: Negative for difficulty urinating, dysuria and hematuria     Musculoskeletal: Positive for arthralgias (shoulder arthritis) and back pain  Negative for myalgias  Skin: Negative for rash  Allergic/Immunologic: Positive for environmental allergies  Neurological: Negative for dizziness, light-headedness and headaches  Psychiatric/Behavioral: Negative for dysphoric mood  All other systems reviewed and are negative  Objective:      /84   Pulse 88   Temp 97 8 °F (36 6 °C)   Resp 18   Ht 5' 1" (1 549 m)   Wt 70 7 kg (155 lb 12 8 oz)   SpO2 98%   BMI 29 44 kg/m²          Physical Exam  Vitals signs reviewed  Constitutional:       General: She is not in acute distress  Appearance: She is well-developed  She is not diaphoretic  HENT:      Head: Normocephalic and atraumatic  Mouth/Throat:      Mouth: Mucous membranes are moist       Pharynx: Oropharynx is clear  Eyes:      Extraocular Movements: Extraocular movements intact  Conjunctiva/sclera: Conjunctivae normal       Pupils: Pupils are equal, round, and reactive to light  Neck:      Musculoskeletal: Neck supple  Cardiovascular:      Rate and Rhythm: Normal rate and regular rhythm  Pulses: Normal pulses  Heart sounds: Normal heart sounds  No murmur  No friction rub  No gallop  Pulmonary:      Effort: Pulmonary effort is normal  No respiratory distress  Breath sounds: Normal breath sounds  No stridor  No wheezing, rhonchi or rales  Abdominal:      General: Bowel sounds are normal  There is no distension  Palpations: Abdomen is soft  Tenderness: There is no abdominal tenderness  There is no right CVA tenderness, left CVA tenderness or guarding  Musculoskeletal: Normal range of motion  Right lower leg: No edema  Left lower leg: No edema  Lymphadenopathy:      Cervical: No cervical adenopathy  Skin:     General: Skin is warm and dry  Findings: No rash  Neurological:      General: No focal deficit present  Mental Status: She is alert and oriented to person, place, and time  Psychiatric:         Mood and Affect: Mood normal          Behavior: Behavior normal

## 2021-05-12 NOTE — PATIENT INSTRUCTIONS
Pulmonary Function Tests   WHAT YOU NEED TO KNOW:   What do I need to know about pulmonary function tests? Pulmonary function tests (PFTs) measure how well your lungs work  PFTs may show the cause of breathing problems or how well treatments for a lung condition are working  How do I prepare for PFTs? Your healthcare provider will talk to you about how to prepare for these tests  You may need to stop taking certain breathing medicines from several hours to 24 hours before your tests  He will tell you what other medicines to take or not take on the day of your test  If you use oxygen, you may need to stop using it for a short time before you have these tests  You may be told not to smoke or do intense exercise on the day of your tests  What will happen during PFTs? · Spirometry  measures how much air you breathe in and breathe out, and how fast you can blow air out  During this test, you will sit or stand next to a machine  A healthcare provider will place soft clips on your nose  He will tell you how to breathe into the machine  You will be asked to breathe calmly several times, draw the deepest breath you can, and then exhale forcefully for about 6 seconds  You will then be asked to breathe in deeply once more  · Lung diffusion capacity  measures how well your lungs can move oxygen into your bloodstream  For this test, you will breathe in a gas through a tube  You will be asked to hold your breath for about 10 seconds and then exhale  · Body plethysmography  measures the amount of air in your lungs after you take a deep breath  It also measures how much air stays in your lungs after you exhale  You will sit inside of a sealed box about the size of a phone sheehan  You will be asked to breathe into a tube that is connected to a computer  The healthcare provider will tell you how to breathe into this tube  What are the risks of PFTs?   Deep breathing done during some tests may cause you to feel short of breath, dizzy, or lightheaded  It may also cause you to cough  CARE AGREEMENT:   You have the right to help plan your care  Learn about your health condition and how it may be treated  Discuss treatment options with your healthcare providers to decide what care you want to receive  You always have the right to refuse treatment  The above information is an  only  It is not intended as medical advice for individual conditions or treatments  Talk to your doctor, nurse or pharmacist before following any medical regimen to see if it is safe and effective for you  © Copyright [address] Information is for End User's use only and may not be sold, redistributed or otherwise used for commercial purposes   All illustrations and images included in CareNotes® are the copyrighted property of A D A M , Inc  or [address] [place] [address]

## 2021-05-12 NOTE — ASSESSMENT & PLAN NOTE
BP in prehypertension range today in office  Pt reports normal BP at home  Will continue to monitor

## 2021-06-10 ENCOUNTER — TELEPHONE (OUTPATIENT)
Dept: OTHER | Facility: HOSPITAL | Age: 71
End: 2021-06-10

## 2021-06-10 ENCOUNTER — HOSPITAL ENCOUNTER (OUTPATIENT)
Dept: RADIOLOGY | Age: 71
Discharge: HOME | End: 2021-06-10
Payer: MEDICARE

## 2021-06-10 ENCOUNTER — TELEPHONE (OUTPATIENT)
Dept: OTHER | Facility: OTHER | Age: 71
End: 2021-06-10

## 2021-06-10 VITALS — HEIGHT: 61 IN | BODY MASS INDEX: 29.07 KG/M2 | WEIGHT: 154 LBS

## 2021-06-10 DIAGNOSIS — Z12.31 VISIT FOR SCREENING MAMMOGRAM: ICD-10-CM

## 2021-06-10 DIAGNOSIS — M85.832 OTHER SPECIFIED DISORDERS OF BONE DENSITY AND STRUCTURE, LEFT FOREARM: ICD-10-CM

## 2021-06-10 PROCEDURE — 77067 SCR MAMMO BI INCL CAD: CPT

## 2021-06-10 PROCEDURE — 77080 DXA BONE DENSITY AXIAL: CPT

## 2021-06-10 PROCEDURE — 77063 BREAST TOMOSYNTHESIS BI: CPT

## 2021-06-10 NOTE — TELEPHONE ENCOUNTER
Patient: Sabrina Dunn: [phone]    SCS inserted in 2016  Patient reports that her battery is not working properly  Email was sent to SCS rep for them to contact patient and investigate the situation  Please call to follow up      Thank you,    Alexis Durham

## 2021-06-10 NOTE — TELEPHONE ENCOUNTER
Pt said device used to put on stimulator isn't working properly because stimulator is off  Pt has the stimulator due to compress fractures in spinal cord

## 2021-06-11 ENCOUNTER — TELEPHONE (OUTPATIENT)
Dept: NEUROSURGERY | Facility: CLINIC | Age: 71
End: 2021-06-11

## 2021-06-11 NOTE — TELEPHONE ENCOUNTER
Patient cannot attend appointment today will call on Monday if she cannot charge battery whe she finally returns home from work  The issue is (a very different story for what she told me this morning) patient dean not use stimulator for 30 days , was admitted to a psychiatric hospital    She cannot turn the battery on   JEAN Rep spoke with patient today informed her the  will need to be charged before she can turn battery on  She is directed to charge all night , attempt to turn on tomorrow , if cannot call the rep  She will call me Monday with an update, is advised do not call on-call staff this will not be an emergency , there is nothing they can do over a weekend  I scheduled appointment today to assess problem but patient cancelled at the time of the appointment 1400

## 2021-06-11 NOTE — TELEPHONE ENCOUNTER
Patient telephoned on call service last evening SCS not functioning properly   I informed on-call to sent message to [employer]   Patient called again this morning ---anxious she has not heard from rep  Message to [employer] Chris   Brain   Scheduled appointment for today @ 1400 need Rep present   Patient turned device off for DEXA scan yesterday after which she cannot turn device on again  She is in pain and very anxious that device will not turn on again , complaining of pain   Stimulator delivers 70 % efficacy       Cannot take earlier appointment working until 1300 today     7/12/2016 PLACEMENT OF THORACIC PARASPINAL PERIPHERAL NERVE STIMULATING ELECTODES WITH LEFT BUTTOCK GENERATOR (N/A Spine Thoracic)

## 2021-06-14 NOTE — TELEPHONE ENCOUNTER
Report charged SCS on Friday night , then was able to turn device on   Is working well now relieving pain  She notified [facility]BBOTT Rep

## 2021-06-16 ENCOUNTER — HOSPITAL ENCOUNTER (OUTPATIENT)
Dept: PULMONOLOGY | Facility: HOSPITAL | Age: 71
Discharge: HOME | End: 2021-06-16
Payer: MEDICARE

## 2021-06-16 ENCOUNTER — TELEPHONE (OUTPATIENT)
Dept: PSYCHIATRY | Facility: CLINIC | Age: 71
End: 2021-06-16

## 2021-06-16 DIAGNOSIS — R06.02 SOB (SHORTNESS OF BREATH): ICD-10-CM

## 2021-06-16 PROCEDURE — 94729 DIFFUSING CAPACITY: CPT

## 2021-06-16 PROCEDURE — 94760 N-INVAS EAR/PLS OXIMETRY 1: CPT

## 2021-06-16 PROCEDURE — 94060 EVALUATION OF WHEEZING: CPT | Performed by: INTERNAL MEDICINE

## 2021-06-16 PROCEDURE — 94726 PLETHYSMOGRAPHY LUNG VOLUMES: CPT | Performed by: INTERNAL MEDICINE

## 2021-06-16 PROCEDURE — 94726 PLETHYSMOGRAPHY LUNG VOLUMES: CPT

## 2021-06-16 PROCEDURE — 94060 EVALUATION OF WHEEZING: CPT

## 2021-06-16 PROCEDURE — 94729 DIFFUSING CAPACITY: CPT | Performed by: INTERNAL MEDICINE

## 2021-06-16 RX ORDER — ALBUTEROL SULFATE 2.5 MG/3ML
2.5 SOLUTION RESPIRATORY (INHALATION) ONCE
Status: COMPLETED | OUTPATIENT
Start: 2021-06-16 | End: 2021-06-16

## 2021-06-16 RX ADMIN — ALBUTEROL SULFATE 2.5 MG: 2.5 SOLUTION RESPIRATORY (INHALATION) at 15:03

## 2021-06-25 DIAGNOSIS — M85.80 OSTEOPENIA, UNSPECIFIED LOCATION: Primary | ICD-10-CM

## 2021-06-25 RX ORDER — ALENDRONATE SODIUM 10 MG/1
10 TABLET ORAL
Qty: 30 TABLET | Refills: 0 | Status: SHIPPED | OUTPATIENT
Start: 2021-06-25 | End: 2022-03-15

## 2021-06-25 NOTE — RESULT ENCOUNTER NOTE
Notified pt of results  Will start bisphosphonate due to increasing fracture risk  Encouraged pt to continue taking calcium/vitamin d and try to do weight bearing exercises

## 2021-08-24 ENCOUNTER — TELEPHONE (OUTPATIENT)
Dept: FAMILY MEDICINE CLINIC | Facility: OTHER | Age: 71
End: 2021-08-24

## 2021-08-24 NOTE — TELEPHONE ENCOUNTER
Patient called and said she should have some blood work done in October, if so can you put orders in for her?      Please advise   Thank you

## 2021-08-25 DIAGNOSIS — N18.2 CKD (CHRONIC KIDNEY DISEASE) STAGE 2, GFR 60-89 ML/MIN: ICD-10-CM

## 2021-08-25 DIAGNOSIS — M85.80 OSTEOPENIA, UNSPECIFIED LOCATION: ICD-10-CM

## 2021-08-25 DIAGNOSIS — E78.00 HYPERCHOLESTEROLEMIA: Primary | ICD-10-CM

## 2021-08-28 ENCOUNTER — LAB (OUTPATIENT)
Dept: LAB | Facility: CLINIC | Age: 71
End: 2021-08-28
Payer: MEDICARE

## 2021-08-28 DIAGNOSIS — N18.2 CKD (CHRONIC KIDNEY DISEASE) STAGE 2, GFR 60-89 ML/MIN: ICD-10-CM

## 2021-08-28 DIAGNOSIS — M85.80 OSTEOPENIA, UNSPECIFIED LOCATION: ICD-10-CM

## 2021-08-28 DIAGNOSIS — E78.00 HYPERCHOLESTEROLEMIA: ICD-10-CM

## 2021-08-28 LAB
ALBUMIN SERPL BCP-MCNC: 3.8 G/DL (ref 3.5–5)
ALP SERPL-CCNC: 84 U/L (ref 46–116)
ALT SERPL W P-5'-P-CCNC: 21 U/L (ref 12–78)
ANION GAP SERPL CALCULATED.3IONS-SCNC: 7 MMOL/L (ref 4–13)
AST SERPL W P-5'-P-CCNC: 18 U/L (ref 5–45)
BILIRUB SERPL-MCNC: 0.39 MG/DL (ref 0.2–1)
BUN SERPL-MCNC: 22 MG/DL (ref 5–25)
CALCIUM SERPL-MCNC: 9.8 MG/DL (ref 8.3–10.1)
CHLORIDE SERPL-SCNC: 105 MMOL/L (ref 100–108)
CHOLEST SERPL-MCNC: 263 MG/DL (ref 50–200)
CO2 SERPL-SCNC: 28 MMOL/L (ref 21–32)
CREAT SERPL-MCNC: 1.18 MG/DL (ref 0.6–1.3)
GFR SERPL CREATININE-BSD FRML MDRD: 48 ML/MIN/1.73SQ M
GLUCOSE P FAST SERPL-MCNC: 92 MG/DL (ref 65–99)
HDLC SERPL-MCNC: 71 MG/DL
LDLC SERPL CALC-MCNC: 174 MG/DL (ref 0–100)
PHOSPHATE SERPL-MCNC: 4.1 MG/DL (ref 2.3–4.1)
POTASSIUM SERPL-SCNC: 4.6 MMOL/L (ref 3.5–5.3)
PROT SERPL-MCNC: 7.4 G/DL (ref 6.4–8.2)
SODIUM SERPL-SCNC: 140 MMOL/L (ref 136–145)
TRIGL SERPL-MCNC: 90 MG/DL

## 2021-08-28 PROCEDURE — 80053 COMPREHEN METABOLIC PANEL: CPT

## 2021-08-28 PROCEDURE — 36415 COLL VENOUS BLD VENIPUNCTURE: CPT

## 2021-08-28 PROCEDURE — 82306 VITAMIN D 25 HYDROXY: CPT

## 2021-08-28 PROCEDURE — 80061 LIPID PANEL: CPT

## 2021-08-28 PROCEDURE — 84100 ASSAY OF PHOSPHORUS: CPT

## 2021-08-29 LAB — 25(OH)D3 SERPL-MCNC: 43.6 NG/ML (ref 30–100)

## 2021-09-19 DIAGNOSIS — R39.198 DIFFICULTY URINATING: Primary | ICD-10-CM

## 2021-09-19 NOTE — RESULT ENCOUNTER NOTE
Pt contacted and notified of stable cholesterol results  Recommended continued attempts at lifestyle changes currently  Also discussed somewhat elevated creatinine from baseline  Pt mentioned some difficulty urinating for the last few months  Will order referral to urology

## 2021-12-13 ENCOUNTER — TELEPHONE (OUTPATIENT)
Dept: UROLOGY | Facility: CLINIC | Age: 71
End: 2021-12-13

## 2021-12-13 ENCOUNTER — CONSULT (OUTPATIENT)
Dept: UROLOGY | Facility: CLINIC | Age: 71
End: 2021-12-13
Payer: MEDICARE

## 2021-12-13 VITALS — HEIGHT: 61 IN | RESPIRATION RATE: 18 BRPM | BODY MASS INDEX: 30.21 KG/M2 | WEIGHT: 160 LBS | HEART RATE: 93 BPM

## 2021-12-13 DIAGNOSIS — N39.0 URINARY TRACT INFECTION WITHOUT HEMATURIA, SITE UNSPECIFIED: Primary | ICD-10-CM

## 2021-12-13 DIAGNOSIS — R39.198 DIFFICULTY URINATING: ICD-10-CM

## 2021-12-13 PROCEDURE — 99203 OFFICE O/P NEW LOW 30 MIN: CPT | Performed by: PHYSICIAN ASSISTANT

## 2022-03-15 ENCOUNTER — OFFICE VISIT (OUTPATIENT)
Dept: FAMILY MEDICINE CLINIC | Facility: OTHER | Age: 72
End: 2022-03-15
Payer: COMMERCIAL

## 2022-03-15 VITALS
SYSTOLIC BLOOD PRESSURE: 120 MMHG | BODY MASS INDEX: 29.83 KG/M2 | RESPIRATION RATE: 16 BRPM | WEIGHT: 158 LBS | HEIGHT: 61 IN | TEMPERATURE: 98 F | HEART RATE: 78 BPM | OXYGEN SATURATION: 98 % | DIASTOLIC BLOOD PRESSURE: 70 MMHG

## 2022-03-15 DIAGNOSIS — E03.8 SUBCLINICAL HYPOTHYROIDISM: ICD-10-CM

## 2022-03-15 DIAGNOSIS — N18.2 CKD (CHRONIC KIDNEY DISEASE) STAGE 2, GFR 60-89 ML/MIN: ICD-10-CM

## 2022-03-15 DIAGNOSIS — M85.80 OSTEOPENIA, UNSPECIFIED LOCATION: ICD-10-CM

## 2022-03-15 DIAGNOSIS — Z00.00 MEDICARE ANNUAL WELLNESS VISIT, SUBSEQUENT: Primary | ICD-10-CM

## 2022-03-15 DIAGNOSIS — Z13.1 SCREENING FOR DIABETES MELLITUS: ICD-10-CM

## 2022-03-15 DIAGNOSIS — Z11.1 PPD SCREENING TEST: ICD-10-CM

## 2022-03-15 PROCEDURE — 86580 TB INTRADERMAL TEST: CPT

## 2022-03-15 PROCEDURE — 1170F FXNL STATUS ASSESSED: CPT | Performed by: FAMILY MEDICINE

## 2022-03-15 PROCEDURE — 1036F TOBACCO NON-USER: CPT | Performed by: FAMILY MEDICINE

## 2022-03-15 PROCEDURE — 3288F FALL RISK ASSESSMENT DOCD: CPT | Performed by: FAMILY MEDICINE

## 2022-03-15 PROCEDURE — 1125F AMNT PAIN NOTED PAIN PRSNT: CPT | Performed by: FAMILY MEDICINE

## 2022-03-15 PROCEDURE — 1160F RVW MEDS BY RX/DR IN RCRD: CPT | Performed by: FAMILY MEDICINE

## 2022-03-15 PROCEDURE — 3008F BODY MASS INDEX DOCD: CPT | Performed by: FAMILY MEDICINE

## 2022-03-15 PROCEDURE — G0439 PPPS, SUBSEQ VISIT: HCPCS | Performed by: FAMILY MEDICINE

## 2022-03-15 PROCEDURE — 3725F SCREEN DEPRESSION PERFORMED: CPT | Performed by: FAMILY MEDICINE

## 2022-03-15 RX ORDER — RISPERIDONE 2 MG/1
TABLET, FILM COATED ORAL
COMMUNITY
Start: 2022-03-11

## 2022-03-15 RX ORDER — CHOLECALCIFEROL (VITAMIN D3) 125 MCG
1 CAPSULE ORAL
COMMUNITY

## 2022-03-15 NOTE — PROGRESS NOTES
Assessment and Plan:     Problem List Items Addressed This Visit        Endocrine    Subclinical hypothyroidism    Relevant Orders    TSH, 3rd generation with Free T4 reflex       Musculoskeletal and Integument    Osteopenia     The patient discontinued alendronate due to fears of side effects  Genitourinary    CKD (chronic kidney disease) stage 2, GFR 60-89 ml/min    Relevant Orders    Comprehensive metabolic panel      Other Visit Diagnoses     Medicare annual wellness visit, subsequent    -  Primary    Screening for diabetes mellitus        Relevant Orders    Comprehensive metabolic panel    PPD screening test        Relevant Orders    TB Skin Test (Completed)         BMI Counseling: Body mass index is 29 85 kg/m²  The BMI is above normal  Nutrition recommendations include encouraging healthy choices of fruits and vegetables  Exercise recommendations include moderate physical activity 150 minutes/week  No pharmacotherapy was ordered  Rationale for BMI follow-up plan is due to patient being overweight or obese  Emotional and Mental Well-being, Sleep, Connectedness Assessment and Intervention:    Depression and anxiety screening performed and reviewed      Tobacco and Toxic Substance Assessment and Intervention:     Tobacco use screening performed    Alcohol and drug use screening performed      Therapeutic Lifestyle Change Visit:     One-on-one comprehensive counseling, coaching, and health behavior change visit completed        Preventive health issues were discussed with patient, and age appropriate screening tests were ordered as noted in patient's After Visit Summary  Personalized health advice and appropriate referrals for health education or preventive services given if needed, as noted in patient's After Visit Summary       History of Present Illness:     Patient presents for Medicare Annual Wellness visit    Patient Care Team:  Lay Dale MD as PCP - General (Family Medicine)  SIOBHAN Pitts PA-C Shiekh Asim Pixie Grief, MD Phil Roes, MD Daryl Vincent Larry, MD Bolivar Moose, CRNP Georges Bing, MD Rusty Sic, MD as Endoscopist     Problem List:     Patient Active Problem List   Diagnosis    Back pain, chronic    Bunion, right foot    Cervical post-laminectomy syndrome    Deformity of toe of left foot    DJD (degenerative joint disease) of thoracic spine    Foot pain, bilateral    Leukopenia    Myofascial pain syndrome    Osteopenia    Pain syndrome, chronic    Thoracic neuralgia    Thoracic spondylosis without myelopathy    Vitiligo    Mild persistent asthma with acute exacerbation    History of gastroesophageal reflux (GERD)    Hypercholesterolemia    Seasonal allergies    Osteoporosis screening    Subclinical hypothyroidism    Mood disorder with psychosis ([facility] [address] )    Acute diarrhea    Elevated blood pressure reading    Agitation    Hyponatremia    CKD (chronic kidney disease) stage 2, GFR 60-89 ml/min    Medical clearance for psychiatric admission    Major depressive disorder    Anxiety    MCI (mild cognitive impairment)    SOB (shortness of breath)      Past Medical and Surgical History:     Past Medical History:   Diagnosis Date    Anemia     last assessed - [id number]    Anxiety disorder     Arthritis     Asthma     Back pain     Bunion, right foot     last assessed - [id number]    Deformity of toe of left foot     last assessed - [id number]    Discitis of thoracolumbar region     last assessed - [id number]    Fall     Fibrocystic disease of breast     Fracture of wrist     and hand, left    GERD (gastroesophageal reflux disease)     Head injury     Hypercholesterolemia     Hypokalemia 3/27/2021    Leukopenia     last assessed - [id number]    Osteopenia     last assessed - [id number]    Polyarthritis     Prediabetes     Seasonal allergies     Shortness of breath     Sleep disturbance     Subclinical hypothyroidism     Toe deformity     last assessed - [id number]    Trochanteric bursitis of left hip     last assessed - [id number]    Vitamin D deficiency     last assessed - [id number]    Vitiligo     last assessed - [id number]    Wears eyeglasses      Past Surgical History:   Procedure Laterality Date    BACK SURGERY      4 back surgeries, fusion, bone replacement    BACK SURGERY  2012    Lumbar PLIF surgery    CARPAL TUNNEL RELEASE Bilateral      SECTION  1980    CHOLECYSTECTOMY      HAND TENDON SURGERY Bilateral     HERNIA REPAIR      NECK SURGERY  2012    ACDF Surgery    TX COLONOSCOPY FLX DX W/COLLJ SPEC WHEN PFRMD N/A 2019    Procedure: COLONOSCOPY;  Surgeon: José Mario MD;  [facility]; Service: Gastroenterology    TX ESOPHAGOGASTRODUODENOSCOPY TRANSORAL DIAGNOSTIC N/A 2019    Procedure: ESOPHAGOGASTRODUODENOSCOPY (EGD); Surgeon: José Mario MD;  Location: [facility];   Service: Gastroenterology    SPINAL CORD STIMULATOR IMPLANT N/A 2016    Procedure: PLACEMENT OF THORACIC PARASPINAL PERIPHERAL NERVE STIMULATING ELECTODES WITH LEFT BUTTOCK GENERATOR;  Surgeon: Ebony Hamm MD;  Location:  MAIN OR;  Service:    Donnella Phi NERVE REPAIR Bilateral       Family History:     Family History   Problem Relation Age of Onset    Dementia Mother     Emphysema Father         lung    Lung cancer Father     Other Paternal Grandfather         gangrene    Hypertension Family     Other Family         back trouble    No Known Problems Sister     No Known Problems Daughter     No Known Problems Maternal Grandmother     No Known Problems Maternal Grandfather     No Known Problems Paternal Grandmother     No Known Problems Maternal Aunt     No Known Problems Maternal Aunt     No Known Problems Maternal Aunt     No Known Problems Maternal Aunt     No Known Problems Paternal Aunt     No Known Problems Paternal Aunt     No Known Problems Paternal Aunt     No Known Problems Paternal Aunt       Social History:     Social History     Socioeconomic History    Marital status: /Civil Union     Spouse name: None    Number of children: 2    Years of education: HS or GED    Highest education level: None   Occupational History    None   Tobacco Use    Smoking status: Never Smoker    Smokeless tobacco: Never Used   Vaping Use    Vaping Use: Never used   Substance and Sexual Activity    Alcohol use: Not Currently     Comment: ocassional    Drug use: No    Sexual activity: Yes     Partners: Male     Comment: No known STD risk factors; Denied currently sexually active   Other Topics Concern    None   Social History Narrative    No known risk factors    Physical Disability     Social Determinants of Health     Financial Resource Strain: Not on file   Food Insecurity: Not on file   Transportation Needs: Not on file   Physical Activity: Not on file   Stress: Not on file   Social Connections: Not on file   Intimate Partner Violence: Not on file   Housing Stability: Not on file      Medications and Allergies:     Current Outpatient Medications   Medication Sig Dispense Refill    cyanocobalamin (VITAMIN B-12) 1,000 mcg tablet Take 1 tablet (1,000 mcg total) by mouth daily 30 tablet 0    Melatonin 5 MG TABS Take 1 tablet by mouth daily at bedtime as needed      risperiDONE (RisperDAL) 2 mg tablet TAKE 1 TABLET BY MOUTH AT NIGHT FOR 30 DAYS       No current facility-administered medications for this visit       Allergies   Allergen Reactions    Influenza A (H1n1) Monoval Vac Anaphylaxis    Lyrica [Pregabalin] Swelling    Other Anaphylaxis     FLU SHOT    Acetaminophen Other (See Comments)     GI Upset, "it upps my triglycerides"    Percocet [Oxycodone-Acetaminophen] GI Intolerance      Immunizations:     Immunization History   Administered Date(s) Administered    Tdap 06/04/2014    Tuberculin Skin Test-PPD Intradermal 03/01/2021, 03/15/2022      Health Maintenance:         Topic Date Due    Breast Cancer Screening: Mammogram  06/10/2023    DXA SCAN  06/10/2026    Colorectal Cancer Screening  01/31/2029    Hepatitis C Screening  Completed     There are no preventive care reminders to display for this patient  Medicare Health Risk Assessment:     /70   Pulse 78   Temp 98 °F (36 7 °C)   Resp 16   Ht 5' 1" (1 549 m)   Wt 71 7 kg (158 lb)   SpO2 98%   BMI 29 85 kg/m²      Pat is here for her Subsequent Wellness visit  Health Risk Assessment:   Patient rates overall health as excellent  Patient feels that their physical health rating is much better  Patient is very satisfied with their life  Eyesight was rated as same  Hearing was rated as same  Patient feels that their emotional and mental health rating is much better  Patients states they are never, rarely angry  Patient states they are never, rarely unusually tired/fatigued  Pain experienced in the last 7 days has been none  Patient states that she has experienced no weight loss or gain in last 6 months  Depression Screening:   PHQ-9 Score: 0      Fall Risk Screening: In the past year, patient has experienced: no history of falling in past year      Urinary Incontinence Screening:   Patient has not leaked urine accidently in the last six months  Home Safety:  Patient does not have trouble with stairs inside or outside of their home  Patient has working smoke alarms and has working carbon monoxide detector  Home safety hazards include: none  Nutrition:   Current diet is Regular  Medications:   Patient is currently taking over-the-counter supplements  OTC medications include: see medication list  Patient is able to manage medications       Activities of Daily Living (ADLs)/Instrumental Activities of Daily Living (IADLs):   Walk and transfer into and out of bed and chair?: Yes  Dress and groom yourself?: Yes    Bathe or shower yourself?: Yes    Feed yourself? Yes  Do your laundry/housekeeping?: Yes  Manage your money, pay your bills and track your expenses?: Yes  Make your own meals?: Yes    Do your own shopping?: Yes    Previous Hospitalizations:   Any hospitalizations or ED visits within the last 12 months?: No      Advance Care Planning:   Living will: No    Durable POA for healthcare: No    Advanced directive: No      Cognitive Screening:   Provider or family/friend/caregiver concerned regarding cognition?: No    PREVENTIVE SCREENINGS      Cardiovascular Screening:    General: Screening Not Indicated and History Lipid Disorder      Diabetes Screening:     General: Screening Current    Due for: Blood Glucose      Colorectal Cancer Screening:     General: Screening Current      Breast Cancer Screening:     General: Screening Current      Cervical Cancer Screening:    General: Screening Not Indicated      Osteoporosis Screening:    General: Screening Current      Abdominal Aortic Aneurysm (AAA) Screening:        General: Screening Not Indicated      Lung Cancer Screening:     General: Screening Not Indicated      Hepatitis C Screening:    General: Screening Current    Screening, Brief Intervention, and Referral to Treatment (SBIRT)    Screening  Typical number of drinks in a day: 0  Typical number of drinks in a week: 0  Interpretation: Low risk drinking behavior  Single Item Drug Screening:  How often have you used an illegal drug (including marijuana) or a prescription medication for non-medical reasons in the past year? never    Single Item Drug Screen Score: 0  Interpretation: Negative screen for possible drug use disorder    Other Counseling Topics:   Calcium and vitamin D intake and regular weightbearing exercise  Physical Exam  Vitals reviewed  Constitutional:       General: She is not in acute distress  Appearance: She is well-developed  She is obese  She is not diaphoretic     HENT:      Head: Normocephalic and atraumatic  Right Ear: External ear normal       Left Ear: External ear normal       Nose: Nose normal       Mouth/Throat:      Mouth: Mucous membranes are moist       Pharynx: Oropharynx is clear  Comments: Edentulous  Eyes:      Extraocular Movements: Extraocular movements intact  Conjunctiva/sclera: Conjunctivae normal       Pupils: Pupils are equal, round, and reactive to light  Cardiovascular:      Rate and Rhythm: Normal rate and regular rhythm  Heart sounds: Normal heart sounds  No murmur heard  No friction rub  No gallop  Pulmonary:      Effort: Pulmonary effort is normal  No respiratory distress  Breath sounds: Normal breath sounds  No stridor  No wheezing, rhonchi or rales  Abdominal:      General: Bowel sounds are normal  There is no distension  Palpations: Abdomen is soft  Tenderness: There is no abdominal tenderness  There is no right CVA tenderness, left CVA tenderness or guarding  Musculoskeletal:         General: Normal range of motion  Cervical back: Normal range of motion and neck supple  Right lower leg: No edema  Left lower leg: No edema  Lymphadenopathy:      Cervical: No cervical adenopathy  Skin:     General: Skin is warm and dry  Comments: Scattered areas of hypopigmentation c/w hx of vitiligo   Neurological:      General: No focal deficit present  Mental Status: She is alert and oriented to person, place, and time     Psychiatric:         Mood and Affect: Mood normal          Behavior: Behavior normal          Tawnya Orona MD

## 2022-03-15 NOTE — PATIENT INSTRUCTIONS
Medicare Preventive Visit Patient Instructions  Thank you for completing your Welcome to Medicare Visit or Medicare Annual Wellness Visit today  Your next wellness visit will be due in one year (3/16/2023)  The screening/preventive services that you may require over the next 5-10 years are detailed below  Some tests may not apply to you based off risk factors and/or age  Screening tests ordered at today's visit but not completed yet may show as past due  Also, please note that scanned in results may not display below  Preventive Screenings:  Service Recommendations Previous Testing/Comments   Colorectal Cancer Screening  * Colonoscopy    * Fecal Occult Blood Test (FOBT)/Fecal Immunochemical Test (FIT)  * Fecal DNA/Cologuard Test  * Flexible Sigmoidoscopy Age: 54-65 years old   Colonoscopy: every 10 years (may be performed more frequently if at higher risk)  OR  FOBT/FIT: every 1 year  OR  Cologuard: every 3 years  OR  Sigmoidoscopy: every 5 years  Screening may be recommended earlier than age 48 if at higher risk for colorectal cancer  Also, an individualized decision between you and your healthcare provider will decide whether screening between the ages of 74-80 would be appropriate  Colonoscopy: 01/31/2019  FOBT/FIT: Not on file  Cologuard: Not on file  Sigmoidoscopy: Not on file    Screening Current     Breast Cancer Screening Age: 36 years old  Frequency: every 1-2 years  Not required if history of left and right mastectomy Mammogram: 06/10/2021    Screening Current   Cervical Cancer Screening Between the ages of 21-29, pap smear recommended once every 3 years  Between the ages of 33-67, can perform pap smear with HPV co-testing every 5 years     Recommendations may differ for women with a history of total hysterectomy, cervical cancer, or abnormal pap smears in past  Pap Smear: Not on file    Screening Not Indicated   Hepatitis C Screening Once for adults born between 1945 and 1965  More frequently in patients at high risk for Hepatitis C Hep C Antibody: 12/04/2018    Screening Current   Diabetes Screening 1-2 times per year if you're at risk for diabetes or have pre-diabetes Fasting glucose: 92 mg/dL   A1C: 5 4 %    Screening Current   Cholesterol Screening Once every 5 years if you don't have a lipid disorder  May order more often based on risk factors  Lipid panel: 08/28/2021    Screening Not Indicated  History Lipid Disorder     Other Preventive Screenings Covered by Medicare:  1  Abdominal Aortic Aneurysm (AAA) Screening: covered once if your at risk  You're considered to be at risk if you have a family history of AAA  2  Lung Cancer Screening: covers low dose CT scan once per year if you meet all of the following conditions: (1) Age 50-69; (2) No signs or symptoms of lung cancer; (3) Current smoker or have quit smoking within the last 15 years; (4) You have a tobacco smoking history of at least 30 pack years (packs per day multiplied by number of years you smoked); (5) You get a written order from a healthcare provider  3  Glaucoma Screening: covered annually if you're considered high risk: (1) You have diabetes OR (2) Family history of glaucoma OR (3)  aged 48 and older OR (3)  American aged 72 and older  3  Osteoporosis Screening: covered every 2 years if you meet one of the following conditions: (1) You're estrogen deficient and at risk for osteoporosis based off medical history and other findings; (2) Have a vertebral abnormality; (3) On glucocorticoid therapy for more than 3 months; (4) Have primary hyperparathyroidism; (5) On osteoporosis medications and need to assess response to drug therapy  · Last bone density test (DXA Scan): 06/10/2021   5  HIV Screening: covered annually if you're between the age of 15-65  Also covered annually if you are younger than 13 and older than 72 with risk factors for HIV infection   For pregnant patients, it is covered up to 3 times per pregnancy  Immunizations:  Immunization Recommendations   Influenza Vaccine Annual influenza vaccination during flu season is recommended for all persons aged >= 6 months who do not have contraindications   Pneumococcal Vaccine (Prevnar and Pneumovax)  * Prevnar = PCV13  * Pneumovax = PPSV23   Adults 25-60 years old: 1-3 doses may be recommended based on certain risk factors  Adults 72 years old: Prevnar (PCV13) vaccine recommended followed by Pneumovax (PPSV23) vaccine  If already received PPSV23 since turning 65, then PCV13 recommended at least one year after PPSV23 dose  Hepatitis B Vaccine 3 dose series if at intermediate or high risk (ex: diabetes, end stage renal disease, liver disease)   Tetanus (Td) Vaccine - COST NOT COVERED BY MEDICARE PART B Following completion of primary series, a booster dose should be given every 10 years to maintain immunity against tetanus  Td may also be given as tetanus wound prophylaxis  Tdap Vaccine - COST NOT COVERED BY MEDICARE PART B Recommended at least once for all adults  For pregnant patients, recommended with each pregnancy  Shingles Vaccine (Shingrix) - COST NOT COVERED BY MEDICARE PART B  2 shot series recommended in those aged 48 and above     Health Maintenance Due:      Topic Date Due    Breast Cancer Screening: Mammogram  06/10/2023    DXA SCAN  06/10/2026    Colorectal Cancer Screening  01/31/2029    Hepatitis C Screening  Completed     Immunizations Due:      Topic Date Due    COVID-19 Vaccine (1) Never done    Pneumococcal Vaccine: 65+ Years (1 of 2 - PPSV23) Never done    Influenza Vaccine (1) Never done     Advance Directives   What are advance directives? Advance directives are legal documents that state your wishes and plans for medical care  These plans are made ahead of time in case you lose your ability to make decisions for yourself   Advance directives can apply to any medical decision, such as the treatments you want, and if you want to donate organs  What are the types of advance directives? There are many types of advance directives, and each state has rules about how to use them  You may choose a combination of any of the following:  · Living will: This is a written record of the treatment you want  You can also choose which treatments you do not want, which to limit, and which to stop at a certain time  This includes surgery, medicine, IV fluid, and tube feedings  · Durable power of  for healthcare [facility]): This is a written record that states who you want to make healthcare choices for you when you are unable to make them for yourself  This person, called a proxy, is usually a family member or a friend  You may choose more than 1 proxy  · Do not resuscitate (DNR) order:  A DNR order is used in case your heart stops beating or you stop breathing  It is a request not to have certain forms of treatment, such as CPR  A DNR order may be included in other types of advance directives  · Medical directive: This covers the care that you want if you are in a coma, near death, or unable to make decisions for yourself  You can list the treatments you want for each condition  Treatment may include pain medicine, surgery, blood transfusions, dialysis, IV or tube feedings, and a ventilator (breathing machine)  · Values history: This document has questions about your views, beliefs, and how you feel and think about life  This information can help others choose the care that you would choose  Why are advance directives important? An advance directive helps you control your care  Although spoken wishes may be used, it is better to have your wishes written down  Spoken wishes can be misunderstood, or not followed  Treatments may be given even if you do not want them  An advance directive may make it easier for your family to make difficult choices about your care     Weight Management   Why it is important to manage your weight:  Being overweight increases your risk of health conditions such as heart disease, high blood pressure, type 2 diabetes, and certain types of cancer  It can also increase your risk for osteoarthritis, sleep apnea, and other respiratory problems  Aim for a slow, steady weight loss  Even a small amount of weight loss can lower your risk of health problems  How to lose weight safely:  A safe and healthy way to lose weight is to eat fewer calories and get regular exercise  You can lose up about 1 pound a week by decreasing the number of calories you eat by 500 calories each day  Healthy meal plan for weight management:  A healthy meal plan includes a variety of foods, contains fewer calories, and helps you stay healthy  A healthy meal plan includes the following:  · Eat whole-grain foods more often  A healthy meal plan should contain fiber  Fiber is the part of grains, fruits, and vegetables that is not broken down by your body  Whole-grain foods are healthy and provide extra fiber in your diet  Some examples of whole-grain foods are whole-wheat breads and pastas, oatmeal, brown rice, and bulgur  · Eat a variety of vegetables every day  Include dark, leafy greens such as spinach, kale, sinai greens, and mustard greens  Eat yellow and orange vegetables such as carrots, sweet potatoes, and winter squash  · Eat a variety of fruits every day  Choose fresh or canned fruit (canned in its own juice or light syrup) instead of juice  Fruit juice has very little or no fiber  · Eat low-fat dairy foods  Drink fat-free (skim) milk or 1% milk  Eat fat-free yogurt and low-fat cottage cheese  Try low-fat cheeses such as mozzarella and other reduced-fat cheeses  · Choose meat and other protein foods that are low in fat  Choose beans or other legumes such as split peas or lentils  Choose fish, skinless poultry (chicken or turkey), or lean cuts of red meat (beef or pork)  Before you cook meat or poultry, cut off any visible fat  · Use less fat and oil  Try baking foods instead of frying them  Add less fat, such as margarine, sour cream, regular salad dressing and mayonnaise to foods  Eat fewer high-fat foods  Some examples of high-fat foods include french fries, doughnuts, ice cream, and cakes  · Eat fewer sweets  Limit foods and drinks that are high in sugar  This includes candy, cookies, regular soda, and sweetened drinks  Exercise:  Exercise at least 30 minutes per day on most days of the week  Some examples of exercise include walking, biking, dancing, and swimming  You can also fit in more physical activity by taking the stairs instead of the elevator or parking farther away from stores  Ask your healthcare provider about the best exercise plan for you  © Copyright [employer] 2018 Information is for End User's use only and may not be sold, redistributed or otherwise used for commercial purposes   All illustrations and images included in CareNotes® are the copyrighted property of [employer]  or [address] [place] [address]

## 2022-03-17 ENCOUNTER — CLINICAL SUPPORT (OUTPATIENT)
Dept: FAMILY MEDICINE CLINIC | Facility: OTHER | Age: 72
End: 2022-03-17

## 2022-03-17 DIAGNOSIS — Z11.1 ENCOUNTER FOR PPD SKIN TEST READING: Primary | ICD-10-CM

## 2022-03-17 LAB
INDURATION: 0 MM
TB SKIN TEST: NEGATIVE

## 2022-03-19 ENCOUNTER — LAB (OUTPATIENT)
Dept: LAB | Facility: CLINIC | Age: 72
End: 2022-03-19
Payer: COMMERCIAL

## 2022-03-19 DIAGNOSIS — N18.2 CKD (CHRONIC KIDNEY DISEASE) STAGE 2, GFR 60-89 ML/MIN: ICD-10-CM

## 2022-03-19 DIAGNOSIS — Z13.1 SCREENING FOR DIABETES MELLITUS: ICD-10-CM

## 2022-03-19 DIAGNOSIS — E03.8 SUBCLINICAL HYPOTHYROIDISM: ICD-10-CM

## 2022-03-19 LAB
ALBUMIN SERPL BCP-MCNC: 3.6 G/DL (ref 3.5–5)
ALP SERPL-CCNC: 79 U/L (ref 46–116)
ALT SERPL W P-5'-P-CCNC: 22 U/L (ref 12–78)
ANION GAP SERPL CALCULATED.3IONS-SCNC: 11 MMOL/L (ref 4–13)
AST SERPL W P-5'-P-CCNC: 19 U/L (ref 5–45)
BILIRUB SERPL-MCNC: 0.36 MG/DL (ref 0.2–1)
BUN SERPL-MCNC: 10 MG/DL (ref 5–25)
CALCIUM SERPL-MCNC: 9 MG/DL (ref 8.3–10.1)
CHLORIDE SERPL-SCNC: 99 MMOL/L (ref 100–108)
CO2 SERPL-SCNC: 26 MMOL/L (ref 21–32)
CREAT SERPL-MCNC: 0.91 MG/DL (ref 0.6–1.3)
GFR SERPL CREATININE-BSD FRML MDRD: 64 ML/MIN/1.73SQ M
GLUCOSE P FAST SERPL-MCNC: 92 MG/DL (ref 65–99)
POTASSIUM SERPL-SCNC: 4 MMOL/L (ref 3.5–5.3)
PROT SERPL-MCNC: 7.3 G/DL (ref 6.4–8.2)
SODIUM SERPL-SCNC: 136 MMOL/L (ref 136–145)
TSH SERPL DL<=0.05 MIU/L-ACNC: 3.53 UIU/ML (ref 0.36–3.74)

## 2022-03-19 PROCEDURE — 84443 ASSAY THYROID STIM HORMONE: CPT

## 2022-03-19 PROCEDURE — 80053 COMPREHEN METABOLIC PANEL: CPT

## 2022-03-19 PROCEDURE — 36415 COLL VENOUS BLD VENIPUNCTURE: CPT

## 2022-09-06 ENCOUNTER — RA CDI HCC (OUTPATIENT)
Dept: OTHER | Facility: HOSPITAL | Age: 72
End: 2022-09-06

## 2022-09-06 NOTE — PROGRESS NOTES
[facility] 77  coding opportunities       Chart reviewed, no opportunity found:   [address]        Patients Insurance     Medicare Insurance: [facility]

## 2022-09-12 ENCOUNTER — OFFICE VISIT (OUTPATIENT)
Dept: FAMILY MEDICINE CLINIC | Facility: OTHER | Age: 72
End: 2022-09-12
Payer: COMMERCIAL

## 2022-09-12 VITALS
OXYGEN SATURATION: 98 % | BODY MASS INDEX: 28.55 KG/M2 | DIASTOLIC BLOOD PRESSURE: 70 MMHG | RESPIRATION RATE: 18 BRPM | HEIGHT: 61 IN | SYSTOLIC BLOOD PRESSURE: 104 MMHG | HEART RATE: 85 BPM | WEIGHT: 151.2 LBS | TEMPERATURE: 97.6 F

## 2022-09-12 DIAGNOSIS — E66.3 OVERWEIGHT (BMI 25.0-29.9): Primary | ICD-10-CM

## 2022-09-12 PROCEDURE — 99213 OFFICE O/P EST LOW 20 MIN: CPT | Performed by: FAMILY MEDICINE

## 2022-09-12 PROCEDURE — 1160F RVW MEDS BY RX/DR IN RCRD: CPT | Performed by: FAMILY MEDICINE

## 2022-09-12 NOTE — PROGRESS NOTES
Assessment/Plan:      Diagnoses and all orders for this visit:    Overweight (BMI 25 0-29  9)    Other orders  -     silver sulfadiazine (SILVADENE,SSD) 1 % cream; Apply 1 application topically daily      Patient has lost 7 lbs since last visit  Recommended patient not eat cake for breakfast every day but replace with fruits or oatmeal   Recommended continued frequent walking as well as regular gym visits  Subjective:      Patient ID: Lanre Sherman is a 73 y o  female  Patient presents for follow-up concerning weight loss  She reports he has been walking daily at [address] [facility] for approximately 1 5 hours although she has decreased this recently  She states she also walks every aisle when shopping at another store  She has a gym membership at [place] [id number] but has not yet gone  She plans to ride a stationary bike or swim  She notes that she has decreased some portion sizes but has not made significant changes to the types of food she eats  Review of Systems   Constitutional: Negative for chills, fever and unexpected weight change  HENT: Negative for congestion, rhinorrhea and sore throat  Eyes: Negative for visual disturbance  Respiratory: Positive for cough (occasional, at night)  Negative for shortness of breath and wheezing  Cardiovascular: Negative for chest pain, palpitations and leg swelling  Gastrointestinal: Negative for abdominal pain, blood in stool, constipation, diarrhea, nausea and vomiting  Genitourinary: Negative for difficulty urinating, dysuria and hematuria  Musculoskeletal: Negative for arthralgias and myalgias  Skin: Negative for rash  Neurological: Negative for dizziness, light-headedness and headaches  Psychiatric/Behavioral: Negative for dysphoric mood  All other systems reviewed and are negative          Objective:      /70   Pulse 85   Temp 97 6 °F (36 4 °C)   Resp 18   Ht 5' 1" (1 549 m)   Wt 68 6 kg (151 lb 3 2 oz)   SpO2 98%   BMI 28 57 kg/m²          Physical Exam  Vitals reviewed  Constitutional:       General: She is not in acute distress  Appearance: She is well-developed  She is not diaphoretic  HENT:      Head: Normocephalic and atraumatic  Right Ear: External ear normal       Left Ear: External ear normal       Nose: Nose normal       Mouth/Throat:      Mouth: Mucous membranes are moist       Pharynx: Oropharynx is clear  Comments: Edentulous  Eyes:      Extraocular Movements: Extraocular movements intact  Conjunctiva/sclera: Conjunctivae normal       Pupils: Pupils are equal, round, and reactive to light  Cardiovascular:      Rate and Rhythm: Normal rate and regular rhythm  Heart sounds: Normal heart sounds  No murmur heard  No friction rub  No gallop  Pulmonary:      Effort: Pulmonary effort is normal  No respiratory distress  Breath sounds: Normal breath sounds  No stridor  No wheezing, rhonchi or rales  Abdominal:      General: Bowel sounds are normal  There is no distension  Palpations: Abdomen is soft  Tenderness: There is no abdominal tenderness  There is no right CVA tenderness, left CVA tenderness or guarding  Musculoskeletal:         General: Normal range of motion  Cervical back: Normal range of motion and neck supple  Right lower leg: No edema  Left lower leg: No edema  Lymphadenopathy:      Cervical: No cervical adenopathy  Skin:     General: Skin is warm and dry  Comments: Scattered areas of hypopigmentation c/w hx of vitiligo   Neurological:      General: No focal deficit present  Mental Status: She is alert and oriented to person, place, and time     Psychiatric:         Mood and Affect: Mood normal          Behavior: Behavior normal

## 2022-09-12 NOTE — PATIENT INSTRUCTIONS
Work on eating a healthy breakfast such as oatmeal or fruit  Weight Management   AMBULATORY CARE:   Why it is important to manage your weight:  Being overweight increases your risk of health conditions such as heart disease, high blood pressure, type 2 diabetes, and certain types of cancer  It can also increase your risk for osteoarthritis, sleep apnea, and other respiratory problems  Aim for a slow, steady weight loss  Even a small amount of weight loss can lower your risk of health problems  Risks of being overweight:  Extra weight can cause many health problems, including the following:  Diabetes (high blood sugar level)    High blood pressure or high cholesterol    Heart disease    Stroke    Gallbladder or liver disease    Cancer of the colon, breast, prostate, liver, or kidney    Sleep apnea    Arthritis or gout    Screening  is done to check for health conditions before you have signs or symptoms  If you are 28to 79years old, your blood sugar level may be checked every 3 years for signs of prediabetes or diabetes  Your healthcare provider will check your blood pressure at each visit  High blood pressure can lead to a stroke or other problems  Your provider may check for signs of heart disease, cancer, or other health problems  How to lose weight safely:  A safe and healthy way to lose weight is to eat fewer calories and get regular exercise  You can lose up about 1 pound a week by decreasing the number of calories you eat by 500 calories each day  You can decrease calories by eating smaller portion sizes or by cutting out high-calorie foods  Read labels to find out how many calories are in the foods you eat  You can also burn calories with exercise such as walking, swimming, or biking  You will be more likely to keep weight off if you make these changes part of your lifestyle  Exercise at least 30 minutes per day on most days of the week   You can also fit in more physical activity by taking the stairs instead of the elevator or parking farther away from stores  Ask your healthcare provider about the best exercise plan for you  Healthy meal plan for weight management:  A healthy meal plan includes a variety of foods, contains fewer calories, and helps you stay healthy  A healthy meal plan includes the following:     Eat whole-grain foods more often  A healthy meal plan should contain fiber  Fiber is the part of grains, fruits, and vegetables that is not broken down by your body  Whole-grain foods are healthy and provide extra fiber in your diet  Some examples of whole-grain foods are whole-wheat breads and pastas, oatmeal, brown rice, and bulgur  Eat a variety of vegetables every day  Include dark, leafy greens such as spinach, kale, sinai greens, and mustard greens  Eat yellow and orange vegetables such as carrots, sweet potatoes, and winter squash  Eat a variety of fruits every day  Choose fresh or canned fruit (canned in its own juice or light syrup) instead of juice  Fruit juice has very little or no fiber  Eat low-fat dairy foods  Drink fat-free (skim) milk or 1% milk  Eat fat-free yogurt and low-fat cottage cheese  Try low-fat cheeses such as mozzarella and other reduced-fat cheeses  Choose meat and other protein foods that are low in fat  Choose beans or other legumes such as split peas or lentils  Choose fish, skinless poultry (chicken or turkey), or lean cuts of red meat (beef or pork)  Before you cook meat or poultry, cut off any visible fat  Use less fat and oil  Try baking foods instead of frying them  Add less fat, such as margarine, sour cream, regular salad dressing and mayonnaise to foods  Eat fewer high-fat foods  Some examples of high-fat foods include french fries, doughnuts, ice cream, and cakes  Eat fewer sweets  Limit foods and drinks that are high in sugar  This includes candy, cookies, regular soda, and sweetened drinks      Ways to decrease calories:   Eat smaller portions  Use a small plate with smaller servings  Do not eat second helpings  When you eat at a restaurant, ask for a box and place half of your meal in the box before you eat  Share an entrée with someone else  Replace high-calorie snacks with healthy, low-calorie snacks  Choose fresh fruit, vegetables, fat-free rice cakes, or air-popped popcorn instead of potato chips, nuts, or chocolate  Choose water or calorie-free drinks instead of soda or sweetened drinks  Do not shop for groceries when you are hungry  You may be more likely to make unhealthy food choices  Take a grocery list of healthy foods and shop after you have eaten  Eat regular meals  Do not skip meals  Skipping meals can lead to overeating later in the day  This can make it harder for you to lose weight  Eat a healthy snack in place of a meal if you do not have time to eat a regular meal  Talk with a dietitian to help you create a meal plan and schedule that is right for you  Other things to consider as you try to lose weight:   Be aware of situations that may give you the urge to overeat, such as eating while watching television  Find ways to avoid these situations  For example, read a book, go for a walk, or do crafts  Meet with a weight loss support group or friends who are also trying to lose weight  This may help you stay motivated to continue working on your weight loss goals  © Copyright [employer] 2022 Information is for End User's use only and may not be sold, redistributed or otherwise used for commercial purposes  All illustrations and images included in CareNotes® are the copyrighted property of [employer]  or [employer]dy Mancilla   The above information is an  only  It is not intended as medical advice for individual conditions or treatments   Talk to your doctor, nurse or pharmacist before following any medical regimen to see if it is safe and effective for you

## 2023-03-20 ENCOUNTER — TELEPHONE (OUTPATIENT)
Dept: FAMILY MEDICINE CLINIC | Facility: OTHER | Age: 73
End: 2023-03-20

## 2023-03-20 ENCOUNTER — OFFICE VISIT (OUTPATIENT)
Dept: FAMILY MEDICINE CLINIC | Facility: OTHER | Age: 73
End: 2023-03-20

## 2023-03-20 VITALS
BODY MASS INDEX: 30.93 KG/M2 | HEIGHT: 61 IN | WEIGHT: 163.8 LBS | OXYGEN SATURATION: 96 % | TEMPERATURE: 98.2 F | SYSTOLIC BLOOD PRESSURE: 110 MMHG | DIASTOLIC BLOOD PRESSURE: 60 MMHG | HEART RATE: 98 BPM | RESPIRATION RATE: 14 BRPM

## 2023-03-20 DIAGNOSIS — M85.80 OSTEOPENIA, UNSPECIFIED LOCATION: ICD-10-CM

## 2023-03-20 DIAGNOSIS — Z00.00 MEDICARE ANNUAL WELLNESS VISIT, SUBSEQUENT: Primary | ICD-10-CM

## 2023-03-20 DIAGNOSIS — D70.9 NEUTROPENIA, UNSPECIFIED TYPE (HCC): ICD-10-CM

## 2023-03-20 DIAGNOSIS — N18.2 CKD (CHRONIC KIDNEY DISEASE) STAGE 2, GFR 60-89 ML/MIN: ICD-10-CM

## 2023-03-20 DIAGNOSIS — E78.00 HYPERCHOLESTEROLEMIA: ICD-10-CM

## 2023-03-20 RX ORDER — MELATONIN
1000 DAILY
COMMUNITY
End: 2023-03-20

## 2023-03-20 RX ORDER — CHOLECALCIFEROL (VITAMIN D3) 125 MCG
1 CAPSULE ORAL DAILY
COMMUNITY

## 2023-03-20 NOTE — PATIENT INSTRUCTIONS
Osteopenia (low bone density): do regular weight bearing exercise and get lots of protein from plants (e g  beans)  Avoid salt, caffeine, and animal products as much as possible  Look into Dr Shanta Arceo poses  Medicare Preventive Visit Patient Instructions  Thank you for completing your Welcome to Medicare Visit or Medicare Annual Wellness Visit today  Your next wellness visit will be due in one year (3/20/2024)  The screening/preventive services that you may require over the next 5-10 years are detailed below  Some tests may not apply to you based off risk factors and/or age  Screening tests ordered at today's visit but not completed yet may show as past due  Also, please note that scanned in results may not display below  Preventive Screenings:  Service Recommendations Previous Testing/Comments   Colorectal Cancer Screening  * Colonoscopy    * Fecal Occult Blood Test (FOBT)/Fecal Immunochemical Test (FIT)  * Fecal DNA/Cologuard Test  * Flexible Sigmoidoscopy Age: 39-70 years old   Colonoscopy: every 10 years (may be performed more frequently if at higher risk)  OR  FOBT/FIT: every 1 year  OR  Cologuard: every 3 years  OR  Sigmoidoscopy: every 5 years  Screening may be recommended earlier than age 39 if at higher risk for colorectal cancer  Also, an individualized decision between you and your healthcare provider will decide whether screening between the ages of 74-80 would be appropriate  Colonoscopy: 01/31/2019  FOBT/FIT: Not on file  Cologuard: Not on file  Sigmoidoscopy: Not on file    Screening Current     Breast Cancer Screening Age: 36 years old  Frequency: every 1-2 years  Not required if history of left and right mastectomy Mammogram: 06/10/2021    Screening Current   Cervical Cancer Screening Between the ages of 21-29, pap smear recommended once every 3 years  Between the ages of 33-67, can perform pap smear with HPV co-testing every 5 years     Recommendations may differ for women with a history of total hysterectomy, cervical cancer, or abnormal pap smears in past  Pap Smear: Not on file    Screening Not Indicated   Hepatitis C Screening Once for adults born between 1945 and 1965  More frequently in patients at high risk for Hepatitis C Hep C Antibody: 12/04/2018    Screening Current   Diabetes Screening 1-2 times per year if you're at risk for diabetes or have pre-diabetes Fasting glucose: 92 mg/dL (3/19/2022)  A1C: 5 4 % (2/28/2020)      Cholesterol Screening Once every 5 years if you don't have a lipid disorder  May order more often based on risk factors  Lipid panel: 08/28/2021    Screening Not Indicated  History Lipid Disorder     Other Preventive Screenings Covered by Medicare:  Abdominal Aortic Aneurysm (AAA) Screening: covered once if your at risk  You're considered to be at risk if you have a family history of AAA  Lung Cancer Screening: covers low dose CT scan once per year if you meet all of the following conditions: (1) Age 50-69; (2) No signs or symptoms of lung cancer; (3) Current smoker or have quit smoking within the last 15 years; (4) You have a tobacco smoking history of at least 20 pack years (packs per day multiplied by number of years you smoked); (5) You get a written order from a healthcare provider  Glaucoma Screening: covered annually if you're considered high risk: (1) You have diabetes OR (2) Family history of glaucoma OR (3)  aged 48 and older OR (3)  American aged 72 and older  Osteoporosis Screening: covered every 2 years if you meet one of the following conditions: (1) You're estrogen deficient and at risk for osteoporosis based off medical history and other findings; (2) Have a vertebral abnormality; (3) On glucocorticoid therapy for more than 3 months; (4) Have primary hyperparathyroidism; (5) On osteoporosis medications and need to assess response to drug therapy  Last bone density test (DXA Scan): 06/10/2021    HIV Screening: covered annually if you're between the age of 12-76  Also covered annually if you are younger than 13 and older than 72 with risk factors for HIV infection  For pregnant patients, it is covered up to 3 times per pregnancy  Immunizations:  Immunization Recommendations   Influenza Vaccine Annual influenza vaccination during flu season is recommended for all persons aged >= 6 months who do not have contraindications   Pneumococcal Vaccine   * Pneumococcal conjugate vaccine = PCV13 (Prevnar 13), PCV15 (Vaxneuvance), PCV20 (Prevnar 20)  * Pneumococcal polysaccharide vaccine = PPSV23 (Pneumovax) Adults 25-60 years old: 1-3 doses may be recommended based on certain risk factors  Adults 72 years old: 1-2 doses may be recommended based off what pneumonia vaccine you previously received   Hepatitis B Vaccine 3 dose series if at intermediate or high risk (ex: diabetes, end stage renal disease, liver disease)   Tetanus (Td) Vaccine - COST NOT COVERED BY MEDICARE PART B Following completion of primary series, a booster dose should be given every 10 years to maintain immunity against tetanus  Td may also be given as tetanus wound prophylaxis  Tdap Vaccine - COST NOT COVERED BY MEDICARE PART B Recommended at least once for all adults  For pregnant patients, recommended with each pregnancy  Shingles Vaccine (Shingrix) - COST NOT COVERED BY MEDICARE PART B  2 shot series recommended in those aged 48 and above     Health Maintenance Due:      Topic Date Due    Breast Cancer Screening: Mammogram  06/10/2023    DXA SCAN  06/10/2026    Colorectal Cancer Screening  01/31/2029    Hepatitis C Screening  Completed     Immunizations Due:      Topic Date Due    COVID-19 Vaccine (1) Never done    Pneumococcal Vaccine: 65+ Years (1 - PCV) Never done     Advance Directives   What are advance directives? Advance directives are legal documents that state your wishes and plans for medical care   These plans are made ahead of time in case you lose your ability to make decisions for yourself  Advance directives can apply to any medical decision, such as the treatments you want, and if you want to donate organs  What are the types of advance directives? There are many types of advance directives, and each state has rules about how to use them  You may choose a combination of any of the following:  Living will: This is a written record of the treatment you want  You can also choose which treatments you do not want, which to limit, and which to stop at a certain time  This includes surgery, medicine, IV fluid, and tube feedings  Durable power of  for healthcare [facility]): This is a written record that states who you want to make healthcare choices for you when you are unable to make them for yourself  This person, called a proxy, is usually a family member or a friend  You may choose more than 1 proxy  Do not resuscitate (DNR) order:  A DNR order is used in case your heart stops beating or you stop breathing  It is a request not to have certain forms of treatment, such as CPR  A DNR order may be included in other types of advance directives  Medical directive: This covers the care that you want if you are in a coma, near death, or unable to make decisions for yourself  You can list the treatments you want for each condition  Treatment may include pain medicine, surgery, blood transfusions, dialysis, IV or tube feedings, and a ventilator (breathing machine)  Values history: This document has questions about your views, beliefs, and how you feel and think about life  This information can help others choose the care that you would choose  Why are advance directives important? An advance directive helps you control your care  Although spoken wishes may be used, it is better to have your wishes written down  Spoken wishes can be misunderstood, or not followed  Treatments may be given even if you do not want them   An advance directive may make it easier for your family to make difficult choices about your care  Weight Management   Why it is important to manage your weight:  Being overweight increases your risk of health conditions such as heart disease, high blood pressure, type 2 diabetes, and certain types of cancer  It can also increase your risk for osteoarthritis, sleep apnea, and other respiratory problems  Aim for a slow, steady weight loss  Even a small amount of weight loss can lower your risk of health problems  How to lose weight safely:  A safe and healthy way to lose weight is to eat fewer calories and get regular exercise  You can lose up about 1 pound a week by decreasing the number of calories you eat by 500 calories each day  Healthy meal plan for weight management:  A healthy meal plan includes a variety of foods, contains fewer calories, and helps you stay healthy  A healthy meal plan includes the following:  Eat whole-grain foods more often  A healthy meal plan should contain fiber  Fiber is the part of grains, fruits, and vegetables that is not broken down by your body  Whole-grain foods are healthy and provide extra fiber in your diet  Some examples of whole-grain foods are whole-wheat breads and pastas, oatmeal, brown rice, and bulgur  Eat a variety of vegetables every day  Include dark, leafy greens such as spinach, kale, sinai greens, and mustard greens  Eat yellow and orange vegetables such as carrots, sweet potatoes, and winter squash  Eat a variety of fruits every day  Choose fresh or canned fruit (canned in its own juice or light syrup) instead of juice  Fruit juice has very little or no fiber  Eat low-fat dairy foods  Drink fat-free (skim) milk or 1% milk  Eat fat-free yogurt and low-fat cottage cheese  Try low-fat cheeses such as mozzarella and other reduced-fat cheeses  Choose meat and other protein foods that are low in fat  Choose beans or other legumes such as split peas or lentils   Choose fish, skinless poultry (chicken or turkey), or lean cuts of red meat (beef or pork)  Before you cook meat or poultry, cut off any visible fat  Use less fat and oil  Try baking foods instead of frying them  Add less fat, such as margarine, sour cream, regular salad dressing and mayonnaise to foods  Eat fewer high-fat foods  Some examples of high-fat foods include french fries, doughnuts, ice cream, and cakes  Eat fewer sweets  Limit foods and drinks that are high in sugar  This includes candy, cookies, regular soda, and sweetened drinks  Exercise:  Exercise at least 30 minutes per day on most days of the week  Some examples of exercise include walking, biking, dancing, and swimming  You can also fit in more physical activity by taking the stairs instead of the elevator or parking farther away from stores  Ask your healthcare provider about the best exercise plan for you  © Copyright [employer] 2018 Information is for End User's use only and may not be sold, redistributed or otherwise used for commercial purposes  All illustrations and images included in CareNotes® are the copyrighted property of A D A M [employer]  or [facility]   AMBULATORY CARE:   What you need to know about healthy sleep:  Healthy sleep, or sleep hygiene, is important to your physical, mental, and emotional health  Sleep affects almost every part of your body, including your brain, heart, metabolism, immune system, and mood  Good sleep habits can help you fall asleep and stay asleep during the night  Poor quality sleep or a long-term lack of sleep increases your risk for certain disorders  These include high blood pressure, cardiovascular disease, obesity, depression, and diabetes  What you need to know about sleep stages: The 2 main kinds of sleep are rapid eye movement (REM) and non-REM  You cycle through REM and non-REM many times during the night  Stage 1 non-REM sleep  is when you first fall asleep   This stage is short  Your brain waves slow and your body relaxes  Stage 2 non-REM sleep  is a period of light sleep before you move into deeper sleep  You spend the most time in this stage during the night  Your body temperature drops and your body relaxes even more  Stage 3 non-REM sleep  is a period of deep sleep that starts after stage 2  This stage is needed to feel refreshed in the morning  REM sleep  starts about 90 minutes after you fall asleep  Your eyes move from side to side  Your heart rate, blood pressure, and breathing become faster  Most of your dreams occur during REM sleep  As you age, you spend less time in REM sleep  How much sleep you need:  Each person needs a different amount of sleep  Your sleep patterns and needs change as you age  In general, school-aged children and teenagers need about 9 to 10 hours of sleep a night  Most adults need about 7 to 9 hours of sleep a night  After age 61 years, sleep may be lighter and for less time, with more periods of wakefulness  Older adults may fall asleep and wake up earlier than they did at a younger age  Medicines or conditions, such as chronic pain, may keep older adults awake  How to know you are getting healthy sleep:   Keep a 2-week log of your sleep  Write down what time you got up, what you did that day, and anything else that could affect your sleep  Keep a record of your sleep patterns, and any sleeping problems you have  Bring the record to your follow-up visits  Ask someone who lives with you if they notice anything about your sleep  For example, maybe you snore loudly or stop breathing for short periods  Tell your healthcare provider if your legs twitch or you feel like you can't keep them still  Your healthcare provider may refer to you a sleep specialist or cognitive behavioral therapy  Call your doctor if:   Someone has told you that you stop breathing when you sleep      Your legs twitch and it keeps you from sleeping  You begin to use drugs or alcohol to fall asleep  You have questions or concerns about your sleep habits  How to improve your sleep:  Your daily routines influence your sleep  Nutrition, medicines, your schedule, and what you do in the evenings can affect your sleep  Do the following to help improve your sleep:  Create a sleep schedule  Go to sleep and wake up at the same time every day  Be consistent, even on weekends or when you travel  Do not go to bed unless you are sleepy  Set up a calming routine before bed  Soak in a warm bath, listen to relaxing music, or read a book  Turn off all electronics at least 30 minutes before bedtime  Try not to watch television or use any electronics in the bedroom  Limit naps to 20 or 30 minutes, earlier in the day  Naps could make it hard for you to fall asleep at bedtime  Keep your bedroom cool, quiet, and dark  Turn on white noise, such as a fan, to help you relax  Get up if you do not fall asleep within 20 minutes  Move to another room and do something relaxing until you become sleepy  Limit caffeine, alcohol, and food to earlier in the day  Only drink caffeine in the morning  Do not drink alcohol within 6 hours of bedtime  Do not eat a heavy meal right before you go to bed  Limit how much liquid you drink in the evenings and right before bed  If you are prescribed diuretics, or water pills, take them early in the day  Exercise regularly  Daily exercise may help you sleep better  Do not exercise within 3 hours of bedtime  Follow up with your doctor as directed:  Bring your sleep log with you  Write down your questions so you remember to ask them during your visits  © Copyright [facility] 2022 Information is for End User's use only and may not be sold, redistributed or otherwise used for commercial purposes  The above information is an  only   It is not intended as medical advice for individual conditions or treatments  Talk to your doctor, nurse or pharmacist before following any medical regimen to see if it is safe and effective for you

## 2023-03-20 NOTE — TELEPHONE ENCOUNTER
Health Maintenance Due   Topic Date Due    COVID-19 Vaccine (1) Never done    Varicella vaccine (1 of 2 - 2-dose childhood series) Never done    Pneumococcal 0-64 years Vaccine (1 - PCV) Never done    Depression Screen  Never done    Hepatitis C screen  Never done    DTaP/Tdap/Td vaccine (1 - Tdap) Never done    Flu vaccine (1) Never done Patient called back and said she is taking Vit D 125 mg  And Calcium 600 mg OTC

## 2023-03-20 NOTE — PROGRESS NOTES
Assessment and Plan:     Problem List Items Addressed This Visit        Musculoskeletal and Integument    Osteopenia    Relevant Orders    Vitamin D 25 hydroxy       Genitourinary    CKD (chronic kidney disease) stage 2, GFR 60-89 ml/min    Relevant Orders    Comprehensive metabolic panel       Other    Hypercholesterolemia    Relevant Orders    Lipid Panel with Direct LDL reflex    Leukopenia    Relevant Orders    CBC and differential   Other Visit Diagnoses     Medicare annual wellness visit, subsequent    -  Primary        BMI Counseling: Body mass index is 30 95 kg/m²  The BMI is above normal  Nutrition recommendations include encouraging healthy choices of fruits and vegetables  Exercise recommendations include moderate physical activity 150 minutes/week  No pharmacotherapy was ordered  Rationale for BMI follow-up plan is due to patient being overweight or obese  Nutrition Assessment and Intervention:     New Nutrition Prescription completed with patient      Other interventions: Regular consumption of beans for increased plant protein to aid bone health    Emotional and Mental Well-being, Sleep, Connectedness Assessment and Intervention:    Sleep/stress assessment performed    Depression and anxiety screening performed and reviewed    Counseled regarding sleep hygiene and aspects of healthy sleep      Tobacco and Toxic Substance Assessment and Intervention:     Tobacco use screening performed    Alcohol and drug use screening performed        Preventive health issues were discussed with patient, and age appropriate screening tests were ordered as noted in patient's After Visit Summary  Personalized health advice and appropriate referrals for health education or preventive services given if needed, as noted in patient's After Visit Summary  History of Present Illness:     Patient presents for a Medicare Wellness Visit    Patient reports occasional fatigue due to trouble sleeping 2-3 times weekly  She does endorse nocturia 3-4 times nightly as well as some frequency during the day  Patient Care Team:  Dwayne Rushing MD as PCP - General (Family Medicine)  SIOBHAN Hope MD Clark Norlander, MD Daryl Llewellyn Modena, MD Marcheta Hack, MD Jeannene Bunk, MD as Endoscopist     Review of Systems:     Review of Systems   Constitutional: Negative for activity change, appetite change and unexpected weight change  HENT: Negative  Respiratory: Negative for shortness of breath and wheezing  Cardiovascular: Negative  Gastrointestinal: Negative  Genitourinary: Positive for frequency  Musculoskeletal: Negative  Neurological: Negative  Psychiatric/Behavioral: Positive for sleep disturbance  Negative for confusion        Problem List:     Patient Active Problem List   Diagnosis   • Back pain, chronic   • Bunion, right foot   • Cervical post-laminectomy syndrome   • Deformity of toe of left foot   • DJD (degenerative joint disease) of thoracic spine   • Foot pain, bilateral   • Leukopenia   • Myofascial pain syndrome   • Osteopenia   • Pain syndrome, chronic   • Thoracic neuralgia   • Thoracic spondylosis without myelopathy   • Vitiligo   • Mild persistent asthma with acute exacerbation   • History of gastroesophageal reflux (GERD)   • Hypercholesterolemia   • Seasonal allergies   • Osteoporosis screening   • Subclinical hypothyroidism   • Mood disorder with psychosis (HCC)   • Acute diarrhea   • Elevated blood pressure reading   • Agitation   • Hyponatremia   • CKD (chronic kidney disease) stage 2, GFR 60-89 ml/min   • Medical clearance for psychiatric admission   • Major depressive disorder   • Anxiety   • MCI (mild cognitive impairment)   • SOB (shortness of breath)      Past Medical and Surgical History:     Past Medical History:   Diagnosis Date   • Anemia     last assessed - [id number]   • Anxiety disorder    • Arthritis    • Asthma    • Back pain    • Bunion, right foot     last assessed - [id number]   • Deformity of toe of left foot     last assessed - 30Huk1210   • Discitis of thoracolumbar region     last assessed - [id number]   • Fall    • Fibrocystic disease of breast    • Fracture of wrist     and hand, left   • GERD (gastroesophageal reflux disease)    • Head injury    • Hypercholesterolemia    • Hypokalemia 3/27/2021   • Leukopenia     last assessed - [id number]   • Osteopenia     last assessed - 09IMM5859   • Polyarthritis    • Prediabetes    • Seasonal allergies    • Shortness of breath    • Sleep disturbance    • Subclinical hypothyroidism    • Toe deformity     last assessed - 06Gja2191   • Trochanteric bursitis of left hip     last assessed - 65Dkh6003   • Vitamin D deficiency     last assessed - 10HBY7053   • Vitiligo     last assessed - 27GRF3162   • Wears eyeglasses      Past Surgical History:   Procedure Laterality Date   • BACK SURGERY      4 back surgeries, fusion, bone replacement   • BACK SURGERY  2012    Lumbar PLIF surgery   • CARPAL TUNNEL RELEASE Bilateral    •  SECTION  1980   • CHOLECYSTECTOMY     • HAND TENDON SURGERY Bilateral    • HERNIA REPAIR     • NECK SURGERY  2012    ACDF Surgery   • RI COLONOSCOPY FLX DX W/COLLJ SPEC WHEN PFRMD N/A 2019    Procedure: COLONOSCOPY;  Surgeon: Gudelia Castaneda MD;  [facility]; Service: Gastroenterology   • RI ESOPHAGOGASTRODUODENOSCOPY TRANSORAL DIAGNOSTIC N/A 2019    Procedure: ESOPHAGOGASTRODUODENOSCOPY (EGD); Surgeon: Gudelia Castaneda MD;  Location[facility];   Service: Gastroenterology   • SPINAL CORD STIMULATOR IMPLANT N/A 2016    Procedure: PLACEMENT OF THORACIC PARASPINAL PERIPHERAL NERVE STIMULATING ELECTODES WITH LEFT BUTTOCK GENERATOR;  Surgeon: Jordan Daniels MD;  Location:  MAIN OR;  Service:    • ULNAR NERVE REPAIR Bilateral       Family History:     Family History   Problem Relation Age of Onset   • Dementia Mother    • Emphysema Father         lung   • Lung cancer Father    • Other Paternal Grandfather         gangrene   • Hypertension Family    • Other Family         back trouble   • No Known Problems Sister    • No Known Problems Daughter    • No Known Problems Maternal Grandmother    • No Known Problems Maternal Grandfather    • No Known Problems Paternal Grandmother    • No Known Problems Maternal Aunt    • No Known Problems Maternal Aunt    • No Known Problems Maternal Aunt    • No Known Problems Maternal Aunt    • No Known Problems Paternal Aunt    • No Known Problems Paternal Aunt    • No Known Problems Paternal Aunt    • No Known Problems Paternal Aunt       Social History:     Social History     Socioeconomic History   • Marital status: /Civil Union     Spouse name: None   • Number of children: 2   • Years of education: HS or GED   • Highest education level: None   Occupational History   • None   Tobacco Use   • Smoking status: Never   • Smokeless tobacco: Never   Vaping Use   • Vaping Use: Never used   Substance and Sexual Activity   • Alcohol use: Not Currently     Comment: ocassional   • Drug use: No   • Sexual activity: Not Currently     Partners: Male     Comment: No known STD risk factors; Denied currently sexually active   Other Topics Concern   • None   Social History Narrative    No known risk factors    Physical Disability     Social Determinants of Health     Financial Resource Strain: Low Risk    • Difficulty of Paying Living Expenses: Not hard at all   Food Insecurity: Not on file   Transportation Needs: No Transportation Needs   • Lack of Transportation (Medical): No   • Lack of Transportation (Non-Medical):  No   Physical Activity: Not on file   Stress: Not on file   Social Connections: Not on file   Intimate Partner Violence: Not on file   Housing Stability: Not on file      Medications and Allergies:     Current Outpatient Medications   Medication Sig Dispense Refill   • Calcium Carbonate (CALCIUM 600 HIGH POTENCY PO) Take 1 tablet by mouth daily     • Cholecalciferol (Vitamin D) 125 MCG (5000 UT) CAPS Take 1 capsule by mouth daily     • cyanocobalamin (VITAMIN B-12) 1,000 mcg tablet Take 1 tablet (1,000 mcg total) by mouth daily 30 tablet 0   • risperiDONE (RisperDAL) 2 mg tablet TAKE 1 TABLET BY MOUTH AT NIGHT FOR 30 DAYS       No current facility-administered medications for this visit  Allergies   Allergen Reactions   • Influenza A (H1n1) Monoval Vac Anaphylaxis   • Lyrica [Pregabalin] Swelling   • Other Anaphylaxis     FLU SHOT   • Acetaminophen Other (See Comments)     GI Upset, "it upps my triglycerides"   • Percocet [Oxycodone-Acetaminophen] GI Intolerance      Immunizations:     Immunization History   Administered Date(s) Administered   • Tdap 06/04/2014   • Tuberculin Skin Test-PPD Intradermal 03/01/2021, 03/15/2022      Health Maintenance:         Topic Date Due   • Breast Cancer Screening: Mammogram  06/10/2023   • DXA SCAN  06/10/2026   • Colorectal Cancer Screening  01/31/2029   • Hepatitis C Screening  Completed         Topic Date Due   • COVID-19 Vaccine (1) Never done      Medicare Screening Tests and Risk Assessments:     Manju Goodwin is here for her Subsequent Wellness visit  Last Medicare Wellness visit information reviewed, patient interviewed and updates made to the record today  Health Risk Assessment:   Patient rates overall health as excellent  Patient feels that their physical health rating is same  Patient is satisfied with their life  Eyesight was rated as same  Hearing was rated as same  Patient feels that their emotional and mental health rating is same  Patients states they are never, rarely angry  Patient states they are sometimes unusually tired/fatigued  Pain experienced in the last 7 days has been none  Patient states that she has experienced no weight loss or gain in last 6 months       Depression Screening:   PHQ-9 Score: 0 Fall Risk Screening: In the past year, patient has experienced: no history of falling in past year      Urinary Incontinence Screening:   Patient has not leaked urine accidently in the last six months  Home Safety:  Patient does not have trouble with stairs inside or outside of their home  Patient has working smoke alarms and has working carbon monoxide detector  Home safety hazards include: none  Nutrition:   Current diet is Regular  Medications:   Patient is currently taking over-the-counter supplements  OTC medications include: see medication list  Patient is able to manage medications  Activities of Daily Living (ADLs)/Instrumental Activities of Daily Living (IADLs):   Walk and transfer into and out of bed and chair?: Yes  Dress and groom yourself?: Yes    Bathe or shower yourself?: Yes    Feed yourself?  Yes  Do your laundry/housekeeping?: Yes  Manage your money, pay your bills and track your expenses?: Yes  Make your own meals?: Yes    Do your own shopping?: Yes    Previous Hospitalizations:   Any hospitalizations or ED visits within the last 12 months?: No      Advance Care Planning:   Living will: No    Durable POA for healthcare: No    Advanced directive: No      Cognitive Screening:   Provider or family/friend/caregiver concerned regarding cognition?: No    PREVENTIVE SCREENINGS      Cardiovascular Screening:    General: History Lipid Disorder    Due for: Lipid Panel      Diabetes Screening:       Due for: Blood Glucose      Colorectal Cancer Screening:     General: Screening Current      Breast Cancer Screening:     General: Screening Current      Cervical Cancer Screening:    General: Screening Not Indicated      Osteoporosis Screening:    General: Screening Current      Abdominal Aortic Aneurysm (AAA) Screening:        General: Screening Not Indicated      Lung Cancer Screening:     General: Screening Not Indicated      Hepatitis C Screening:    General: Screening Current    Screening, Brief Intervention, and Referral to Treatment (SBIRT)    Screening  Typical number of drinks in a day: 0  Typical number of drinks in a week: 0  Interpretation: Low risk drinking behavior  Single Item Drug Screening:  How often have you used an illegal drug (including marijuana) or a prescription medication for non-medical reasons in the past year? never    Single Item Drug Screen Score: 0  Interpretation: Negative screen for possible drug use disorder    Other Counseling Topics:   Car/seat belt/driving safety, skin self-exam, sunscreen and calcium and vitamin D intake and regular weightbearing exercise  No results found  Physical Exam:     /60   Pulse 98   Temp 98 2 °F (36 8 °C)   Resp 14   Ht 5' 1" (1 549 m)   Wt 74 3 kg (163 lb 12 8 oz)   SpO2 96%   BMI 30 95 kg/m²     Physical Exam  Vitals reviewed  Constitutional:       General: She is not in acute distress  Appearance: She is obese  She is not diaphoretic  HENT:      Head: Normocephalic and atraumatic  Right Ear: External ear normal       Left Ear: External ear normal       Nose: Nose normal       Mouth/Throat:      Mouth: Mucous membranes are moist       Pharynx: Oropharynx is clear  Comments: Edentulous  Eyes:      Extraocular Movements: Extraocular movements intact  Conjunctiva/sclera: Conjunctivae normal       Pupils: Pupils are equal, round, and reactive to light  Cardiovascular:      Rate and Rhythm: Normal rate and regular rhythm  Pulses: Normal pulses  Heart sounds: Normal heart sounds  No murmur heard  No friction rub  No gallop  Pulmonary:      Effort: Pulmonary effort is normal  No respiratory distress  Breath sounds: Normal breath sounds  No stridor  No wheezing, rhonchi or rales  Abdominal:      General: Bowel sounds are normal  There is no distension  Palpations: Abdomen is soft  There is no mass  Tenderness: There is no abdominal tenderness  There is no right CVA tenderness, left CVA tenderness or guarding  Musculoskeletal:         General: Normal range of motion  Cervical back: Normal range of motion and neck supple  Right lower leg: No edema  Left lower leg: No edema  Lymphadenopathy:      Cervical: No cervical adenopathy  Skin:     General: Skin is warm and dry  Findings: No rash  Comments: Scattered areas of hypopigmentation c/w hx of vitiligo    Neurological:      General: No focal deficit present  Mental Status: She is alert and oriented to person, place, and time     Psychiatric:         Mood and Affect: Mood normal          Behavior: Behavior normal          Lashell Richter MD

## 2023-03-25 ENCOUNTER — LAB (OUTPATIENT)
Dept: LAB | Facility: CLINIC | Age: 73
End: 2023-03-25

## 2023-03-25 DIAGNOSIS — N18.2 CKD (CHRONIC KIDNEY DISEASE) STAGE 2, GFR 60-89 ML/MIN: ICD-10-CM

## 2023-03-25 DIAGNOSIS — M85.80 OSTEOPENIA, UNSPECIFIED LOCATION: ICD-10-CM

## 2023-03-25 DIAGNOSIS — D70.9 NEUTROPENIA, UNSPECIFIED TYPE (HCC): ICD-10-CM

## 2023-03-25 DIAGNOSIS — E78.00 HYPERCHOLESTEROLEMIA: ICD-10-CM

## 2023-03-25 LAB
25(OH)D3 SERPL-MCNC: 24.2 NG/ML (ref 30–100)
ALBUMIN SERPL BCP-MCNC: 3.9 G/DL (ref 3.5–5)
ALP SERPL-CCNC: 75 U/L (ref 34–104)
ALT SERPL W P-5'-P-CCNC: 11 U/L (ref 7–52)
ANION GAP SERPL CALCULATED.3IONS-SCNC: 6 MMOL/L (ref 4–13)
AST SERPL W P-5'-P-CCNC: 16 U/L (ref 13–39)
BASOPHILS # BLD AUTO: 0.01 THOUSANDS/ÂΜL (ref 0–0.1)
BASOPHILS NFR BLD AUTO: 0 % (ref 0–1)
BILIRUB SERPL-MCNC: 0.49 MG/DL (ref 0.2–1)
BUN SERPL-MCNC: 15 MG/DL (ref 5–25)
CALCIUM SERPL-MCNC: 9.5 MG/DL (ref 8.4–10.2)
CHLORIDE SERPL-SCNC: 105 MMOL/L (ref 96–108)
CHOLEST SERPL-MCNC: 239 MG/DL
CO2 SERPL-SCNC: 28 MMOL/L (ref 21–32)
CREAT SERPL-MCNC: 0.92 MG/DL (ref 0.6–1.3)
EOSINOPHIL # BLD AUTO: 0 THOUSAND/ÂΜL (ref 0–0.61)
EOSINOPHIL NFR BLD AUTO: 0 % (ref 0–6)
ERYTHROCYTE [DISTWIDTH] IN BLOOD BY AUTOMATED COUNT: 12.2 % (ref 11.6–15.1)
GFR SERPL CREATININE-BSD FRML MDRD: 63 ML/MIN/1.73SQ M
GLUCOSE P FAST SERPL-MCNC: 89 MG/DL (ref 65–99)
HCT VFR BLD AUTO: 40.5 % (ref 34.8–46.1)
HDLC SERPL-MCNC: 65 MG/DL
HGB BLD-MCNC: 13.9 G/DL (ref 11.5–15.4)
IMM GRANULOCYTES # BLD AUTO: 0 THOUSAND/UL (ref 0–0.2)
IMM GRANULOCYTES NFR BLD AUTO: 0 % (ref 0–2)
LDLC SERPL CALC-MCNC: 161 MG/DL (ref 0–100)
LYMPHOCYTES # BLD AUTO: 1.12 THOUSANDS/ÂΜL (ref 0.6–4.47)
LYMPHOCYTES NFR BLD AUTO: 37 % (ref 14–44)
MCH RBC QN AUTO: 32.2 PG (ref 26.8–34.3)
MCHC RBC AUTO-ENTMCNC: 34.3 G/DL (ref 31.4–37.4)
MCV RBC AUTO: 94 FL (ref 82–98)
MONOCYTES # BLD AUTO: 0.36 THOUSAND/ÂΜL (ref 0.17–1.22)
MONOCYTES NFR BLD AUTO: 12 % (ref 4–12)
NEUTROPHILS # BLD AUTO: 1.58 THOUSANDS/ÂΜL (ref 1.85–7.62)
NEUTS SEG NFR BLD AUTO: 51 % (ref 43–75)
NRBC BLD AUTO-RTO: 0 /100 WBCS
PLATELET # BLD AUTO: 262 THOUSANDS/UL (ref 149–390)
PMV BLD AUTO: 8.7 FL (ref 8.9–12.7)
POTASSIUM SERPL-SCNC: 4.5 MMOL/L (ref 3.5–5.3)
PROT SERPL-MCNC: 6.8 G/DL (ref 6.4–8.4)
RBC # BLD AUTO: 4.32 MILLION/UL (ref 3.81–5.12)
SODIUM SERPL-SCNC: 139 MMOL/L (ref 135–147)
TRIGL SERPL-MCNC: 66 MG/DL
WBC # BLD AUTO: 3.07 THOUSAND/UL (ref 4.31–10.16)

## 2023-03-26 NOTE — RESULT ENCOUNTER NOTE
Please inform patient that her metabolic panel is stable  Her blood counts are stable with chronically low white blood cells  Her cholesterol remains somewhat elevated but is stable with no need for medication changes currently  Her vitamin D is some what low   She should continue taking her Vitamin D supplement daily as well as incorporate regular cardiovascular exercise (>150 minutes/week of moderate intensity), avoiding foods high in saturated fat (e g  cheese, butter, and red meat), and increasing foods high in soluble fiber (oats, beans, etc )

## 2023-06-12 ENCOUNTER — VBI (OUTPATIENT)
Dept: ADMINISTRATIVE | Facility: OTHER | Age: 73
End: 2023-06-12

## 2023-08-21 ENCOUNTER — VBI (OUTPATIENT)
Dept: ADMINISTRATIVE | Facility: OTHER | Age: 73
End: 2023-08-21

## 2023-09-13 ENCOUNTER — RA CDI HCC (OUTPATIENT)
Dept: OTHER | Facility: HOSPITAL | Age: 73
End: 2023-09-13

## 2023-09-13 NOTE — PROGRESS NOTES
[address] [facility] coding opportunities       Chart reviewed, no opportunity found: [address]        Patients Insurance     Medicare Insurance: [facility]

## 2023-09-22 ENCOUNTER — OFFICE VISIT (OUTPATIENT)
Dept: FAMILY MEDICINE CLINIC | Facility: OTHER | Age: 73
End: 2023-09-22
Payer: COMMERCIAL

## 2023-09-22 VITALS
HEIGHT: 61 IN | OXYGEN SATURATION: 93 % | RESPIRATION RATE: 14 BRPM | HEART RATE: 76 BPM | BODY MASS INDEX: 31.72 KG/M2 | DIASTOLIC BLOOD PRESSURE: 68 MMHG | TEMPERATURE: 98.2 F | WEIGHT: 168 LBS | SYSTOLIC BLOOD PRESSURE: 128 MMHG

## 2023-09-22 DIAGNOSIS — F32.5 MAJOR DEPRESSIVE DISORDER IN FULL REMISSION, UNSPECIFIED WHETHER RECURRENT (HCC): ICD-10-CM

## 2023-09-22 DIAGNOSIS — M85.839 OSTEOPENIA OF FOREARM, UNSPECIFIED LATERALITY: Primary | ICD-10-CM

## 2023-09-22 DIAGNOSIS — E55.9 VITAMIN D DEFICIENCY: ICD-10-CM

## 2023-09-22 PROCEDURE — 99213 OFFICE O/P EST LOW 20 MIN: CPT

## 2023-09-22 NOTE — PROGRESS NOTES
Name: Sienna Keyes      : 1950      MRN: [id number]  Encounter Provider: Shayy Muse MD  Encounter Date: 2023   Encounter department: [address]   Patient is a 72-year-old female with past medical history of obesity, dyslipidemia, osteopenia, who presents today for 6 month recheck of osteonpenia and weight. Pt endorses no significant changes in diet or weight, and states that her current exercise is limited by living in a region inhospitable to walking. Pt additionally due for repeat DEXA and mamography screening but deferred at this visit, stating she wished for time to think about these procedures. Information attached to AVS for review by patient, with discussion to occur again at [facility]) in March. Pt had been previously trialed on Alendronate for mgmt of osteopenia but had discontinued it due to reading about side effects. Pt was counseled on importance of Vit D/Calcium supplementation and accepted vit D level re-check to appropriately tailor vitamin D regimen. Pt to return for AWV in March or sooner if needed for problem visit. 1. Osteopenia of forearm, unspecified laterality  Assessment & Plan:  Pt still tenuous about bisphosphonate therapy, requested to resume Vit D/ calcium, with recheck of vit D levels ordered. Pt has upcoming AWV in March, to be discussed further at this appointment     Orders:  -     Vitamin D 25 hydroxy; Future    2. Adult BMI 31.0-31.9 kg/sq m  Assessment & Plan:  Pt advised on lifestyle management strategies such as increasing exercise, incorporating more fresh fruits/vegetables into diet, limiting snacking, limiting processed foods, and keeping food diary. Pt endorses understanding of various strategies and stated she would attempt. Will recheck weight at [facility]) in March       3. Vitamin D deficiency  -     Vitamin D 25 hydroxy; Future    4.  Major depressive disorder in full remission, unspecified whether recurrent [facility]  Assessment & Plan:  PHQ-2/9 Depression Screening    Little interest or pleasure in doing things: 0 - not at all  Feeling down, depressed, or hopeless: 0 - not at all  Trouble falling or staying asleep, or sleeping too much: 0 - not at all  Feeling tired or having little energy: 0 - not at all  Poor appetite or overeatin - not at all  Feeling bad about yourself - or that you are a failure or have let yourself or your family down: 0 - not at all  Trouble concentrating on things, such as reading the newspaper or watching television: 0 - not at all  Moving or speaking so slowly that other people could have noticed. Or the opposite - being so fidgety or restless that you have been moving around a lot more than usual: 0 - not at all  Thoughts that you would be better off dead, or of hurting yourself in some way: 0 - not at all  PHQ-9 Score: 0   PHQ-9 Interpretation: No or Minimal depression        Depression well-controlled on current medication regimen of Risperdal HS, with stable mood findings. Plan to continue this medication            Subjective     Patient is a 81-year-old female presents for follow-up of weight, as well as follow-up of osteopenia. Patient endorses mild weight gain which she attributes to poor exercise. [place] and she does not walk because she does not like walking uphill.         Nutrition Assessment and Intervention:     Reviewed food recall journal    Nutrition patient handout reviewed with patient      Physical Activity Assessment and Intervention:    Physical Activity patient handout reviewed with patient      Emotional and Mental Well-being, Sleep, Connectedness Assessment and Intervention:    Sleep/stress assessment performed    Depression and anxiety screening performed and reviewed    PHQ-9 or GAD7 performed for initial evaluation or follow-up      Tobacco and Toxic Substance Assessment and Intervention:     Tobacco use screening performed    Alcohol and drug use screening performed        Review of Systems   Constitutional: Negative for activity change and fever. HENT: Negative for congestion and trouble swallowing. Eyes: Negative for visual disturbance. Respiratory: Negative for chest tightness, shortness of breath and wheezing. Cardiovascular: Negative for chest pain and palpitations. Gastrointestinal: Negative for abdominal pain, constipation, diarrhea, nausea and vomiting. Genitourinary: Negative for dysuria and urgency. Musculoskeletal: Negative for arthralgias and myalgias. Skin: Negative for pallor and rash. Neurological: Negative for dizziness and weakness. Hematological: Negative for adenopathy. Psychiatric/Behavioral: Negative for agitation.        Past Medical History:   Diagnosis Date   • Anemia     last assessed - [id number]   • Anxiety disorder    • Arthritis    • Asthma    • Back pain    • Bunion, right foot     last assessed - [id number]   • Deformity of toe of left foot     last assessed - 23Oct2017   • Discitis of thoracolumbar region     last assessed - [id number]   • Fall    • Fibrocystic disease of breast    • Fracture of wrist     and hand, left   • GERD (gastroesophageal reflux disease)    • Head injury    • Hypercholesterolemia    • Hypokalemia 3/27/2021   • Leukopenia     last assessed - [id number]   • Osteopenia     last assessed - [id number]   • Polyarthritis    • Prediabetes    • Seasonal allergies    • Shortness of breath    • Sleep disturbance    • Subclinical hypothyroidism    • Toe deformity     last assessed - 08Apr2015   • Trochanteric bursitis of left hip     last assessed - [id number]   • Vitamin D deficiency     last assessed - [id number]   • Vitiligo     last assessed - [id number]   • Wears eyeglasses      Past Surgical History:   Procedure Laterality Date   • BACK SURGERY      4 back surgeries, fusion, bone replacement   • BACK SURGERY  03/06/2012    Lumbar PLIF surgery   • CARPAL TUNNEL RELEASE Bilateral    •  SECTION  1980   • CHOLECYSTECTOMY     • HAND TENDON SURGERY Bilateral    • HERNIA REPAIR     • NECK SURGERY  2012    ACDF Surgery   • IN COLONOSCOPY FLX DX W/COLLJ SPEC WHEN PFRMD N/A 2019    Procedure: COLONOSCOPY;  Surgeon: Yoel Francisco MD;  Location: [facility]; Service: Gastroenterology   • IN ESOPHAGOGASTRODUODENOSCOPY TRANSORAL DIAGNOSTIC N/A 2019    Procedure: ESOPHAGOGASTRODUODENOSCOPY (EGD); Surgeon: Yoel Francisco MD;  Location: [facility];   Service: Gastroenterology   • SPINAL CORD STIMULATOR IMPLANT N/A 2016    Procedure: PLACEMENT OF THORACIC PARASPINAL PERIPHERAL NERVE STIMULATING ELECTODES WITH LEFT BUTTOCK GENERATOR;  Surgeon: Isrrael Cohen MD;  Location: [facility] OR;  Service:    • ULNAR NERVE REPAIR Bilateral      Family History   Problem Relation Age of Onset   • Dementia Mother    • Emphysema Father         lung   • Lung cancer Father    • Other Paternal Grandfather         gangrene   • Hypertension Family    • Other Family         back trouble   • No Known Problems Sister    • No Known Problems Daughter    • No Known Problems Maternal Grandmother    • No Known Problems Maternal Grandfather    • No Known Problems Paternal Grandmother    • No Known Problems Maternal Aunt    • No Known Problems Maternal Aunt    • No Known Problems Maternal Aunt    • No Known Problems Maternal Aunt    • No Known Problems Paternal Aunt    • No Known Problems Paternal Aunt    • No Known Problems Paternal Aunt    • No Known Problems Paternal Aunt      Social History     Socioeconomic History   • Marital status: /Civil Union     Spouse name: None   • Number of children: 2   • Years of education: HS or GED   • Highest education level: None   Occupational History   • None   Tobacco Use   • Smoking status: Never   • Smokeless tobacco: Never   Vaping Use   • Vaping Use: Never used   Substance and Sexual Activity   • Alcohol use: Not Currently     Comment: ocassional   • Drug use: No   • Sexual activity: Not Currently     Partners: Male     Comment: No known STD risk factors; Denied currently sexually active   Other Topics Concern   • None   Social History Narrative    No known risk factors    Physical Disability     Social Determinants of Health     Financial Resource Strain: Low Risk  (3/20/2023)    Overall Financial Resource Strain (CARDIA)    • Difficulty of Paying Living Expenses: Not hard at all   Food Insecurity: Not on file   Transportation Needs: No Transportation Needs (3/20/2023)    PRAPARE - Transportation    • Lack of Transportation (Medical): No    • Lack of Transportation (Non-Medical): No   Physical Activity: Not on file   Stress: Not on file   Social Connections: Not on file   Intimate Partner Violence: Not on file   Housing Stability: Not on file     Current Outpatient Medications on File Prior to Visit   Medication Sig   • Calcium Carbonate (CALCIUM 600 HIGH POTENCY PO) Take 1 tablet by mouth daily   • Cholecalciferol (Vitamin D) 125 MCG (5000 UT) CAPS Take 1 capsule by mouth daily   • cyanocobalamin (VITAMIN B-12) 1,000 mcg tablet Take 1 tablet (1,000 mcg total) by mouth daily   • risperiDONE (RisperDAL) 2 mg tablet TAKE 1 TABLET BY MOUTH AT NIGHT FOR 30 DAYS     Allergies   Allergen Reactions   • Influenza A (H1n1) Monoval Vac Anaphylaxis   • Lyrica [Pregabalin] Swelling   • Other Anaphylaxis     FLU SHOT   • Acetaminophen Other (See Comments)     GI Upset, "it upps my triglycerides"   • Percocet [Oxycodone-Acetaminophen] GI Intolerance     Immunization History   Administered Date(s) Administered   • Tdap 06/04/2014   • Tuberculin Skin Test-PPD Intradermal 03/01/2021, 03/15/2022       Objective     /68   Pulse 76   Temp 98.2 °F (36.8 °C)   Resp 14   Ht 5' 1" (1.549 m)   Wt 76.2 kg (168 lb)   SpO2 93%   BMI 31.74 kg/m²     Physical Exam  Vitals reviewed.    Constitutional:       General: She is not in acute distress. Appearance: She is well-developed. She is not diaphoretic. HENT:      Head: Normocephalic and atraumatic. Right Ear: External ear normal.      Left Ear: External ear normal.      Nose: Nose normal.      Mouth/Throat:      Mouth: Mucous membranes are moist.      Pharynx: Oropharynx is clear. Comments: Poor dentition   Eyes:      Extraocular Movements: Extraocular movements intact. Conjunctiva/sclera: Conjunctivae normal.      Pupils: Pupils are equal, round, and reactive to light. Cardiovascular:      Rate and Rhythm: Normal rate and regular rhythm. Heart sounds: Normal heart sounds. No murmur heard. No friction rub. No gallop. Pulmonary:      Effort: Pulmonary effort is normal. No respiratory distress. Breath sounds: Normal breath sounds. No stridor. No wheezing, rhonchi or rales. Abdominal:      General: Bowel sounds are normal. There is no distension. Palpations: Abdomen is soft. Tenderness: There is no abdominal tenderness. There is no right CVA tenderness, left CVA tenderness or guarding. Musculoskeletal:         General: Normal range of motion. Cervical back: Normal range of motion and neck supple. Right lower leg: No edema. Left lower leg: No edema. Lymphadenopathy:      Cervical: No cervical adenopathy. Skin:     General: Skin is warm and dry. Neurological:      General: No focal deficit present. Mental Status: She is alert and oriented to person, place, and time.    Psychiatric:         Mood and Affect: Mood normal.         Behavior: Behavior normal.       David Lloyd MD

## 2023-10-05 ENCOUNTER — APPOINTMENT (OUTPATIENT)
Dept: LAB | Facility: CLINIC | Age: 73
End: 2023-10-05
Payer: COMMERCIAL

## 2023-10-05 DIAGNOSIS — E55.9 VITAMIN D DEFICIENCY: ICD-10-CM

## 2023-10-05 DIAGNOSIS — M85.839 OSTEOPENIA OF FOREARM, UNSPECIFIED LATERALITY: ICD-10-CM

## 2023-10-05 LAB — 25(OH)D3 SERPL-MCNC: 36 NG/ML (ref 30–100)

## 2023-10-05 PROCEDURE — 82306 VITAMIN D 25 HYDROXY: CPT

## 2023-10-05 PROCEDURE — 36415 COLL VENOUS BLD VENIPUNCTURE: CPT

## 2023-10-10 NOTE — ASSESSMENT & PLAN NOTE
Pt advised on lifestyle management strategies such as increasing exercise, incorporating more fresh fruits/vegetables into diet, limiting snacking, limiting processed foods, and keeping food diary. Pt endorses understanding of various strategies and stated she would attempt.  Will recheck weight at [facility] in March

## 2023-10-10 NOTE — ASSESSMENT & PLAN NOTE
Pt still tenuous about bisphosphonate therapy, requested to resume Vit D/ calcium, with recheck of vit D levels ordered.  Pt has upcoming AWV in March, to be discussed further at this appointment

## 2023-10-10 NOTE — ASSESSMENT & PLAN NOTE
PHQ-2/9 Depression Screening    Little interest or pleasure in doing things: 0 - not at all  Feeling down, depressed, or hopeless: 0 - not at all  Trouble falling or staying asleep, or sleeping too much: 0 - not at all  Feeling tired or having little energy: 0 - not at all  Poor appetite or overeatin - not at all  Feeling bad about yourself - or that you are a failure or have let yourself or your family down: 0 - not at all  Trouble concentrating on things, such as reading the newspaper or watching television: 0 - not at all  Moving or speaking so slowly that other people could have noticed. Or the opposite - being so fidgety or restless that you have been moving around a lot more than usual: 0 - not at all  Thoughts that you would be better off dead, or of hurting yourself in some way: 0 - not at all  PHQ-9 Score: 0   PHQ-9 Interpretation: No or Minimal depression        Depression well-controlled on current medication regimen of Risperdal HS, with stable mood findings.  Plan to continue this medication

## 2023-12-14 ENCOUNTER — OFFICE VISIT (OUTPATIENT)
Dept: FAMILY MEDICINE CLINIC | Facility: OTHER | Age: 73
End: 2023-12-14
Payer: COMMERCIAL

## 2023-12-14 VITALS
DIASTOLIC BLOOD PRESSURE: 70 MMHG | RESPIRATION RATE: 16 BRPM | HEART RATE: 99 BPM | SYSTOLIC BLOOD PRESSURE: 139 MMHG | TEMPERATURE: 98.5 F | BODY MASS INDEX: 32.61 KG/M2 | WEIGHT: 172.6 LBS

## 2023-12-14 DIAGNOSIS — R07.81 RIB PAIN ON LEFT SIDE: Primary | ICD-10-CM

## 2023-12-14 PROCEDURE — 99213 OFFICE O/P EST LOW 20 MIN: CPT

## 2023-12-14 RX ORDER — LIDOCAINE 4 G/G
PATCH TOPICAL
Qty: 1 PATCH | Refills: 0 | Status: SHIPPED | OUTPATIENT
Start: 2023-12-14

## 2023-12-14 NOTE — PROGRESS NOTES
Assessment/Plan:    Rib pain on left side  Intermittent rib pain on left side underneath breast for the past 4 to 5 months that has worsened in the last 2 to 3 weeks, now continuous. Patient has only tried warm compresses that initially provided relief but now less so. Plan:  XR left rib to r/o stress fracture due to history of osteopenia not on bisphosphonate  Conservative management with lidocaine patch and over-the-counter topical analgesics  Follow-up 3 weeks       Diagnoses and all orders for this visit:    Rib pain on left side  -     XR ribs left w pa chest min 3 views; Future  -     Lidocaine (HM Lidocaine Patch) 4 % PTCH; Apply one patch to the affected area every 24 hours. Subjective:      Patient ID: Dain Nobles is a 72 y.o. female. Patient presents with chief complaint of left-sided pain around her rib for the past 4 to 5 months. She comes to the office today due to change in frequency of pain that is occurred over the last 2 to 3 weeks. Whereas it was initially intermittently 7 out of 10 achy pain it is now continuous, "a constant ache."  Initially she tried a heating pad with moderate relief but no longer provides relief. Tenderness with palpation and certain movements including lying down on the area. Patient denies any recent injuries or accidents or trauma to the area in the last 6 months. Patient has not been having any difficulty with bowel movements and is regular bowel movements daily. Patient has had past medical history of hernia repair approximately 10 years ago and wonders if this is what that is. Due to having Tylenol allergy, patient has not tried any oral analgesics for any other pain remedies.         The following portions of the patient's history were reviewed and updated as appropriate: allergies, current medications, past family history, past medical history, past social history, past surgical history, and problem list.    Review of Systems   Respiratory: Negative for cough. Cardiovascular:  Negative for chest pain. Gastrointestinal:  Positive for abdominal pain. Negative for constipation, diarrhea, nausea and vomiting. Musculoskeletal:  Negative for back pain and myalgias. Skin:  Negative for rash and wound. Objective:      /70   Pulse 99   Temp 98.5 °F (36.9 °C)   Resp 16   Wt 78.3 kg (172 lb 9.6 oz)   BMI 32.61 kg/m²          Physical Exam  Vitals and nursing note reviewed. Constitutional:       General: She is not in acute distress. Appearance: Normal appearance. Cardiovascular:      Rate and Rhythm: Normal rate and regular rhythm. Pulses: Normal pulses. Heart sounds: Normal heart sounds. Pulmonary:      Effort: Pulmonary effort is normal.      Breath sounds: Normal breath sounds. Abdominal:      General: Abdomen is flat. There is no distension. Palpations: Abdomen is soft. There is no mass. Tenderness: There is abdominal tenderness. There is no right CVA tenderness, left CVA tenderness, guarding or rebound. Hernia: No hernia is present. Comments: Point tenderness mostly painful along highlighted area right underneath the left breast. Popping noise elicited while pressing near ribs. Neurological:      Mental Status: She is alert and oriented to person, place, and time.    Psychiatric:         Mood and Affect: Mood normal.         Behavior: Behavior normal.             Tobacco and Toxic Substance Assessment and Intervention:     Tobacco use screening performed

## 2023-12-14 NOTE — PATIENT INSTRUCTIONS
Try using Bengay, voltaren gel, icyhot or all of it on the area to see if there's improvement in pain. Going to prescribe you lidocaine patch to also use on the area. Would be careful using naproxen/NSAIDs due to your kidneys. We will get XR of your left ribs to make sure there aren't any fractures. Please follow-up with us in 3 weeks.

## 2023-12-15 ENCOUNTER — HOSPITAL ENCOUNTER (OUTPATIENT)
Dept: RADIOLOGY | Facility: HOSPITAL | Age: 73
Discharge: HOME | End: 2023-12-15
Payer: COMMERCIAL

## 2023-12-15 DIAGNOSIS — R07.81 RIB PAIN ON LEFT SIDE: ICD-10-CM

## 2023-12-15 PROCEDURE — 71101 X-RAY EXAM UNILAT RIBS/CHEST: CPT

## 2023-12-15 NOTE — ASSESSMENT & PLAN NOTE
Intermittent rib pain on left side underneath breast for the past 4 to 5 months that has worsened in the last 2 to 3 weeks, now continuous. Patient has only tried warm compresses that initially provided relief but now less so.     Plan:  XR left rib to r/o stress fracture due to history of osteopenia not on bisphosphonate  Conservative management with lidocaine patch and over-the-counter topical analgesics  Follow-up 3 weeks

## 2023-12-15 NOTE — RESULT ENCOUNTER NOTE
Pat's xray shows no rib fractures or other acute findings. Please call Iban Lopez and let her know of the xray results - MyChart not setup. Thanks!

## 2024-02-21 ENCOUNTER — TELEPHONE (OUTPATIENT)
Dept: NEUROSURGERY | Facility: CLINIC | Age: 74
End: 2024-02-21

## 2024-02-21 PROBLEM — Z13.820 OSTEOPOROSIS SCREENING: Status: RESOLVED | Noted: 2018-11-08 | Resolved: 2024-02-21

## 2024-02-21 PROBLEM — R19.7 ACUTE DIARRHEA: Status: RESOLVED | Noted: 2019-05-26 | Resolved: 2024-02-21

## 2024-02-21 NOTE — TELEPHONE ENCOUNTER
PATIENT CALLED BECAUSE THEIR IS SOMETHING WRONG WITH HER SCS. ASKED IF SHE CALLED THE REP FOR THE COMPANY. SHE SAID SHE TRIED BUT THE NUMBER IS DISCONNECTED. ALTHOUGH SHE WAS NOT SURE OF WHO THE COMPANY IS.     ONCE REVIEWING CHART. PATIENT HAS A ST.KERLINE. TOLD HER I WILL HAVE TO CALL HER BACK ONCE I OBTAIN THE REPS TEL NUMBER

## 2024-04-12 ENCOUNTER — TELEPHONE (OUTPATIENT)
Age: 74
End: 2024-04-12

## 2024-04-12 ENCOUNTER — OFFICE VISIT (OUTPATIENT)
Dept: FAMILY MEDICINE CLINIC | Facility: OTHER | Age: 74
End: 2024-04-12

## 2024-04-12 VITALS
HEART RATE: 90 BPM | HEIGHT: 61 IN | DIASTOLIC BLOOD PRESSURE: 70 MMHG | WEIGHT: 176 LBS | OXYGEN SATURATION: 98 % | SYSTOLIC BLOOD PRESSURE: 142 MMHG | BODY MASS INDEX: 33.23 KG/M2 | TEMPERATURE: 98 F

## 2024-04-12 DIAGNOSIS — E78.00 HYPERCHOLESTEROLEMIA: ICD-10-CM

## 2024-04-12 DIAGNOSIS — R79.0 ABNORMAL LEVEL OF BLOOD MINERAL: ICD-10-CM

## 2024-04-12 DIAGNOSIS — N18.2 CKD (CHRONIC KIDNEY DISEASE) STAGE 2, GFR 60-89 ML/MIN: ICD-10-CM

## 2024-04-12 DIAGNOSIS — R03.0 ELEVATED BLOOD PRESSURE READING: ICD-10-CM

## 2024-04-12 DIAGNOSIS — Z12.11 SCREENING FOR COLON CANCER: ICD-10-CM

## 2024-04-12 DIAGNOSIS — K59.00 CONSTIPATION, UNSPECIFIED CONSTIPATION TYPE: ICD-10-CM

## 2024-04-12 DIAGNOSIS — E87.1 HYPONATREMIA: ICD-10-CM

## 2024-04-12 DIAGNOSIS — Z00.00 ENCOUNTER FOR SUBSEQUENT ANNUAL WELLNESS VISIT (AWV) IN MEDICARE PATIENT: Primary | ICD-10-CM

## 2024-04-12 DIAGNOSIS — E03.8 SUBCLINICAL HYPOTHYROIDISM: ICD-10-CM

## 2024-04-12 RX ORDER — TRAZODONE HYDROCHLORIDE 50 MG/1
50 TABLET ORAL DAILY
COMMUNITY
Start: 2024-03-25

## 2024-04-12 NOTE — PATIENT INSTRUCTIONS
Medicare Preventive Visit Patient Instructions  Thank you for completing your Welcome to Medicare Visit or Medicare Annual Wellness Visit today. Your next wellness visit will be due in one year (4/13/2025).  The screening/preventive services that you may require over the next 5-10 years are detailed below. Some tests may not apply to you based off risk factors and/or age. Screening tests ordered at today's visit but not completed yet may show as past due. Also, please note that scanned in results may not display below.  Preventive Screenings:  Service Recommendations Previous Testing/Comments   Colorectal Cancer Screening  * Colonoscopy    * Fecal Occult Blood Test (FOBT)/Fecal Immunochemical Test (FIT)  * Fecal DNA/Cologuard Test  * Flexible Sigmoidoscopy Age: 45-75 years old   Colonoscopy: every 10 years (may be performed more frequently if at higher risk)  OR  FOBT/FIT: every 1 year  OR  Cologuard: every 3 years  OR  Sigmoidoscopy: every 5 years  Screening may be recommended earlier than age 45 if at higher risk for colorectal cancer. Also, an individualized decision between you and your healthcare provider will decide whether screening between the ages of 76-85 would be appropriate. Colonoscopy: 01/31/2019  FOBT/FIT: Not on file  Cologuard: Not on file  Sigmoidoscopy: Not on file    Screening Current  Screening Current     Breast Cancer Screening Age: 40+ years old  Frequency: every 1-2 years  Not required if history of left and right mastectomy Mammogram: 06/10/2021        Cervical Cancer Screening Between the ages of 21-29, pap smear recommended once every 3 years.   Between the ages of 30-65, can perform pap smear with HPV co-testing every 5 years.   Recommendations may differ for women with a history of total hysterectomy, cervical cancer, or abnormal pap smears in past. Pap Smear: Not on file    Screening Not Indicated  Screening Not Indicated   Hepatitis C Screening Once for adults born between 1945 and  1965  More frequently in patients at high risk for Hepatitis C Hep C Antibody: 12/04/2018    Screening Current  Screening Current   Diabetes Screening 1-2 times per year if you're at risk for diabetes or have pre-diabetes Fasting glucose: 89 mg/dL (3/25/2023)  A1C: 5.4 % (2/28/2020)      Cholesterol Screening Once every 5 years if you don't have a lipid disorder. May order more often based on risk factors. Lipid panel: 03/25/2023    Screening Not Indicated  History Lipid Disorder  Screening Not Indicated  History Lipid Disorder     Other Preventive Screenings Covered by Medicare:  Abdominal Aortic Aneurysm (AAA) Screening: covered once if your at risk. You're considered to be at risk if you have a family history of AAA.  Lung Cancer Screening: covers low dose CT scan once per year if you meet all of the following conditions: (1) Age 55-77; (2) No signs or symptoms of lung cancer; (3) Current smoker or have quit smoking within the last 15 years; (4) You have a tobacco smoking history of at least 20 pack years (packs per day multiplied by number of years you smoked); (5) You get a written order from a healthcare provider.  Glaucoma Screening: covered annually if you're considered high risk: (1) You have diabetes OR (2) Family history of glaucoma OR (3)  aged 50 and older OR (4)  American aged 65 and older  Osteoporosis Screening: covered every 2 years if you meet one of the following conditions: (1) You're estrogen deficient and at risk for osteoporosis based off medical history and other findings; (2) Have a vertebral abnormality; (3) On glucocorticoid therapy for more than 3 months; (4) Have primary hyperparathyroidism; (5) On osteoporosis medications and need to assess response to drug therapy.   Last bone density test (DXA Scan): 06/10/2021.  HIV Screening: covered annually if you're between the age of 15-65. Also covered annually if you are younger than 15 and older than 65 with risk  factors for HIV infection. For pregnant patients, it is covered up to 3 times per pregnancy.    Immunizations:  Immunization Recommendations   Influenza Vaccine Annual influenza vaccination during flu season is recommended for all persons aged >= 6 months who do not have contraindications   Pneumococcal Vaccine   * Pneumococcal conjugate vaccine = PCV13 (Prevnar 13), PCV15 (Vaxneuvance), PCV20 (Prevnar 20)  * Pneumococcal polysaccharide vaccine = PPSV23 (Pneumovax) Adults 19-63 yo with certain risk factors or if 65+ yo  If never received any pneumonia vaccine: recommend Prevnar 20 (PCV20)  Give PCV20 if previously received 1 dose of PCV13 or PPSV23   Hepatitis B Vaccine 3 dose series if at intermediate or high risk (ex: diabetes, end stage renal disease, liver disease)   Respiratory syncytial virus (RSV) Vaccine - COVERED BY MEDICARE PART D  * RSVPreF3 (Arexvy) CDC recommends that adults 60 years of age and older may receive a single dose of RSV vaccine using shared clinical decision-making (SCDM)   Tetanus (Td) Vaccine - COST NOT COVERED BY MEDICARE PART B Following completion of primary series, a booster dose should be given every 10 years to maintain immunity against tetanus. Td may also be given as tetanus wound prophylaxis.   Tdap Vaccine - COST NOT COVERED BY MEDICARE PART B Recommended at least once for all adults. For pregnant patients, recommended with each pregnancy.   Shingles Vaccine (Shingrix) - COST NOT COVERED BY MEDICARE PART B  2 shot series recommended in those 19 years and older who have or will have weakened immune systems or those 50 years and older     Health Maintenance Due:      Topic Date Due   • Breast Cancer Screening: Mammogram  06/10/2023   • DXA SCAN  06/10/2026   • Colorectal Cancer Screening  01/31/2029   • Hepatitis C Screening  Completed     Immunizations Due:      Topic Date Due   • Pneumococcal Vaccine: 65+ Years (1 of 2 - PCV) Never done   • COVID-19 Vaccine (1 - 2023-24 season)  Never done     Advance Directives   What are advance directives?  Advance directives are legal documents that state your wishes and plans for medical care. These plans are made ahead of time in case you lose your ability to make decisions for yourself. Advance directives can apply to any medical decision, such as the treatments you want, and if you want to donate organs.   What are the types of advance directives?  There are many types of advance directives, and each state has rules about how to use them. You may choose a combination of any of the following:  Living will:  This is a written record of the treatment you want. You can also choose which treatments you do not want, which to limit, and which to stop at a certain time. This includes surgery, medicine, IV fluid, and tube feedings.   Durable power of  for healthcare (DPAHC):  This is a written record that states who you want to make healthcare choices for you when you are unable to make them for yourself. This person, called a proxy, is usually a family member or a friend. You may choose more than 1 proxy.  Do not resuscitate (DNR) order:  A DNR order is used in case your heart stops beating or you stop breathing. It is a request not to have certain forms of treatment, such as CPR. A DNR order may be included in other types of advance directives.  Medical directive:  This covers the care that you want if you are in a coma, near death, or unable to make decisions for yourself. You can list the treatments you want for each condition. Treatment may include pain medicine, surgery, blood transfusions, dialysis, IV or tube feedings, and a ventilator (breathing machine).  Values history:  This document has questions about your views, beliefs, and how you feel and think about life. This information can help others choose the care that you would choose.  Why are advance directives important?  An advance directive helps you control your care. Although spoken  wishes may be used, it is better to have your wishes written down. Spoken wishes can be misunderstood, or not followed. Treatments may be given even if you do not want them. An advance directive may make it easier for your family to make difficult choices about your care.   Weight Management   Why it is important to manage your weight:  Being overweight increases your risk of health conditions such as heart disease, high blood pressure, type 2 diabetes, and certain types of cancer. It can also increase your risk for osteoarthritis, sleep apnea, and other respiratory problems. Aim for a slow, steady weight loss. Even a small amount of weight loss can lower your risk of health problems.  How to lose weight safely:  A safe and healthy way to lose weight is to eat fewer calories and get regular exercise. You can lose up about 1 pound a week by decreasing the number of calories you eat by 500 calories each day.   Healthy meal plan for weight management:  A healthy meal plan includes a variety of foods, contains fewer calories, and helps you stay healthy. A healthy meal plan includes the following:  Eat whole-grain foods more often.  A healthy meal plan should contain fiber. Fiber is the part of grains, fruits, and vegetables that is not broken down by your body. Whole-grain foods are healthy and provide extra fiber in your diet. Some examples of whole-grain foods are whole-wheat breads and pastas, oatmeal, brown rice, and bulgur.  Eat a variety of vegetables every day.  Include dark, leafy greens such as spinach, kale, sinai greens, and mustard greens. Eat yellow and orange vegetables such as carrots, sweet potatoes, and winter squash.   Eat a variety of fruits every day.  Choose fresh or canned fruit (canned in its own juice or light syrup) instead of juice. Fruit juice has very little or no fiber.  Eat low-fat dairy foods.  Drink fat-free (skim) milk or 1% milk. Eat fat-free yogurt and low-fat cottage cheese. Try  low-fat cheeses such as mozzarella and other reduced-fat cheeses.  Choose meat and other protein foods that are low in fat.  Choose beans or other legumes such as split peas or lentils. Choose fish, skinless poultry (chicken or turkey), or lean cuts of red meat (beef or pork). Before you cook meat or poultry, cut off any visible fat.   Use less fat and oil.  Try baking foods instead of frying them. Add less fat, such as margarine, sour cream, regular salad dressing and mayonnaise to foods. Eat fewer high-fat foods. Some examples of high-fat foods include french fries, doughnuts, ice cream, and cakes.  Eat fewer sweets.  Limit foods and drinks that are high in sugar. This includes candy, cookies, regular soda, and sweetened drinks.  Exercise:  Exercise at least 30 minutes per day on most days of the week. Some examples of exercise include walking, biking, dancing, and swimming. You can also fit in more physical activity by taking the stairs instead of the elevator or parking farther away from stores. Ask your healthcare provider about the best exercise plan for you.      © Copyright [employer] 2018 Information is for End User's use only and may not be sold, redistributed or otherwise used for commercial purposes. All illustrations and images included in CareNotes® are the copyrighted property of [employer]M., Inc[employer] or [employer]      Medicare Preventive Visit Patient Instructions  Thank you for completing your Welcome to Medicare Visit or Medicare Annual Wellness Visit today. Your next wellness visit will be due in one year (4/13/2025).  The screening/preventive services that you may require over the next 5-10 years are detailed below. Some tests may not apply to you based off risk factors and/or age. Screening tests ordered at today's visit but not completed yet may show as past due. Also, please note that scanned in results may not display below.  Preventive Screenings:  Service Recommendations Previous  Testing/Comments   Colorectal Cancer Screening  * Colonoscopy    * Fecal Occult Blood Test (FOBT)/Fecal Immunochemical Test (FIT)  * Fecal DNA/Cologuard Test  * Flexible Sigmoidoscopy Age: 45-75 years old   Colonoscopy: every 10 years (may be performed more frequently if at higher risk)  OR  FOBT/FIT: every 1 year  OR  Cologuard: every 3 years  OR  Sigmoidoscopy: every 5 years  Screening may be recommended earlier than age 45 if at higher risk for colorectal cancer. Also, an individualized decision between you and your healthcare provider will decide whether screening between the ages of 76-85 would be appropriate. Colonoscopy: 01/31/2019  FOBT/FIT: Not on file  Cologuard: Not on file  Sigmoidoscopy: Not on file    Screening Current  Screening Current     Breast Cancer Screening Age: 40+ years old  Frequency: every 1-2 years  Not required if history of left and right mastectomy Mammogram: 06/10/2021        Cervical Cancer Screening Between the ages of 21-29, pap smear recommended once every 3 years.   Between the ages of 30-65, can perform pap smear with HPV co-testing every 5 years.   Recommendations may differ for women with a history of total hysterectomy, cervical cancer, or abnormal pap smears in past. Pap Smear: Not on file    Screening Not Indicated  Screening Not Indicated   Hepatitis C Screening Once for adults born between 1945 and 1965  More frequently in patients at high risk for Hepatitis C Hep C Antibody: 12/04/2018    Screening Current  Screening Current   Diabetes Screening 1-2 times per year if you're at risk for diabetes or have pre-diabetes Fasting glucose: 89 mg/dL (3/25/2023)  A1C: 5.4 % (2/28/2020)      Cholesterol Screening Once every 5 years if you don't have a lipid disorder. May order more often based on risk factors. Lipid panel: 03/25/2023    Screening Not Indicated  History Lipid Disorder  Screening Not Indicated  History Lipid Disorder     Other Preventive Screenings Covered by  Medicare:  Abdominal Aortic Aneurysm (AAA) Screening: covered once if your at risk. You're considered to be at risk if you have a family history of AAA.  Lung Cancer Screening: covers low dose CT scan once per year if you meet all of the following conditions: (1) Age 55-77; (2) No signs or symptoms of lung cancer; (3) Current smoker or have quit smoking within the last 15 years; (4) You have a tobacco smoking history of at least 20 pack years (packs per day multiplied by number of years you smoked); (5) You get a written order from a healthcare provider.  Glaucoma Screening: covered annually if you're considered high risk: (1) You have diabetes OR (2) Family history of glaucoma OR (3)  aged 50 and older OR (4)  American aged 65 and older  Osteoporosis Screening: covered every 2 years if you meet one of the following conditions: (1) You're estrogen deficient and at risk for osteoporosis based off medical history and other findings; (2) Have a vertebral abnormality; (3) On glucocorticoid therapy for more than 3 months; (4) Have primary hyperparathyroidism; (5) On osteoporosis medications and need to assess response to drug therapy.   Last bone density test (DXA Scan): 06/10/2021.  HIV Screening: covered annually if you're between the age of 15-65. Also covered annually if you are younger than 15 and older than 65 with risk factors for HIV infection. For pregnant patients, it is covered up to 3 times per pregnancy.    Immunizations:  Immunization Recommendations   Influenza Vaccine Annual influenza vaccination during flu season is recommended for all persons aged >= 6 months who do not have contraindications   Pneumococcal Vaccine   * Pneumococcal conjugate vaccine = PCV13 (Prevnar 13), PCV15 (Vaxneuvance), PCV20 (Prevnar 20)  * Pneumococcal polysaccharide vaccine = PPSV23 (Pneumovax) Adults 19-63 yo with certain risk factors or if 65+ yo  If never received any pneumonia vaccine: recommend  Prevnar 20 (PCV20)  Give PCV20 if previously received 1 dose of PCV13 or PPSV23   Hepatitis B Vaccine 3 dose series if at intermediate or high risk (ex: diabetes, end stage renal disease, liver disease)   Respiratory syncytial virus (RSV) Vaccine - COVERED BY MEDICARE PART D  * RSVPreF3 (Arexvy) CDC recommends that adults 60 years of age and older may receive a single dose of RSV vaccine using shared clinical decision-making (SCDM)   Tetanus (Td) Vaccine - COST NOT COVERED BY MEDICARE PART B Following completion of primary series, a booster dose should be given every 10 years to maintain immunity against tetanus. Td may also be given as tetanus wound prophylaxis.   Tdap Vaccine - COST NOT COVERED BY MEDICARE PART B Recommended at least once for all adults. For pregnant patients, recommended with each pregnancy.   Shingles Vaccine (Shingrix) - COST NOT COVERED BY MEDICARE PART B  2 shot series recommended in those 19 years and older who have or will have weakened immune systems or those 50 years and older     Health Maintenance Due:      Topic Date Due   • Breast Cancer Screening: Mammogram  06/10/2023   • DXA SCAN  06/10/2026   • Colorectal Cancer Screening  01/31/2029   • Hepatitis C Screening  Completed     Immunizations Due:      Topic Date Due   • Pneumococcal Vaccine: 65+ Years (1 of 2 - PCV) Never done   • COVID-19 Vaccine (1 - 2023-24 season) Never done     Advance Directives   What are advance directives?  Advance directives are legal documents that state your wishes and plans for medical care. These plans are made ahead of time in case you lose your ability to make decisions for yourself. Advance directives can apply to any medical decision, such as the treatments you want, and if you want to donate organs.   What are the types of advance directives?  There are many types of advance directives, and each state has rules about how to use them. You may choose a combination of any of the following:  Living  will:  This is a written record of the treatment you want. You can also choose which treatments you do not want, which to limit, and which to stop at a certain time. This includes surgery, medicine, IV fluid, and tube feedings.   Durable power of  for healthcare (DPAHC):  This is a written record that states who you want to make healthcare choices for you when you are unable to make them for yourself. This person, called a proxy, is usually a family member or a friend. You may choose more than 1 proxy.  Do not resuscitate (DNR) order:  A DNR order is used in case your heart stops beating or you stop breathing. It is a request not to have certain forms of treatment, such as CPR. A DNR order may be included in other types of advance directives.  Medical directive:  This covers the care that you want if you are in a coma, near death, or unable to make decisions for yourself. You can list the treatments you want for each condition. Treatment may include pain medicine, surgery, blood transfusions, dialysis, IV or tube feedings, and a ventilator (breathing machine).  Values history:  This document has questions about your views, beliefs, and how you feel and think about life. This information can help others choose the care that you would choose.  Why are advance directives important?  An advance directive helps you control your care. Although spoken wishes may be used, it is better to have your wishes written down. Spoken wishes can be misunderstood, or not followed. Treatments may be given even if you do not want them. An advance directive may make it easier for your family to make difficult choices about your care.   Weight Management   Why it is important to manage your weight:  Being overweight increases your risk of health conditions such as heart disease, high blood pressure, type 2 diabetes, and certain types of cancer. It can also increase your risk for osteoarthritis, sleep apnea, and other respiratory  problems. Aim for a slow, steady weight loss. Even a small amount of weight loss can lower your risk of health problems.  How to lose weight safely:  A safe and healthy way to lose weight is to eat fewer calories and get regular exercise. You can lose up about 1 pound a week by decreasing the number of calories you eat by 500 calories each day.   Healthy meal plan for weight management:  A healthy meal plan includes a variety of foods, contains fewer calories, and helps you stay healthy. A healthy meal plan includes the following:  Eat whole-grain foods more often.  A healthy meal plan should contain fiber. Fiber is the part of grains, fruits, and vegetables that is not broken down by your body. Whole-grain foods are healthy and provide extra fiber in your diet. Some examples of whole-grain foods are whole-wheat breads and pastas, oatmeal, brown rice, and bulgur.  Eat a variety of vegetables every day.  Include dark, leafy greens such as spinach, kale, sinai greens, and mustard greens. Eat yellow and orange vegetables such as carrots, sweet potatoes, and winter squash.   Eat a variety of fruits every day.  Choose fresh or canned fruit (canned in its own juice or light syrup) instead of juice. Fruit juice has very little or no fiber.  Eat low-fat dairy foods.  Drink fat-free (skim) milk or 1% milk. Eat fat-free yogurt and low-fat cottage cheese. Try low-fat cheeses such as mozzarella and other reduced-fat cheeses.  Choose meat and other protein foods that are low in fat.  Choose beans or other legumes such as split peas or lentils. Choose fish, skinless poultry (chicken or turkey), or lean cuts of red meat (beef or pork). Before you cook meat or poultry, cut off any visible fat.   Use less fat and oil.  Try baking foods instead of frying them. Add less fat, such as margarine, sour cream, regular salad dressing and mayonnaise to foods. Eat fewer high-fat foods. Some examples of high-fat foods include french fries,  doughnuts, ice cream, and cakes.  Eat fewer sweets.  Limit foods and drinks that are high in sugar. This includes candy, cookies, regular soda, and sweetened drinks.  Exercise:  Exercise at least 30 minutes per day on most days of the week. Some examples of exercise include walking, biking, dancing, and swimming. You can also fit in more physical activity by taking the stairs instead of the elevator or parking farther away from stores. Ask your healthcare provider about the best exercise plan for you.      © Copyright [employer] 2018 Information is for End User's use only and may not be sold, redistributed or otherwise used for commercial purposes. All illustrations and images included in CareNotes® are the copyrighted property of [employer] Inc[employer] or [employer]

## 2024-04-12 NOTE — ASSESSMENT & PLAN NOTE
Lab Results   Component Value Date    EGFR 63 03/25/2023    EGFR 64 03/19/2022    EGFR 48 08/28/2021    CREATININE 0.92 03/25/2023    CREATININE 0.91 03/19/2022    CREATININE 1.18 08/28/2021   Baseline 1.9-1.2, patient renal function has been grossly stable through time  Blood pressure mildly elevated this appointment  Can consider renal sparing blood pressure management with medication such as amlodipine at upcoming appointments  Repeat renal function studies ordered today

## 2024-04-12 NOTE — ASSESSMENT & PLAN NOTE
History of hyponatremia, stable on most recent labs  Updated laboratory studies ordered  Return to clinic in 1 month for reassessment

## 2024-04-12 NOTE — PROGRESS NOTES
Assessment and Plan:   Patient is a 73-year-old female with past medical history of hypercholesterolemia, subclinical hypothyroid, mood disorder with psychosis managed by psychiatry, elevated blood pressure reading without diagnosis of hypertension, who presents today for annual well visit.  Patient endorses feeling overall unchanged from prior visits, states that she still continues to struggle with her weight and with diet.  Patient states she attempts to exercise but [place] her ability to walk significant distances.  Patient also endorses constipation at visit today as well as occasional dark stools.  Updated CBC ordered, will assess for anemia with further tests following this.  Additional finding of elevated blood pressure in office visit today, patient advised of dietary and lifestyle modification and will return to clinic in 1 month for recheck of BP as well as review of laboratory studies for possible initiation of statins versus antihypertensives versus both.    Problem List Items Addressed This Visit       Hypercholesterolemia     ASCVD risk of 12.5%, candidate for moderate intensity statin therapy  Updated lipid panel ordered at visit today, patient will return to clinic in 1 month for reassessment of ASCVD risk and possible initiation of statin medication         Relevant Orders    Lipid panel    Subclinical hypothyroidism     Lab Results   Component Value Date    OIX6FNIXTVPV 3.534 03/19/2022    FREET4 0.95 07/01/2019     Last TSH demonstrates subclinical hypothyroidism  Will reassess TSH at this appointment  Return to clinic in 1 month for reassessment of lab studies and medication requirements         Relevant Orders    TSH, 3rd generation with Free T4 reflex    Elevated blood pressure reading     BP Readings from Last 1 Encounters:   04/12/24 142/70     Blood pressure elevated at office visit today, goal less than 140/90  Patient was  historically maintained on antihypertensive medication, these were discontinued for uncertain reasons    Plan:  Updated laboratory studies ordered  Return to clinic in 1 month for reassessment         Hyponatremia     History of hyponatremia, stable on most recent labs  Updated laboratory studies ordered  Return to clinic in 1 month for reassessment         Relevant Orders    Comprehensive metabolic panel    CKD (chronic kidney disease) stage 2, GFR 60-89 ml/min     Lab Results   Component Value Date    EGFR 63 03/25/2023    EGFR 64 03/19/2022    EGFR 48 08/28/2021    CREATININE 0.92 03/25/2023    CREATININE 0.91 03/19/2022    CREATININE 1.18 08/28/2021   Baseline 1.9-1.2, patient renal function has been grossly stable through time  Blood pressure mildly elevated this appointment  Can consider renal sparing blood pressure management with medication such as amlodipine at upcoming appointments  Repeat renal function studies ordered today         Relevant Orders    Comprehensive metabolic panel    BMI 33.0-33.9,adult     Wt Readings from Last 3 Encounters:   04/12/24 79.8 kg (176 lb)   12/14/23 78.3 kg (172 lb 9.6 oz)   09/22/23 76.2 kg (168 lb)       Patient advised on lifestyle management strategies such as increasing exercise, incorporating more beans, nuts, whole grains, fruits, vegetables into diet, limiting processed foods.  Patient endorses understanding of the strategies, states she has not been rigorous with implementation since last visit  Patient advised to increase dietary restraint and exercise, return to clinic in 1 month for reassessment         Constipation     Patient endorses constipation, waxing and waning in fashion.  Patient states happens intermittently, will require her to eat prunes and eventually laxatives for relief she will then have a small, firm, hard bowel movement  Patient endorses no abdominal pain during these events, no nausea, vomiting, diarrhea.  Patient endorses no unusual weight  loss  Patient does endorse occasional black stool, but states it is not tarry but firm    Plan:  Advised of dietary and lifestyle modification, can initiate medication at upcoming visit if no significant improvement         Relevant Orders    CBC and differential     Other Visit Diagnoses       Encounter for subsequent annual wellness visit (AWV) in Medicare patient    -  Primary    Screening for colon cancer        Relevant Orders    Ambulatory Referral to Gastroenterology    Abnormal level of blood mineral        Relevant Orders    Hemoglobin A1C            Depression Screening and Follow-up Plan: Patient was screened for depression during today's encounter. They screened negative with a PHQ-9 score of 0.    Preventive health issues were discussed with patient, and age appropriate screening tests were ordered as noted in patient's After Visit Summary.  Personalized health advice and appropriate referrals for health education or preventive services given if needed, as noted in patient's After Visit Summary.     History of Present Illness:     Patient presents for a Medicare Wellness Visit    HPI     Nutrition Assessment and Intervention:     Recommended completion of food recall journal    Ordered nutritional assessment labs      Physical Activity Assessment and Intervention:    Exercise capacity assessment recommended      Emotional and Mental Well-being, Sleep, Connectedness Assessment and Intervention:    Sleep/stress assessment performed    Patient Care Team:  Hu Crawford MD as PCP - General (Family Medicine)  SIOBHAN Chawla MD Dang Zhang, MD Daryl Gordon, CRNP Farooq Qureshi, MD Nicole Marie Markovcy, CRNP Daniel Kevin O'Rourke, MD Sinan Kutty, MD as Endoscopist     Review of Systems:     Review of Systems   Constitutional:  Negative for chills and fever.   HENT:  Negative for ear pain and sore throat.    Eyes:  Negative for pain and visual disturbance.   Respiratory:  Negative  for cough and shortness of breath.    Cardiovascular:  Negative for chest pain and palpitations.   Gastrointestinal:  Positive for constipation. Negative for abdominal pain, diarrhea, nausea and vomiting.   Genitourinary:  Negative for dysuria and hematuria.   Musculoskeletal:  Negative for arthralgias and back pain.   Skin:  Negative for color change and rash.   Neurological:  Negative for seizures and syncope.   Psychiatric/Behavioral:  Negative for dysphoric mood.    All other systems reviewed and are negative.     Problem List:     Patient Active Problem List   Diagnosis   • Back pain, chronic   • Bunion, right foot   • Cervical post-laminectomy syndrome   • Deformity of toe of left foot   • DJD (degenerative joint disease) of thoracic spine   • Foot pain, bilateral   • Leukopenia   • Myofascial pain syndrome   • Osteopenia   • Pain syndrome, chronic   • Thoracic neuralgia   • Thoracic spondylosis without myelopathy   • Vitiligo   • Mild persistent asthma with acute exacerbation   • History of gastroesophageal reflux (GERD)   • Hypercholesterolemia   • Vitamin D deficiency   • Seasonal allergies   • Subclinical hypothyroidism   • Mood disorder with psychosis (HCC)   • Elevated blood pressure reading   • Agitation   • Hyponatremia   • CKD (chronic kidney disease) stage 2, GFR 60-89 ml/min   • Medical clearance for psychiatric admission   • Major depressive disorder   • Anxiety   • MCI (mild cognitive impairment)   • SOB (shortness of breath)   • BMI 33.0-33.9,adult   • Rib pain on left side   • Constipation      Past Medical and Surgical History:     Past Medical History:   Diagnosis Date   • Anemia     last assessed - 12May2016   • Anxiety disorder    • Arthritis    • Asthma    • Back pain    • Bunion, right foot     last assessed - 02May2017   • Deformity of toe of left foot     last assessed - 23Oct2017   • Discitis of thoracolumbar region     last assessed - 21Jul2015   • Fall    • Fibrocystic disease of  breast    • Fracture of wrist     and hand, left   • GERD (gastroesophageal reflux disease)    • Head injury    • Hypercholesterolemia    • Hypokalemia 3/27/2021   • Leukopenia     last assessed - [id number]   • Osteopenia     last assessed - [id number]   • Polyarthritis    • Prediabetes    • Seasonal allergies    • Shortness of breath    • Sleep disturbance    • Subclinical hypothyroidism    • Toe deformity     last assessed - [id number]   • Trochanteric bursitis of left hip     last assessed - [id number]   • Vitamin D deficiency     last assessed - [id number]   • Vitiligo     last assessed - 20Ntc1015   • Wears eyeglasses      Past Surgical History:   Procedure Laterality Date   • BACK SURGERY      4 back surgeries, fusion, bone replacement   • BACK SURGERY  2012    Lumbar PLIF surgery   • CARPAL TUNNEL RELEASE Bilateral    •  SECTION  1980   • CHOLECYSTECTOMY     • HAND TENDON SURGERY Bilateral    • HERNIA REPAIR     • NECK SURGERY  2012    ACDF Surgery   • AZ COLONOSCOPY FLX DX W/COLLJ SPEC WHEN PFRMD N/A 2019    Procedure: COLONOSCOPY;  Surgeon: Will Perez MD;  Location: [facility];  Service: Gastroenterology   • AZ ESOPHAGOGASTRODUODENOSCOPY TRANSORAL DIAGNOSTIC N/A 2019    Procedure: ESOPHAGOGASTRODUODENOSCOPY (EGD);  Surgeon: Will Perez MD;  Location: [facility];  Service: Gastroenterology   • SPINAL CORD STIMULATOR IMPLANT N/A 2016    Procedure: PLACEMENT OF THORACIC PARASPINAL PERIPHERAL NERVE STIMULATING ELECTODES WITH LEFT BUTTOCK GENERATOR;  Surgeon: Hugo Braun MD;  Location: [facility];  Service:    • ULNAR NERVE REPAIR Bilateral       Family History:     Family History   Problem Relation Age of Onset   • Dementia Mother    • Emphysema Father         lung   • Lung cancer Father    • Other Paternal Grandfather         gangrene   • Hypertension Family    • Other Family         back trouble   • No Known Problems Sister    • No Known  Problems Daughter    • No Known Problems Maternal Grandmother    • No Known Problems Maternal Grandfather    • No Known Problems Paternal Grandmother    • No Known Problems Maternal Aunt    • No Known Problems Maternal Aunt    • No Known Problems Maternal Aunt    • No Known Problems Maternal Aunt    • No Known Problems Paternal Aunt    • No Known Problems Paternal Aunt    • No Known Problems Paternal Aunt    • No Known Problems Paternal Aunt       Social History:     Social History     Socioeconomic History   • Marital status: /Civil Union     Spouse name: None   • Number of children: 2   • Years of education: HS or GED   • Highest education level: None   Occupational History   • None   Tobacco Use   • Smoking status: Never   • Smokeless tobacco: Never   Vaping Use   • Vaping status: Never Used   Substance and Sexual Activity   • Alcohol use: Not Currently     Comment: ocassional   • Drug use: No   • Sexual activity: Not Currently     Partners: Male     Comment: No known STD risk factors; Denied currently sexually active   Other Topics Concern   • None   Social History Narrative    No known risk factors    Physical Disability     Social Determinants of Health     Financial Resource Strain: Low Risk  (3/20/2023)    Overall Financial Resource Strain (CARDIA)    • Difficulty of Paying Living Expenses: Not hard at all   Food Insecurity: No Food Insecurity (4/12/2024)    Hunger Vital Sign    • Worried About Running Out of Food in the Last Year: Never true    • Ran Out of Food in the Last Year: Never true   Transportation Needs: No Transportation Needs (4/12/2024)    PRAPARE - Transportation    • Lack of Transportation (Medical): No    • Lack of Transportation (Non-Medical): No   Physical Activity: Not on file   Stress: Not on file   Social Connections: Not on file   Intimate Partner Violence: Not on file   Housing Stability: Unknown (4/12/2024)    Housing Stability Vital Sign    • Unable to Pay for Housing in the  "Last Year: No    • Number of Places Lived in the Last Year: Not on file    • Unstable Housing in the Last Year: No      Medications and Allergies:     Current Outpatient Medications   Medication Sig Dispense Refill   • Calcium Carbonate (CALCIUM 600 HIGH POTENCY PO) Take 1 tablet by mouth daily     • Cholecalciferol (Vitamin D) 125 MCG (5000 UT) CAPS Take 1 capsule by mouth daily     • cyanocobalamin (VITAMIN B-12) 1,000 mcg tablet Take 1 tablet (1,000 mcg total) by mouth daily 30 tablet 0   • risperiDONE (RisperDAL) 2 mg tablet TAKE 1 TABLET BY MOUTH AT NIGHT FOR 30 DAYS     • traZODone (DESYREL) 50 mg tablet Take 50 mg by mouth daily       No current facility-administered medications for this visit.     Allergies   Allergen Reactions   • Influenza A (H1n1) Monoval Vac Anaphylaxis   • Lyrica [Pregabalin] Swelling   • Other Anaphylaxis     FLU SHOT   • Acetaminophen Other (See Comments)     GI Upset, \"it upps my triglycerides\"   • Percocet [Oxycodone-Acetaminophen] GI Intolerance      Immunizations:     Immunization History   Administered Date(s) Administered   • Tdap 06/04/2014   • Tuberculin Skin Test-PPD Intradermal 03/01/2021, 03/15/2022      Health Maintenance:         Topic Date Due   • Breast Cancer Screening: Mammogram  04/12/2025 (Originally 6/10/2023)   • DXA SCAN  06/10/2026   • Colorectal Cancer Screening  01/31/2029   • Hepatitis C Screening  Completed         Topic Date Due   • Pneumococcal Vaccine: 65+ Years (1 of 2 - PCV) Never done   • COVID-19 Vaccine (1 - 2023-24 season) Never done      Medicare Screening Tests and Risk Assessments:     Pat is here for her Subsequent Wellness visit. Last Medicare Wellness visit information reviewed, patient interviewed and updates made to the record today.      Health Risk Assessment:   Patient rates overall health as excellent. Patient feels that their physical health rating is much better. Patient is satisfied with their life. Eyesight was rated as same. " Hearing was rated as same. Patient feels that their emotional and mental health rating is much better. Patients states they are never, rarely angry. Patient states they are sometimes unusually tired/fatigued. Pain experienced in the last 7 days has been some. Patient's pain rating has been 3/10. Patient states that she has experienced no weight loss or gain in last 6 months.     Depression Screening:   PHQ-9 Score: 0      Fall Risk Screening:   In the past year, patient has experienced: no history of falling in past year      Urinary Incontinence Screening:   Patient has not leaked urine accidently in the last six months.     Home Safety:  Patient does not have trouble with stairs inside or outside of their home. Patient has working smoke alarms and has working carbon monoxide detector.     Nutrition:   Current diet is Regular.     Medications:   Patient is not currently taking any over-the-counter supplements. Patient is able to manage medications.     Activities of Daily Living (ADLs)/Instrumental Activities of Daily Living (IADLs):   Walk and transfer into and out of bed and chair?: Yes  Dress and groom yourself?: Yes    Bathe or shower yourself?: Yes    Feed yourself? Yes  Do your laundry/housekeeping?: Yes  Manage your money, pay your bills and track your expenses?: Yes  Make your own meals?: Yes    Do your own shopping?: Yes    Advance Care Planning:     Advanced directive: Yes      PREVENTIVE SCREENINGS      Cardiovascular Screening:    General: Screening Not Indicated and History Lipid Disorder      Colorectal Cancer Screening:     General: Screening Current      Cervical Cancer Screening:    General: Screening Not Indicated      Osteoporosis Screening:    General: Screening Current      Lung Cancer Screening:     General: Screening Not Indicated      Hepatitis C Screening:    General: Screening Current    Screening, Brief Intervention, and Referral to Treatment (SBIRT)    Screening  Typical number of drinks  "in a day: 0  Typical number of drinks in a week: 0  Interpretation: Low risk drinking behavior.    Single Item Drug Screening:  How often have you used an illegal drug (including marijuana) or a prescription medication for non-medical reasons in the past year? never    Single Item Drug Screen Score: 0  Interpretation: Negative screen for possible drug use disorder    No results found.     Physical Exam:     /70   Pulse 90   Temp 98 °F (36.7 °C)   Ht 5' 1\" (1.549 m)   Wt 79.8 kg (176 lb)   SpO2 98%   BMI 33.25 kg/m²     Physical Exam  Constitutional:       General: She is not in acute distress.     Appearance: She is obese. She is not ill-appearing.   HENT:      Head: Normocephalic and atraumatic.      Right Ear: External ear normal.      Left Ear: External ear normal.      Nose: Nose normal.      Mouth/Throat:      Mouth: Mucous membranes are moist.      Pharynx: Oropharynx is clear.      Comments: Edentulous  Eyes:      General: No scleral icterus.     Extraocular Movements: Extraocular movements intact.      Conjunctiva/sclera: Conjunctivae normal.   Cardiovascular:      Rate and Rhythm: Normal rate and regular rhythm.      Pulses: Normal pulses.      Heart sounds: Normal heart sounds. No murmur heard.     No friction rub. No gallop.   Pulmonary:      Breath sounds: Normal breath sounds. No wheezing, rhonchi or rales.   Abdominal:      General: Abdomen is flat.      Palpations: Abdomen is soft. There is no mass.      Tenderness: There is no abdominal tenderness. There is no guarding.   Musculoskeletal:      Cervical back: Normal range of motion and neck supple.      Right lower leg: No edema.      Left lower leg: No edema.   Skin:     General: Skin is warm and dry.      Capillary Refill: Capillary refill takes less than 2 seconds.   Neurological:      Mental Status: She is alert. Mental status is at baseline.   Psychiatric:         Mood and Affect: Mood normal.        Hu Crawford MD  "

## 2024-04-12 NOTE — ASSESSMENT & PLAN NOTE
ASCVD risk of 12.5%, candidate for moderate intensity statin therapy  Updated lipid panel ordered at visit today, patient will return to clinic in 1 month for reassessment of ASCVD risk and possible initiation of statin medication

## 2024-04-12 NOTE — ASSESSMENT & PLAN NOTE
Patient endorses constipation, waxing and waning in fashion.  Patient states happens intermittently, will require her to eat prunes and eventually laxatives for relief she will then have a small, firm, hard bowel movement  Patient endorses no abdominal pain during these events, no nausea, vomiting, diarrhea.  Patient endorses no unusual weight loss  Patient does endorse occasional black stool, but states it is not tarry but firm    Plan:  Advised of dietary and lifestyle modification, can initiate medication at upcoming visit if no significant improvement

## 2024-04-12 NOTE — PROGRESS NOTES
Assessment and Plan:     Problem List Items Addressed This Visit       Subclinical hypothyroidism    Hyponatremia    Adult BMI 31.0-31.9 kg/sq m     Other Visit Diagnoses       Constipation, unspecified constipation type    -  Primary    Screening for colon cancer                Depression Screening and Follow-up Plan: Patient was screened for depression during today's encounter. They screened negative with a PHQ-9 score of 0.  Preventive health issues were discussed with patient, and age appropriate screening tests were ordered as noted in patient's After Visit Summary.  Personalized health advice and appropriate referrals for health education or preventive services given if needed, as noted in patient's After Visit Summary.     History of Present Illness:     Patient presents for a Medicare Wellness Visit    HPI   Patient Care Team:  Hu Crawford MD as PCP - General (Family Medicine)  SIOBHAN Chawla MD Dang Zhang, MD Daryl Gordon, MD Marzena Reeder CRNP Daniel Kevin O'Rourke, MD Sinan Kutty, MD as Endoscopist     Review of Systems:     Review of Systems     Problem List:     Patient Active Problem List   Diagnosis   • Back pain, chronic   • Bunion, right foot   • Cervical post-laminectomy syndrome   • Deformity of toe of left foot   • DJD (degenerative joint disease) of thoracic spine   • Foot pain, bilateral   • Leukopenia   • Myofascial pain syndrome   • Osteopenia   • Pain syndrome, chronic   • Thoracic neuralgia   • Thoracic spondylosis without myelopathy   • Vitiligo   • Mild persistent asthma with acute exacerbation   • History of gastroesophageal reflux (GERD)   • Hypercholesterolemia   • Vitamin D deficiency   • Seasonal allergies   • Subclinical hypothyroidism   • Mood disorder with psychosis (HCC)   • Elevated blood pressure reading   • Agitation   • Hyponatremia   • CKD (chronic kidney disease) stage 2, GFR 60-89 ml/min   • Medical clearance  for psychiatric admission   • Major depressive disorder   • Anxiety   • MCI (mild cognitive impairment)   • SOB (shortness of breath)   • Adult BMI 31.0-31.9 kg/sq m   • Rib pain on left side      Past Medical and Surgical History:     Past Medical History:   Diagnosis Date   • Anemia     last assessed - 2016   • Anxiety disorder    • Arthritis    • Asthma    • Back pain    • Bunion, right foot     last assessed - 2017   • Deformity of toe of left foot     last assessed - 2017   • Discitis of thoracolumbar region     last assessed - 2015   • Fall    • Fibrocystic disease of breast    • Fracture of wrist     and hand, left   • GERD (gastroesophageal reflux disease)    • Head injury    • Hypercholesterolemia    • Hypokalemia 3/27/2021   • Leukopenia     last assessed - 2017   • Osteopenia     last assessed - 2016   • Polyarthritis    • Prediabetes    • Seasonal allergies    • Shortness of breath    • Sleep disturbance    • Subclinical hypothyroidism    • Toe deformity     last assessed - 2015   • Trochanteric bursitis of left hip     last assessed - [id number]   • Vitamin D deficiency     last assessed - 2017   • Vitiligo     last assessed - 2016   • Wears eyeglasses      Past Surgical History:   Procedure Laterality Date   • BACK SURGERY      4 back surgeries, fusion, bone replacement   • BACK SURGERY  2012    Lumbar PLIF surgery   • CARPAL TUNNEL RELEASE Bilateral    •  SECTION  1980   • CHOLECYSTECTOMY     • HAND TENDON SURGERY Bilateral    • HERNIA REPAIR     • NECK SURGERY  2012    ACDF Surgery   • NV COLONOSCOPY FLX DX W/COLLJ SPEC WHEN PFRMD N/A 2019    Procedure: COLONOSCOPY;  Surgeon: Will Perez MD;  [facility];  Service: Gastroenterology   • NV ESOPHAGOGASTRODUODENOSCOPY TRANSORAL DIAGNOSTIC N/A 2019    Procedure: ESOPHAGOGASTRODUODENOSCOPY (EGD);  Surgeon: Will Perez MD;  [facility];   Service: Gastroenterology   • SPINAL CORD STIMULATOR IMPLANT N/A 7/12/2016    Procedure: PLACEMENT OF THORACIC PARASPINAL PERIPHERAL NERVE STIMULATING ELECTODES WITH LEFT BUTTOCK GENERATOR;  Surgeon: Hugo Braun MD;  Location:  MAIN OR;  Service:    • ULNAR NERVE REPAIR Bilateral 1989      Family History:     Family History   Problem Relation Age of Onset   • Dementia Mother    • Emphysema Father         lung   • Lung cancer Father    • Other Paternal Grandfather         gangrene   • Hypertension Family    • Other Family         back trouble   • No Known Problems Sister    • No Known Problems Daughter    • No Known Problems Maternal Grandmother    • No Known Problems Maternal Grandfather    • No Known Problems Paternal Grandmother    • No Known Problems Maternal Aunt    • No Known Problems Maternal Aunt    • No Known Problems Maternal Aunt    • No Known Problems Maternal Aunt    • No Known Problems Paternal Aunt    • No Known Problems Paternal Aunt    • No Known Problems Paternal Aunt    • No Known Problems Paternal Aunt       Social History:     Social History     Socioeconomic History   • Marital status: /Civil Union     Spouse name: None   • Number of children: 2   • Years of education: HS or GED   • Highest education level: None   Occupational History   • None   Tobacco Use   • Smoking status: Never   • Smokeless tobacco: Never   Vaping Use   • Vaping status: Never Used   Substance and Sexual Activity   • Alcohol use: Not Currently     Comment: ocassional   • Drug use: No   • Sexual activity: Not Currently     Partners: Male     Comment: No known STD risk factors; Denied currently sexually active   Other Topics Concern   • None   Social History Narrative    No known risk factors    Physical Disability     Social Determinants of Health     Financial Resource Strain: Low Risk  (3/20/2023)    Overall Financial Resource Strain (CARDIA)    • Difficulty of Paying Living Expenses: Not hard at all   Food  "Insecurity: No Food Insecurity (4/12/2024)    Hunger Vital Sign    • Worried About Running Out of Food in the Last Year: Never true    • Ran Out of Food in the Last Year: Never true   Transportation Needs: No Transportation Needs (4/12/2024)    PRAPARE - Transportation    • Lack of Transportation (Medical): No    • Lack of Transportation (Non-Medical): No   Physical Activity: Not on file   Stress: Not on file   Social Connections: Not on file   Intimate Partner Violence: Not on file   Housing Stability: Unknown (4/12/2024)    Housing Stability Vital Sign    • Unable to Pay for Housing in the Last Year: No    • Number of Places Lived in the Last Year: Not on file    • Unstable Housing in the Last Year: No      Medications and Allergies:     Current Outpatient Medications   Medication Sig Dispense Refill   • Calcium Carbonate (CALCIUM 600 HIGH POTENCY PO) Take 1 tablet by mouth daily     • Cholecalciferol (Vitamin D) 125 MCG (5000 UT) CAPS Take 1 capsule by mouth daily     • cyanocobalamin (VITAMIN B-12) 1,000 mcg tablet Take 1 tablet (1,000 mcg total) by mouth daily 30 tablet 0   • Lidocaine (HM Lidocaine Patch) 4 % PTCH Apply one patch to the affected area every 24 hours. 1 patch 0   • risperiDONE (RisperDAL) 2 mg tablet TAKE 1 TABLET BY MOUTH AT NIGHT FOR 30 DAYS     • traZODone (DESYREL) 50 mg tablet Take 50 mg by mouth daily       No current facility-administered medications for this visit.     Allergies   Allergen Reactions   • Influenza A (H1n1) Monoval Vac Anaphylaxis   • Lyrica [Pregabalin] Swelling   • Other Anaphylaxis     FLU SHOT   • Acetaminophen Other (See Comments)     GI Upset, \"it upps my triglycerides\"   • Percocet [Oxycodone-Acetaminophen] GI Intolerance      Immunizations:     Immunization History   Administered Date(s) Administered   • Tdap 06/04/2014   • Tuberculin Skin Test-PPD Intradermal 03/01/2021, 03/15/2022      Health Maintenance:         Topic Date Due   • Breast Cancer Screening: " Mammogram  06/10/2023   • DXA SCAN  06/10/2026   • Colorectal Cancer Screening  01/31/2029   • Hepatitis C Screening  Completed         Topic Date Due   • Pneumococcal Vaccine: 65+ Years (1 of 2 - PCV) Never done   • COVID-19 Vaccine (1 - 2023-24 season) Never done      Medicare Screening Tests and Risk Assessments:     Pat is here for her Subsequent Wellness visit. Last Medicare Wellness visit information reviewed, patient interviewed and updates made to the record today.      Health Risk Assessment:   Patient rates overall health as excellent. Patient feels that their physical health rating is much better. Patient is satisfied with their life. Eyesight was rated as same. Hearing was rated as same. Patient feels that their emotional and mental health rating is much better. Patients states they are never, rarely angry. Patient states they are sometimes unusually tired/fatigued. Pain experienced in the last 7 days has been some. Patient's pain rating has been 3/10. Patient states that she has experienced no weight loss or gain in last 6 months.     Depression Screening:   PHQ-9 Score: 0      Fall Risk Screening:   In the past year, patient has experienced: no history of falling in past year      Urinary Incontinence Screening:   Patient has not leaked urine accidently in the last six months.     Home Safety:  Patient does not have trouble with stairs inside or outside of their home. Patient has working smoke alarms and has working carbon monoxide detector. Home safety hazards include: none.     Medications:   Patient is currently taking over-the-counter supplements. OTC medications include: see medication list. Patient is able to manage medications.     Activities of Daily Living (ADLs)/Instrumental Activities of Daily Living (IADLs):   Walk and transfer into and out of bed and chair?: Yes  Dress and groom yourself?: Yes    Bathe or shower yourself?: Yes    Feed yourself? Yes  Do your laundry/housekeeping?:  "Yes  Manage your money, pay your bills and track your expenses?: Yes  Make your own meals?: Yes    Do your own shopping?: Yes    Previous Hospitalizations:   Any hospitalizations or ED visits within the last 12 months?: No      Advance Care Planning:   Living will: Yes    Advanced directive: Yes      PREVENTIVE SCREENINGS      Cardiovascular Screening:    General: History Lipid Disorder    Due for: Lipid Panel      Diabetes Screening:     General: Risks and Benefits Discussed    Due for: Blood Glucose      Colorectal Cancer Screening:     General: Screening Current    Due for: Colonoscopy - High Risk      Breast Cancer Screening:     General: Patient Declines      Cervical Cancer Screening:    General: Screening Not Indicated      Osteoporosis Screening:    General: Screening Current      Abdominal Aortic Aneurysm (AAA) Screening:        General: Screening Not Indicated      Lung Cancer Screening:     General: Screening Not Indicated      Hepatitis C Screening:    General: Screening Current    Screening, Brief Intervention, and Referral to Treatment (SBIRT)    Screening  Typical number of drinks in a day: 0  Typical number of drinks in a week: 0  Interpretation: Low risk drinking behavior.    No results found.     Physical Exam:     /70   Pulse 90   Temp 98 °F (36.7 °C)   Ht 5' 1\" (1.549 m)   Wt 79.8 kg (176 lb)   SpO2 98%   BMI 33.25 kg/m²     Physical Exam     Hu Crawford MD  "

## 2024-04-12 NOTE — ASSESSMENT & PLAN NOTE
BP Readings from Last 1 Encounters:   04/12/24 142/70     Blood pressure elevated at office visit today, goal less than 140/90  Patient was historically maintained on antihypertensive medication, these were discontinued for uncertain reasons    Plan:  Updated laboratory studies ordered  Return to clinic in 1 month for reassessment

## 2024-04-12 NOTE — ASSESSMENT & PLAN NOTE
Wt Readings from Last 3 Encounters:   04/12/24 79.8 kg (176 lb)   12/14/23 78.3 kg (172 lb 9.6 oz)   09/22/23 76.2 kg (168 lb)       Patient advised on lifestyle management strategies such as increasing exercise, incorporating more beans, nuts, whole grains, fruits, vegetables into diet, limiting processed foods.  Patient endorses understanding of the strategies, states she has not been rigorous with implementation since last visit  Patient advised to increase dietary restraint and exercise, return to clinic in 1 month for reassessment

## 2024-04-12 NOTE — ASSESSMENT & PLAN NOTE
Lab Results   Component Value Date    [id number] 3.534 03/19/2022    FREET4 0.95 07/01/2019     Last TSH demonstrates subclinical hypothyroidism  Will reassess TSH at this appointment  Return to clinic in 1 month for reassessment of lab studies and medication requirements

## 2024-04-13 ENCOUNTER — APPOINTMENT (OUTPATIENT)
Dept: LAB | Facility: CLINIC | Age: 74
End: 2024-04-13
Payer: COMMERCIAL

## 2024-04-13 DIAGNOSIS — N18.2 CKD (CHRONIC KIDNEY DISEASE) STAGE 2, GFR 60-89 ML/MIN: ICD-10-CM

## 2024-04-13 DIAGNOSIS — E78.00 HYPERCHOLESTEROLEMIA: ICD-10-CM

## 2024-04-13 DIAGNOSIS — R79.0 ABNORMAL LEVEL OF BLOOD MINERAL: ICD-10-CM

## 2024-04-13 DIAGNOSIS — E03.8 SUBCLINICAL HYPOTHYROIDISM: ICD-10-CM

## 2024-04-13 DIAGNOSIS — K59.00 CONSTIPATION, UNSPECIFIED CONSTIPATION TYPE: ICD-10-CM

## 2024-04-13 DIAGNOSIS — E87.1 HYPONATREMIA: ICD-10-CM

## 2024-04-13 LAB
ALBUMIN SERPL BCP-MCNC: 3.9 G/DL (ref 3.5–5)
ALP SERPL-CCNC: 75 U/L (ref 34–104)
ALT SERPL W P-5'-P-CCNC: 14 U/L (ref 7–52)
ANION GAP SERPL CALCULATED.3IONS-SCNC: 6 MMOL/L (ref 4–13)
AST SERPL W P-5'-P-CCNC: 15 U/L (ref 13–39)
BASOPHILS # BLD AUTO: 0 THOUSANDS/ÂΜL (ref 0–0.1)
BASOPHILS NFR BLD AUTO: 0 % (ref 0–1)
BILIRUB SERPL-MCNC: 0.41 MG/DL (ref 0.2–1)
BUN SERPL-MCNC: 14 MG/DL (ref 5–25)
CALCIUM SERPL-MCNC: 9.3 MG/DL (ref 8.4–10.2)
CHLORIDE SERPL-SCNC: 106 MMOL/L (ref 96–108)
CHOLEST SERPL-MCNC: 239 MG/DL
CO2 SERPL-SCNC: 28 MMOL/L (ref 21–32)
CREAT SERPL-MCNC: 0.95 MG/DL (ref 0.6–1.3)
EOSINOPHIL # BLD AUTO: 0.01 THOUSAND/ÂΜL (ref 0–0.61)
EOSINOPHIL NFR BLD AUTO: 0 % (ref 0–6)
ERYTHROCYTE [DISTWIDTH] IN BLOOD BY AUTOMATED COUNT: 12.6 % (ref 11.6–15.1)
EST. AVERAGE GLUCOSE BLD GHB EST-MCNC: 117 MG/DL
GFR SERPL CREATININE-BSD FRML MDRD: 60 ML/MIN/1.73SQ M
GLUCOSE P FAST SERPL-MCNC: 88 MG/DL (ref 65–99)
HBA1C MFR BLD: 5.7 %
HCT VFR BLD AUTO: 41.8 % (ref 34.8–46.1)
HDLC SERPL-MCNC: 56 MG/DL
HGB BLD-MCNC: 14 G/DL (ref 11.5–15.4)
IMM GRANULOCYTES # BLD AUTO: 0.01 THOUSAND/UL (ref 0–0.2)
IMM GRANULOCYTES NFR BLD AUTO: 0 % (ref 0–2)
LDLC SERPL CALC-MCNC: 162 MG/DL (ref 0–100)
LYMPHOCYTES # BLD AUTO: 1.1 THOUSANDS/ÂΜL (ref 0.6–4.47)
LYMPHOCYTES NFR BLD AUTO: 29 % (ref 14–44)
MCH RBC QN AUTO: 31.5 PG (ref 26.8–34.3)
MCHC RBC AUTO-ENTMCNC: 33.5 G/DL (ref 31.4–37.4)
MCV RBC AUTO: 94 FL (ref 82–98)
MONOCYTES # BLD AUTO: 0.39 THOUSAND/ÂΜL (ref 0.17–1.22)
MONOCYTES NFR BLD AUTO: 10 % (ref 4–12)
NEUTROPHILS # BLD AUTO: 2.31 THOUSANDS/ÂΜL (ref 1.85–7.62)
NEUTS SEG NFR BLD AUTO: 61 % (ref 43–75)
NONHDLC SERPL-MCNC: 183 MG/DL
NRBC BLD AUTO-RTO: 0 /100 WBCS
PLATELET # BLD AUTO: 267 THOUSANDS/UL (ref 149–390)
PMV BLD AUTO: 9 FL (ref 8.9–12.7)
POTASSIUM SERPL-SCNC: 4.5 MMOL/L (ref 3.5–5.3)
PROT SERPL-MCNC: 6.9 G/DL (ref 6.4–8.4)
RBC # BLD AUTO: 4.44 MILLION/UL (ref 3.81–5.12)
SODIUM SERPL-SCNC: 140 MMOL/L (ref 135–147)
TRIGL SERPL-MCNC: 106 MG/DL
TSH SERPL DL<=0.05 MIU/L-ACNC: 4.05 UIU/ML (ref 0.45–4.5)
WBC # BLD AUTO: 3.82 THOUSAND/UL (ref 4.31–10.16)

## 2024-04-13 PROCEDURE — 85025 COMPLETE CBC W/AUTO DIFF WBC: CPT

## 2024-04-13 PROCEDURE — 80061 LIPID PANEL: CPT

## 2024-04-13 PROCEDURE — 84443 ASSAY THYROID STIM HORMONE: CPT

## 2024-04-13 PROCEDURE — 36415 COLL VENOUS BLD VENIPUNCTURE: CPT

## 2024-04-13 PROCEDURE — 80053 COMPREHEN METABOLIC PANEL: CPT

## 2024-04-13 PROCEDURE — 83036 HEMOGLOBIN GLYCOSYLATED A1C: CPT

## 2024-05-20 ENCOUNTER — OFFICE VISIT (OUTPATIENT)
Age: 74
End: 2024-05-20
Payer: COMMERCIAL

## 2024-05-20 VITALS
DIASTOLIC BLOOD PRESSURE: 80 MMHG | HEIGHT: 61 IN | HEART RATE: 98 BPM | OXYGEN SATURATION: 96 % | RESPIRATION RATE: 18 BRPM | WEIGHT: 178.4 LBS | SYSTOLIC BLOOD PRESSURE: 142 MMHG | TEMPERATURE: 98 F | BODY MASS INDEX: 33.68 KG/M2

## 2024-05-20 DIAGNOSIS — K59.00 CONSTIPATION, UNSPECIFIED CONSTIPATION TYPE: ICD-10-CM

## 2024-05-20 DIAGNOSIS — R73.03 PREDIABETES: ICD-10-CM

## 2024-05-20 DIAGNOSIS — I10 ESSENTIAL HYPERTENSION: Primary | ICD-10-CM

## 2024-05-20 DIAGNOSIS — E78.00 HYPERCHOLESTEROLEMIA: ICD-10-CM

## 2024-05-20 PROCEDURE — G2211 COMPLEX E/M VISIT ADD ON: HCPCS

## 2024-05-20 PROCEDURE — 99213 OFFICE O/P EST LOW 20 MIN: CPT

## 2024-05-20 RX ORDER — LOSARTAN POTASSIUM 25 MG/1
12.5 TABLET ORAL DAILY
Qty: 15 TABLET | Refills: 0 | Status: SHIPPED | OUTPATIENT
Start: 2024-05-20 | End: 2024-06-19

## 2024-05-20 RX ORDER — AMLODIPINE BESYLATE 2.5 MG/1
2.5 TABLET ORAL DAILY
Qty: 30 TABLET | Refills: 0 | Status: SHIPPED | OUTPATIENT
Start: 2024-05-20 | End: 2024-05-20 | Stop reason: DRUGHIGH

## 2024-05-20 RX ORDER — ATORVASTATIN CALCIUM 10 MG/1
10 TABLET, FILM COATED ORAL DAILY
Qty: 30 TABLET | Refills: 0 | Status: SHIPPED | OUTPATIENT
Start: 2024-05-20 | End: 2024-06-19

## 2024-05-20 NOTE — ASSESSMENT & PLAN NOTE
Wt Readings from Last 3 Encounters:   05/20/24 80.9 kg (178 lb 6.4 oz)   04/12/24 79.8 kg (176 lb)   12/14/23 78.3 kg (172 lb 9.6 oz)       Patient advised on lifestyle management strategies such as increasing exercise, incorporating more beans, nuts, whole grains, fruits, vegetables into diet, limiting processed foods.  Patient endorses understanding of the strategies, states she has not been rigorous with implementation since last visit

## 2024-05-20 NOTE — ASSESSMENT & PLAN NOTE
Patient endorses constipation, waxing and waning in fashion.  Patient states happens intermittently, will require her to eat prunes and eventually laxatives for relief she will then have a small, firm, hard bowel movement  Patient endorses no abdominal pain during these events, no nausea, vomiting, diarrhea.  Patient endorses no unusual weight loss  Patient does endorse occasional black stool, but states it is not tarry but firm    Plan:  Patient to be started on Metamucil 1 scoop daily for stool bulking  If no significant improvement, can consider increasing Metamucil or adding second agent such as MiraLAX

## 2024-05-20 NOTE — ASSESSMENT & PLAN NOTE
Lab Results   Component Value Date    HGBA1C 5.7 (H) 04/13/2024     Last A1c of 5.7, consistent with prediabetes  Encourage dietary and lifestyle modification, encouraged reduction of consumption of fatty and processed foods and increase consumption of whole foods, whole grains, plant-based foods, beans, nuts, legumes.  Advised patient to continue to exercise, recommended 150 minutes/week moderate intensity exercise such as walking briskly uphill

## 2024-05-20 NOTE — PROGRESS NOTES
Ambulatory Visit  Name: Pat Hurtado      : 1950      MRN: [id number]  Encounter Provider: Hu Crawford MD  Encounter Date: 2024   Encounter department: [facility]    Assessment & Plan   Patient is a 73-year-old female with past medical history of CKD 2, lumbar radiculopathy status postsurgical intervention, mild cognitive impairment, dyslipidemia, who presents today for review of laboratory studies.  Laboratory studies demonstrated significantly elevated blood lipids with ASCVD risk of 13.3%, A1c of 5.7 consistent with prediabetes.  Blood pressure at office today mildly elevated at 142/80.  Patient was advised that in light of prediabetes, dyslipidemia, hypertension, she would likely benefit from combination antihypertensive and antidyslipidemic therapy.  Risk versus benefits of various blood pressure controlling agents as well as risks versus benefits of various statin medications reviewed with patient.  Patient elected for trial of low-dose atorvastatin as well as low-dose Cozaar.  Repeat CMP and lipid panel ordered at visit today, patient advised to fill these labs several days before upcoming follow-up.  Patient to return to clinic in 1 month for recheck of CMP and lipid panel to ensure medication tolerance.    1. Essential hypertension  -     losartan (COZAAR) 25 mg tablet; Take 0.5 tablets (12.5 mg total) by mouth daily  -     Comprehensive metabolic panel; Future  2. Hypercholesterolemia  Assessment & Plan:  ASCVD risk of 13.3%, candidate for moderate intensity statin therapy  Risks versus benefits of statin therapy outlined with patient, who elected for trial of moderate intensity statin with follow-up CMP and lipid panel in 1 month after initiating medication  Orders:  -     atorvastatin (LIPITOR) 10 mg tablet; Take 1 tablet (10 mg total) by mouth daily  -     Lipid panel; Future  3. BMI 33.0-33.9,adult  Assessment & Plan:  Wt Readings from Last 3 Encounters:    05/20/24 80.9 kg (178 lb 6.4 oz)   04/12/24 79.8 kg (176 lb)   12/14/23 78.3 kg (172 lb 9.6 oz)       Patient advised on lifestyle management strategies such as increasing exercise, incorporating more beans, nuts, whole grains, fruits, vegetables into diet, limiting processed foods.  Patient endorses understanding of the strategies, states she has not been rigorous with implementation since last visit  4. Constipation, unspecified constipation type  Assessment & Plan:  Patient endorses constipation, waxing and waning in fashion.  Patient states happens intermittently, will require her to eat prunes and eventually laxatives for relief she will then have a small, firm, hard bowel movement  Patient endorses no abdominal pain during these events, no nausea, vomiting, diarrhea.  Patient endorses no unusual weight loss  Patient does endorse occasional black stool, but states it is not tarry but firm    Plan:  Patient to be started on Metamucil 1 scoop daily for stool bulking  If no significant improvement, can consider increasing Metamucil or adding second agent such as MiraLAX  Orders:  -     psyllium (METAMUCIL) packet; Take 1 packet by mouth daily for 10 days  5. Prediabetes  Assessment & Plan:  Lab Results   Component Value Date    HGBA1C 5.7 (H) 04/13/2024     Last A1c of 5.7, consistent with prediabetes  Encourage dietary and lifestyle modification, encouraged reduction of consumption of fatty and processed foods and increase consumption of whole foods, whole grains, plant-based foods, beans, nuts, legumes.  Advised patient to continue to exercise, recommended 150 minutes/week moderate intensity exercise such as walking briskly uphill         History of Present Illness     Hyperlipidemia  This is a chronic problem. The current episode started more than 1 year ago. The problem is uncontrolled. Recent lipid tests were reviewed and are high. Exacerbating diseases include obesity. Factors aggravating her  hyperlipidemia include atypical antipsychotics. Pertinent negatives include no chest pain, focal sensory loss, focal weakness, leg pain, myalgias or shortness of breath. She is currently on no antihyperlipidemic treatment. Compliance problems include adherence to diet, adherence to exercise and medication side effects.  Risk factors for coronary artery disease include obesity, a sedentary lifestyle, post-menopausal and dyslipidemia.     Nutrition Assessment and Intervention:     Nutrition patient handout reviewed with patient      Physical Activity Assessment and Intervention:    Physical Activity patient handout reviewed with patient        Review of Systems   Constitutional:  Negative for chills and fever.   HENT:  Negative for ear pain and sore throat.    Eyes:  Negative for pain and visual disturbance.   Respiratory:  Negative for cough and shortness of breath.    Cardiovascular:  Negative for chest pain and palpitations.   Gastrointestinal:  Negative for abdominal pain and vomiting.   Genitourinary:  Negative for dysuria and hematuria.   Musculoskeletal:  Negative for arthralgias, back pain and myalgias.   Skin:  Negative for color change and rash.   Neurological:  Negative for focal weakness, seizures and syncope.   Psychiatric/Behavioral:  Negative for agitation.    All other systems reviewed and are negative.  Past Medical History:   Diagnosis Date   • Anemia     last assessed - [id number]   • Anxiety disorder    • Arthritis    • Asthma    • Back pain    • Bunion, right foot     last assessed - [id number]   • Deformity of toe of left foot     last assessed - [id number]   • Discitis of thoracolumbar region     last assessed - [id number]   • Fall    • Fibrocystic disease of breast    • Fracture of wrist     and hand, left   • GERD (gastroesophageal reflux disease)    • Head injury    • Hypercholesterolemia    • Hypokalemia 3/27/2021   • Leukopenia     last assessed - [id number]   • Osteopenia     last assessed -  67Ock4294   • Polyarthritis    • Prediabetes    • Seasonal allergies    • Shortness of breath    • Sleep disturbance    • Subclinical hypothyroidism    • Toe deformity     last assessed - 47Bbz6571   • Trochanteric bursitis of left hip     last assessed - 26Gvx7097   • Vitamin D deficiency     last assessed - 80Fiw1184   • Vitiligo     last assessed - 86Asb8943   • Wears eyeglasses      Past Surgical History:   Procedure Laterality Date   • BACK SURGERY      4 back surgeries, fusion, bone replacement   • BACK SURGERY  2012    Lumbar PLIF surgery   • CARPAL TUNNEL RELEASE Bilateral    •  SECTION  1980   • CHOLECYSTECTOMY     • HAND TENDON SURGERY Bilateral    • HERNIA REPAIR     • NECK SURGERY  2012    ACDF Surgery   • CO COLONOSCOPY FLX DX W/COLLJ SPEC WHEN PFRMD N/A 2019    Procedure: COLONOSCOPY;  Surgeon: Will Perez MD;  [facility];  Service: Gastroenterology   • CO ESOPHAGOGASTRODUODENOSCOPY TRANSORAL DIAGNOSTIC N/A 2019    Procedure: ESOPHAGOGASTRODUODENOSCOPY (EGD);  Surgeon: Will Perez MD;  Location: [facility];  Service: Gastroenterology   • SPINAL CORD STIMULATOR IMPLANT N/A 2016    Procedure: PLACEMENT OF THORACIC PARASPINAL PERIPHERAL NERVE STIMULATING ELECTODES WITH LEFT BUTTOCK GENERATOR;  Surgeon: Hugo Braun MD;  Location: [facility] OR;  Service:    • ULNAR NERVE REPAIR Bilateral      Family History   Problem Relation Age of Onset   • Dementia Mother    • Emphysema Father         lung   • Lung cancer Father    • Other Paternal Grandfather         gangrene   • Hypertension Family    • Other Family         back trouble   • No Known Problems Sister    • No Known Problems Daughter    • No Known Problems Maternal Grandmother    • No Known Problems Maternal Grandfather    • No Known Problems Paternal Grandmother    • No Known Problems Maternal Aunt    • No Known Problems Maternal Aunt    • No Known Problems Maternal Aunt    • No  "Known Problems Maternal Aunt    • No Known Problems Paternal Aunt    • No Known Problems Paternal Aunt    • No Known Problems Paternal Aunt    • No Known Problems Paternal Aunt      Social History     Tobacco Use   • Smoking status: Never   • Smokeless tobacco: Never   Vaping Use   • Vaping status: Never Used   Substance and Sexual Activity   • Alcohol use: Not Currently     Comment: ocassional   • Drug use: No   • Sexual activity: Not Currently     Partners: Male     Comment: No known STD risk factors; Denied currently sexually active     Current Outpatient Medications on File Prior to Visit   Medication Sig   • Calcium Carbonate (CALCIUM 600 HIGH POTENCY PO) Take 1 tablet by mouth daily   • Cholecalciferol (Vitamin D) 125 MCG (5000 UT) CAPS Take 1 capsule by mouth daily   • cyanocobalamin (VITAMIN B-12) 1,000 mcg tablet Take 1 tablet (1,000 mcg total) by mouth daily   • risperiDONE (RisperDAL) 2 mg tablet TAKE 1 TABLET BY MOUTH AT NIGHT FOR 30 DAYS   • traZODone (DESYREL) 50 mg tablet Take 50 mg by mouth daily     Allergies   Allergen Reactions   • Influenza A (H1n1) Monoval Vac Anaphylaxis   • Lyrica [Pregabalin] Swelling   • Other Anaphylaxis     FLU SHOT   • Acetaminophen Other (See Comments)     GI Upset, \"it upps my triglycerides\"   • Percocet [Oxycodone-Acetaminophen] GI Intolerance     Immunization History   Administered Date(s) Administered   • Tdap 06/04/2014   • Tuberculin Skin Test-PPD Intradermal 03/01/2021, 03/15/2022     Objective     /80   Pulse 98   Temp 98 °F (36.7 °C)   Resp 18   Ht 5' 1\" (1.549 m)   Wt 80.9 kg (178 lb 6.4 oz)   SpO2 96%   BMI 33.71 kg/m²     Physical Exam  Constitutional:       General: She is not in acute distress.     Appearance: She is obese. She is not ill-appearing.   HENT:      Head: Normocephalic and atraumatic.      Right Ear: External ear normal.      Left Ear: External ear normal.      Nose: Nose normal.      Mouth/Throat:      Mouth: Mucous membranes are " moist.      Pharynx: Oropharynx is clear.      Comments: Edentulous  Eyes:      General: No scleral icterus.     Extraocular Movements: Extraocular movements intact.      Conjunctiva/sclera: Conjunctivae normal.   Cardiovascular:      Rate and Rhythm: Normal rate and regular rhythm.      Pulses: Normal pulses.      Heart sounds: Normal heart sounds. No murmur heard.     No friction rub. No gallop.   Pulmonary:      Breath sounds: Normal breath sounds. No wheezing, rhonchi or rales.   Abdominal:      General: Abdomen is flat.      Palpations: Abdomen is soft. There is no mass.      Tenderness: There is no abdominal tenderness. There is no guarding.   Musculoskeletal:      Cervical back: Normal range of motion and neck supple.      Right lower leg: No edema.      Left lower leg: No edema.   Skin:     General: Skin is warm and dry.      Capillary Refill: Capillary refill takes less than 2 seconds.   Neurological:      Mental Status: She is alert. Mental status is at baseline.   Psychiatric:         Mood and Affect: Mood normal.       Administrative Statements   I have spent a total time of 40 minutes on 05/20/24 In caring for this patient including Risks and benefits of tx options, Instructions for management, Importance of tx compliance, Risk factor reductions, and Documenting in the medical record.

## 2024-05-20 NOTE — ASSESSMENT & PLAN NOTE
ASCVD risk of 13.3%, candidate for moderate intensity statin therapy  Risks versus benefits of statin therapy outlined with patient, who elected for trial of moderate intensity statin with follow-up CMP and lipid panel in 1 month after initiating medication

## 2024-06-07 ENCOUNTER — APPOINTMENT (OUTPATIENT)
Dept: LAB | Facility: CLINIC | Age: 74
End: 2024-06-07
Payer: COMMERCIAL

## 2024-06-07 DIAGNOSIS — E78.00 HYPERCHOLESTEROLEMIA: ICD-10-CM

## 2024-06-07 DIAGNOSIS — I10 ESSENTIAL HYPERTENSION: ICD-10-CM

## 2024-06-07 LAB
ALBUMIN SERPL BCP-MCNC: 4 G/DL (ref 3.5–5)
ALP SERPL-CCNC: 71 U/L (ref 34–104)
ALT SERPL W P-5'-P-CCNC: 14 U/L (ref 7–52)
ANION GAP SERPL CALCULATED.3IONS-SCNC: 7 MMOL/L (ref 4–13)
AST SERPL W P-5'-P-CCNC: 15 U/L (ref 13–39)
BILIRUB SERPL-MCNC: 0.45 MG/DL (ref 0.2–1)
BUN SERPL-MCNC: 12 MG/DL (ref 5–25)
CALCIUM SERPL-MCNC: 9.2 MG/DL (ref 8.4–10.2)
CHLORIDE SERPL-SCNC: 104 MMOL/L (ref 96–108)
CHOLEST SERPL-MCNC: 166 MG/DL
CO2 SERPL-SCNC: 27 MMOL/L (ref 21–32)
CREAT SERPL-MCNC: 0.98 MG/DL (ref 0.6–1.3)
GFR SERPL CREATININE-BSD FRML MDRD: 58 ML/MIN/1.73SQ M
GLUCOSE P FAST SERPL-MCNC: 93 MG/DL (ref 65–99)
HDLC SERPL-MCNC: 59 MG/DL
LDLC SERPL CALC-MCNC: 88 MG/DL (ref 0–100)
NONHDLC SERPL-MCNC: 107 MG/DL
POTASSIUM SERPL-SCNC: 4 MMOL/L (ref 3.5–5.3)
PROT SERPL-MCNC: 6.9 G/DL (ref 6.4–8.4)
SODIUM SERPL-SCNC: 138 MMOL/L (ref 135–147)
TRIGL SERPL-MCNC: 95 MG/DL

## 2024-06-07 PROCEDURE — 80053 COMPREHEN METABOLIC PANEL: CPT

## 2024-06-07 PROCEDURE — 80061 LIPID PANEL: CPT

## 2024-06-07 PROCEDURE — 36415 COLL VENOUS BLD VENIPUNCTURE: CPT

## 2024-06-13 ENCOUNTER — RA CDI HCC (OUTPATIENT)
Dept: OTHER | Facility: HOSPITAL | Age: 74
End: 2024-06-13

## 2024-06-20 ENCOUNTER — OFFICE VISIT (OUTPATIENT)
Age: 74
End: 2024-06-20
Payer: COMMERCIAL

## 2024-06-20 VITALS
HEIGHT: 61 IN | RESPIRATION RATE: 18 BRPM | WEIGHT: 178 LBS | OXYGEN SATURATION: 96 % | HEART RATE: 92 BPM | DIASTOLIC BLOOD PRESSURE: 80 MMHG | SYSTOLIC BLOOD PRESSURE: 130 MMHG | BODY MASS INDEX: 33.61 KG/M2 | TEMPERATURE: 98 F

## 2024-06-20 DIAGNOSIS — I10 ESSENTIAL HYPERTENSION: ICD-10-CM

## 2024-06-20 DIAGNOSIS — E78.00 HYPERCHOLESTEROLEMIA: ICD-10-CM

## 2024-06-20 PROCEDURE — 99213 OFFICE O/P EST LOW 20 MIN: CPT

## 2024-06-20 PROCEDURE — G2211 COMPLEX E/M VISIT ADD ON: HCPCS

## 2024-06-20 RX ORDER — ATORVASTATIN CALCIUM 10 MG/1
10 TABLET, FILM COATED ORAL DAILY
Qty: 90 TABLET | Refills: 0 | Status: SHIPPED | OUTPATIENT
Start: 2024-06-20 | End: 2024-09-18

## 2024-06-20 NOTE — PROGRESS NOTES
Ambulatory Visit  Name: Pat Hurtado      : 1950      MRN: [id number]  Encounter Provider: Hu Crawford MD  Encounter Date: 2024   Encounter department: [facility]    Assessment & Plan   Patient is a 73-year-old female with past medical history of CKD 2, lumbar radiculopathy status postsurgical intervention, mild cognitive impairment, dyslipidemia, who presents today for review of labs and titration of antihypertensive regimen.  Laboratory studies demonstrate improvement in serum cholesterol levels as well as hepatic tolerance of statin therapy at current dosage.  Review of blood pressure demonstrates normotension on losartan 12.5 mg but patient requests escalation to full dose due to not wanting to break pills.  Patient was advised of risks versus benefits, but elected to continue with full dose losartan.  Rest of care as outlined below, return to clinic in 6 months for recheck of chronic health conditions or sooner if needed for problem    1. Essential hypertension  Assessment & Plan:  BP Readings from Last 1 Encounters:   24 130/80     Blood pressure appropriate at office visit today  As per patient, she does not wish to take losartan 12.5 mg because breaking the pill in half is distasteful when she attempts to swallow it.  Patient states her home blood pressures have been elevated, and she states she disagrees with an office blood pressure.  Patient was advised on the risks of hypotension, but still requested to try full dose losartan at 25 mg/day.    Plan:  Questionable adherence with prior regimen, will attempt at 25 mg/day and monitor for signs of hypotension  2. Hypercholesterolemia  Assessment & Plan:  Patient presenting for dyslipidemia follow-up.  Was recently initiated on low intensity statin therapy for ASCVD risk score of 13.6.  Patient has been adequately tolerating medication at this point in time, repeat lipid panel and CMP ordered at visit prior for  review today.  Lipid panel demonstrates significant improvement from prior, cholesterol levels have gone from 239 to 166 consistent with adherence to statin therapy  CMP additionally demonstrates stable liver enzymes, indicating metabolic tolerance of medication    Plan:  Continue with statin therapy at current dosage  Periodic monitoring of LFTs every 6 months  Control of blood pressure and other comorbid conditions  Orders:  -     atorvastatin (LIPITOR) 10 mg tablet; Take 1 tablet (10 mg total) by mouth daily         History of Present Illness     Hypertension  This is a chronic problem. The current episode started more than 1 year ago. The problem has been gradually improving since onset. The problem is controlled. Pertinent negatives include no anxiety, chest pain, palpitations, PND or shortness of breath. There are no associated agents to hypertension. Risk factors for coronary artery disease include dyslipidemia, obesity, post-menopausal state and sedentary lifestyle. Past treatments include angiotensin blockers. The current treatment provides significant improvement. There are no compliance problems.  There is no history of angina, kidney disease, CAD/MI or PVD. There is no history of chronic renal disease, hyperparathyroidism, a hypertension causing med or a thyroid problem.     Review of Systems   Constitutional:  Negative for chills and fever.   HENT:  Negative for ear pain and sore throat.    Eyes:  Negative for pain and visual disturbance.   Respiratory:  Negative for cough and shortness of breath.    Cardiovascular:  Negative for chest pain, palpitations and PND.   Gastrointestinal:  Negative for abdominal pain and vomiting.   Genitourinary:  Negative for dysuria and hematuria.   Musculoskeletal:  Negative for arthralgias, back pain and myalgias.   Skin:  Negative for color change and rash.   Neurological:  Negative for seizures and syncope.   Psychiatric/Behavioral:  Negative for agitation.    All  other systems reviewed and are negative.    Past Medical History:   Diagnosis Date    Anemia     last assessed - [id number]    Anxiety disorder     Arthritis     Asthma     Back pain     Bunion, right foot     last assessed - [id number]    Deformity of toe of left foot     last assessed - 57Irn7825    Discitis of thoracolumbar region     last assessed - 33Lkc1044    Fall     Fibrocystic disease of breast     Fracture of wrist     and hand, left    GERD (gastroesophageal reflux disease)     Head injury     Hypercholesterolemia     Hypokalemia 3/27/2021    Leukopenia     last assessed - 15Hjh2108    Osteopenia     last assessed - 63Wel5597    Polyarthritis     Prediabetes     Seasonal allergies     Shortness of breath     Sleep disturbance     Subclinical hypothyroidism     Toe deformity     last assessed - 87Xed4942    Trochanteric bursitis of left hip     last assessed - 81Yor0651    Vitamin D deficiency     last assessed - 07Kju3867    Vitiligo     last assessed - 30Ybw6934    Wears eyeglasses      Past Surgical History:   Procedure Laterality Date    BACK SURGERY      4 back surgeries, fusion, bone replacement    BACK SURGERY  2012    Lumbar PLIF surgery    CARPAL TUNNEL RELEASE Bilateral      SECTION  1980    CHOLECYSTECTOMY      HAND TENDON SURGERY Bilateral     HERNIA REPAIR      NECK SURGERY  2012    ACDF Surgery    RI COLONOSCOPY FLX DX W/COLLJ SPEC WHEN PFRMD N/A 2019    Procedure: COLONOSCOPY;  Surgeon: Will Perez MD;  [facility];  Service: Gastroenterology    RI ESOPHAGOGASTRODUODENOSCOPY TRANSORAL DIAGNOSTIC N/A 2019    Procedure: ESOPHAGOGASTRODUODENOSCOPY (EGD);  Surgeon: Will Perez MD;  [facility];  Service: Gastroenterology    SPINAL CORD STIMULATOR IMPLANT N/A 2016    Procedure: PLACEMENT OF THORACIC PARASPINAL PERIPHERAL NERVE STIMULATING ELECTODES WITH LEFT BUTTOCK GENERATOR;  Surgeon: Hugo Braun MD;  Location:   [facility] OR;  Service:     ULNAR NERVE REPAIR Bilateral 1989     Family History   Problem Relation Age of Onset    Dementia Mother     Emphysema Father         lung    Lung cancer Father     Other Paternal Grandfather         gangrene    Hypertension Family     Other Family         back trouble    No Known Problems Sister     No Known Problems Daughter     No Known Problems Maternal Grandmother     No Known Problems Maternal Grandfather     No Known Problems Paternal Grandmother     No Known Problems Maternal Aunt     No Known Problems Maternal Aunt     No Known Problems Maternal Aunt     No Known Problems Maternal Aunt     No Known Problems Paternal Aunt     No Known Problems Paternal Aunt     No Known Problems Paternal Aunt     No Known Problems Paternal Aunt      Social History     Tobacco Use    Smoking status: Never    Smokeless tobacco: Never   Vaping Use    Vaping status: Never Used   Substance and Sexual Activity    Alcohol use: Not Currently     Comment: ocassional    Drug use: No    Sexual activity: Not Currently     Partners: Male     Comment: No known STD risk factors; Denied currently sexually active     Current Outpatient Medications on File Prior to Visit   Medication Sig    risperiDONE (RisperDAL) 2 mg tablet TAKE 1 TABLET BY MOUTH AT NIGHT FOR 30 DAYS    traZODone (DESYREL) 50 mg tablet Take 50 mg by mouth daily    Calcium Carbonate (CALCIUM 600 HIGH POTENCY PO) Take 1 tablet by mouth daily (Patient not taking: Reported on 6/20/2024)    Cholecalciferol (Vitamin D) 125 MCG (5000 UT) CAPS Take 1 capsule by mouth daily (Patient not taking: Reported on 6/20/2024)    psyllium (METAMUCIL) packet Take 1 packet by mouth daily for 10 days (Patient not taking: Reported on 6/20/2024)     Allergies   Allergen Reactions    Influenza A (H1n1) Monoval Vac Anaphylaxis    Lyrica [Pregabalin] Swelling    Other Anaphylaxis     FLU SHOT    Acetaminophen Other (See Comments)     GI Upset, \"it upps my triglycerides\"    " "Percocet [Oxycodone-Acetaminophen] GI Intolerance     Immunization History   Administered Date(s) Administered    Tdap 06/04/2014    Tuberculin Skin Test-PPD Intradermal 03/01/2021, 03/15/2022     Objective     /80   Pulse 92   Temp 98 °F (36.7 °C)   Resp 18   Ht 5' 1\" (1.549 m)   Wt 80.7 kg (178 lb)   SpO2 96%   BMI 33.63 kg/m²     Physical Exam  Constitutional:       General: She is not in acute distress.     Appearance: She is obese. She is not ill-appearing.   HENT:      Head: Normocephalic and atraumatic.      Right Ear: External ear normal.      Left Ear: External ear normal.      Nose: Nose normal.      Mouth/Throat:      Mouth: Mucous membranes are moist.      Pharynx: Oropharynx is clear.      Comments: Edentulous  Eyes:      General: No scleral icterus.     Extraocular Movements: Extraocular movements intact.      Conjunctiva/sclera: Conjunctivae normal.   Cardiovascular:      Rate and Rhythm: Normal rate and regular rhythm.      Pulses: Normal pulses.      Heart sounds: Normal heart sounds. No murmur heard.     No friction rub. No gallop.   Pulmonary:      Breath sounds: Normal breath sounds. No wheezing, rhonchi or rales.   Abdominal:      General: Abdomen is flat.      Palpations: Abdomen is soft. There is no mass.      Tenderness: There is no abdominal tenderness. There is no guarding.   Musculoskeletal:      Cervical back: Normal range of motion and neck supple.      Right lower leg: No edema.      Left lower leg: No edema.   Skin:     General: Skin is warm and dry.      Capillary Refill: Capillary refill takes less than 2 seconds.   Neurological:      Mental Status: She is alert. Mental status is at baseline.   Psychiatric:         Mood and Affect: Mood normal.       Administrative Statements         "

## 2024-06-21 DIAGNOSIS — I10 ESSENTIAL HYPERTENSION: ICD-10-CM

## 2024-06-21 NOTE — TELEPHONE ENCOUNTER
Medication: losartan     Dose/Frequency: (COZAAR) 25 mg tablet        Quantity: 15    Pharmacy: [facility] [address] [place] [address] [phone]     Office:   [x] PCP/Provider -   [] Speciality/Provider -     Does the patient have enough for 3 days?   [] Yes   [x] No - Send as HP to POD

## 2024-06-23 RX ORDER — LOSARTAN POTASSIUM 25 MG/1
12.5 TABLET ORAL DAILY
Qty: 15 TABLET | Refills: 0 | Status: SHIPPED | OUTPATIENT
Start: 2024-06-23 | End: 2024-07-23

## 2024-06-25 ENCOUNTER — TELEPHONE (OUTPATIENT)
Age: 74
End: 2024-06-25

## 2024-06-25 NOTE — TELEPHONE ENCOUNTER
Patient called back her BP last night around 8 PM was 211/81 after taking Losartan 25 mg 1/2 tablet.  This morning around 10 AM BP was 186/106.  Confirmed no chest pain, dizziness or headache.  Patient takes the Losartan around 8 PM every night.  She has if she should increase dose?    Thank you

## 2024-06-25 NOTE — TELEPHONE ENCOUNTER
Meseret would like to know if its ok to take a whole pill for her blood pressure instead of the half she was told to take. She would like the her doctor to giver a call back. She mentioned that her BP was high and that today is also high

## 2024-06-25 NOTE — TELEPHONE ENCOUNTER
Please call pt back and advise to increase to full dose 25mg daily and to call office back if BP remains elevated >24 hours after starting full dose. Thank you!

## 2024-06-27 NOTE — TELEPHONE ENCOUNTER
Patient has taken the full dose of her blood pressure meds for the past two nights, however she stated lastnight her b/p was 170/96 and today is it 219/97. She would like to speak with the doctor.

## 2024-07-09 DIAGNOSIS — I10 ESSENTIAL HYPERTENSION: ICD-10-CM

## 2024-07-09 RX ORDER — LOSARTAN POTASSIUM 25 MG/1
25 TABLET ORAL DAILY
Qty: 30 TABLET | Refills: 2 | Status: SHIPPED | OUTPATIENT
Start: 2024-07-09 | End: 2024-10-07

## 2024-07-09 NOTE — TELEPHONE ENCOUNTER
Medication:     losartan (COZAAR)       Dose/Frequency: Take 0.5 tablets (12.5 mg total) by mouth daily     Quantity: 15    Pharmacy: [facility] [address] [place] [address]     Office:   [x] PCP/Provider -   [] Speciality/Provider -     Does the patient have enough for 3 days?   [x] Yes   [] No - Send as HP to POD       Patient is requesting a 3 month supply.

## 2024-08-06 ENCOUNTER — VBI (OUTPATIENT)
Dept: ADMINISTRATIVE | Facility: OTHER | Age: 74
End: 2024-08-06

## 2024-08-06 NOTE — TELEPHONE ENCOUNTER
08/06/24 1:37 PM     Chart reviewed for Mammogram ; nothing is submitted to the patient's insurance at this time.     Paige Kirby   [facility]

## 2024-08-13 NOTE — ASSESSMENT & PLAN NOTE
BP Readings from Last 1 Encounters:   06/20/24 130/80     Blood pressure appropriate at office visit today  As per patient, she does not wish to take losartan 12.5 mg because breaking the pill in half is distasteful when she attempts to swallow it.  Patient states her home blood pressures have been elevated, and she states she disagrees with an office blood pressure.  Patient was advised on the risks of hypotension, but still requested to try full dose losartan at 25 mg/day.    Plan:  Questionable adherence with prior regimen, will attempt at 25 mg/day and monitor for signs of hypotension

## 2024-09-26 DIAGNOSIS — I10 ESSENTIAL HYPERTENSION: ICD-10-CM

## 2024-09-26 DIAGNOSIS — E78.00 HYPERCHOLESTEROLEMIA: ICD-10-CM

## 2024-09-26 RX ORDER — ATORVASTATIN CALCIUM 10 MG/1
10 TABLET, FILM COATED ORAL DAILY
Qty: 90 TABLET | Refills: 1 | Status: SHIPPED | OUTPATIENT
Start: 2024-09-26 | End: 2025-03-25

## 2024-09-26 RX ORDER — LOSARTAN POTASSIUM 25 MG/1
25 TABLET ORAL DAILY
Qty: 90 TABLET | Refills: 1 | Status: SHIPPED | OUTPATIENT
Start: 2024-09-26 | End: 2025-03-25

## 2024-09-26 NOTE — TELEPHONE ENCOUNTER
Reason for call:   [x] Refill   [] Prior Auth  [] Other:     Office:   [x] PCP/Provider - Hu Crawford  [] Specialty/Provider -     Medication: Atorvastatin  Dose/Frequency: 10 mg Daily   Quantity: 90    Medication: Losartan  Dose/Frequency: 25 mg Daily   Quantity: 30    Pharmacy: [facility]    Does the patient have enough for 3 days?   [] Yes   [x] No - Send as HP to POD

## 2024-11-13 ENCOUNTER — VBI (OUTPATIENT)
Dept: ADMINISTRATIVE | Facility: OTHER | Age: 74
End: 2024-11-13

## 2024-11-14 NOTE — TELEPHONE ENCOUNTER
11/13/24 7:42 PM     Chart reviewed for Hemoglobin A1c and Immunization(s) blood pressure  was/were submitted to the patient's insurance.     Paige Kirby   [facility]

## 2024-11-20 ENCOUNTER — TELEPHONE (OUTPATIENT)
Age: 74
End: 2024-11-20

## 2024-11-20 NOTE — TELEPHONE ENCOUNTER
JOSIANE FROM [employer] [facility] CALLED TO CONFIRM [facility] [phone] AND WILL BE FAXING PW TO THE OFFICE IF SHE CANNOT MAKE CONTACT WITH DR FRITZ, PT'S SURGEON, ELSEWHERE.

## 2024-11-22 ENCOUNTER — TELEPHONE (OUTPATIENT)
Age: 74
End: 2024-11-22

## 2024-11-22 ENCOUNTER — OFFICE VISIT (OUTPATIENT)
Age: 74
End: 2024-11-22
Payer: COMMERCIAL

## 2024-11-22 VITALS
DIASTOLIC BLOOD PRESSURE: 78 MMHG | OXYGEN SATURATION: 97 % | WEIGHT: 184.8 LBS | SYSTOLIC BLOOD PRESSURE: 112 MMHG | BODY MASS INDEX: 34.89 KG/M2 | HEART RATE: 92 BPM | HEIGHT: 61 IN

## 2024-11-22 DIAGNOSIS — I10 ESSENTIAL HYPERTENSION: ICD-10-CM

## 2024-11-22 DIAGNOSIS — F39 MOOD DISORDER WITH PSYCHOSIS (HCC): ICD-10-CM

## 2024-11-22 DIAGNOSIS — M85.832 OSTEOPENIA OF LEFT FOREARM: ICD-10-CM

## 2024-11-22 DIAGNOSIS — J45.31 MILD PERSISTENT ASTHMA WITH ACUTE EXACERBATION: ICD-10-CM

## 2024-11-22 DIAGNOSIS — N18.2 CKD (CHRONIC KIDNEY DISEASE) STAGE 2, GFR 60-89 ML/MIN: Primary | ICD-10-CM

## 2024-11-22 DIAGNOSIS — R06.02 SOB (SHORTNESS OF BREATH): ICD-10-CM

## 2024-11-22 PROCEDURE — G2211 COMPLEX E/M VISIT ADD ON: HCPCS

## 2024-11-22 PROCEDURE — 99213 OFFICE O/P EST LOW 20 MIN: CPT

## 2024-11-22 RX ORDER — B-COMPLEX WITH VITAMIN C
1 TABLET ORAL
Qty: 30 TABLET | Refills: 0 | Status: SHIPPED | OUTPATIENT
Start: 2024-11-22 | End: 2024-12-22

## 2024-11-22 NOTE — ASSESSMENT & PLAN NOTE
Mood stable at this point in time, denies psychosis or other similar features   Continue trazodone and risperdal at current dosing, can consider dose reduction if GFR continues to drop

## 2024-11-22 NOTE — PROGRESS NOTES
Name: Pat Hurtado      : 1950      MRN: [id number]  Encounter Provider: Hu Crawford MD  Encounter Date: 2024   Encounter department: [facility]    Assessment & Plan  CKD (chronic kidney disease) stage 2, GFR 60-89 ml/min  Lab Results   Component Value Date    EGFR 57 2024    EGFR 58 2024    EGFR 60 2024    CREATININE 0.98 2024    CREATININE 0.98 2024    CREATININE 0.95 2024     Creatinine clearance as per last labs of 68 mL/min    Baseline creatinine of approximately 1, renal function currently stable and patient tolerating losartan 25 mg daily with appropriate control of blood pressure  Will continue losartan 25 mg, close monitoring of renal function with dose reduction in Risperdal if creatinine clearance reaches or drops below 60    Orders:    Basic metabolic panel; Future    Magnesium; Future    Mood disorder with psychosis (HCC)  Mood stable at this point in time, denies psychosis or other similar features   Continue trazodone and risperdal at current dosing, can consider dose reduction if GFR continues to drop         Essential hypertension  BP Readings from Last 1 Encounters:   24 112/78       Blood pressure appropriate at office visit today, at goal of less than 140 over less than 90    Plan:  Patient tolerating 25 mg losartan daily with adequate renal function, will continue with this dose with close monitoring of BP and renal function    Orders:    Basic metabolic panel; Future    Magnesium; Future    Mild persistent asthma with acute exacerbation  Stable, no issues at this time.  PFT in  demonstrated no response to bronchodilator patient is not currently on any inhaled medication.  Uncertain if this is an accurate diagnosis, if shortness of breath persist can consider bronchoprovocation studies but will continue to monitor off of inhalers or other medications at this point in time         BMI 34.0-34.9,adult  Wt  Readings from Last 3 Encounters:   12/27/24 83.8 kg (184 lb 11.9 oz)   11/22/24 83.8 kg (184 lb 12.8 oz)   06/20/24 80.7 kg (178 lb)       Patient advised on lifestyle management strategies such as increasing exercise, incorporating more beans, nuts, whole grains, fruits, vegetables into diet, limiting processed foods.  Patient endorses understanding of the strategies, states she has not been rigorous with implementation since last visit    Orders:    Ambulatory Referral to Weight Management; Future    SOB (shortness of breath)  No hx of smoking, asthma, HTN, or heart disease. No signs of heart failure on physical exam.  PFTs performed in 2021 were mostly within normal limits however there were some signs of air trapping of uncertain etiology at this point in time    Plan:  Will order echo at this point in time to rule out cardiac cause  Likely some element of chronic deconditioning, patient advised to attempt to increase exercise as able    Orders:    Echo complete w/ contrast if indicated; Future    Osteopenia of left forearm  Based on DEXA scan in 2021, mild osteopenia of left forearm not significantly changed from prior studies.  Due for repeat DEXA in approximately 2025       History of Present Illness     Hypertension  This is a chronic problem. The current episode started more than 1 year ago. The problem has been gradually improving since onset. The problem is controlled. Associated symptoms include shortness of breath. Pertinent negatives include no anxiety, chest pain, palpitations or PND. There are no associated agents to hypertension. Risk factors for coronary artery disease include dyslipidemia, obesity, post-menopausal state and sedentary lifestyle. Past treatments include angiotensin blockers. The current treatment provides significant improvement. There are no compliance problems.  There is no history of angina, kidney disease, CAD/MI or PVD. There is no history of chronic renal disease,  hyperparathyroidism, a hypertension causing med or a thyroid problem.     Review of Systems   Constitutional:  Negative for chills and fever.   HENT:  Negative for ear pain and sore throat.    Eyes:  Negative for pain and visual disturbance.   Respiratory:  Positive for shortness of breath. Negative for cough.    Cardiovascular:  Negative for chest pain, palpitations and PND.   Gastrointestinal:  Negative for abdominal pain and vomiting.   Genitourinary:  Negative for dysuria and hematuria.   Musculoskeletal:  Negative for arthralgias, back pain and myalgias.   Skin:  Negative for color change and rash.   Neurological:  Negative for seizures and syncope.   Psychiatric/Behavioral:  Negative for agitation.    All other systems reviewed and are negative.    Past Medical History:   Diagnosis Date    Anemia     last assessed - [id number]    Anxiety disorder     Arthritis     Asthma     Back pain     Bunion, right foot     last assessed - [id number]    Deformity of toe of left foot     last assessed - [id number]    Discitis of thoracolumbar region     last assessed - [id number]    Fall     Fibrocystic disease of breast     Fracture of wrist     and hand, left    GERD (gastroesophageal reflux disease)     Head injury     Hypercholesterolemia     Hypokalemia 3/27/2021    Leukopenia     last assessed - [id number]    Osteopenia     last assessed - [id number]    Polyarthritis     Prediabetes     Seasonal allergies     Shortness of breath     Sleep disturbance     Subclinical hypothyroidism     Toe deformity     last assessed - [id number]    Trochanteric bursitis of left hip     last assessed - 58Hse2341    Vitamin D deficiency     last assessed - 17Xjq0209    Vitiligo     last assessed - 52Flt8031    Wears eyeglasses      Past Surgical History:   Procedure Laterality Date    BACK SURGERY      4 back surgeries, fusion, bone replacement    BACK SURGERY  03/06/2012    Lumbar PLIF surgery    CARPAL TUNNEL RELEASE Bilateral 1989      SECTION  1980    CHOLECYSTECTOMY  1997    HAND TENDON SURGERY Bilateral     HERNIA REPAIR      NECK SURGERY  2012    ACDF Surgery    TN COLONOSCOPY FLX DX W/COLLJ SPEC WHEN PFRMD N/A 2019    Procedure: COLONOSCOPY;  Surgeon: Will Perez MD;  Location: [facility];  Service: Gastroenterology    TN ESOPHAGOGASTRODUODENOSCOPY TRANSORAL DIAGNOSTIC N/A 2019    Procedure: ESOPHAGOGASTRODUODENOSCOPY (EGD);  Surgeon: Will Perez MD;  Location: [facility];  Service: Gastroenterology    SPINAL CORD STIMULATOR IMPLANT N/A 2016    Procedure: PLACEMENT OF THORACIC PARASPINAL PERIPHERAL NERVE STIMULATING ELECTODES WITH LEFT BUTTOCK GENERATOR;  Surgeon: Hugo Braun MD;  Location: [facility];  Service:     ULNAR NERVE REPAIR Bilateral      Family History   Problem Relation Age of Onset    Dementia Mother     Emphysema Father         lung    Lung cancer Father     Other Paternal Grandfather         gangrene    Hypertension Family     Other Family         back trouble    No Known Problems Sister     No Known Problems Daughter     No Known Problems Maternal Grandmother     No Known Problems Maternal Grandfather     No Known Problems Paternal Grandmother     No Known Problems Maternal Aunt     No Known Problems Maternal Aunt     No Known Problems Maternal Aunt     No Known Problems Maternal Aunt     No Known Problems Paternal Aunt     No Known Problems Paternal Aunt     No Known Problems Paternal Aunt     No Known Problems Paternal Aunt      Social History     Tobacco Use    Smoking status: Never    Smokeless tobacco: Never   Vaping Use    Vaping status: Never Used   Substance and Sexual Activity    Alcohol use: Not Currently     Comment: ocassional    Drug use: No    Sexual activity: Not Currently     Partners: Male     Comment: No known STD risk factors; Denied currently sexually active     Current Outpatient Medications on File Prior to Visit   Medication Sig    atorvastatin  "(LIPITOR) 10 mg tablet Take 1 tablet (10 mg total) by mouth daily    losartan (COZAAR) 25 mg tablet Take 1 tablet (25 mg total) by mouth daily    risperiDONE (RisperDAL) 2 mg tablet TAKE 1 TABLET BY MOUTH AT NIGHT FOR 30 DAYS    traZODone (DESYREL) 50 mg tablet Take 50 mg by mouth daily    Calcium Carbonate (CALCIUM 600 HIGH POTENCY PO) Take 1 tablet by mouth daily (Patient not taking: Reported on 11/22/2024)    Cholecalciferol (Vitamin D) 125 MCG (5000 UT) CAPS Take 1 capsule by mouth daily (Patient not taking: Reported on 6/20/2024)    psyllium (METAMUCIL) packet Take 1 packet by mouth daily for 10 days (Patient not taking: Reported on 11/22/2024)     Allergies   Allergen Reactions    Influenza A (H1n1) Monoval Vac Anaphylaxis    Lyrica [Pregabalin] Swelling    Other Anaphylaxis     FLU SHOT    Acetaminophen Other (See Comments)     GI Upset, \"it upps my triglycerides\"    Percocet [Oxycodone-Acetaminophen] GI Intolerance     Immunization History   Administered Date(s) Administered    Tdap 06/04/2014    Tuberculin Skin Test-PPD Intradermal 03/01/2021, 03/15/2022     Objective   /78   Pulse 92   Ht 5' 1\" (1.549 m)   Wt 83.8 kg (184 lb 12.8 oz)   SpO2 97%   BMI 34.92 kg/m²     Physical Exam  Constitutional:       General: She is not in acute distress.     Appearance: She is obese. She is not ill-appearing.   HENT:      Head: Normocephalic and atraumatic.      Right Ear: External ear normal.      Left Ear: External ear normal.      Nose: Nose normal.      Mouth/Throat:      Mouth: Mucous membranes are moist.      Pharynx: Oropharynx is clear.      Comments: Edentulous  Eyes:      General: No scleral icterus.     Extraocular Movements: Extraocular movements intact.      Conjunctiva/sclera: Conjunctivae normal.   Cardiovascular:      Rate and Rhythm: Normal rate and regular rhythm.      Pulses: Normal pulses.      Heart sounds: Normal heart sounds. No murmur heard.     No friction rub. No gallop.   Pulmonary: "      Breath sounds: Normal breath sounds. No wheezing, rhonchi or rales.   Abdominal:      General: Abdomen is flat.      Palpations: Abdomen is soft. There is no mass.      Tenderness: There is no abdominal tenderness. There is no guarding.   Musculoskeletal:      Cervical back: Normal range of motion and neck supple.      Right lower leg: No edema.      Left lower leg: No edema.   Skin:     General: Skin is warm and dry.      Capillary Refill: Capillary refill takes less than 2 seconds.   Neurological:      Mental Status: She is alert. Mental status is at baseline.   Psychiatric:         Mood and Affect: Mood normal.

## 2024-11-22 NOTE — TELEPHONE ENCOUNTER
Patient called in to schedule due to referral, patient stated that both her and her  would like to be seen at the same day/time with Yolande Ravi. I offer patient appointment for January 24, 8:15 and 9:15. Patient stated that was too early. I offer her January 29 at 12:45 for one appointment she agreed to that time but wanted her  to be seen at the same time. I advise patient I would need another slot for her  appointment so it would have to be another day. She got upset and stated forget it, never mind.     If there is anyway both patient and her  can be seen on the same day back to back time(late in the day), please call patient and let her know. Thank you

## 2024-11-22 NOTE — ASSESSMENT & PLAN NOTE
Stable, no issues at this time.  PFT in 2021 demonstrated no response to bronchodilator patient is not currently on any inhaled medication.  Uncertain if this is an accurate diagnosis, if shortness of breath persist can consider bronchoprovocation studies but will continue to monitor off of inhalers or other medications at this point in time

## 2024-11-25 ENCOUNTER — APPOINTMENT (OUTPATIENT)
Dept: LAB | Facility: CLINIC | Age: 74
End: 2024-11-25
Payer: COMMERCIAL

## 2024-11-25 DIAGNOSIS — I10 ESSENTIAL HYPERTENSION: ICD-10-CM

## 2024-11-25 DIAGNOSIS — N18.2 CKD (CHRONIC KIDNEY DISEASE) STAGE 2, GFR 60-89 ML/MIN: ICD-10-CM

## 2024-11-25 LAB
ANION GAP SERPL CALCULATED.3IONS-SCNC: 7 MMOL/L (ref 4–13)
BUN SERPL-MCNC: 11 MG/DL (ref 5–25)
CALCIUM SERPL-MCNC: 9.5 MG/DL (ref 8.4–10.2)
CHLORIDE SERPL-SCNC: 104 MMOL/L (ref 96–108)
CO2 SERPL-SCNC: 28 MMOL/L (ref 21–32)
CREAT SERPL-MCNC: 0.98 MG/DL (ref 0.6–1.3)
GFR SERPL CREATININE-BSD FRML MDRD: 57 ML/MIN/1.73SQ M
GLUCOSE P FAST SERPL-MCNC: 96 MG/DL (ref 65–99)
MAGNESIUM SERPL-MCNC: 2.2 MG/DL (ref 1.9–2.7)
POTASSIUM SERPL-SCNC: 4.2 MMOL/L (ref 3.5–5.3)
SODIUM SERPL-SCNC: 139 MMOL/L (ref 135–147)

## 2024-11-25 PROCEDURE — 80048 BASIC METABOLIC PNL TOTAL CA: CPT

## 2024-11-25 PROCEDURE — 36415 COLL VENOUS BLD VENIPUNCTURE: CPT

## 2024-11-25 PROCEDURE — 83735 ASSAY OF MAGNESIUM: CPT

## 2024-11-25 NOTE — TELEPHONE ENCOUNTER
Left message on machine offering patient appointment for her and her  with Yolande for Feb. 4 at 12:45 and 1:45pm.   Asked patient to return call.

## 2024-12-02 ENCOUNTER — RESULTS FOLLOW-UP (OUTPATIENT)
Age: 74
End: 2024-12-02

## 2024-12-03 ENCOUNTER — TELEPHONE (OUTPATIENT)
Dept: NEUROSURGERY | Facility: CLINIC | Age: 74
End: 2024-12-03

## 2024-12-03 NOTE — TELEPHONE ENCOUNTER
12/3/24 Fax received from [employer] regarding forms to be completed this was Dr. oCles patient as per Maria Isabel kennedy to give to Alexa to complete and sign. Placed on [employer] desk.

## 2024-12-19 DIAGNOSIS — I10 ESSENTIAL HYPERTENSION: ICD-10-CM

## 2024-12-19 DIAGNOSIS — E78.00 HYPERCHOLESTEROLEMIA: ICD-10-CM

## 2024-12-19 NOTE — TELEPHONE ENCOUNTER
Reason for call: Hu Crawford manage these medications!  Patient has an appointment with Dr Hu Crawford on 05/22/2025! No refills left per Patient!    [x] Refill   [] Prior Auth  [] Other:     Office:   [x] PCP/Provider -   [] Specialty/Provider -     Medication:     atorvastatin (LIPITOR) 10 mg tablet       Dose/Frequency: : Take 1 tablet (10 mg total) by mouth daily,     Quantity: 90 tablet       Medication:    losartan (COZAAR) 25 mg tablet     Dose/Frequency:Take 1 tablet (25 mg total) by mouth daily,     Quantity:90 tablet     Pharmacy: [address] [phone]     Does the patient have enough for 3 days?   [x] Yes   [] No - Send as HP to POD

## 2024-12-20 RX ORDER — ATORVASTATIN CALCIUM 10 MG/1
10 TABLET, FILM COATED ORAL DAILY
Qty: 90 TABLET | Refills: 1 | Status: SHIPPED | OUTPATIENT
Start: 2024-12-20 | End: 2025-06-18

## 2024-12-20 RX ORDER — LOSARTAN POTASSIUM 25 MG/1
25 TABLET ORAL DAILY
Qty: 90 TABLET | Refills: 1 | Status: SHIPPED | OUTPATIENT
Start: 2024-12-20 | End: 2025-06-18

## 2024-12-27 ENCOUNTER — HOSPITAL ENCOUNTER (OUTPATIENT)
Dept: NON INVASIVE DIAGNOSTICS | Facility: CLINIC | Age: 74
Discharge: HOME | End: 2024-12-27
Payer: COMMERCIAL

## 2024-12-27 VITALS
SYSTOLIC BLOOD PRESSURE: 112 MMHG | HEART RATE: 79 BPM | BODY MASS INDEX: 34.88 KG/M2 | HEIGHT: 61 IN | WEIGHT: 184.75 LBS | DIASTOLIC BLOOD PRESSURE: 78 MMHG

## 2024-12-27 DIAGNOSIS — R06.02 SOB (SHORTNESS OF BREATH): ICD-10-CM

## 2024-12-27 LAB
AORTIC ROOT: 2.5 CM
APICAL FOUR CHAMBER EJECTION FRACTION: 61 %
ASCENDING AORTA: 2.9 CM
BSA FOR ECHO PROCEDURE: 1.83 M2
E WAVE DECELERATION TIME: 136 MS
E/A RATIO: 1.1
FRACTIONAL SHORTENING: 38 (ref 28–44)
INTERVENTRICULAR SEPTUM IN DIASTOLE (PARASTERNAL SHORT AXIS VIEW): 1.1 CM
INTERVENTRICULAR SEPTUM: 1.1 CM (ref 0.6–1.1)
LAAS-AP2: 13.4 CM2
LAAS-AP4: 17.1 CM2
LEFT ATRIUM AREA SYSTOLE SINGLE PLANE A4C: 16.5 CM2
LEFT ATRIUM SIZE: 2.7 CM
LEFT ATRIUM VOLUME (MOD BIPLANE): 44 ML
LEFT ATRIUM VOLUME INDEX (MOD BIPLANE): 24 ML/M2
LEFT INTERNAL DIMENSION IN SYSTOLE: 1.8 CM (ref 2.1–4)
LEFT VENTRICULAR INTERNAL DIMENSION IN DIASTOLE: 2.9 CM (ref 3.5–6)
LEFT VENTRICULAR POSTERIOR WALL IN END DIASTOLE: 1 CM
LEFT VENTRICULAR STROKE VOLUME: 21 ML
LVSV (TEICH): 21 ML
MV PEAK A VEL: 0.61 M/S
MV PEAK E VEL: 67 CM/S
MV STENOSIS PRESSURE HALF TIME: 39 MS
MV VALVE AREA P 1/2 METHOD: 5.64
RIGHT ATRIUM AREA SYSTOLE A4C: 12.2 CM2
RIGHT VENTRICLE ID DIMENSION: 2.9 CM
SL CV LEFT ATRIUM LENGTH A2C: 4.1 CM
SL CV LV EF: 65
SL CV PED ECHO LEFT VENTRICLE DIASTOLIC VOLUME (MOD BIPLANE) 2D: 31 ML
SL CV PED ECHO LEFT VENTRICLE SYSTOLIC VOLUME (MOD BIPLANE) 2D: 9 ML
TRICUSPID ANNULAR PLANE SYSTOLIC EXCURSION: 1.9 CM

## 2024-12-27 PROCEDURE — 93306 TTE W/DOPPLER COMPLETE: CPT

## 2024-12-27 PROCEDURE — 93306 TTE W/DOPPLER COMPLETE: CPT | Performed by: INTERNAL MEDICINE

## 2024-12-31 NOTE — ASSESSMENT & PLAN NOTE
Based on DEXA scan in 2021, mild osteopenia of left forearm not significantly changed from prior studies.  Due for repeat DEXA in approximately 2025

## 2024-12-31 NOTE — ASSESSMENT & PLAN NOTE
Lab Results   Component Value Date    EGFR 57 11/25/2024    EGFR 58 06/07/2024    EGFR 60 04/13/2024    CREATININE 0.98 11/25/2024    CREATININE 0.98 06/07/2024    CREATININE 0.95 04/13/2024     Creatinine clearance as per last labs of 68 mL/min    Baseline creatinine of approximately 1, renal function currently stable and patient tolerating losartan 25 mg daily with appropriate control of blood pressure  Will continue losartan 25 mg, close monitoring of renal function with dose reduction in Risperdal if creatinine clearance reaches or drops below 60    Orders:    Basic metabolic panel; Future    Magnesium; Future

## 2024-12-31 NOTE — ASSESSMENT & PLAN NOTE
No hx of smoking, asthma, HTN, or heart disease. No signs of heart failure on physical exam.  PFTs performed in 2021 were mostly within normal limits however there were some signs of air trapping of uncertain etiology at this point in time    Plan:  Will order echo at this point in time to rule out cardiac cause  Likely some element of chronic deconditioning, patient advised to attempt to increase exercise as able    Orders:    Echo complete w/ contrast if indicated; Future

## 2024-12-31 NOTE — ASSESSMENT & PLAN NOTE
BP Readings from Last 1 Encounters:   12/27/24 112/78       Blood pressure appropriate at office visit today, at goal of less than 140 over less than 90    Plan:  Patient tolerating 25 mg losartan daily with adequate renal function, will continue with this dose with close monitoring of BP and renal function    Orders:    Basic metabolic panel; Future    Magnesium; Future

## 2024-12-31 NOTE — ASSESSMENT & PLAN NOTE
Wt Readings from Last 3 Encounters:   12/27/24 83.8 kg (184 lb 11.9 oz)   11/22/24 83.8 kg (184 lb 12.8 oz)   06/20/24 80.7 kg (178 lb)       Patient advised on lifestyle management strategies such as increasing exercise, incorporating more beans, nuts, whole grains, fruits, vegetables into diet, limiting processed foods.  Patient endorses understanding of the strategies, states she has not been rigorous with implementation since last visit    Orders:    Ambulatory Referral to Weight Management; Future

## 2025-01-06 ENCOUNTER — OFFICE VISIT (OUTPATIENT)
Age: 75
End: 2025-01-06
Payer: COMMERCIAL

## 2025-01-06 VITALS
TEMPERATURE: 98.8 F | WEIGHT: 185.8 LBS | HEART RATE: 117 BPM | BODY MASS INDEX: 35.08 KG/M2 | SYSTOLIC BLOOD PRESSURE: 130 MMHG | OXYGEN SATURATION: 99 % | HEIGHT: 61 IN | DIASTOLIC BLOOD PRESSURE: 64 MMHG

## 2025-01-06 DIAGNOSIS — I10 ESSENTIAL HYPERTENSION: ICD-10-CM

## 2025-01-06 DIAGNOSIS — K21.9 GASTROESOPHAGEAL REFLUX DISEASE, UNSPECIFIED WHETHER ESOPHAGITIS PRESENT: Primary | ICD-10-CM

## 2025-01-06 DIAGNOSIS — J45.31 MILD PERSISTENT ASTHMA WITH ACUTE EXACERBATION: ICD-10-CM

## 2025-01-06 DIAGNOSIS — R10.13 EPIGASTRIC PAIN: ICD-10-CM

## 2025-01-06 DIAGNOSIS — R06.02 SOB (SHORTNESS OF BREATH): ICD-10-CM

## 2025-01-06 DIAGNOSIS — R05.8 DRY COUGH: ICD-10-CM

## 2025-01-06 PROCEDURE — 99213 OFFICE O/P EST LOW 20 MIN: CPT

## 2025-01-06 PROCEDURE — G2211 COMPLEX E/M VISIT ADD ON: HCPCS

## 2025-01-06 RX ORDER — FAMOTIDINE 20 MG/1
20 TABLET, FILM COATED ORAL DAILY
Qty: 90 TABLET | Refills: 0 | Status: SHIPPED | OUTPATIENT
Start: 2025-01-06 | End: 2025-04-06

## 2025-01-06 RX ORDER — FAMOTIDINE 20 MG/1
20 TABLET, FILM COATED ORAL DAILY
Qty: 90 TABLET | Refills: 0 | Status: SHIPPED | OUTPATIENT
Start: 2025-01-06 | End: 2025-01-06

## 2025-01-06 RX ORDER — ALBUTEROL SULFATE 90 UG/1
2 INHALANT RESPIRATORY (INHALATION) EVERY 6 HOURS PRN
Qty: 18 G | Refills: 1 | Status: SHIPPED | OUTPATIENT
Start: 2025-01-06

## 2025-01-06 NOTE — ASSESSMENT & PLAN NOTE
Patient has history of mild asthma however this has not been demonstrated on most recent PFT in 2021.  Patient does endorse progressive breathlessness at this point in time, with differential including obesity hypoventilation versus deconditioning versus asthma versus GERD with aspiration    Plan:  Patient recently underwent cardiac workup with EKG and echo both of which were within normal limits  Will rule out pulmonary pathology at this point in time, repeat PFT ordered  Will trial albuterol as needed  VBG and BMP ordered to assess for possible hypercapnic state  Patient may benefit from sleep study in future, however sleep apnea lower on list of differential  Orders:    Complete PFT with post bronchodilator; Future    albuterol (Ventolin HFA) 90 mcg/act inhaler; Inhale 2 puffs every 6 (six) hours as needed for wheezing    Blood gas, venous; Future

## 2025-01-06 NOTE — ASSESSMENT & PLAN NOTE
See problem of mild persistent asthma  Orders:    Complete PFT with post bronchodilator; Future    CBC and differential; Future    Basic metabolic panel; Future

## 2025-01-06 NOTE — PROGRESS NOTES
Name: Pat Hurtado      : 1950      MRN: [id number]  Encounter Provider: Hu Crawford MD  Encounter Date: 2025   [facility]  :  Assessment & Plan  Gastroesophageal reflux disease, unspecified whether esophagitis present  Patient endorses epigastric pain as well as epigastric tenderness to palpation ongoing in the last several months.  Patient states his pain has not significantly changed with eating, bowel movements, or other similar interventions.  Patient additionally endorses dry cough as well as occasional sour taste in her mouth    Physical exam demonstrates mild tenderness to palpation of epigastric region, rest of abdomen within normal limits    Plan:  Differential at this time of GERD versus sliding hiatal hernia versus peptic ulcer  Will obtain CT abdomen without contrast to assess for presence of hiatal hernia versus other abdominal pathology  Will treat symptomatically with trial of Pepcid 20 mg daily, can escalate versus switch to PPI at upcoming appointments if necessary  Referral to gastroenterology, appreciate input  Return to clinic in 2 months for reevaluation of symptoms and review of specialist recommendations last imaging    Orders:    Ambulatory Referral to Gastroenterology; Future    CBC and differential; Future    Basic metabolic panel; Future    famotidine (PEPCID) 20 mg tablet; Take 1 tablet (20 mg total) by mouth daily    Mild persistent asthma with acute exacerbation  Patient has history of mild asthma however this has not been demonstrated on most recent PFT in .  Patient does endorse progressive breathlessness at this point in time, with differential including obesity hypoventilation versus deconditioning versus asthma versus GERD with aspiration    Plan:  Patient recently underwent cardiac workup with EKG and echo both of which were within normal limits  Will rule out pulmonary pathology at this point in time, repeat  PFT ordered  Will trial albuterol as needed  VBG and BMP ordered to assess for possible hypercapnic state  Patient may benefit from sleep study in future, however sleep apnea lower on list of differential  Orders:    Complete PFT with post bronchodilator; Future    albuterol (Ventolin HFA) 90 mcg/act inhaler; Inhale 2 puffs every 6 (six) hours as needed for wheezing    Blood gas, venous; Future    SOB (shortness of breath)  See problem of mild persistent asthma  Orders:    Complete PFT with post bronchodilator; Future    CBC and differential; Future    Basic metabolic panel; Future    Essential hypertension  BP Readings from Last 1 Encounters:   01/06/25 130/64     Blood pressure currently well-controlled, at goal of less than 140 over less than 90  Continue with losartan 25 mg daily  Orders:    CBC and differential; Future    Basic metabolic panel; Future    Epigastric pain  See principal problem of GERD for more information  Orders:    CT abdomen pelvis wo contrast; Future    Ambulatory Referral to Gastroenterology; Future    CBC and differential; Future    Dry cough  Patient additionally endorses dry cough, uncertain etiology at this point in time however attributable possibly to asthma versus GERD versus other pulmonary pathology.  Orders:    CBC and differential; Future           History of Present Illness     Ms. Hurtado is a 74 y.o.female with PMHx of CKD and GERD that presents to the clinic for abdominal tenderness in the center as well as chronic pain to her flank. Epigastric abdominal pain doesn't seem similar to GERD per Pt.   Pt also mentions cough and shortness of breath - states that walking from the clinic to parking lot gets her out of breath as well as cleaning car windows. Cough is nonproductive. Mentions no wheezing occurs except at night. [place] and that walking gets out of her breath since summer. Used to be on the treadmill for 15-20 minutes - can no longer tolerate  "it. Denies chest pain      Review of Systems   Constitutional: Negative.  Negative for chills, fever and unexpected weight change.   HENT:  Positive for congestion and postnasal drip. Negative for ear pain, sinus pain, sore throat and tinnitus.    Eyes:  Negative for itching.        Uses anti-itch medicine and it helps   Respiratory:  Positive for cough and shortness of breath. Negative for chest tightness, wheezing and stridor.         SOB per walking/exertion   Cardiovascular: Negative.  Negative for chest pain, palpitations and leg swelling.   Gastrointestinal:  Positive for abdominal pain and constipation. Negative for blood in stool, diarrhea, nausea and vomiting.        Epigastric as well as flank pain (chronic)  Every once in a while becomes constipated - mentions it is the case today 01/06/25 - takes some metamucil for it   Endocrine: Negative.  Negative for cold intolerance and heat intolerance.   Genitourinary:  Positive for flank pain. Negative for difficulty urinating.        Chronic (secondary to CKD)   Musculoskeletal:  Positive for arthralgias and myalgias. Negative for back pain, gait problem, joint swelling, neck pain and neck stiffness.        Pain in muscles and shoulders   Skin: Negative.  Negative for color change and rash.   Allergic/Immunologic:        Onions and egg allergy   Neurological: Negative.  Negative for dizziness, tremors, weakness and headaches.   Hematological: Negative.  Does not bruise/bleed easily.   Psychiatric/Behavioral:  Positive for sleep disturbance. Negative for confusion and decreased concentration.         Arthralgias and coughing spell cause sleep disturbances       Objective   /64   Pulse (!) 117   Temp 98.8 °F (37.1 °C)   Ht 5' 1\" (1.549 m)   Wt 84.3 kg (185 lb 12.8 oz)   SpO2 99%   BMI 35.11 kg/m²      Physical Exam  Vitals reviewed.   Constitutional:       General: She is not in acute distress.     Appearance: Normal appearance.   HENT:      Head: " Normocephalic.      Right Ear: External ear normal.      Left Ear: External ear normal.      Nose: Nose normal.      Mouth/Throat:      Mouth: Mucous membranes are moist.   Eyes:      Conjunctiva/sclera: Conjunctivae normal.   Cardiovascular:      Rate and Rhythm: Normal rate and regular rhythm.      Pulses: Normal pulses.      Heart sounds: Normal heart sounds. No murmur heard.  Pulmonary:      Effort: Pulmonary effort is normal.      Breath sounds: Normal breath sounds. No wheezing.   Abdominal:      Palpations: Abdomen is soft. There is no mass.      Tenderness: There is abdominal tenderness. There is no rebound.      Comments: Tenderness in epigastric region   Musculoskeletal:      Cervical back: Normal range of motion.      Right lower leg: No edema.      Left lower leg: No edema.   Skin:     Capillary Refill: Capillary refill takes less than 2 seconds.      Coloration: Skin is not jaundiced.   Neurological:      Mental Status: She is alert. Mental status is at baseline.   Psychiatric:         Mood and Affect: Mood normal.         Behavior: Behavior normal.

## 2025-01-06 NOTE — ASSESSMENT & PLAN NOTE
BP Readings from Last 1 Encounters:   01/06/25 130/64     Blood pressure currently well-controlled, at goal of less than 140 over less than 90  Continue with losartan 25 mg daily  Orders:    CBC and differential; Future    Basic metabolic panel; Future

## 2025-01-06 NOTE — ASSESSMENT & PLAN NOTE
Patient endorses epigastric pain as well as epigastric tenderness to palpation ongoing in the last several months.  Patient states his pain has not significantly changed with eating, bowel movements, or other similar interventions.  Patient additionally endorses dry cough as well as occasional sour taste in her mouth    Physical exam demonstrates mild tenderness to palpation of epigastric region, rest of abdomen within normal limits    Plan:  Differential at this time of GERD versus sliding hiatal hernia versus peptic ulcer  Will obtain CT abdomen without contrast to assess for presence of hiatal hernia versus other abdominal pathology  Will treat symptomatically with trial of Pepcid 20 mg daily, can escalate versus switch to PPI at upcoming appointments if necessary  Referral to gastroenterology, appreciate input  Return to clinic in 2 months for reevaluation of symptoms and review of specialist recommendations last imaging    Orders:    Ambulatory Referral to Gastroenterology; Future    CBC and differential; Future    Basic metabolic panel; Future    famotidine (PEPCID) 20 mg tablet; Take 1 tablet (20 mg total) by mouth daily

## 2025-01-13 ENCOUNTER — VBI (OUTPATIENT)
Dept: ADMINISTRATIVE | Facility: OTHER | Age: 75
End: 2025-01-13

## 2025-01-13 NOTE — TELEPHONE ENCOUNTER
01/13/25 9:55 AM     Chart reviewed for Mammogram ; nothing is submitted to the patient's insurance at this time.     Paige Kirby   [facility]

## 2025-01-15 ENCOUNTER — HOSPITAL ENCOUNTER (OUTPATIENT)
Dept: CT IMAGING | Facility: HOSPITAL | Age: 75
Discharge: HOME | End: 2025-01-15
Payer: COMMERCIAL

## 2025-01-15 DIAGNOSIS — R10.13 EPIGASTRIC PAIN: ICD-10-CM

## 2025-01-15 PROCEDURE — 74176 CT ABD & PELVIS W/O CONTRAST: CPT

## 2025-01-21 ENCOUNTER — RESULTS FOLLOW-UP (OUTPATIENT)
Dept: CARDIOLOGY CLINIC | Facility: CLINIC | Age: 75
End: 2025-01-21

## 2025-01-21 NOTE — TELEPHONE ENCOUNTER
Pt wants to know if how the hernias can be fixed and if so how? Also wants Dr Crawford to know she's not taking rispeDONE.

## 2025-01-22 ENCOUNTER — HOSPITAL ENCOUNTER (OUTPATIENT)
Dept: PULMONOLOGY | Facility: HOSPITAL | Age: 75
Discharge: HOME | End: 2025-01-22
Payer: COMMERCIAL

## 2025-01-22 DIAGNOSIS — R06.02 SOB (SHORTNESS OF BREATH): ICD-10-CM

## 2025-01-22 DIAGNOSIS — J45.31 MILD PERSISTENT ASTHMA WITH ACUTE EXACERBATION: ICD-10-CM

## 2025-01-22 PROCEDURE — 94729 DIFFUSING CAPACITY: CPT

## 2025-01-22 PROCEDURE — 94729 DIFFUSING CAPACITY: CPT | Performed by: INTERNAL MEDICINE

## 2025-01-22 PROCEDURE — 94726 PLETHYSMOGRAPHY LUNG VOLUMES: CPT | Performed by: INTERNAL MEDICINE

## 2025-01-22 PROCEDURE — 94060 EVALUATION OF WHEEZING: CPT | Performed by: INTERNAL MEDICINE

## 2025-01-22 PROCEDURE — 94760 N-INVAS EAR/PLS OXIMETRY 1: CPT

## 2025-01-22 PROCEDURE — 94060 EVALUATION OF WHEEZING: CPT

## 2025-01-22 PROCEDURE — 94726 PLETHYSMOGRAPHY LUNG VOLUMES: CPT

## 2025-01-22 RX ORDER — ALBUTEROL SULFATE 0.83 MG/ML
2.5 SOLUTION RESPIRATORY (INHALATION) ONCE
Status: COMPLETED | OUTPATIENT
Start: 2025-01-22 | End: 2025-01-22

## 2025-01-22 RX ADMIN — ALBUTEROL SULFATE 2.5 MG: 2.5 SOLUTION RESPIRATORY (INHALATION) at 15:09

## 2025-01-30 ENCOUNTER — OFFICE VISIT (OUTPATIENT)
Age: 75
End: 2025-01-30
Payer: COMMERCIAL

## 2025-01-30 VITALS
TEMPERATURE: 97.6 F | OXYGEN SATURATION: 98 % | HEART RATE: 80 BPM | DIASTOLIC BLOOD PRESSURE: 72 MMHG | WEIGHT: 185 LBS | BODY MASS INDEX: 34.96 KG/M2 | SYSTOLIC BLOOD PRESSURE: 126 MMHG

## 2025-01-30 DIAGNOSIS — J02.9 SORE THROAT: ICD-10-CM

## 2025-01-30 DIAGNOSIS — J32.9 BACTERIAL SINUSITIS: Primary | ICD-10-CM

## 2025-01-30 DIAGNOSIS — B96.89 BACTERIAL SINUSITIS: Primary | ICD-10-CM

## 2025-01-30 LAB — S PYO AG THROAT QL: NEGATIVE

## 2025-01-30 PROCEDURE — G2211 COMPLEX E/M VISIT ADD ON: HCPCS

## 2025-01-30 PROCEDURE — 99213 OFFICE O/P EST LOW 20 MIN: CPT

## 2025-01-30 PROCEDURE — 87880 STREP A ASSAY W/OPTIC: CPT

## 2025-01-30 RX ORDER — AMOXICILLIN 500 MG/1
500 CAPSULE ORAL EVERY 12 HOURS SCHEDULED
Qty: 14 CAPSULE | Refills: 0 | Status: SHIPPED | OUTPATIENT
Start: 2025-01-30 | End: 2025-02-06

## 2025-01-30 RX ORDER — AZELASTINE 1 MG/ML
2 SPRAY, METERED NASAL 2 TIMES DAILY
Qty: 1 ML | Refills: 0 | Status: SHIPPED | OUTPATIENT
Start: 2025-01-30

## 2025-01-30 RX ORDER — BENZONATATE 200 MG/1
200 CAPSULE ORAL 3 TIMES DAILY PRN
Qty: 30 CAPSULE | Refills: 0 | Status: SHIPPED | OUTPATIENT
Start: 2025-01-30

## 2025-01-30 NOTE — PROGRESS NOTES
Name: Pat Hurtado      : 1950      MRN: [id number]  Encounter Provider: Jacqueline Ayala MD  Encounter Date: 2025   Encounter department: [facility]  :  Assessment & Plan  Bacterial sinusitis    Pt presents with 1 week of cough, ear fullness, sinus tenderness/pressure, consistent with bacterial sinusitis. Physical exam demonstrates bilateral maxillary sinus tenderness to palpation, posterior oropharyngeal erythema consistent with post-nasal drip. Will send azelastine nasal spray, as flonase has not been effective. Will also send amoxicillin and tessalon for supportive care.     Orders:    azelastine (ASTELIN) 0.1 % nasal spray; 2 sprays into each nostril 2 (two) times a day Use in each nostril as directed    benzonatate (TESSALON) 200 MG capsule; Take 1 capsule (200 mg total) by mouth 3 (three) times a day as needed for cough    amoxicillin (AMOXIL) 500 mg capsule; Take 1 capsule (500 mg total) by mouth every 12 (twelve) hours for 7 days    Sore throat  POCT testing negative in office today     Orders:    POCT rapid ANTIGEN strepA         History of Present Illness   Sore Throat   Associated symptoms include coughing and ear pain. Pertinent negatives include no abdominal pain, shortness of breath or vomiting.       Patient presents today for acute concerns of sore throat and ear pain.     Patient reports:    - sore throat started one week ago on Wednesday   - has been using peroxide water, was initially helpful but not any more   - has also been using nasal spray (maybe flonase)   - pressure in her nasal sinuses bilaterally   - R ear pain down and inside   - no fevers or chills   - occasional nausea but no vomiting   - + cough, nonproductive     Review of Systems   Constitutional:  Negative for chills and fever.   HENT:  Positive for ear pain, sinus pressure, sinus pain and sore throat.    Eyes:  Negative for pain and visual disturbance.   Respiratory:  Positive for  cough. Negative for shortness of breath.    Cardiovascular:  Negative for chest pain and palpitations.   Gastrointestinal:  Positive for nausea. Negative for abdominal pain and vomiting.   Genitourinary:  Negative for dysuria and hematuria.   Musculoskeletal:  Negative for arthralgias and back pain.   Skin:  Negative for color change and rash.   Neurological:  Negative for seizures and syncope.   All other systems reviewed and are negative.      Objective   /72   Pulse 80   Temp 97.6 °F (36.4 °C)   Wt 83.9 kg (185 lb)   SpO2 98%   BMI 34.96 kg/m²      Physical Exam  Vitals and nursing note reviewed.   Constitutional:       General: She is not in acute distress.     Appearance: Normal appearance. She is well-developed. She is not ill-appearing, toxic-appearing or diaphoretic.   HENT:      Head: Normocephalic and atraumatic.      Right Ear: External ear normal.      Left Ear: External ear normal.      Nose: No congestion or rhinorrhea.      Right Sinus: Maxillary sinus tenderness present.      Left Sinus: Maxillary sinus tenderness present.      Mouth/Throat:      Mouth: Mucous membranes are moist.      Pharynx: Posterior oropharyngeal erythema present. No oropharyngeal exudate.   Eyes:      General: No scleral icterus.        Right eye: No discharge.         Left eye: No discharge.      Conjunctiva/sclera: Conjunctivae normal.   Cardiovascular:      Rate and Rhythm: Normal rate and regular rhythm.      Heart sounds: Normal heart sounds. No murmur heard.     No friction rub. No gallop.   Pulmonary:      Effort: Pulmonary effort is normal. No respiratory distress.      Breath sounds: Normal breath sounds. No wheezing, rhonchi or rales.   Skin:     General: Skin is warm and dry.      Coloration: Skin is not jaundiced.      Findings: No bruising or lesion.   Neurological:      Mental Status: She is alert.      Gait: Gait normal.   Psychiatric:         Mood and Affect: Mood normal.         Behavior: Behavior  normal.         Thought Content: Thought content normal.         Judgment: Judgment normal.           Jacqueline Ayala MD    PGY-3

## 2025-05-09 ENCOUNTER — TELEPHONE (OUTPATIENT)
Age: 75
End: 2025-05-09

## 2025-05-09 DIAGNOSIS — Z12.31 ENCOUNTER FOR SCREENING MAMMOGRAM FOR BREAST CANCER: Primary | ICD-10-CM

## 2025-05-09 NOTE — TELEPHONE ENCOUNTER
Patient called asking that script for annual mammogram be placed.  Please advise and contact patient when ordered.

## 2025-05-14 ENCOUNTER — HOSPITAL ENCOUNTER (OUTPATIENT)
Dept: MAMMOGRAPHY | Facility: HOSPITAL | Age: 75
Discharge: HOME | End: 2025-05-14
Payer: COMMERCIAL

## 2025-05-14 VITALS — HEIGHT: 61 IN | BODY MASS INDEX: 34.93 KG/M2 | WEIGHT: 185 LBS

## 2025-05-14 DIAGNOSIS — Z12.31 ENCOUNTER FOR SCREENING MAMMOGRAM FOR BREAST CANCER: ICD-10-CM

## 2025-05-14 PROCEDURE — 77063 BREAST TOMOSYNTHESIS BI: CPT

## 2025-05-14 PROCEDURE — 77067 SCR MAMMO BI INCL CAD: CPT

## 2025-05-15 ENCOUNTER — RA CDI HCC (OUTPATIENT)
Dept: OTHER | Facility: HOSPITAL | Age: 75
End: 2025-05-15

## 2025-05-20 ENCOUNTER — RESULTS FOLLOW-UP (OUTPATIENT)
Age: 75
End: 2025-05-20

## 2025-05-22 ENCOUNTER — OFFICE VISIT (OUTPATIENT)
Age: 75
End: 2025-05-22

## 2025-05-22 ENCOUNTER — VBI (OUTPATIENT)
Dept: ADMINISTRATIVE | Facility: OTHER | Age: 75
End: 2025-05-22

## 2025-05-22 VITALS
HEIGHT: 61 IN | TEMPERATURE: 97.6 F | WEIGHT: 186 LBS | OXYGEN SATURATION: 97 % | HEART RATE: 102 BPM | BODY MASS INDEX: 35.12 KG/M2 | SYSTOLIC BLOOD PRESSURE: 124 MMHG | DIASTOLIC BLOOD PRESSURE: 72 MMHG

## 2025-05-22 DIAGNOSIS — R06.02 SHORTNESS OF BREATH: ICD-10-CM

## 2025-05-22 DIAGNOSIS — J45.30 MILD PERSISTENT ASTHMA WITHOUT COMPLICATION: ICD-10-CM

## 2025-05-22 DIAGNOSIS — R09.81 NASAL CONGESTION: ICD-10-CM

## 2025-05-22 DIAGNOSIS — R73.03 PREDIABETES: ICD-10-CM

## 2025-05-22 DIAGNOSIS — K21.9 GASTROESOPHAGEAL REFLUX DISEASE, UNSPECIFIED WHETHER ESOPHAGITIS PRESENT: Primary | ICD-10-CM

## 2025-05-22 RX ORDER — OMEPRAZOLE 40 MG/1
40 CAPSULE, DELAYED RELEASE ORAL DAILY
Qty: 90 CAPSULE | Refills: 0 | Status: SHIPPED | OUTPATIENT
Start: 2025-05-22

## 2025-05-22 RX ORDER — FLUTICASONE PROPIONATE 50 MCG
2 SPRAY, SUSPENSION (ML) NASAL DAILY
Qty: 9.9 ML | Refills: 0 | Status: SHIPPED | OUTPATIENT
Start: 2025-05-22 | End: 2025-06-09 | Stop reason: SDUPTHER

## 2025-05-22 NOTE — PROGRESS NOTES
Name: Pat Hurtado      : 1950      MRN: [id number]  Encounter Provider: Hu Crawford MD  Encounter Date: 2025   Encounter department: [facility]    Assessment & Plan  Gastroesophageal reflux disease, unspecified whether esophagitis present  Pt states GERD symptoms well controlled with current therapy, will c/w prilosec 40mg daily  Orders:    omeprazole (PriLOSEC) 40 MG capsule; Take 1 capsule (40 mg total) by mouth daily    Nasal congestion  Pt endorses seasonal allergies with periodic nasal congestion. Has had relief in past with Flonase therapy, will resume at this time and advised to call if ineffective  Orders:    fluticasone (FLONASE) 50 mcg/act nasal spray; 2 sprays into each nostril daily    Shortness of breath  Pt continues to endorse periodic shortness of breath. Pt states that this SOB is exacerbated by physical activity and alleviated with rest. Pt states she feels it is worsening, and that she cannot tolerate physical activity greater than a flight of stairs due to breathlessness. Pt denies any chest pain, SOB at rest, orthopnea, bendopnea, LE edema, or other similar issues     Plan:  Current differential of physical deconditioning vs obesity hypoventilation vs occult structural cause   EKG attempted at office visit today however interference from spinal stimulator rendered it unusable   Workup to date: Echo  WNL without adventitious findings, PFTs 2025 WNL with no adventitious findings  Has been offered sleep study but deferred   Will perform CT chest to r/o occult structural causes in addition to VBG to assess for hypercarbia  Recommended continued efforts at weight loss   Pt requesting cardiology evaluation for possible other causes, referral provided today   Orders:    CT chest wo contrast; Future    Ambulatory Referral to Cardiology; Future    ECG 12 lead; Future    Mild persistent asthma without complication  Patient has history of mild asthma  however this has not been demonstrated on most recent PFT in 2025 patient does endorse progressive breathlessness at this point in time, with differential including obesity hypoventilation versus deconditioning versus asthma versus GERD with aspiration    Plan:  Patient recently underwent cardiac workup with EKG and echo both of which were within normal limits  Will rule out pulmonary pathology at this point in time, CT chest ordered  Will trial albuterol as needed, patient states it has not been exceptionally helpful  VBG and BMP ordered to assess for possible hypercapnic state  Patient may benefit from sleep study in future, however is hesitant to perform this  Will trial PPI for GERD with possible silent aspiration       Prediabetes  Repeat A1c ordered at time of visit today  Orders:    Hemoglobin A1C; Future         History of Present Illness     Ms. Hurtado is a 74 y.o.female with PMHx of CKD and GERD that presents to the clinic for recheck of chronic conditions and continued episodes of breathlessness  Pt also mentions cough and shortness of breath - states that walking from the clinic to parking lot gets her out of breath. Cough is nonproductive. Mentions no wheezing occurs except at night. [place] and that walking gets out of her breath since summer. Used to be on the treadmill for 15-20 minutes - can no longer tolerate it. Denies chest pain      Review of Systems   Constitutional:  Negative for chills and fever.   HENT:  Negative for ear pain and sore throat.    Eyes:  Negative for pain and visual disturbance.   Respiratory:  Positive for shortness of breath. Negative for cough.    Cardiovascular:  Negative for chest pain and palpitations.   Gastrointestinal:  Negative for abdominal pain and vomiting.   Genitourinary:  Negative for dysuria and hematuria.   Musculoskeletal:  Negative for arthralgias, back pain and myalgias.   Skin:  Negative for color change and rash.   Neurological:  " Negative for seizures and syncope.   Psychiatric/Behavioral:  Negative for agitation.    All other systems reviewed and are negative.    Past Medical History[1]  Past Surgical History[2]  Family History[3]  Social History[4]  Medications[5]  Allergies   Allergen Reactions    Influenza A (H1n1) Monoval Vac Anaphylaxis    Lyrica [Pregabalin] Swelling    Other Anaphylaxis     FLU SHOT    Acetaminophen Other (See Comments)     GI Upset, \"it upps my triglycerides\"    Percocet [Oxycodone-Acetaminophen] GI Intolerance     Immunization History   Administered Date(s) Administered    Tdap 06/04/2014    Tuberculin Skin Test-PPD Intradermal 03/01/2021, 03/15/2022     Objective   /72   Pulse 102   Temp 97.6 °F (36.4 °C)   Ht 5' 1\" (1.549 m)   Wt 84.4 kg (186 lb)   SpO2 97%   BMI 35.14 kg/m²     Physical Exam  Constitutional:       General: She is not in acute distress.     Appearance: She is obese. She is not ill-appearing.   HENT:      Head: Normocephalic and atraumatic.      Right Ear: External ear normal.      Left Ear: External ear normal.      Nose: Nose normal.      Mouth/Throat:      Mouth: Mucous membranes are moist.      Pharynx: Oropharynx is clear.      Comments: Edentulous    Eyes:      General: No scleral icterus.     Extraocular Movements: Extraocular movements intact.      Conjunctiva/sclera: Conjunctivae normal.       Cardiovascular:      Rate and Rhythm: Normal rate and regular rhythm.      Pulses: Normal pulses.      Heart sounds: Normal heart sounds. No murmur heard.     No friction rub. No gallop.   Pulmonary:      Breath sounds: Normal breath sounds. No wheezing, rhonchi or rales.   Abdominal:      General: Abdomen is flat.      Palpations: Abdomen is soft. There is no mass.      Tenderness: There is no abdominal tenderness. There is no guarding.     Musculoskeletal:      Cervical back: Normal range of motion and neck supple.      Right lower leg: No edema.      Left lower leg: No edema. "     Skin:     General: Skin is warm and dry.      Capillary Refill: Capillary refill takes less than 2 seconds.     Neurological:      Mental Status: She is alert. Mental status is at baseline.     Psychiatric:         Mood and Affect: Mood normal.                [1]   Past Medical History:  Diagnosis Date    Anemia     last assessed - [id number]    Anxiety disorder     Arthritis     Asthma     Back pain     Bunion, right foot     last assessed - [id number]    Deformity of toe of left foot     last assessed - [id number]    Discitis of thoracolumbar region     last assessed - [id number]    Fall     Fibrocystic disease of breast     Fracture of wrist     and hand, left    GERD (gastroesophageal reflux disease)     Head injury     Hypercholesterolemia     Hypokalemia 3/27/2021    Leukopenia     last assessed - [id number]    Osteopenia     last assessed - [id number]    Polyarthritis     Prediabetes     Seasonal allergies     Shortness of breath     Sleep disturbance     Subclinical hypothyroidism     Toe deformity     last assessed - [id number]    Trochanteric bursitis of left hip     last assessed - [id number]    Vitamin D deficiency     last assessed - [id number]    Vitiligo     last assessed - [id number]    Wears eyeglasses    [2]   Past Surgical History:  Procedure Laterality Date    BACK SURGERY      4 back surgeries, fusion, bone replacement    BACK SURGERY  2012    Lumbar PLIF surgery    CARPAL TUNNEL RELEASE Bilateral      SECTION  1980    CHOLECYSTECTOMY      HAND TENDON SURGERY Bilateral     HERNIA REPAIR      NECK SURGERY  2012    ACDF Surgery    KS COLONOSCOPY FLX DX W/COLLJ SPEC WHEN PFRMD N/A 2019    Procedure: COLONOSCOPY;  Surgeon: Will Perez MD;  Location: [facility];  Service: Gastroenterology    KS ESOPHAGOGASTRODUODENOSCOPY TRANSORAL DIAGNOSTIC N/A 2019    Procedure: ESOPHAGOGASTRODUODENOSCOPY (EGD);  Surgeon: Will Perez MD;  Location: [facility];   Service: Gastroenterology    SPINAL CORD STIMULATOR IMPLANT N/A 7/12/2016    Procedure: PLACEMENT OF THORACIC PARASPINAL PERIPHERAL NERVE STIMULATING ELECTODES WITH LEFT BUTTOCK GENERATOR;  Surgeon: Hugo Braun MD;  Location: [facility];  Service:     ULNAR NERVE REPAIR Bilateral 1989   [3]   Family History  Problem Relation Name Age of Onset    Dementia Mother      Emphysema Father          lung    Lung cancer Father      No Known Problems Sister      No Known Problems Daughter      No Known Problems Maternal Grandmother      No Known Problems Maternal Grandfather      No Known Problems Paternal Grandmother      Other Paternal Grandfather          gangrene    No Known Problems Maternal Aunt      No Known Problems Maternal Aunt      No Known Problems Maternal Aunt      No Known Problems Maternal Aunt      No Known Problems Paternal Aunt      No Known Problems Paternal Aunt      No Known Problems Paternal Aunt      No Known Problems Paternal Aunt     [4]   Social History  Tobacco Use    Smoking status: Never    Smokeless tobacco: Never   Vaping Use    Vaping status: Never Used   Substance and Sexual Activity    Alcohol use: Not Currently     Comment: ocassional    Drug use: No    Sexual activity: Not Currently     Partners: Male     Comment: No known STD risk factors; Denied currently sexually active   [5]   Current Outpatient Medications on File Prior to Visit   Medication Sig    albuterol (Ventolin HFA) 90 mcg/act inhaler Inhale 2 puffs every 6 (six) hours as needed for wheezing    atorvastatin (LIPITOR) 10 mg tablet Take 1 tablet (10 mg total) by mouth daily    losartan (COZAAR) 25 mg tablet Take 1 tablet (25 mg total) by mouth daily    traZODone (DESYREL) 50 mg tablet Take 50 mg by mouth in the morning.    psyllium (METAMUCIL) packet Take 1 packet by mouth daily for 10 days (Patient not taking: Reported on 6/20/2024)    risperiDONE (RisperDAL) 2 mg tablet TAKE 1 TABLET BY MOUTH AT NIGHT FOR 30 DAYS (Patient  not taking: Reported on 1/6/2025)

## 2025-05-22 NOTE — TELEPHONE ENCOUNTER
05/22/25 2:06 PM     Chart reviewed for Mammogram was/were submitted to the patient's insurance.     Reuben Black MA   [facility]

## 2025-05-22 NOTE — ASSESSMENT & PLAN NOTE
Pt states GERD symptoms well controlled with current therapy, will c/w prilosec 40mg daily  Orders:    omeprazole (PriLOSEC) 40 MG capsule; Take 1 capsule (40 mg total) by mouth daily

## 2025-05-23 ENCOUNTER — TELEPHONE (OUTPATIENT)
Age: 75
End: 2025-05-23

## 2025-05-23 NOTE — TELEPHONE ENCOUNTER
Pt called. Was in the office yesterday. Could not complete EKG as it was thought there was an issue with the EKG machine. Pt wanted to inform the office that she does not believe there was an issue with the EKG machine. Pt forgot to turn off her spinal stimulator for the test. Pt has an appointment to follow up with Cardiology, but not until August.     Please review with PCP and advise further as needed.     Pt did schedule to establish with another PCP within the practice for October.

## 2025-05-27 ENCOUNTER — APPOINTMENT (OUTPATIENT)
Dept: LAB | Facility: CLINIC | Age: 75
End: 2025-05-27
Payer: COMMERCIAL

## 2025-05-27 DIAGNOSIS — J45.31 MILD PERSISTENT ASTHMA WITH ACUTE EXACERBATION: ICD-10-CM

## 2025-05-27 DIAGNOSIS — I10 ESSENTIAL HYPERTENSION: ICD-10-CM

## 2025-05-27 DIAGNOSIS — R06.02 SOB (SHORTNESS OF BREATH): ICD-10-CM

## 2025-05-27 DIAGNOSIS — R73.03 PREDIABETES: ICD-10-CM

## 2025-05-27 DIAGNOSIS — R05.8 DRY COUGH: ICD-10-CM

## 2025-05-27 DIAGNOSIS — R10.13 EPIGASTRIC PAIN: ICD-10-CM

## 2025-05-27 DIAGNOSIS — K21.9 GASTROESOPHAGEAL REFLUX DISEASE, UNSPECIFIED WHETHER ESOPHAGITIS PRESENT: ICD-10-CM

## 2025-05-27 LAB
ANION GAP SERPL CALCULATED.3IONS-SCNC: 4 MMOL/L (ref 4–13)
BASE EX.OXY STD BLDV CALC-SCNC: 69.7 % (ref 60–80)
BASE EXCESS BLDV CALC-SCNC: 0.7 MMOL/L
BASOPHILS # BLD AUTO: 0 THOUSANDS/ÂΜL (ref 0–0.1)
BASOPHILS NFR BLD AUTO: 0 % (ref 0–1)
BUN SERPL-MCNC: 11 MG/DL (ref 5–25)
CALCIUM SERPL-MCNC: 9.6 MG/DL (ref 8.4–10.2)
CHLORIDE SERPL-SCNC: 105 MMOL/L (ref 96–108)
CO2 SERPL-SCNC: 31 MMOL/L (ref 21–32)
CREAT SERPL-MCNC: 1.08 MG/DL (ref 0.6–1.3)
EOSINOPHIL # BLD AUTO: 0 THOUSAND/ÂΜL (ref 0–0.61)
EOSINOPHIL NFR BLD AUTO: 0 % (ref 0–6)
ERYTHROCYTE [DISTWIDTH] IN BLOOD BY AUTOMATED COUNT: 12.3 % (ref 11.6–15.1)
EST. AVERAGE GLUCOSE BLD GHB EST-MCNC: 120 MG/DL
GFR SERPL CREATININE-BSD FRML MDRD: 51 ML/MIN/1.73SQ M
GLUCOSE P FAST SERPL-MCNC: 97 MG/DL (ref 65–99)
HBA1C MFR BLD: 5.8 %
HCO3 BLDV-SCNC: 27.2 MMOL/L (ref 24–30)
HCT VFR BLD AUTO: 41.7 % (ref 34.8–46.1)
HGB BLD-MCNC: 14.1 G/DL (ref 11.5–15.4)
IMM GRANULOCYTES # BLD AUTO: 0.01 THOUSAND/UL (ref 0–0.2)
IMM GRANULOCYTES NFR BLD AUTO: 0 % (ref 0–2)
LYMPHOCYTES # BLD AUTO: 1.34 THOUSANDS/ÂΜL (ref 0.6–4.47)
LYMPHOCYTES NFR BLD AUTO: 43 % (ref 14–44)
MCH RBC QN AUTO: 31.8 PG (ref 26.8–34.3)
MCHC RBC AUTO-ENTMCNC: 33.8 G/DL (ref 31.4–37.4)
MCV RBC AUTO: 94 FL (ref 82–98)
MONOCYTES # BLD AUTO: 0.26 THOUSAND/ÂΜL (ref 0.17–1.22)
MONOCYTES NFR BLD AUTO: 8 % (ref 4–12)
NEUTROPHILS # BLD AUTO: 1.49 THOUSANDS/ÂΜL (ref 1.85–7.62)
NEUTS SEG NFR BLD AUTO: 49 % (ref 43–75)
NRBC BLD AUTO-RTO: 0 /100 WBCS
O2 CT BLDV-SCNC: 14.6 ML/DL
PCO2 BLDV: 50.9 MM HG (ref 42–50)
PH BLDV: 7.35 [PH] (ref 7.3–7.4)
PLATELET # BLD AUTO: 253 THOUSANDS/UL (ref 149–390)
PMV BLD AUTO: 8.8 FL (ref 8.9–12.7)
PO2 BLDV: 38.3 MM HG (ref 35–45)
POTASSIUM SERPL-SCNC: 4.6 MMOL/L (ref 3.5–5.3)
RBC # BLD AUTO: 4.44 MILLION/UL (ref 3.81–5.12)
SODIUM SERPL-SCNC: 140 MMOL/L (ref 135–147)
WBC # BLD AUTO: 3.1 THOUSAND/UL (ref 4.31–10.16)

## 2025-05-27 PROCEDURE — 80048 BASIC METABOLIC PNL TOTAL CA: CPT

## 2025-05-27 PROCEDURE — 83036 HEMOGLOBIN GLYCOSYLATED A1C: CPT

## 2025-05-27 PROCEDURE — 85025 COMPLETE CBC W/AUTO DIFF WBC: CPT

## 2025-05-27 PROCEDURE — 36415 COLL VENOUS BLD VENIPUNCTURE: CPT

## 2025-05-27 PROCEDURE — 82805 BLOOD GASES W/O2 SATURATION: CPT

## 2025-05-28 NOTE — ASSESSMENT & PLAN NOTE
Patient has history of mild asthma however this has not been demonstrated on most recent PFT in 2025 patient does endorse progressive breathlessness at this point in time, with differential including obesity hypoventilation versus deconditioning versus asthma versus GERD with aspiration    Plan:  Patient recently underwent cardiac workup with EKG and echo both of which were within normal limits  Will rule out pulmonary pathology at this point in time, CT chest ordered  Will trial albuterol as needed, patient states it has not been exceptionally helpful  VBG and BMP ordered to assess for possible hypercapnic state  Patient may benefit from sleep study in future, however is hesitant to perform this  Will trial PPI for GERD with possible silent aspiration

## 2025-05-29 ENCOUNTER — HOSPITAL ENCOUNTER (OUTPATIENT)
Dept: CT IMAGING | Facility: HOSPITAL | Age: 75
Discharge: HOME | End: 2025-05-29
Payer: COMMERCIAL

## 2025-05-29 DIAGNOSIS — R06.02 SHORTNESS OF BREATH: ICD-10-CM

## 2025-05-29 PROCEDURE — 71250 CT THORAX DX C-: CPT

## 2025-06-06 ENCOUNTER — TELEPHONE (OUTPATIENT)
Age: 75
End: 2025-06-06

## 2025-06-06 NOTE — TELEPHONE ENCOUNTER
"Patient called for results of labs and ct scan. Read message verbatim per provider:    \"Please call patient and advise A1c remains mildly elevated, CBC is stable. Blood gas shows mildly elevated bicarb levels, most likely sleep apnea vs obesity hypoventilation. BMP is within normal limits. Thank you!\"    Patient verbalized understanding. Patient asking for results of cat scan. Please review and advise.   "

## 2025-06-09 ENCOUNTER — OFFICE VISIT (OUTPATIENT)
Age: 75
End: 2025-06-09
Payer: COMMERCIAL

## 2025-06-09 ENCOUNTER — TELEPHONE (OUTPATIENT)
Dept: OTHER | Facility: OTHER | Age: 75
End: 2025-06-09

## 2025-06-09 VITALS
DIASTOLIC BLOOD PRESSURE: 76 MMHG | BODY MASS INDEX: 35.3 KG/M2 | OXYGEN SATURATION: 96 % | HEART RATE: 80 BPM | TEMPERATURE: 97.5 F | WEIGHT: 187 LBS | HEIGHT: 61 IN | SYSTOLIC BLOOD PRESSURE: 124 MMHG

## 2025-06-09 DIAGNOSIS — R09.81 NASAL CONGESTION: ICD-10-CM

## 2025-06-09 DIAGNOSIS — E78.00 HYPERCHOLESTEROLEMIA: ICD-10-CM

## 2025-06-09 DIAGNOSIS — R73.03 PREDIABETES: ICD-10-CM

## 2025-06-09 DIAGNOSIS — R10.13 EPIGASTRIC PAIN: ICD-10-CM

## 2025-06-09 DIAGNOSIS — R06.02 SOB (SHORTNESS OF BREATH): ICD-10-CM

## 2025-06-09 DIAGNOSIS — I10 ESSENTIAL HYPERTENSION: ICD-10-CM

## 2025-06-09 DIAGNOSIS — R19.5 DARK STOOLS: Primary | ICD-10-CM

## 2025-06-09 PROCEDURE — 99214 OFFICE O/P EST MOD 30 MIN: CPT

## 2025-06-09 PROCEDURE — G2211 COMPLEX E/M VISIT ADD ON: HCPCS

## 2025-06-09 RX ORDER — FLUTICASONE PROPIONATE 50 MCG
2 SPRAY, SUSPENSION (ML) NASAL DAILY
Qty: 11.1 ML | Refills: 0 | Status: SHIPPED | OUTPATIENT
Start: 2025-06-09 | End: 2025-09-07

## 2025-06-09 NOTE — ASSESSMENT & PLAN NOTE
SOB and SINCLAIR for past year   Denies chest pain, LE edema, wheezing   Echo 12/24 wnl   PFTs 1/2025 wnl   CT chest reviewed with patient: low lung volumes with mild nonspecific right hemidiaphragm and mild bibasilar atelectasis and/or scarring   VBG showing hypercarbia   Differentials include obesity hypoventilation syndrome vs physical deconditioning vs structural heart disease vs anxiety vs KAL  Advised to f/u with cardiologist, has apt 8/27, will reach out to cardiologist to see if can be seen sooner, likely needs stress testing  Gave ED/return precautions  Dicussed possibility for sleep study, patient declines at this time   Discussed lifestyle interventions   May be related to GERD, advised to reduce caffeine intake and increase hydration

## 2025-06-09 NOTE — TELEPHONE ENCOUNTER
Pt called to check on status of medications (atorvastatin, and losartan) pt stated she has 8 pills left however would like to pick them up in advance tomorrow on 6/10/25 since she is going to the pharmacy tomorrow.

## 2025-06-09 NOTE — PROGRESS NOTES
Name: Pat Hurtado      : 1950      MRN: [id number]  Encounter Provider: Audrey Bains DO  Encounter Date: 2025   Encounter department: [facility]  :  Assessment & Plan  Dark stools  Reports one episode of dark stools per week   Has associated epigastric pain but not related to food intake   Provided referral to GI   Orders:  •  Ambulatory Referral to Gastroenterology; Future    Epigastric pain  See above   Orders:  •  Ambulatory Referral to Gastroenterology; Future    Nasal congestion  Refill ordered   Orders:  •  fluticasone (FLONASE) 50 mcg/act nasal spray; 2 sprays into each nostril daily    SOB (shortness of breath)  SOB and SINCLAIR for past year   Denies chest pain, LE edema, wheezing   Echo  wnl   PFTs 2025 wnl   CT chest reviewed with patient: low lung volumes with mild nonspecific right hemidiaphragm and mild bibasilar atelectasis and/or scarring   VBG showing hypercarbia   Differentials include obesity hypoventilation syndrome vs physical deconditioning vs structural heart disease vs anxiety vs KAL  Advised to f/u with cardiologist, has apt , will reach out to cardiologist to see if can be seen sooner, likely needs stress testing  Gave ED/return precautions  Dicussed possibility for sleep study, patient declines at this time   Discussed lifestyle interventions   May be related to GERD, advised to reduce caffeine intake and increase hydration        Prediabetes  A1C 5.8 on    Discussed various options, would like to continue with lifestyle interventions   Can order repeat at next scheduled follow up          Nutrition Assessment and Intervention:     Reviewed and updated Nutrition Prescription      Physical Activity Assessment and Intervention:    Reviewed and updated Physical Activity Prescription with patient      Therapeutic Lifestyle Change Visit:     One-on-one comprehensive counseling, coaching, and health behavior change visit completed   "         History of Present Illness {?Quick Links Encounters * My Last Note * Last Note in Specialty * Snapshot * Since Last Visit * History :04078}  HPI  Patient is a 73 yo F with PMHx of HTN, MDD, anxiety, GERD that presents to the clinic for SOB, dark stools, and discussion regarding prediabetes. Patient reports SOB for about a year, gets worse with walking but sometimes has at rest. Uses 4 pillows to sleep at night and wakes up every 2 hours due to her dog or feeling thirsty. SOB improves with laying down. Albuterol doesn't help, denies wheezing. Reports an intermittent dry cough as well. Drinks 5-6 cups of coffee per day. Also has been having epigastric pain with constipation and at least once per week of dark stools. Has been using metamucil. Pain does not get worse with eating. No nausea, vomiting. Has 2 BM per day, denies bright red blood in stools. Needs refill on flonase.     Review of Systems   Constitutional:  Negative for chills and fever.   HENT:  Negative for ear pain and sore throat.    Eyes:  Negative for pain and visual disturbance.   Respiratory:  Positive for cough and shortness of breath.    Cardiovascular:  Negative for chest pain, palpitations and leg swelling.   Gastrointestinal:  Positive for abdominal pain and constipation. Negative for vomiting.        Dark stools   Genitourinary:  Negative for dysuria and hematuria.   Musculoskeletal:  Negative for arthralgias and back pain.   Skin:  Negative for color change and rash.   Neurological:  Negative for seizures and syncope.   All other systems reviewed and are negative.      Objective {?Quick Links Trend Vitals * Enter New Vitals * Results Review * Timeline (Adult) * Labs * Imaging * Cardiology * Procedures * Lung Cancer Screening * Surgical eConsent :92618}  /76   Pulse 80   Temp 97.5 °F (36.4 °C)   Ht 5' 1\" (1.549 m)   Wt 84.8 kg (187 lb)   SpO2 96%   BMI 35.33 kg/m²      Physical Exam  Vitals reviewed.   Constitutional:       " Appearance: Normal appearance. She is not ill-appearing, toxic-appearing or diaphoretic.      Comments: Body mass index is 35.33 kg/m².     HENT:      Head: Normocephalic and atraumatic.      Right Ear: External ear normal.      Left Ear: External ear normal.      Nose: Nose normal.      Mouth/Throat:      Mouth: Mucous membranes are moist.      Pharynx: Oropharynx is clear.     Eyes:      Extraocular Movements: Extraocular movements intact.      Conjunctiva/sclera: Conjunctivae normal.      Pupils: Pupils are equal, round, and reactive to light.       Cardiovascular:      Rate and Rhythm: Normal rate and regular rhythm.      Pulses: Normal pulses.      Heart sounds: Normal heart sounds.   Pulmonary:      Breath sounds: Normal breath sounds.      Comments: SOB at rest  Abdominal:      General: Abdomen is flat. There is no distension.      Palpations: Abdomen is soft.      Tenderness: There is abdominal tenderness. There is no guarding or rebound.      Comments: TTP epigastric region     Musculoskeletal:         General: Normal range of motion.      Cervical back: Normal range of motion.      Right lower leg: No edema.      Left lower leg: No edema.     Skin:     General: Skin is warm.      Capillary Refill: Capillary refill takes less than 2 seconds.      Coloration: Skin is not jaundiced.      Findings: No erythema.     Neurological:      General: No focal deficit present.      Mental Status: She is alert and oriented to person, place, and time. Mental status is at baseline.     Psychiatric:         Behavior: Behavior normal.      Comments: anxious

## 2025-06-09 NOTE — TELEPHONE ENCOUNTER
Pt had an office visit and forgot to request refills of the following medications when she was at her appt:        Medication: atorvastatin (LIPITOR) 10 mg tablet     Dose/Frequency: Take 1 tablet (10 mg total) by mouth daily     Quantity: 90    Pharmacy: [facility]    Office:   [x] PCP/Provider -   [] Speciality/Provider -     Does the patient have enough for 3 days?   [x] Yes   [] No - Send as HP to POD      Medication:     losartan (COZAAR) 25 mg tablet       Dose/Frequency: Take 1 tablet (25 mg total) by mouth daily     Quantity: 90    Pharmacy: [facility]    Office:   [x] PCP/Provider -   [] Speciality/Provider -     Does the patient have enough for 3 days?   [x] Yes   [] No - Send as HP to POD

## 2025-06-09 NOTE — ASSESSMENT & PLAN NOTE
A1C 5.8 on 5/27   Discussed various options, would like to continue with lifestyle interventions   Can order repeat at next scheduled follow up

## 2025-06-10 ENCOUNTER — TELEPHONE (OUTPATIENT)
Age: 75
End: 2025-06-10

## 2025-06-10 DIAGNOSIS — I10 ESSENTIAL HYPERTENSION: ICD-10-CM

## 2025-06-10 DIAGNOSIS — E78.00 HYPERCHOLESTEROLEMIA: ICD-10-CM

## 2025-06-10 RX ORDER — ATORVASTATIN CALCIUM 10 MG/1
10 TABLET, FILM COATED ORAL DAILY
Qty: 90 TABLET | Refills: 0 | OUTPATIENT
Start: 2025-06-10

## 2025-06-10 RX ORDER — LOSARTAN POTASSIUM 25 MG/1
25 TABLET ORAL DAILY
Qty: 90 TABLET | Refills: 0 | OUTPATIENT
Start: 2025-06-10

## 2025-06-10 RX ORDER — LOSARTAN POTASSIUM 25 MG/1
25 TABLET ORAL DAILY
Qty: 90 TABLET | Refills: 1 | Status: SHIPPED | OUTPATIENT
Start: 2025-06-10 | End: 2025-06-10 | Stop reason: SDUPTHER

## 2025-06-10 RX ORDER — ATORVASTATIN CALCIUM 10 MG/1
10 TABLET, FILM COATED ORAL DAILY
Qty: 30 TABLET | Refills: 0 | Status: SHIPPED | OUTPATIENT
Start: 2025-06-10 | End: 2025-06-10 | Stop reason: SDUPTHER

## 2025-06-10 NOTE — TELEPHONE ENCOUNTER
Patient called reports. Pt was referred to GI for black stools. States she does not want to have colonoscopy done due to her insurance not covering it.        Please review and advise.  Thank you

## 2025-06-11 RX ORDER — LOSARTAN POTASSIUM 25 MG/1
25 TABLET ORAL DAILY
Qty: 90 TABLET | Refills: 1 | Status: SHIPPED | OUTPATIENT
Start: 2025-06-11 | End: 2025-12-08

## 2025-06-11 RX ORDER — ATORVASTATIN CALCIUM 10 MG/1
10 TABLET, FILM COATED ORAL DAILY
Qty: 90 TABLET | Refills: 1 | Status: SHIPPED | OUTPATIENT
Start: 2025-06-11 | End: 2025-12-08

## 2025-06-11 NOTE — TELEPHONE ENCOUNTER
If she is having dark stools and that is an indication for colonoscopy which should be covered by her insurance.  I would urge her to visit with gastroenterology for assessment.  Given her age she is also still eligible for screening colonoscopies, if this one is diagnostic it would likely not be denied

## 2025-06-24 ENCOUNTER — CONSULT (OUTPATIENT)
Dept: CARDIOLOGY CLINIC | Facility: CLINIC | Age: 75
End: 2025-06-24
Payer: COMMERCIAL

## 2025-06-24 VITALS
DIASTOLIC BLOOD PRESSURE: 70 MMHG | HEART RATE: 74 BPM | OXYGEN SATURATION: 97 % | SYSTOLIC BLOOD PRESSURE: 120 MMHG | HEIGHT: 61 IN | BODY MASS INDEX: 34.7 KG/M2 | TEMPERATURE: 98.8 F | WEIGHT: 183.8 LBS

## 2025-06-24 DIAGNOSIS — R06.02 SHORTNESS OF BREATH: ICD-10-CM

## 2025-06-24 DIAGNOSIS — E78.00 HYPERCHOLESTEROLEMIA: ICD-10-CM

## 2025-06-24 DIAGNOSIS — R07.9 CHEST PAIN, UNSPECIFIED TYPE: Primary | ICD-10-CM

## 2025-06-24 DIAGNOSIS — I10 ESSENTIAL HYPERTENSION: ICD-10-CM

## 2025-06-24 PROCEDURE — 93000 ELECTROCARDIOGRAM COMPLETE: CPT | Performed by: INTERNAL MEDICINE

## 2025-06-24 PROCEDURE — 99204 OFFICE O/P NEW MOD 45 MIN: CPT | Performed by: INTERNAL MEDICINE

## 2025-06-24 RX ORDER — ASPIRIN 81 MG/1
81 TABLET ORAL DAILY
Start: 2025-06-24

## 2025-06-24 NOTE — ASSESSMENT & PLAN NOTE
Needs updated lipid panel. Year ago, her LDL was in the 80s. She is on a low-dose of atorvastatin. May need to intensify.

## 2025-06-24 NOTE — PROGRESS NOTES
Cardiology Consultation     Pat Hurtado  [id number]  1950  [facility]  [facility] [place]  [address] [address] [phone]    Orders Placed This Encounter   Procedures    Lipid panel         Assessment & Plan  Chest pain, unspecified type  Shortness of breath  At 74 with cardiac risk factors of hypertension, dyslipidemia, impaired fasting glucose, she has symptoms of shortness of breath on exertion and chest pain which is worsening. Check pharmacologic nuclear stress test. She will be unable to exercise on the treadmill due to orthopedic issues.  Discussed potential for cardiac catheterization if testing is abnormal.    If testing is reassuring, then consider noncardiac etiology as she has had other testing which has been reassuring, as well.    Patient attributes some of her symptoms to GI etiology and she is repeating that she has multiple hernias. Discussed with her that if she has stent placement then would need dual antiplatelet therapy and evaluation for her hernias would need to be on hold. Additionally, I did discuss that given both her stimulators in place and the hernias, imaging may be suboptimal but we will attempt.    In the meantime, the only change made to her medication regimen is to add 81 mg of aspirin.  Essential hypertension  Blood pressure is controlled with losartan  Hypercholesterolemia  Needs updated lipid panel. Year ago, her LDL was in the 80s. She is on a low-dose of atorvastatin. May need to intensify.        History of Present Illness:    74-year-old female who has a history of hypertension, dyslipidemia, impaired fasting glucose.  She is referred to the office today for progressive symptoms of shortness of breath with exertion and chest discomfort.    She says the symptoms have been present for over a year, but they are worsening. She notices them most with walking to her mailbox. She  "says she is no longer able to do any exercise on the treadmill at home. Walking up a flight of stairs will create the same types of problems. She is not even able to go for walks with family because she is feeling the symptoms.    She has had an evaluation with an echocardiogram in December which was unremarkable. She has had PFTs which were similarly unremarkable.    Her risk factors are overall pretty well-controlled. Her blood pressure is stable with losartan. She takes amlodipine for dyslipidemia and a year ago her LDL was in the 80s. Her A1c was 5.8    Denies any other prior cardiac testing. Mother and father had MI but seems to be at an older age.    Problem List[1]  Past Medical History[2]  Social History[3]   Family History[4]  Past Surgical History[5]  Current Medications[6]  Allergies   Allergen Reactions    Influenza A (H1n1) Monoval Vac Anaphylaxis    Lyrica [Pregabalin] Swelling    Other Anaphylaxis     FLU SHOT    Acetaminophen Other (See Comments)     GI Upset, \"it upps my triglycerides\"    Percocet [Oxycodone-Acetaminophen] GI Intolerance       Vitals:    06/24/25 1557   BP: 120/70   BP Location: Left arm   Patient Position: Sitting   Cuff Size: Adult   Pulse: 74   Temp: 98.8 °F (37.1 °C)   TempSrc: Tympanic   SpO2: 97%   Weight: 83.4 kg (183 lb 12.8 oz)   Height: 5' 1\" (1.549 m)     Vitals:    06/24/25 1557   Weight: 83.4 kg (183 lb 12.8 oz)      Height: 5' 1\" (154.9 cm)   Body mass index is 34.73 kg/m².    Physical Exam:  GENERAL: Alert, well appearing, and in no distress  HEENT:  PERRL, EOMI, no scleral icterus, no conjunctival pallor  NECK:  Supple, No elevated JVP, no thyromegaly, no carotid bruits  HEART:  Regular rate and rhythm, normal S1/S2, no S3/S4, no murmur or rub  LUNGS:  Clear to auscultation bilaterally  ABDOMEN:  Soft, non-tender, positive bowel sounds, no rebound or guarding  EXTREMITIES:  No edema  VASCULAR:  Normal pedal pulses   NEURO: No focal deficits,  SKIN: Normal without " "suspicious lesions on exposed skin      ROS:  Positive for chest pain, depression, shortness of breath.  Except as noted in HPI, is otherwise reviewed in detail and a 12 point review of systems is negative.    Labs:  Lab Results   Component Value Date    SODIUM 140 05/27/2025    K 4.6 05/27/2025     05/27/2025    CREATININE 1.08 05/27/2025    BUN 11 05/27/2025    CO2 31 05/27/2025    ALT 14 06/07/2024    AST 15 06/07/2024    INR 0.95 06/27/2016    GLUF 97 05/27/2025    HGBA1C 5.8 (H) 05/27/2025    WBC 3.10 (L) 05/27/2025    HGB 14.1 05/27/2025    HCT 41.7 05/27/2025     05/27/2025       No results found for: \"CHOL\"  Lab Results   Component Value Date    HDL 59 06/07/2024    HDL 56 04/13/2024    HDL 65 03/25/2023     Lab Results   Component Value Date    LDLCALC 88 06/07/2024    LDLCALC 162 (H) 04/13/2024    LDLCALC 161 (H) 03/25/2023     Lab Results   Component Value Date    TRIG 95 06/07/2024    TRIG 106 04/13/2024    TRIG 66 03/25/2023     Testing:  Echo 12/2024  Left Ventricle: Left ventricular cavity size is normal. Wall thickness is normal. The left ventricular ejection fraction is 65%. Systolic function is normal. Wall motion is normal. Diastolic function is normal.     EKG:  Sinus rhythm, 74 BPM. Normal EKG         [1]   Patient Active Problem List  Diagnosis    Back pain, chronic    Bunion, right foot    Cervical post-laminectomy syndrome    Deformity of toe of left foot    DJD (degenerative joint disease) of thoracic spine    Foot pain, bilateral    Leukopenia    Myofascial pain syndrome    Osteopenia    Pain syndrome, chronic    Thoracic neuralgia    Thoracic spondylosis without myelopathy    Vitiligo    Mild persistent asthma with acute exacerbation    History of gastroesophageal reflux (GERD)    Hypercholesterolemia    Vitamin D deficiency    Seasonal allergies    Prediabetes    Subclinical hypothyroidism    Mood disorder with psychosis (HCC)    Elevated blood pressure reading    Agitation "    Hyponatremia    CKD (chronic kidney disease) stage 2, GFR 60-89 ml/min    Medical clearance for psychiatric admission    Major depressive disorder    Anxiety    MCI (mild cognitive impairment)    SOB (shortness of breath)    BMI 34.0-34.9,adult    Rib pain on left side    Constipation    Essential hypertension   [2]   Past Medical History:  Diagnosis Date    Anemia     last assessed - [id number]    Anxiety disorder     Arthritis     Asthma     Back pain     Bunion, right foot     last assessed - [id number]    Deformity of toe of left foot     last assessed - [id number]    Discitis of thoracolumbar region     last assessed - [id number]    Fall     Fibrocystic disease of breast     Fracture of wrist     and hand, left    GERD (gastroesophageal reflux disease)     Head injury     Hypercholesterolemia     Hypokalemia 3/27/2021    Leukopenia     last assessed - [id number]    Osteopenia     last assessed - [id number]    Polyarthritis     Prediabetes     Seasonal allergies     Shortness of breath     Sleep disturbance     Subclinical hypothyroidism     Toe deformity     last assessed - [id number]    Trochanteric bursitis of left hip     last assessed - [id number]    Vitamin D deficiency     last assessed - [id number]    Vitiligo     last assessed - [id number]    Wears eyeglasses    [3]   Social History  Tobacco Use    Smoking status: Never    Smokeless tobacco: Never   Vaping Use    Vaping status: Never Used   Substance Use Topics    Alcohol use: Not Currently     Comment: ocassional    Drug use: No   [4]   Family History  Problem Relation Name Age of Onset    Dementia Mother      Emphysema Father          lung    Lung cancer Father      No Known Problems Sister      No Known Problems Daughter      No Known Problems Maternal Grandmother      No Known Problems Maternal Grandfather      No Known Problems Paternal Grandmother      Other Paternal Grandfather          gangrene    No Known Problems Maternal Aunt      No Known Problems  Maternal Aunt      No Known Problems Maternal Aunt      No Known Problems Maternal Aunt      No Known Problems Paternal Aunt      No Known Problems Paternal Aunt      No Known Problems Paternal Aunt      No Known Problems Paternal Aunt     [5]   Past Surgical History:  Procedure Laterality Date    BACK SURGERY      4 back surgeries, fusion, bone replacement    BACK SURGERY  2012    Lumbar PLIF surgery    CARPAL TUNNEL RELEASE Bilateral      SECTION  1980    CHOLECYSTECTOMY  1997    HAND TENDON SURGERY Bilateral     HERNIA REPAIR      NECK SURGERY  2012    ACDF Surgery    MS COLONOSCOPY FLX DX W/COLLJ SPEC WHEN PFRMD N/A 2019    Procedure: COLONOSCOPY;  Surgeon: Will Perez MD;  Location: [facility];  Service: Gastroenterology    MS ESOPHAGOGASTRODUODENOSCOPY TRANSORAL DIAGNOSTIC N/A 2019    Procedure: ESOPHAGOGASTRODUODENOSCOPY (EGD);  Surgeon: Will Perez MD;  Location: [facility];  Service: Gastroenterology    SPINAL CORD STIMULATOR IMPLANT N/A 2016    Procedure: PLACEMENT OF THORACIC PARASPINAL PERIPHERAL NERVE STIMULATING ELECTODES WITH LEFT BUTTOCK GENERATOR;  Surgeon: Hugo Braun MD;  Location: [facility] OR;  Service:     ULNAR NERVE REPAIR Bilateral    [6]   Current Outpatient Medications:     albuterol (Ventolin HFA) 90 mcg/act inhaler, Inhale 2 puffs every 6 (six) hours as needed for wheezing, Disp: 18 g, Rfl: 1    aspirin (ECOTRIN LOW STRENGTH) 81 mg EC tablet, Take 1 tablet (81 mg total) by mouth daily, Disp: , Rfl:     atorvastatin (LIPITOR) 10 mg tablet, Take 1 tablet (10 mg total) by mouth daily, Disp: 90 tablet, Rfl: 1    fluticasone (FLONASE) 50 mcg/act nasal spray, 2 sprays into each nostril daily, Disp: 11.1 mL, Rfl: 0    losartan (COZAAR) 25 mg tablet, Take 1 tablet (25 mg total) by mouth daily, Disp: 90 tablet, Rfl: 1    omeprazole (PriLOSEC) 40 MG capsule, Take 1 capsule (40 mg total) by mouth daily, Disp: 90 capsule, Rfl: 0     traZODone (DESYREL) 50 mg tablet, Take 50 mg by mouth in the morning., Disp: , Rfl:     psyllium (METAMUCIL) packet, Take 1 packet by mouth daily for 10 days (Patient not taking: Reported on 6/24/2025), Disp: , Rfl:     risperiDONE (RisperDAL) 2 mg tablet, TAKE 1 TABLET BY MOUTH AT NIGHT FOR 30 DAYS (Patient not taking: Reported on 1/6/2025), Disp: , Rfl:

## 2025-06-27 ENCOUNTER — APPOINTMENT (OUTPATIENT)
Dept: LAB | Facility: CLINIC | Age: 75
End: 2025-06-27
Payer: COMMERCIAL

## 2025-06-27 LAB
CHOLEST SERPL-MCNC: 155 MG/DL (ref ?–200)
HDLC SERPL-MCNC: 55 MG/DL
LDLC SERPL CALC-MCNC: 73 MG/DL (ref 0–100)
NONHDLC SERPL-MCNC: 100 MG/DL
TRIGL SERPL-MCNC: 135 MG/DL (ref ?–150)

## 2025-06-27 PROCEDURE — 80061 LIPID PANEL: CPT | Performed by: INTERNAL MEDICINE

## 2025-06-27 PROCEDURE — 36415 COLL VENOUS BLD VENIPUNCTURE: CPT | Performed by: INTERNAL MEDICINE

## 2025-07-01 ENCOUNTER — APPOINTMENT (EMERGENCY)
Dept: RADIOLOGY | Facility: HOSPITAL | Age: 75
End: 2025-07-01
Payer: COMMERCIAL

## 2025-07-01 ENCOUNTER — NURSE TRIAGE (OUTPATIENT)
Age: 75
End: 2025-07-01

## 2025-07-01 ENCOUNTER — HOSPITAL ENCOUNTER (EMERGENCY)
Facility: HOSPITAL | Age: 75
Discharge: HOME | End: 2025-07-01
Attending: EMERGENCY MEDICINE
Payer: COMMERCIAL

## 2025-07-01 VITALS
TEMPERATURE: 98 F | RESPIRATION RATE: 20 BRPM | HEART RATE: 88 BPM | SYSTOLIC BLOOD PRESSURE: 140 MMHG | DIASTOLIC BLOOD PRESSURE: 77 MMHG | OXYGEN SATURATION: 98 %

## 2025-07-01 DIAGNOSIS — R07.89 CHEST WALL PAIN: Primary | ICD-10-CM

## 2025-07-01 DIAGNOSIS — R10.12 LUQ PAIN: ICD-10-CM

## 2025-07-01 LAB
ALBUMIN SERPL BCG-MCNC: 3.9 G/DL (ref 3.5–5)
ALP SERPL-CCNC: 89 U/L (ref 34–104)
ALT SERPL W P-5'-P-CCNC: 16 U/L (ref 7–52)
ANION GAP SERPL CALCULATED.3IONS-SCNC: 8 MMOL/L (ref 4–13)
AST SERPL W P-5'-P-CCNC: 17 U/L (ref 13–39)
ATRIAL RATE: 88 BPM
BACTERIA UR QL AUTO: ABNORMAL /HPF
BASOPHILS # BLD AUTO: 0 THOUSANDS/ÂΜL (ref 0–0.1)
BASOPHILS NFR BLD AUTO: 0 % (ref 0–1)
BILIRUB SERPL-MCNC: 0.36 MG/DL (ref 0.2–1)
BILIRUB UR QL STRIP: NEGATIVE
BUN SERPL-MCNC: 14 MG/DL (ref 5–25)
CALCIUM SERPL-MCNC: 9.3 MG/DL (ref 8.4–10.2)
CHLORIDE SERPL-SCNC: 106 MMOL/L (ref 96–108)
CLARITY UR: CLEAR
CO2 SERPL-SCNC: 24 MMOL/L (ref 21–32)
COLOR UR: COLORLESS
CREAT SERPL-MCNC: 1.12 MG/DL (ref 0.6–1.3)
EOSINOPHIL # BLD AUTO: 0.01 THOUSAND/ÂΜL (ref 0–0.61)
EOSINOPHIL NFR BLD AUTO: 0 % (ref 0–6)
ERYTHROCYTE [DISTWIDTH] IN BLOOD BY AUTOMATED COUNT: 12.7 % (ref 11.6–15.1)
GFR SERPL CREATININE-BSD FRML MDRD: 49 ML/MIN/1.73SQ M
GLUCOSE SERPL-MCNC: 99 MG/DL (ref 65–140)
GLUCOSE UR STRIP-MCNC: NEGATIVE MG/DL
HCT VFR BLD AUTO: 40.2 % (ref 34.8–46.1)
HGB BLD-MCNC: 13.7 G/DL (ref 11.5–15.4)
HGB UR QL STRIP.AUTO: NEGATIVE
IMM GRANULOCYTES # BLD AUTO: 0.01 THOUSAND/UL (ref 0–0.2)
IMM GRANULOCYTES NFR BLD AUTO: 0 % (ref 0–2)
KETONES UR STRIP-MCNC: NEGATIVE MG/DL
LEUKOCYTE ESTERASE UR QL STRIP: ABNORMAL
LIPASE SERPL-CCNC: 19 U/L (ref 11–82)
LYMPHOCYTES # BLD AUTO: 1.26 THOUSANDS/ÂΜL (ref 0.6–4.47)
LYMPHOCYTES NFR BLD AUTO: 33 % (ref 14–44)
MCH RBC QN AUTO: 32.1 PG (ref 26.8–34.3)
MCHC RBC AUTO-ENTMCNC: 34.1 G/DL (ref 31.4–37.4)
MCV RBC AUTO: 94 FL (ref 82–98)
MONOCYTES # BLD AUTO: 0.27 THOUSAND/ÂΜL (ref 0.17–1.22)
MONOCYTES NFR BLD AUTO: 7 % (ref 4–12)
NEUTROPHILS # BLD AUTO: 2.31 THOUSANDS/ÂΜL (ref 1.85–7.62)
NEUTS SEG NFR BLD AUTO: 60 % (ref 43–75)
NITRITE UR QL STRIP: NEGATIVE
NON-SQ EPI CELLS URNS QL MICRO: ABNORMAL /HPF
NRBC BLD AUTO-RTO: 0 /100 WBCS
P AXIS: 48 DEGREES
PH UR STRIP.AUTO: 7.5 [PH]
PLATELET # BLD AUTO: 255 THOUSANDS/UL (ref 149–390)
PMV BLD AUTO: 9.3 FL (ref 8.9–12.7)
POTASSIUM SERPL-SCNC: 3.5 MMOL/L (ref 3.5–5.3)
PR INTERVAL: 144 MS
PROT SERPL-MCNC: 6.6 G/DL (ref 6.4–8.4)
PROT UR STRIP-MCNC: NEGATIVE MG/DL
QRS AXIS: -4 DEGREES
QRSD INTERVAL: 82 MS
QT INTERVAL: 374 MS
QTC INTERVAL: 452 MS
RBC # BLD AUTO: 4.27 MILLION/UL (ref 3.81–5.12)
RBC #/AREA URNS AUTO: ABNORMAL /HPF
SODIUM SERPL-SCNC: 138 MMOL/L (ref 135–147)
SP GR UR STRIP.AUTO: 1 (ref 1–1.03)
T WAVE AXIS: 66 DEGREES
UROBILINOGEN UR STRIP-ACNC: <2 MG/DL
VENTRICULAR RATE: 88 BPM
WBC # BLD AUTO: 3.86 THOUSAND/UL (ref 4.31–10.16)
WBC #/AREA URNS AUTO: ABNORMAL /HPF

## 2025-07-01 PROCEDURE — 99285 EMERGENCY DEPT VISIT HI MDM: CPT | Performed by: EMERGENCY MEDICINE

## 2025-07-01 PROCEDURE — 85025 COMPLETE CBC W/AUTO DIFF WBC: CPT

## 2025-07-01 PROCEDURE — 93010 ELECTROCARDIOGRAM REPORT: CPT | Performed by: INTERNAL MEDICINE

## 2025-07-01 PROCEDURE — 99285 EMERGENCY DEPT VISIT HI MDM: CPT

## 2025-07-01 PROCEDURE — 71046 X-RAY EXAM CHEST 2 VIEWS: CPT

## 2025-07-01 PROCEDURE — 81001 URINALYSIS AUTO W/SCOPE: CPT

## 2025-07-01 PROCEDURE — 93005 ELECTROCARDIOGRAM TRACING: CPT

## 2025-07-01 PROCEDURE — 36415 COLL VENOUS BLD VENIPUNCTURE: CPT

## 2025-07-01 PROCEDURE — 80053 COMPREHEN METABOLIC PANEL: CPT

## 2025-07-01 PROCEDURE — 83690 ASSAY OF LIPASE: CPT

## 2025-07-01 RX ORDER — ACETAMINOPHEN 325 MG/1
650 TABLET ORAL ONCE
Status: COMPLETED | OUTPATIENT
Start: 2025-07-01 | End: 2025-07-01

## 2025-07-01 RX ORDER — LIDOCAINE 50 MG/G
1 PATCH TOPICAL ONCE
Status: DISCONTINUED | OUTPATIENT
Start: 2025-07-01 | End: 2025-07-01 | Stop reason: HOSPADM

## 2025-07-01 RX ORDER — GABAPENTIN 100 MG/1
100 CAPSULE ORAL 2 TIMES DAILY
Qty: 30 CAPSULE | Refills: 0 | Status: SHIPPED | OUTPATIENT
Start: 2025-07-01 | End: 2025-07-11

## 2025-07-01 RX ORDER — MAGNESIUM HYDROXIDE/ALUMINUM HYDROXICE/SIMETHICONE 120; 1200; 1200 MG/30ML; MG/30ML; MG/30ML
30 SUSPENSION ORAL ONCE
Status: COMPLETED | OUTPATIENT
Start: 2025-07-01 | End: 2025-07-01

## 2025-07-01 RX ADMIN — LIDOCAINE 1 PATCH: 700 PATCH TOPICAL at 13:37

## 2025-07-01 RX ADMIN — ALUMINUM HYDROXIDE, MAGNESIUM HYDROXIDE, AND DIMETHICONE 30 ML: 200; 20; 200 SUSPENSION ORAL at 13:52

## 2025-07-01 RX ADMIN — ACETAMINOPHEN 650 MG: 325 TABLET ORAL at 13:34

## 2025-07-01 NOTE — DISCHARGE INSTRUCTIONS
You may take Tylenol 500 to 750 mg as needed for pain    You can continue to take all of your home medications as prescribed    Please start with gabapentin 100 mg twice daily.  You may require increased doses if these are not effective.  Please follow-up with your primary care provider for further changes.

## 2025-07-01 NOTE — ED ATTENDING ATTESTATION
7/1/2025  I, David Clemens DO, saw and evaluated the patient. I have discussed the patient with the resident/non-physician practitioner and agree with the resident's/non-physician practitioner's findings, Plan of Care, and MDM as documented in the resident's/non-physician practitioner's note, except where noted. All available labs and Radiology studies were reviewed.  I was present for key portions of any procedure(s) performed by the resident/non-physician practitioner and I was immediately available to provide assistance.       At this point I agree with the current assessment done in the Emergency Department.  I have conducted an independent evaluation of this patient a history and physical is as follows:            1. Chest wall pain    2. LUQ pain            MDM  Number of Diagnoses or Management Options  Chest wall pain  LUQ pain  Diagnosis management comments:       Initial ED assessment:     74-year-old female, presents with left upper quadrant abdominal pain, has been occurring for 5 months.,  Resource intensive workup by PCP including noncontrast CTs of the abdomen pelvis and chest all without exact etiology of her discomfort.  Worse with motion/reproducible at time of exam with truncal rotation or with palpation of the left upper quadrant.    Pathology at risk for includes but is not limited to:     Gastritis, duodenitis, peptic ulcer disease, pancreatitis, shingles without the rash,    Initial ED plan:    Blood work, consider gastritis or peptic ulcer as cause of discomfort.  Consider repeat CT imaging with contrast, patient would prefer not to, personally reviewed patient's images, did get good images with good intra-abdominal adipose tissue, do not suspect missed pathology        Final ED summary/disposition:   After evaluation and workup in the emergency department,    ED workup unremarkable.,  Unclear exact etiology of her pain.  Will have her follow with her PCP for further workup and  evaluation of her discomfort.               Time reflects when diagnosis was documented in both MDM as applicable and the Disposition within this note       Time User Action Codes Description Comment    7/1/2025  3:57 PM John Chang Add [R07.89] Chest wall pain     7/1/2025  4:00 PM John Chang Add [R10.12] LUQ pain           ED Disposition       ED Disposition   Discharge    Condition   Stable    Date/Time   Tue Jul 1, 2025  3:50 PM    Comment   Pat Hurtado discharge to home/self care.                   Follow-up Information       Follow up With Specialties Details Why Contact Info Additional Information    Hu Crawford MD Family Medicine Call in 1 day Call tomorrow to schedule an appointment for within a week for further evaluation of your symptoms and for possible adjustment of your gabapentin [address]  [facility] [id number] [phone]        [facility] Emergency Department Emergency Medicine Go to  If symptoms worsen including worsening chest pain, shortness of breath, nausea or vomiting preventing you from keeping down your medications, or any other concerning symptoms develop [facility] [address]  [phone] [facility] Emergency Department, [address] [place] [address], [id number]                          Chief Complaint   Patient presents with    Abdominal Pain     Patient having LUQ abdominal pain for a few weeks states getting worse. Pain is better with pressure applied   Also having achy chest pain when pushed on              74-year-old female,Presents with left upper quadrant left flank and epigastric abdominal pain.  Has been occurring for the past 5 months.,  Has been to her PCP for it had CT abdomen and had CT chest, both without contrast, personally reviewed images, no exact etiology of her discomfort.  Does have incidental findings of hernias in fibroids but this does not appear to be in the location of  her pain.  Pain is worse with motion/twisting.  There was no fall injury or trauma to the area.,  There was never any rashes in that area that would have been suggestive of shingles.,  No central chest pain.  No shortness of breath no dyspnea on exertion no diaphoresis.  No nausea no vomiting no bowel changes no weight loss.      Abdominal Pain                Visit Vitals  /77   Pulse 88   Temp 98 °F (36.7 °C)   Resp 20   SpO2 98%   OB Status Postmenopausal   Smoking Status Never        Physical Exam  Vitals reviewed.   Constitutional:       General: She is not in acute distress.     Appearance: She is well-developed. She is not diaphoretic.   HENT:      Head: Normocephalic and atraumatic.      Right Ear: External ear normal.      Left Ear: External ear normal.      Nose: Nose normal.     Eyes:      General:         Right eye: No discharge.         Left eye: No discharge.      Pupils: Pupils are equal, round, and reactive to light.     Neck:      Trachea: No tracheal deviation.     Cardiovascular:      Rate and Rhythm: Normal rate and regular rhythm.      Heart sounds: Normal heart sounds. No murmur heard.  Pulmonary:      Effort: Pulmonary effort is normal. No respiratory distress.      Breath sounds: Normal breath sounds. No stridor.   Abdominal:      General: There is no distension.      Palpations: Abdomen is soft.      Tenderness: There is abdominal tenderness in the epigastric area and left upper quadrant. There is no guarding or rebound.     Musculoskeletal:         General: Normal range of motion.      Cervical back: Normal range of motion and neck supple.     Skin:     General: Skin is warm and dry.      Coloration: Skin is not pale.      Findings: No erythema.     Neurological:      General: No focal deficit present.      Mental Status: She is alert and oriented to person, place, and time.                       Medications   lidocaine (LIDODERM) 5 % patch 1 patch (1 patch Topical Medication Applied  7/1/25 1337)   acetaminophen (TYLENOL) tablet 650 mg (650 mg Oral Given 7/1/25 1334)   aluminum-magnesium hydroxide-simethicone (MAALOX) oral suspension 30 mL (30 mL Oral Given 7/1/25 1352)             Labs Reviewed   CBC AND DIFFERENTIAL - Abnormal       Result Value Ref Range Status    WBC 3.86 (*) 4.31 - 10.16 Thousand/uL Final    RBC 4.27  3.81 - 5.12 Million/uL Final    Hemoglobin 13.7  11.5 - 15.4 g/dL Final    Hematocrit 40.2  34.8 - 46.1 % Final    MCV 94  82 - 98 fL Final    MCH 32.1  26.8 - 34.3 pg Final    MCHC 34.1  31.4 - 37.4 g/dL Final    RDW 12.7  11.6 - 15.1 % Final    MPV 9.3  8.9 - 12.7 fL Final    Platelets 255  149 - 390 Thousands/uL Final    nRBC 0  /100 WBCs Final    Segmented % 60  43 - 75 % Final    Immature Grans % 0  0 - 2 % Final    Lymphocytes % 33  14 - 44 % Final    Monocytes % 7  4 - 12 % Final    Eosinophils Relative 0  0 - 6 % Final    Basophils Relative 0  0 - 1 % Final    Absolute Neutrophils 2.31  1.85 - 7.62 Thousands/µL Final    Absolute Immature Grans 0.01  0.00 - 0.20 Thousand/uL Final    Absolute Lymphocytes 1.26  0.60 - 4.47 Thousands/µL Final    Absolute Monocytes 0.27  0.17 - 1.22 Thousand/µL Final    Eosinophils Absolute 0.01  0.00 - 0.61 Thousand/µL Final    Basophils Absolute 0.00  0.00 - 0.10 Thousands/µL Final   UA W REFLEX TO MICROSCOPIC WITH REFLEX TO CULTURE - Abnormal    Color, UA Colorless   Final    Clarity, UA Clear   Final    Specific Gravity, UA 1.005  1.003 - 1.030 Final    pH, UA 7.5  4.5, 5.0, 5.5, 6.0, 6.5, 7.0, 7.5, 8.0 Final    Leukocytes, UA Small (*) Negative Final    Nitrite, UA Negative  Negative Final    Protein, UA Negative  Negative mg/dl Final    Glucose, UA Negative  Negative mg/dl Final    Ketones, UA Negative  Negative mg/dl Final    Urobilinogen, UA <2.0  <2.0 mg/dl mg/dl Final    Bilirubin, UA Negative  Negative Final    Occult Blood, UA Negative  Negative Final   URINE MICROSCOPIC - Abnormal    RBC, UA 1-2  None Seen, 1-2 /hpf Final     WBC, UA 2-4 (*) None Seen, 1-2 /hpf Final    Epithelial Cells Occasional  None Seen, Occasional /hpf Final    Bacteria, UA None Seen  None Seen, Occasional /hpf Final   LIPASE - Normal    Lipase 19  11 - 82 u/L Final   COMPREHENSIVE METABOLIC PANEL    Sodium 138  135 - 147 mmol/L Final    Potassium 3.5  3.5 - 5.3 mmol/L Final    Comment: verified by repeat analysis.    Chloride 106  96 - 108 mmol/L Final    CO2 24  21 - 32 mmol/L Final    ANION GAP 8  4 - 13 mmol/L Final    BUN 14  5 - 25 mg/dL Final    Creatinine 1.12  0.60 - 1.30 mg/dL Final    Comment: Standardized to IDMS reference method    Glucose 99  65 - 140 mg/dL Final    Comment: If the patient is fasting, the ADA then defines impaired fasting glucose as > 100 mg/dL and diabetes as > or equal to 123 mg/dL.    Calcium 9.3  8.4 - 10.2 mg/dL Final    AST 17  13 - 39 U/L Final    ALT 16  7 - 52 U/L Final    Comment: Specimen collection should occur prior to Sulfasalazine administration due to the potential for falsely depressed results.     Alkaline Phosphatase 89  34 - 104 U/L Final    Total Protein 6.6  6.4 - 8.4 g/dL Final    Albumin 3.9  3.5 - 5.0 g/dL Final    Total Bilirubin 0.36  0.20 - 1.00 mg/dL Final    Comment: Use of this assay is not recommended for patients undergoing treatment with eltrombopag due to the potential for falsely elevated results.  N-acetyl-p-benzoquinone imine (metabolite of Acetaminophen) will generate erroneously low results in samples for patients that have taken an overdose of Acetaminophen.    eGFR 49  ml/min/1.73sq m Final    Narrative:     National Kid[employer] guidelines for Chronic Kidney Disease (CKD):     Stage 1 with normal or high GFR (GFR > 90 mL/min/1.73 square meters)    Stage 2 Mild CKD (GFR = 60-89 mL/min/1.73 square meters)    Stage 3A Moderate CKD (GFR = 45-59 mL/min/1.73 square meters)    Stage 3B Moderate CKD (GFR = 30-44 mL/min/1.73 square meters)    Stage 4 Severe CKD (GFR = 15-29 mL/min/1.73  square meters)    Stage 5 End Stage CKD (GFR <15 mL/min/1.73 square meters)  Note: GFR calculation is accurate only with a steady state creatinine         XR chest 2 views   ED Interpretation   Hardware intact no evidence of hardware fracture or injury.,  Stimulator in place.,  No cardiopulmonary pathology.                     Procedures

## 2025-07-01 NOTE — TELEPHONE ENCOUNTER
"REASON FOR CONVERSATION: Hip Pain and Back Pain    SYMPTOMS: severe abdominal pain and back pain    OTHER HEALTH INFORMATION: been dealing with the pain for months but has gotten much worse in the last few days. Patient is guarding. Says it feels better when she pushes on the side where it hurts but when she lets go it comes back     PROTOCOL DISPOSITION: Go to ED Now    CARE ADVICE PROVIDED: advised to go to ED and patient agreed. Told patient to follow up with us post ED     PRACTICE FOLLOW-UP: none        Reason for Disposition   SEVERE abdominal pain (e.g., excruciating)    Answer Assessment - Initial Assessment Questions  1. LOCATION: \"Where does it hurt?\"       Pain in the stomach   2. RADIATION: \"Does the pain shoot anywhere else?\" (e.g., chest, back)      Radiates to the left side and back   3. ONSET: \"When did the pain begin?\" (e.g., minutes, hours or days ago)       Months. But the last couple days have gotten worse.   4. SUDDEN: \"Gradual or sudden onset?\"      Gradual   5. PATTERN \"Does the pain come and go, or is it constant?\"      Constant   6. SEVERITY: \"How bad is the pain?\"  (e.g., Scale 1-10; mild, moderate, or severe)      10   7. RECURRENT SYMPTOM: \"Have you ever had this type of stomach pain before?\" If Yes, ask: \"When was the last time?\" and \"What happened that time?\"       Denies   8. CAUSE: \"What do you think is causing the stomach pain?\"      Unsure   9. RELIEVING/AGGRAVATING FACTORS: \"What makes it better or worse?\" (e.g., antacids, bending or twisting motion, bowel movement)      Subsides when she stops pushing on it   10. OTHER SYMPTOMS: \"Do you have any other symptoms?\" (e.g., back pain, diarrhea, fever, urination pain, vomiting)        Diarrhea off and on. Slightly black in the stool    Protocols used: Abdominal Pain - Female-Adult-[place]    "

## 2025-07-02 ENCOUNTER — RESULTS FOLLOW-UP (OUTPATIENT)
Dept: CARDIOLOGY CLINIC | Facility: CLINIC | Age: 75
End: 2025-07-02

## 2025-07-02 ENCOUNTER — VBI (OUTPATIENT)
Dept: FAMILY MEDICINE CLINIC | Facility: CLINIC | Age: 75
End: 2025-07-02

## 2025-07-02 NOTE — RESULT ENCOUNTER NOTE
Called pt to give lipid panel results. Pt understood. Pt is complaining of chest pain, tightness, and shortness of breath. Went to the ED yesterday and was prescribed gabapentin 100mg once a day. Pt reports the medication has not helped yet. Advised to report to ED again if pain worsens. Pt understood. Pt has appt with PCP on 7/11. Pt's spouse got on call and asked if pt needs designated  for nuclear stress test next week. Please advise, thank you.

## 2025-07-02 NOTE — TELEPHONE ENCOUNTER
07/02/25 9:17 AM    Patient contacted post ED visit, [facility] department spoke with patient/caregiver and outreach was successful.    Thank you.  Bre Briscoe  [facility]

## 2025-07-03 NOTE — RESULT ENCOUNTER NOTE
Called pt to let her know that she can drive herself. Pt understood. No other questions at this time.

## 2025-07-04 NOTE — ED PROVIDER NOTES
Time reflects when diagnosis was documented in both MDM as applicable and the Disposition within this note       Time User Action Codes Description Comment    7/1/2025  3:57 PM John Chang Add [R07.89] Chest wall pain     7/1/2025  4:00 PM John Chang [R10.12] LUQ pain           ED Disposition       ED Disposition   Discharge    Condition   Stable    Date/Time   Tue Jul 1, 2025  3:50 PM    Comment   Pat Hurtado discharge to home/self care.                   Assessment & Plan       Medical Decision Making  74-year-old female presents with left lateral chest wall pain and upper abdominal pain  Patient has received multiple CT scans and x-rays in previous workups which do not show any discernible cause of her chest wall pain.  At risk for hepatitis, pancreatitis, anemia, electrolyte abnormality, urinary tract infection, angina, costochondritis, nerve impingement, pleural effusion, pleurodynia, pneumonia, etc.  Patient given Tylenol, lidocaine patch, Maalox for pain treatment  CBC shows no leukocytosis, no anemia  CMP shows no electrolyte abnormality with kidney [place] function at baseline  Lipase 19 no pancreatitis  Urinalysis shows no evidence of acute urinary tract infection  EKG is unremarkable with no acute changes when compared to previous.  Normal EKG  History of pain, physical exam, and patient's description are not consistent with cardiac causes of chest pain.  Pain is reproducible to palpation  Abdominal exam benign  Patient states that pain is mildly improved with Maalox and Tylenol  Recommended patient use Tylenol patches at home, other pain management discussed  Patient directed to follow-up with PCP  Patient likely experiencing pleurodynia/nerve pain with history of further nerve pains in the past  Will trial patient on gabapentin start with 100 mg twice daily with close follow-up with PCP for adjustment  Stressed importance of following up with PCP, taking medication as prescribed, and  tracking if she is having improved pain and other adverse effects  Patient is agreeable to plan, all questions answered at this time  Patient is stable condition and cleared for discharge        Amount and/or Complexity of Data Reviewed  Labs: ordered.  Radiology: ordered and independent interpretation performed.    Risk  OTC drugs.  Prescription drug management.             Medications   acetaminophen (TYLENOL) tablet 650 mg (650 mg Oral Given 7/1/25 1334)   aluminum-magnesium hydroxide-simethicone (MAALOX) oral suspension 30 mL (30 mL Oral Given 7/1/25 1352)       ED Risk Strat Scores                    No data recorded                            History of Present Illness       Chief Complaint   Patient presents with    Abdominal Pain     Patient having LUQ abdominal pain for a few weeks states getting worse. Pain is better with pressure applied   Also having achy chest pain when pushed on        Past Medical History[1]   Past Surgical History[2]   Family History[3]   Social History[4]   E-Cigarette/Vaping    E-Cigarette Use Never User       E-Cigarette/Vaping Substances    Nicotine No     THC No     CBD No     Flavoring No     Other No     Unknown No       I have reviewed and agree with the history as documented.     74-year-old female presents with left upper quadrant abdominal pain/left lower rib pain that has been ongoing for the past 5 or 6 months.  Patient states that she decided to come in today because it prevented her from sleeping normally last night.  Patient states that the symptoms of her pain have been consistent over the past 5 to 6 months.  Patient states that pain is relieved when mild pressure is placed over the bottom of her ribs.  Patient denies fever, chills, nausea, vomiting, cough, dizziness, syncope.  Pain is reproducible with movement.  Patient states that she is seeing multiple doctors for this issue and has received multiple CT scans and x-rays for evaluation.      Abdominal  Pain  Associated symptoms: no chest pain, no chills, no cough, no dysuria, no fever, no hematuria, no nausea, no shortness of breath, no sore throat and no vomiting        Review of Systems   Constitutional:  Positive for activity change. Negative for chills and fever.   HENT:  Negative for congestion, ear pain, rhinorrhea and sore throat.    Eyes:  Negative for pain and visual disturbance.   Respiratory:  Positive for chest tightness. Negative for cough and shortness of breath.    Cardiovascular:  Negative for chest pain and palpitations.   Gastrointestinal:  Positive for abdominal pain. Negative for nausea and vomiting.   Genitourinary:  Negative for dysuria and hematuria.   Musculoskeletal:  Negative for arthralgias, back pain and myalgias.   Skin:  Negative for color change and rash.   Neurological:  Negative for seizures and syncope.   All other systems reviewed and are negative.          Objective       ED Triage Vitals   Temperature Pulse Blood Pressure Respirations SpO2 Patient Position - Orthostatic VS   07/01/25 1207 07/01/25 1205 07/01/25 1205 07/01/25 1205 07/01/25 1205 --   98 °F (36.7 °C) 81 (!) 200/87 22 99 %       Temp src Heart Rate Source BP Location FiO2 (%) Pain Score    -- -- -- -- 07/01/25 1205        9      Vitals      Date and Time Temp Pulse SpO2 Resp BP Pain Score FACES Pain Rating User   07/01/25 1334 -- -- -- -- -- 9 -- AF   07/01/25 1330 -- 88 -- 20 140/77 -- -- MD   07/01/25 1230 -- 77 98 % 20 150/96 -- -- MD   07/01/25 1207 98 °F (36.7 °C) -- -- -- -- -- -- AP   07/01/25 1205 -- 81 99 % 22 200/87 9 -- AP            Physical Exam  Vitals and nursing note reviewed.   Constitutional:       General: She is not in acute distress.     Appearance: She is well-developed.   HENT:      Head: Normocephalic and atraumatic.     Eyes:      Conjunctiva/sclera: Conjunctivae normal.       Cardiovascular:      Rate and Rhythm: Normal rate and regular rhythm.      Heart sounds: No murmur heard.     No  friction rub. No gallop.   Pulmonary:      Effort: Pulmonary effort is normal. No respiratory distress.      Breath sounds: Normal breath sounds. No stridor. No wheezing, rhonchi or rales.   Chest:      Chest wall: Tenderness present. No mass or deformity.        Comments: Pain radiates from extreme left upper quadrant along rib 10 on the left anterior chest wall.  Pain is elicited with shoulder abduction and truncal rotation.  Pain is reproducible with palpation and sometimes is alleviated with palpation.  Abdominal:      General: Abdomen is flat. Bowel sounds are normal. There is no distension. There are no signs of injury.      Palpations: Abdomen is soft. There is no shifting dullness, fluid wave, hepatomegaly or splenomegaly.      Tenderness: There is abdominal tenderness in the left upper quadrant. There is no guarding or rebound. Negative signs include Moore's sign, Rovsing's sign and McBurney's sign.      Hernia: No hernia is present.      Comments: Tenderness to palpation over extreme left upper quadrant.  Pain more localized to lower ribs/left inferior chest wall.  Patient has no guarding, rebound, CVA tenderness.     Musculoskeletal:         General: No swelling.      Cervical back: Neck supple.     Skin:     General: Skin is warm and dry.      Capillary Refill: Capillary refill takes less than 2 seconds.      Coloration: Skin is not cyanotic, jaundiced, mottled or pale.     Neurological:      General: No focal deficit present.      Mental Status: She is alert and oriented to person, place, and time.      Motor: No weakness.     Psychiatric:         Mood and Affect: Mood normal.         Behavior: Behavior normal.         Results Reviewed       Procedure Component Value Units Date/Time    Lipase [[id number]]  (Normal) Collected: 07/01/25 1442    Lab Status: Final result Specimen: Blood from Arm, Left Updated: 07/01/25 1523     Lipase 19 u/L     Comprehensive metabolic panel [[id number]] Collected: 07/01/25  1442    Lab Status: Final result Specimen: Blood from Arm, Left Updated: 07/01/25 1523     Sodium 138 mmol/L      Potassium 3.5 mmol/L      Chloride 106 mmol/L      CO2 24 mmol/L      ANION GAP 8 mmol/L      BUN 14 mg/dL      Creatinine 1.12 mg/dL      Glucose 99 mg/dL      Calcium 9.3 mg/dL      AST 17 U/L      ALT 16 U/L      Alkaline Phosphatase 89 U/L      Total Protein 6.6 g/dL      Albumin 3.9 g/dL      Total Bilirubin 0.36 mg/dL      eGFR 49 ml/min/1.73sq m     Narrative:      National Kidney Disease Foundation guidelines for Chronic Kidney Disease (CKD):     Stage 1 with normal or high GFR (GFR > 90 mL/min/1.73 square meters)    Stage 2 Mild CKD (GFR = 60-89 mL/min/1.73 square meters)    Stage 3A Moderate CKD (GFR = 45-59 mL/min/1.73 square meters)    Stage 3B Moderate CKD (GFR = 30-44 mL/min/1.73 square meters)    Stage 4 Severe CKD (GFR = 15-29 mL/min/1.73 square meters)    Stage 5 End Stage CKD (GFR <15 mL/min/1.73 square meters)  Note: GFR calculation is accurate only with a steady state creatinine    Urine Microscopic [[id number]]  (Abnormal) Collected: 07/01/25 1328    Lab Status: Final result Specimen: Urine, Clean Catch Updated: 07/01/25 1348     RBC, UA 1-2 /hpf      WBC, UA 2-4 /hpf      Epithelial Cells Occasional /hpf      Bacteria, UA None Seen /hpf     UA w Reflex to Microscopic w Reflex to Culture [[id number]]  (Abnormal) Collected: 07/01/25 1328    Lab Status: Final result Specimen: Urine, Clean Catch Updated: 07/01/25 1345     Color, UA Colorless     Clarity, UA Clear     Specific Gravity, UA 1.005     pH, UA 7.5     Leukocytes, UA Small     Nitrite, UA Negative     Protein, UA Negative mg/dl      Glucose, UA Negative mg/dl      Ketones, UA Negative mg/dl      Urobilinogen, UA <2.0 mg/dl      Bilirubin, UA Negative     Occult Blood, UA Negative    CBC and differential [[id number]]  (Abnormal) Collected: 07/01/25 1328    Lab Status: Final result Specimen: Blood from Arm, Left Updated: 07/01/25  1341     WBC 3.86 Thousand/uL      RBC 4.27 Million/uL      Hemoglobin 13.7 g/dL      Hematocrit 40.2 %      MCV 94 fL      MCH 32.1 pg      MCHC 34.1 g/dL      RDW 12.7 %      MPV 9.3 fL      Platelets 255 Thousands/uL      nRBC 0 /100 WBCs      Segmented % 60 %      Immature Grans % 0 %      Lymphocytes % 33 %      Monocytes % 7 %      Eosinophils Relative 0 %      Basophils Relative 0 %      Absolute Neutrophils 2.31 Thousands/µL      Absolute Immature Grans 0.01 Thousand/uL      Absolute Lymphocytes 1.26 Thousands/µL      Absolute Monocytes 0.27 Thousand/µL      Eosinophils Absolute 0.01 Thousand/µL      Basophils Absolute 0.00 Thousands/µL             XR chest 2 views   ED Interpretation by David Clemens DO (07/01 1514)   Hardware intact no evidence of hardware fracture or injury.,  Stimulator in place.,  No cardiopulmonary pathology.      Final Interpretation by Jacobo Hopkins MD (07/02 1024)      No acute cardiopulmonary disease.            Workstation performed: [id number]             ECG 12 Lead Documentation Only    Date/Time: 7/1/2025 1:35 PM    Performed by: John Chang DO  Authorized by: John Chang DO    Indications / Diagnosis:  Chest wall pain  ECG reviewed by me, the ED Provider: yes    Patient location:  ED  Previous ECG:     Previous ECG:  Compared to current    Comparison ECG info:  3/26/2021    Similarity:  No change    Comparison to cardiac monitor: Yes    Interpretation:     Interpretation: normal    Rate:     ECG rate:  88    ECG rate assessment: normal    Rhythm:     Rhythm: sinus rhythm    Ectopy:     Ectopy: none    QRS:     QRS axis:  Normal    QRS intervals:  Normal  Conduction:     Conduction: normal    ST segments:     ST segments:  Normal  T waves:     T waves: normal    Comments:      Normal sinus rhythm with rate of 88.  No changes from previous EKG.  Normal axis, normal intervals, normal ST segment and T waves.      ED Medication and Procedure Management   Prior to  Admission Medications   Prescriptions Last Dose Informant Patient Reported? Taking?   albuterol (Ventolin HFA) 90 mcg/act inhaler   No No   Sig: Inhale 2 puffs every 6 (six) hours as needed for wheezing   aspirin (ECOTRIN LOW STRENGTH) 81 mg EC tablet   No No   Sig: Take 1 tablet (81 mg total) by mouth daily   atorvastatin (LIPITOR) 10 mg tablet   No No   Sig: Take 1 tablet (10 mg total) by mouth daily   fluticasone (FLONASE) 50 mcg/act nasal spray   No No   Si sprays into each nostril daily   losartan (COZAAR) 25 mg tablet   No No   Sig: Take 1 tablet (25 mg total) by mouth daily   omeprazole (PriLOSEC) 40 MG capsule   No No   Sig: Take 1 capsule (40 mg total) by mouth daily   psyllium (METAMUCIL) packet   No No   Sig: Take 1 packet by mouth daily for 10 days   Patient not taking: Reported on 2025   risperiDONE (RisperDAL) 2 mg tablet  Self Yes No   Sig: TAKE 1 TABLET BY MOUTH AT NIGHT FOR 30 DAYS   Patient not taking: Reported on 2025   traZODone (DESYREL) 50 mg tablet   Yes No   Sig: Take 50 mg by mouth in the morning.      Facility-Administered Medications: None     Discharge Medication List as of 2025  4:00 PM        START taking these medications    Details   gabapentin (Neurontin) 100 mg capsule Take 1 capsule (100 mg total) by mouth 2 (two) times a day for 15 days, Starting 2025, Until 2025, Normal           CONTINUE these medications which have NOT CHANGED    Details   albuterol (Ventolin HFA) 90 mcg/act inhaler Inhale 2 puffs every 6 (six) hours as needed for wheezing, Starting 2025, Normal      aspirin (ECOTRIN LOW STRENGTH) 81 mg EC tablet Take 1 tablet (81 mg total) by mouth daily, Starting 2025, No Print      atorvastatin (LIPITOR) 10 mg tablet Take 1 tablet (10 mg total) by mouth daily, Starting 2025, Until 2025, Normal      fluticasone (FLONASE) 50 mcg/act nasal spray 2 sprays into each nostril daily, Starting 2025, Until  Sun 9/7/2025, Normal      losartan (COZAAR) 25 mg tablet Take 1 tablet (25 mg total) by mouth daily, Starting Wed 6/11/2025, Until Mon 12/8/2025, Normal      omeprazole (PriLOSEC) 40 MG capsule Take 1 capsule (40 mg total) by mouth daily, Starting Thu 5/22/2025, Normal      psyllium (METAMUCIL) packet Take 1 packet by mouth daily for 10 days, Starting Mon 5/20/2024, Until Thu 6/20/2024, OTC      risperiDONE (RisperDAL) 2 mg tablet TAKE 1 TABLET BY MOUTH AT NIGHT FOR 30 DAYS, Historical Med      traZODone (DESYREL) 50 mg tablet Take 50 mg by mouth in the morning., Starting Mon 3/25/2024, Historical Med           No discharge procedures on file.  ED SEPSIS DOCUMENTATION   Time reflects when diagnosis was documented in both MDM as applicable and the Disposition within this note       Time User Action Codes Description Comment    7/1/2025  3:57 PM John Chang Add [R07.89] Chest wall pain     7/1/2025  4:00 PM John Chang [R10.12] LUQ pain                    [1]   Past Medical History:  Diagnosis Date    Anemia     last assessed - 12May2016    Anxiety disorder     Arthritis     Asthma     Back pain     Bunion, right foot     last assessed - 02May2017    Deformity of toe of left foot     last assessed - [id number]    Discitis of thoracolumbar region     last assessed - 10Irf1886    Fall     Fibrocystic disease of breast     Fracture of wrist     and hand, left    GERD (gastroesophageal reflux disease)     Head injury     Hypercholesterolemia     Hypokalemia 3/27/2021    Leukopenia     last assessed - [id number]    Osteopenia     last assessed - 23Eoy3230    Polyarthritis     Prediabetes     Seasonal allergies     Shortness of breath     Sleep disturbance     Subclinical hypothyroidism     Toe deformity     last assessed - 08Apr2015    Trochanteric bursitis of left hip     last assessed - 35Apd7935    Vitamin D deficiency     last assessed - 58Prt5045    Vitiligo     last assessed - [id number]    Wears eyeglasses    [2]    Past Surgical History:  Procedure Laterality Date    BACK SURGERY      4 back surgeries, fusion, bone replacement    BACK SURGERY  2012    Lumbar PLIF surgery    CARPAL TUNNEL RELEASE Bilateral      SECTION  1980    CHOLECYSTECTOMY  1997    HAND TENDON SURGERY Bilateral     HERNIA REPAIR      NECK SURGERY  2012    ACDF Surgery    MO COLONOSCOPY FLX DX W/COLLJ SPEC WHEN PFRMD N/A 2019    Procedure: COLONOSCOPY;  Surgeon: Will Perez MD;  Location: [facility];  Service: Gastroenterology    MO ESOPHAGOGASTRODUODENOSCOPY TRANSORAL DIAGNOSTIC N/A 2019    Procedure: ESOPHAGOGASTRODUODENOSCOPY (EGD);  Surgeon: Will Perez MD;  Location: [facility];  Service: Gastroenterology    SPINAL CORD STIMULATOR IMPLANT N/A 2016    Procedure: PLACEMENT OF THORACIC PARASPINAL PERIPHERAL NERVE STIMULATING ELECTODES WITH LEFT BUTTOCK GENERATOR;  Surgeon: Hugo Braun MD;  Location:  MAIN OR;  Service:     ULNAR NERVE REPAIR Bilateral    [3]   Family History  Problem Relation Name Age of Onset    Dementia Mother      Emphysema Father          lung    Lung cancer Father      No Known Problems Sister      No Known Problems Daughter      No Known Problems Maternal Grandmother      No Known Problems Maternal Grandfather      No Known Problems Paternal Grandmother      Other Paternal Grandfather          gangrene    No Known Problems Maternal Aunt      No Known Problems Maternal Aunt      No Known Problems Maternal Aunt      No Known Problems Maternal Aunt      No Known Problems Paternal Aunt      No Known Problems Paternal Aunt      No Known Problems Paternal Aunt      No Known Problems Paternal Aunt     [4]   Social History  Tobacco Use    Smoking status: Never    Smokeless tobacco: Never   Vaping Use    Vaping status: Never Used   Substance Use Topics    Alcohol use: Not Currently     Comment: ocassional    Drug use: No        John Chang DO  [id number]

## 2025-07-07 ENCOUNTER — HOSPITAL ENCOUNTER (OUTPATIENT)
Dept: NON INVASIVE DIAGNOSTICS | Facility: CLINIC | Age: 75
Discharge: HOME | End: 2025-07-07
Attending: INTERNAL MEDICINE
Payer: COMMERCIAL

## 2025-07-07 VITALS — BODY MASS INDEX: 34.55 KG/M2 | HEIGHT: 61 IN | WEIGHT: 183 LBS

## 2025-07-07 DIAGNOSIS — R07.9 CHEST PAIN, UNSPECIFIED TYPE: ICD-10-CM

## 2025-07-07 LAB
MAX HR PERCENT: 71 %
MAX HR: 106 BPM
RATE PRESSURE PRODUCT: NORMAL
SL CV REST NUCLEAR ISOTOPE DOSE: 10.2 MCI
SL CV STRESS NUCLEAR ISOTOPE DOSE: 32.2 MCI
SL CV STRESS RECOVERY BP: NORMAL MMHG
SL CV STRESS RECOVERY HR: 83 BPM
SL CV STRESS RECOVERY O2 SAT: 99 %
SPECT HRT GATED+EF W RNC IV: 71 %
STRESS ANGINA INDEX: 0
STRESS BASELINE BP: NORMAL MMHG
STRESS BASELINE HR: 65 BPM
STRESS O2 SAT REST: 99 %
STRESS PEAK HR: 103 BPM
STRESS POST ESTIMATED WORKLOAD: 1 METS
STRESS POST O2 SAT PEAK: 99 %
STRESS POST PEAK BP: 130 MMHG
STRESS/REST PERFUSION RATIO: 1.06

## 2025-07-07 PROCEDURE — 78452 HT MUSCLE IMAGE SPECT MULT: CPT

## 2025-07-07 PROCEDURE — 78452 HT MUSCLE IMAGE SPECT MULT: CPT | Performed by: INTERNAL MEDICINE

## 2025-07-07 PROCEDURE — 93017 CV STRESS TEST TRACING ONLY: CPT

## 2025-07-07 PROCEDURE — 93016 CV STRESS TEST SUPVJ ONLY: CPT | Performed by: INTERNAL MEDICINE

## 2025-07-07 PROCEDURE — 93018 CV STRESS TEST I&R ONLY: CPT | Performed by: INTERNAL MEDICINE

## 2025-07-07 PROCEDURE — A9502 TC99M TETROFOSMIN: HCPCS

## 2025-07-07 RX ORDER — REGADENOSON 0.08 MG/ML
0.4 INJECTION, SOLUTION INTRAVENOUS ONCE
Status: COMPLETED | OUTPATIENT
Start: 2025-07-07 | End: 2025-07-07

## 2025-07-07 RX ADMIN — REGADENOSON 0.4 MG: 0.08 INJECTION, SOLUTION INTRAVENOUS at 15:14

## 2025-07-08 LAB
CHEST PAIN STATEMENT: NORMAL
MAX DIASTOLIC BP: 74 MMHG
MAX PREDICTED HEART RATE: 148 BPM
PROTOCOL NAME: NORMAL
REASON FOR TERMINATION: NORMAL
STRESS POST EXERCISE DUR MIN: 3 MIN
STRESS POST EXERCISE DUR SEC: 0 SEC
STRESS POST PEAK HR: 106 BPM
STRESS POST PEAK SYSTOLIC BP: 142 MMHG
TARGET HR FORMULA: NORMAL
TEST INDICATION: NORMAL

## 2025-07-08 NOTE — TELEPHONE ENCOUNTER
----- Message from Alcon Anand MD sent at 7/8/2025 12:05 PM EDT -----  Please let patient know that stress test was normal.   ----- Message -----  From: Bryant Wakefield MD  Sent: 7/7/2025   4:55 PM EDT  To: Alcon Anand MD

## 2025-07-09 DIAGNOSIS — E78.00 HYPERCHOLESTEROLEMIA: ICD-10-CM

## 2025-07-09 NOTE — TELEPHONE ENCOUNTER
Medication: atorvastatin (LIPITOR)    Dose/Frequency: 10 mg tablet; Take 1 tablet (10 mg total) by mouth daily     Quantity: 90    Pharmacy: [facility] [address] [place] [address]     Office:   [x] PCP/Provider -   [] Speciality/Provider -     Does the patient have enough for 3 days?   [x] Yes   [] No - Send as HP to POD

## 2025-07-11 ENCOUNTER — OFFICE VISIT (OUTPATIENT)
Age: 75
End: 2025-07-11
Payer: COMMERCIAL

## 2025-07-11 ENCOUNTER — TELEPHONE (OUTPATIENT)
Age: 75
End: 2025-07-11

## 2025-07-11 VITALS
OXYGEN SATURATION: 96 % | DIASTOLIC BLOOD PRESSURE: 82 MMHG | BODY MASS INDEX: 34.66 KG/M2 | HEIGHT: 61 IN | SYSTOLIC BLOOD PRESSURE: 124 MMHG | WEIGHT: 183.6 LBS | HEART RATE: 93 BPM | TEMPERATURE: 97.5 F

## 2025-07-11 DIAGNOSIS — M54.50 CHRONIC BILATERAL LOW BACK PAIN WITHOUT SCIATICA: ICD-10-CM

## 2025-07-11 DIAGNOSIS — G89.29 CHRONIC BILATERAL LOW BACK PAIN WITHOUT SCIATICA: ICD-10-CM

## 2025-07-11 DIAGNOSIS — R10.84 GENERALIZED ABDOMINAL PAIN: Primary | ICD-10-CM

## 2025-07-11 DIAGNOSIS — R07.89 CHEST WALL PAIN: ICD-10-CM

## 2025-07-11 DIAGNOSIS — R06.02 SOB (SHORTNESS OF BREATH): ICD-10-CM

## 2025-07-11 PROCEDURE — G2211 COMPLEX E/M VISIT ADD ON: HCPCS

## 2025-07-11 PROCEDURE — 99213 OFFICE O/P EST LOW 20 MIN: CPT

## 2025-07-11 RX ORDER — GABAPENTIN 100 MG/1
200 CAPSULE ORAL 2 TIMES DAILY
Qty: 120 CAPSULE | Refills: 0 | Status: SHIPPED | OUTPATIENT
Start: 2025-07-11 | End: 2025-08-10

## 2025-07-11 RX ORDER — ATORVASTATIN CALCIUM 10 MG/1
10 TABLET, FILM COATED ORAL DAILY
Qty: 90 TABLET | Refills: 1 | Status: SHIPPED | OUTPATIENT
Start: 2025-07-11 | End: 2026-01-07

## 2025-07-11 NOTE — PROGRESS NOTES
Name: Pat Hurtado      : 1950      MRN: [id number]  Encounter Provider: Audrey Bains DO  Encounter Date: 2025   Encounter department: [facility]  :  Assessment & Plan  Generalized abdominal pain  With generalized midline abdominal pain, was seen in ED on  with workup unremarkable  - Reviewed labs  - CT abdomen pelvis without contrast on 1/15 showing small hiatal hernia, small fat-containing umbilical, supraumbilical, bilateral inguinal hernias, small calcified fibroid  -Increased gabapentin to 200 mg twice daily  - May need colonoscopy given dark stools   - Has upcoming apt with GI, advised to follow up  - Recommended food journal  -Discussed lifestyle interventions  - Strict ED/return precautions given   Orders:  •  gabapentin (Neurontin) 100 mg capsule; Take 2 capsules (200 mg total) by mouth 2 (two) times a day    Chest wall pain  Was seen in the ED on  for wall pain and left upper quadrant abdominal pain, reports has resolved  -Workup negative in ED  -He was prescribed gabapentin 100 mg twice daily upon discharge  -Patient states has resolved at this point       SOB (shortness of breath)  Symptoms of shortness of breath with exertion, previously with chest discomfort, currently without  CT chest without/29 showing low lung volumes with mild nonspecific elevation of the right hemidiaphragm and mild bibasilar atelectasis/or scarring  Reviewed recent stress test  Follow-up with cardiology  Gave strict ED precautions       Chronic bilateral low back pain without sciatica  Prior history of back pain  Increase gabapentin in, may help with pain  Will schedule patient for OMT in the office         Nutrition Assessment and Intervention:     Reviewed and updated Nutrition Prescription      Physical Activity Assessment and Intervention:    Reviewed and updated Physical Activity Prescription with patient      Therapeutic Lifestyle Change Visit:     One-on-one  comprehensive counseling, coaching, and health behavior change visit completed           History of Present Illness {?Quick Links Encounters * My Last Note * Last Note in Specialty * Snapshot * Since Last Visit * History :[id number]}  HPI  Patient is a 74-year-old female with past medical history of hypertension, anxiety, MDD, GERD that presents to the clinic today for ED follow-up.  Patient was seen in the ED on 7/1 for chest wall pain and left upper quadrant abdominal pain improvement in pain in these areas but still having midline generalized abdominal pain.  Reports feeling bloated, achy dull pain in her abdomen that is 6 out of 10 on pain scale, reports it is chronic, no radiation, has been going on for months since January.  Having 2 bowel movements per day, still having dark stools mixed, reports for over a year.  Denies bright red blood in stool.  Not taking Pepto-Bismol.  States gabapentin that was prescribed from the ED has helped with pain but not completely.  Has also been taking ibuprofen over-the-counter, she is not sure how much she has been taking.  Also states shortness of breath mostly with activity, following with cardiology.    Review of Systems   Constitutional:  Negative for chills and fever.   HENT:  Negative for ear pain and sore throat.    Eyes:  Negative for pain and visual disturbance.   Respiratory:  Positive for shortness of breath. Negative for cough.    Cardiovascular:  Negative for chest pain and palpitations.   Gastrointestinal:  Positive for abdominal pain and nausea. Negative for vomiting.   Genitourinary:  Negative for dysuria and hematuria.   Musculoskeletal:  Positive for back pain. Negative for arthralgias.   Skin:  Negative for color change and rash.   Neurological:  Negative for seizures and syncope.   All other systems reviewed and are negative.      Objective {?Quick Links Trend Vitals * Enter New Vitals * Results Review * Timeline (Adult) * Labs * Imaging * Cardiology *  "Procedures * Lung Cancer Screening * Surgical eConsent :[id number]}  /82   Pulse 93   Temp 97.5 °F (36.4 °C)   Ht 5' 1\" (1.549 m)   Wt 83.3 kg (183 lb 9.6 oz)   SpO2 96%   BMI 34.69 kg/m²      Physical Exam  Vitals reviewed.   Constitutional:       Appearance: Normal appearance. She is not ill-appearing, toxic-appearing or diaphoretic.   HENT:      Head: Normocephalic and atraumatic.      Right Ear: External ear normal.      Left Ear: External ear normal.      Nose: Nose normal.      Mouth/Throat:      Mouth: Mucous membranes are moist.      Pharynx: Oropharynx is clear.     Eyes:      Extraocular Movements: Extraocular movements intact.      Conjunctiva/sclera: Conjunctivae normal.      Pupils: Pupils are equal, round, and reactive to light.       Cardiovascular:      Rate and Rhythm: Normal rate and regular rhythm.      Pulses: Normal pulses.      Heart sounds: Normal heart sounds.   Pulmonary:      Effort: Pulmonary effort is normal.      Breath sounds: Normal breath sounds.   Abdominal:      General: Abdomen is flat. There is no distension.      Palpations: Abdomen is soft.      Tenderness: There is abdominal tenderness. There is no guarding or rebound.      Comments: TTP midline abdomen and LLQ     Musculoskeletal:         General: Normal range of motion.      Cervical back: Normal range of motion.      Right lower leg: No edema.      Left lower leg: No edema.     Skin:     General: Skin is warm.      Capillary Refill: Capillary refill takes less than 2 seconds.      Coloration: Skin is not jaundiced.      Findings: No erythema.     Neurological:      General: No focal deficit present.      Mental Status: She is alert and oriented to person, place, and time. Mental status is at baseline.     Psychiatric:         Mood and Affect: Mood normal.         Behavior: Behavior normal.       "

## 2025-07-15 ENCOUNTER — TELEPHONE (OUTPATIENT)
Age: 75
End: 2025-07-15

## 2025-07-17 NOTE — TELEPHONE ENCOUNTER
Pt called back and stated she doesn't want to make an in office appt with PCP office because its nothing that PCP can help with, she said she will wait to see GI.

## 2025-07-18 ENCOUNTER — PROCEDURE VISIT (OUTPATIENT)
Age: 75
End: 2025-07-18
Payer: COMMERCIAL

## 2025-07-18 ENCOUNTER — TELEPHONE (OUTPATIENT)
Dept: GASTROENTEROLOGY | Facility: AMBULARY SURGERY CENTER | Age: 75
End: 2025-07-18

## 2025-07-18 DIAGNOSIS — M54.50 CHRONIC BILATERAL LOW BACK PAIN WITHOUT SCIATICA: Primary | ICD-10-CM

## 2025-07-18 DIAGNOSIS — M99.04 SOMATIC DYSFUNCTION OF SPINE, SACRAL: ICD-10-CM

## 2025-07-18 DIAGNOSIS — R07.81 RIB PAIN ON RIGHT SIDE: ICD-10-CM

## 2025-07-18 DIAGNOSIS — M99.05 SOMATIC DYSFUNCTION OF PELVIC REGION: ICD-10-CM

## 2025-07-18 DIAGNOSIS — M99.03 SOMATIC DYSFUNCTION OF SPINE, LUMBAR: ICD-10-CM

## 2025-07-18 DIAGNOSIS — M99.08 SOMATIC DYSFUNCTION OF RIB: ICD-10-CM

## 2025-07-18 DIAGNOSIS — G89.29 CHRONIC BILATERAL LOW BACK PAIN WITHOUT SCIATICA: Primary | ICD-10-CM

## 2025-07-18 PROCEDURE — 98928 OSTEOPATH MANJ 7-8 REGIONS: CPT

## 2025-07-18 NOTE — PROGRESS NOTES
The Assessment & Plan     This is a 74 y.o. female who presents for OMT initial visit for:  1. Chronic bilateral low back pain without sciatica  Ambulatory referral to Spine & Pain Management    OMT      2. Rib pain on right side  OMT      3. Somatic dysfunction of spine, lumbar  OMT      4. Somatic dysfunction of spine, sacral  OMT      5. Somatic dysfunction of pelvic region  OMT      6. Somatic dysfunction of rib  OMT           1. Patient tolerated OMT well for the above problems,  advised patient to drink fluids and can use NSAID for soreness after treatment     2. OMT Follow up in 3 weeks.    Subjective     Pat Hurtado is a 74 y.o. female and is here for a OMT initial visit. The patient reports ongoing back pain, had history of low back pain with prior surgery but worsening last month. No radiation into extremities.     Is the patient taking Pain medication? yes  Has the patient completed physical therapy for this condition? no  Did Patient symptoms improve from last OMT appointment? Initial visit    The following portions of the patient's history were reviewed and updated as appropriate: allergies, current medications, past family history, past medical history, past social history, past surgical history, and problem list.    Review of Systems  Review of Systems   Constitutional:  Negative for chills and fever.   HENT:  Negative for ear pain and sore throat.    Eyes:  Negative for pain and visual disturbance.   Respiratory:  Negative for cough and shortness of breath.    Cardiovascular:  Negative for chest pain and palpitations.   Gastrointestinal:  Positive for abdominal pain. Negative for vomiting.   Genitourinary:  Negative for dysuria and hematuria.   Musculoskeletal:  Positive for back pain. Negative for arthralgias.   Skin:  Negative for color change and rash.   Neurological:  Negative for seizures and syncope.   All other systems reviewed and are negative.        Objective     OMT Exam      OMT    Performed by: Audrey Bains DO  Authorized by: Audrey Bains DO    Universal Protocol:  Consent: Verbal consent obtained  Risks and benefits: risks, benefits and alternatives were discussed  Consent given by: patient  Patient understanding: patient states understanding of the procedure being performed  Patient consent: the patient's understanding of the procedure matches consent given  Procedure consent: procedure consent matches procedure scheduled  Patient identity confirmed: verbally with patient      Procedure Details:     Region evaluated and treated:  Lumbar, Sacrum/Pelvis, Pelvis Innominate and Ribs    Lumbar details:     Examination Method:  Tissue Texture Change, Stability, Laxity, Effusions, Tone, Asymmetry, Misalignment, Crepitation, Defects, Masses and Tenderness, Pain    Severity:  Moderate    Osteopathic Findings:  L4 F RRSR, hypertonic paraspinals right greater than left    Treatment Method:  Muscle Energy Treatment, Myofascial Release Treatment and Soft Tissue Treatment    Response:  Improved - The somatic dysfunction is improved but not completely resolved.    Sacrum/Pelvis details:     Examination Method:  Tissue Texture Change, Stability, Laxity, Effusions, Tone, Asymmetry, Misalignment, Crepitation, Defects, Masses and Tenderness, Pain    Severity:  Moderate    Osteopathic Findings:  Right unilateral sacral extension    Treatment Method:  Soft Tissue Treatment, Myofascial Release Treatment and High Velocity, Low Amplitude Treatment    Response:  Improved - The somatic dysfunction is improved but not completely resolved.    Pelvis Innominate details:     Examination Method:  Tissue Texture Change, Stability, Laxity, Effusions, Tone, Asymmetry, Misalignment, Crepitation, Defects, Masses and Tenderness, Pain    Severity:  Moderate    Osteopathic Findings:  Right superior and anterior pelvic dysfunction    Treatment Method:  Muscle Energy Treatment, High Velocity, Low Amplitude  Treatment, Myofascial Release Treatment and Soft Tissue Treatment    Response:  Improved - The somatic dysfunction is improved but not completely resolved.    Ribs details:     Examination Method:  Tissue Texture Change, Stability, Laxity, Effusions, Tone, Asymmetry, Misalignment, Crepitation, Defects, Masses and Tenderness, Pain    Severity:  Moderate    Osteopathic Findings:  Ribs 7-9 on right and left inhalation dysfunction    Treatment Method:  Soft Tissue Treatment, Myofascial Release Treatment and Direct Treatment    Total Regions Treated:  4  Attending provider present in exam room for procedure: No

## 2025-07-18 NOTE — TELEPHONE ENCOUNTER
----- Message from Shae Cline PA-C sent at 7/17/2025 12:49 PM EDT -----  Regarding: FW: Earlier appointment  Hi, this is a new patient who is having black stools since June. If there is sooner availability than 8/18 with any provider, can we  her sooner? Alternatively she could be put in an urgent slot as well. Thanks!  ----- Message -----  From: Audrey Bains DO  Sent: 7/17/2025  12:36 PM EDT  To: Shae Cline PA-C  Subject: Earlier appointment                              Good afternoon,     I hope you are doing well. I am the PCP for this patient. She is having left sided abdominal pain since January with abdominal bloating and now having dark stools since June. I see that she has an appointment with you on 8/18, but patient wanted me to reach out to see if you'd be able to see her sooner. Please let me know, and thank you so much!     Sincerely,     Dr. Bains

## 2025-07-21 ENCOUNTER — TELEPHONE (OUTPATIENT)
Age: 75
End: 2025-07-21

## 2025-07-23 ENCOUNTER — CONSULT (OUTPATIENT)
Dept: GASTROENTEROLOGY | Facility: AMBULARY SURGERY CENTER | Age: 75
End: 2025-07-23
Payer: COMMERCIAL

## 2025-07-23 ENCOUNTER — HOSPITAL ENCOUNTER (OUTPATIENT)
Dept: RADIOLOGY | Facility: HOSPITAL | Age: 75
Discharge: HOME | End: 2025-07-23
Payer: COMMERCIAL

## 2025-07-23 VITALS
HEIGHT: 61 IN | OXYGEN SATURATION: 97 % | HEART RATE: 86 BPM | SYSTOLIC BLOOD PRESSURE: 126 MMHG | BODY MASS INDEX: 34.55 KG/M2 | WEIGHT: 183 LBS | DIASTOLIC BLOOD PRESSURE: 84 MMHG

## 2025-07-23 DIAGNOSIS — R10.31 CHRONIC RLQ PAIN: ICD-10-CM

## 2025-07-23 DIAGNOSIS — K59.00 CONSTIPATION, UNSPECIFIED CONSTIPATION TYPE: ICD-10-CM

## 2025-07-23 DIAGNOSIS — K59.00 CONSTIPATION, UNSPECIFIED CONSTIPATION TYPE: Primary | ICD-10-CM

## 2025-07-23 DIAGNOSIS — G89.29 CHRONIC RLQ PAIN: ICD-10-CM

## 2025-07-23 DIAGNOSIS — R10.13 EPIGASTRIC PAIN: ICD-10-CM

## 2025-07-23 DIAGNOSIS — R19.5 DARK STOOLS: ICD-10-CM

## 2025-07-23 PROCEDURE — 99204 OFFICE O/P NEW MOD 45 MIN: CPT | Performed by: PHYSICIAN ASSISTANT

## 2025-07-23 PROCEDURE — 74018 RADEX ABDOMEN 1 VIEW: CPT

## 2025-07-23 RX ORDER — SODIUM CHLORIDE, SODIUM LACTATE, POTASSIUM CHLORIDE, CALCIUM CHLORIDE 600; 310; 30; 20 MG/100ML; MG/100ML; MG/100ML; MG/100ML
125 INJECTION, SOLUTION INTRAVENOUS CONTINUOUS
OUTPATIENT
Start: 2025-07-23

## 2025-07-23 NOTE — PATIENT INSTRUCTIONS
Scheduled date of EGD/colonoscopy (as of today): Aug 6, 2025  Physician performing EGD/colonoscopy: Dr Perez   Location of EGD/colonoscopy: [facility]   Desired bowel prep reviewed with patient: [email or web address]   Instructions reviewed with patient by: ZAHEER   Clearances:  n/a

## 2025-07-23 NOTE — PROGRESS NOTES
Name: Pat Hurtado      : 1950      MRN: [id number]  Encounter Provider: Kelsey Ortiz PA-C  Encounter Date: 2025   Encounter department: [facility]  :  Assessment & Plan  Dark stools  Epigastric pain  CT imaging earlier this year unremarkable for source of symptoms, though she reports symptoms are relatively new over the past few months.  Hemoglobin normal at 13. On omeprazole.  Reports symptoms of dyspepsia upper abdominal discomfort.  Possibly in setting of peptic ulcer disease, H. pylori infection, possibly due to constipation as well.    -Due to persistent symptoms, recommend EGD  - Continue omeprazole 40 mg once daily.  - Continue Mylanta or trial Gaviscon as needed for breakthrough symptoms.  - Management of constipation as noted below.  -Medication adjustments based on EGD results.    - Discussed if any severe worsening of symptoms would recommend going to the emergency room.    Constipation, unspecified constipation type  Chronic RLQ pain  She reports change in bowel habits.  More predominant constipation.  Incomplete sensation evacuation. She is taking Metamucil daily.  She started a probiotic.  Suspect constipation may be contributing to her symptoms. Last colonoscopy .    - Recommend KUB now  - If significant constipation is noted would recommend bowel prep followed by daily MiraLAX.  -Continue Metamucil     - Due to change in bowel habits, dark stools and abdominal pain, recommend colonoscopy along with EGD.      If above workup is unremarkable, would consider repeat CT scan due to significance of symptoms.      History of Present Illness   Pat Hurtado is a 74 y.o. female who presents with hypertension, asthma, hypothyroidism, CKD who presents to the office as a new patient for dark stools and abdominal pain.  Patient presents with her spouse.    Patient went to the emergency room for symptoms .  Last abdominal imaging 2025  "with small umbilical, and epigastric hernia.    Patient reports significant abdominal discomfort.  This is mainly in the epigastric area as well as suprapubic area.  She reports change in bowel habits.  She reports more significant constipation.  She reports urge to have a bowel movement but unable to go.  She is taking Metamucil daily.  She reports most the time having a bowel movement every day however sometimes skipping days.  She denies any bright red blood in the stool but does report seeing black specks in the stool this has been ongoing.  She reports her weight has been increasing.  She tried a probiotic which she just started recently.  She has been on Prilosec for over a year.  She denies any nausea, vomiting or difficulty swallowing with this.  It is significantly uncomfortable.    Most recent lab work with normal hemoglobin of 13.  CMP unremarkable.    Previous colonoscopy in 2019 with removal of polyps.  These came back as hyperplastic and repeat colonoscopy recommended in 10 years.    HPI    Review of Systems   All other systems reviewed and are negative.   A complete review of systems is negative other than that noted above in the HPI.         Objective   /84 (BP Location: Right arm, Patient Position: Sitting, Cuff Size: Standard)   Pulse 86   Ht 5' 1\" (1.549 m)   Wt 83 kg (183 lb)   SpO2 97%   BMI 34.58 kg/m²     Physical Exam  Vitals reviewed.   Constitutional:       General: She is not in acute distress.     Appearance: Normal appearance. She is not ill-appearing.   HENT:      Head: Normocephalic and atraumatic.     Eyes:      Conjunctiva/sclera: Conjunctivae normal.       Cardiovascular:      Rate and Rhythm: Normal rate and regular rhythm.      Heart sounds: No murmur heard.  Pulmonary:      Effort: Pulmonary effort is normal. No respiratory distress.      Breath sounds: Normal breath sounds.   Abdominal:      General: Abdomen is flat. There is no distension.      Palpations: Abdomen is " soft.      Tenderness: There is generalized abdominal tenderness and tenderness in the epigastric area and suprapubic area. There is no guarding or rebound.     Musculoskeletal:         General: No swelling.      Cervical back: Normal range of motion.      Right lower leg: No edema.      Left lower leg: No edema.     Skin:     General: Skin is warm.      Coloration: Skin is not jaundiced.     Neurological:      General: No focal deficit present.      Mental Status: She is alert and oriented to person, place, and time. Mental status is at baseline.     Psychiatric:         Mood and Affect: Mood normal.         Behavior: Behavior normal.          Lab Results: I personally reviewed relevant lab results.

## 2025-07-23 NOTE — ASSESSMENT & PLAN NOTE
She reports change in bowel habits.  More predominant constipation.  Incomplete sensation evacuation. She is taking Metamucil daily.  She started a probiotic.  Suspect constipation may be contributing to her symptoms. Last colonoscopy 2019.    - Recommend KUB now  - If significant constipation is noted would recommend bowel prep followed by daily MiraLAX.  -Continue Metamucil     - Due to change in bowel habits, dark stools and abdominal pain, recommend colonoscopy along with EGD.

## 2025-07-24 ENCOUNTER — TELEPHONE (OUTPATIENT)
Age: 75
End: 2025-07-24

## 2025-07-24 NOTE — TELEPHONE ENCOUNTER
Pt called to say she is going to stop taking    omeprazole (PriLOSEC) 40 MG capsule for a while.  She thinks it may be causing her stomach ache.

## 2025-07-25 ENCOUNTER — TELEPHONE (OUTPATIENT)
Age: 75
End: 2025-07-25

## 2025-07-25 NOTE — TELEPHONE ENCOUNTER
Spoke with pt confirming her 8/6 procedure with Dr. Perez. Her  is her  and she will be called the day before with her arrival time. She has her bowel prep as well.

## 2025-07-29 ENCOUNTER — TELEPHONE (OUTPATIENT)
Age: 75
End: 2025-07-29

## 2025-07-30 ENCOUNTER — ANESTHESIA (OUTPATIENT)
Dept: ANESTHESIOLOGY | Facility: HOSPITAL | Age: 75
End: 2025-07-30

## 2025-07-30 ENCOUNTER — ANESTHESIA EVENT (OUTPATIENT)
Dept: ANESTHESIOLOGY | Facility: HOSPITAL | Age: 75
End: 2025-07-30

## 2025-07-30 DIAGNOSIS — R10.31 CHRONIC RLQ PAIN: ICD-10-CM

## 2025-07-30 DIAGNOSIS — G89.29 CHRONIC RLQ PAIN: ICD-10-CM

## 2025-07-30 DIAGNOSIS — R19.5 DARK STOOLS: Primary | ICD-10-CM

## 2025-08-06 ENCOUNTER — HOSPITAL ENCOUNTER (OUTPATIENT)
Dept: GASTROENTEROLOGY | Facility: AMBULARY SURGERY CENTER | Age: 75
Setting detail: OUTPATIENT SURGERY
Discharge: HOME | End: 2025-08-06
Attending: PHYSICIAN ASSISTANT
Payer: COMMERCIAL

## 2025-08-06 ENCOUNTER — ANESTHESIA (OUTPATIENT)
Dept: GASTROENTEROLOGY | Facility: AMBULARY SURGERY CENTER | Age: 75
End: 2025-08-06
Payer: COMMERCIAL

## 2025-08-06 PROBLEM — R06.02 SOB (SHORTNESS OF BREATH): Status: RESOLVED | Noted: 2021-05-12 | Resolved: 2025-08-06

## 2025-08-06 PROBLEM — R07.81 RIB PAIN ON LEFT SIDE: Status: RESOLVED | Noted: 2023-12-14 | Resolved: 2025-08-06

## 2025-08-06 RX ORDER — SODIUM CHLORIDE, SODIUM LACTATE, POTASSIUM CHLORIDE, CALCIUM CHLORIDE 600; 310; 30; 20 MG/100ML; MG/100ML; MG/100ML; MG/100ML
INJECTION, SOLUTION INTRAVENOUS CONTINUOUS PRN
Status: DISCONTINUED | OUTPATIENT
Start: 2025-08-06 | End: 2025-08-06

## 2025-08-06 RX ORDER — PROPOFOL 10 MG/ML
INJECTION, EMULSION INTRAVENOUS CONTINUOUS PRN
Status: DISCONTINUED | OUTPATIENT
Start: 2025-08-06 | End: 2025-08-06

## 2025-08-06 RX ORDER — PROPOFOL 10 MG/ML
INJECTION, EMULSION INTRAVENOUS AS NEEDED
Status: DISCONTINUED | OUTPATIENT
Start: 2025-08-06 | End: 2025-08-06

## 2025-08-06 RX ORDER — LIDOCAINE HYDROCHLORIDE 10 MG/ML
INJECTION, SOLUTION EPIDURAL; INFILTRATION; INTRACAUDAL; PERINEURAL AS NEEDED
Status: DISCONTINUED | OUTPATIENT
Start: 2025-08-06 | End: 2025-08-06

## 2025-08-06 RX ADMIN — PROPOFOL 100 MCG/KG/MIN: 10 INJECTION, EMULSION INTRAVENOUS at 10:57

## 2025-08-06 RX ADMIN — LIDOCAINE HYDROCHLORIDE 50 MG: 10 INJECTION, SOLUTION EPIDURAL; INFILTRATION; INTRACAUDAL at 10:51

## 2025-08-06 RX ADMIN — SODIUM CHLORIDE, SODIUM LACTATE, POTASSIUM CHLORIDE, AND CALCIUM CHLORIDE: .6; .31; .03; .02 INJECTION, SOLUTION INTRAVENOUS at 10:26

## 2025-08-06 RX ADMIN — PROPOFOL 100 MG: 10 INJECTION, EMULSION INTRAVENOUS at 10:51

## 2025-08-06 RX ADMIN — PROPOFOL 50 MG: 10 INJECTION, EMULSION INTRAVENOUS at 10:53

## 2025-08-07 ENCOUNTER — OFFICE VISIT (OUTPATIENT)
Dept: CARDIOLOGY CLINIC | Facility: CLINIC | Age: 75
End: 2025-08-07
Payer: COMMERCIAL

## 2025-08-07 VITALS
SYSTOLIC BLOOD PRESSURE: 130 MMHG | WEIGHT: 181 LBS | BODY MASS INDEX: 34.17 KG/M2 | HEART RATE: 91 BPM | DIASTOLIC BLOOD PRESSURE: 80 MMHG | OXYGEN SATURATION: 97 % | HEIGHT: 61 IN | TEMPERATURE: 98.7 F

## 2025-08-07 DIAGNOSIS — I10 ESSENTIAL HYPERTENSION: Primary | ICD-10-CM

## 2025-08-07 DIAGNOSIS — R07.9 CHEST PAIN, UNSPECIFIED TYPE: ICD-10-CM

## 2025-08-07 PROCEDURE — 99213 OFFICE O/P EST LOW 20 MIN: CPT | Performed by: INTERNAL MEDICINE

## 2025-08-08 ENCOUNTER — TELEPHONE (OUTPATIENT)
Age: 75
End: 2025-08-08

## 2025-08-12 ENCOUNTER — TELEPHONE (OUTPATIENT)
Age: 75
End: 2025-08-12

## 2025-08-15 ENCOUNTER — PROCEDURE VISIT (OUTPATIENT)
Age: 75
End: 2025-08-15
Payer: COMMERCIAL

## (undated) DEVICE — ENDOCUFF VISION LRG GREEN ID 11.2